# Patient Record
Sex: MALE | Race: WHITE | Employment: OTHER | URBAN - NONMETROPOLITAN AREA
[De-identification: names, ages, dates, MRNs, and addresses within clinical notes are randomized per-mention and may not be internally consistent; named-entity substitution may affect disease eponyms.]

---

## 2017-01-03 ENCOUNTER — OFFICE VISIT (OUTPATIENT)
Dept: SURGERY | Age: 62
End: 2017-01-03

## 2017-01-03 VITALS
SYSTOLIC BLOOD PRESSURE: 130 MMHG | HEART RATE: 72 BPM | RESPIRATION RATE: 14 BRPM | HEIGHT: 72 IN | DIASTOLIC BLOOD PRESSURE: 78 MMHG | BODY MASS INDEX: 30.23 KG/M2 | WEIGHT: 223.2 LBS

## 2017-01-03 DIAGNOSIS — K63.5 COLON POLYPS: Primary | ICD-10-CM

## 2017-01-03 PROCEDURE — 99213 OFFICE O/P EST LOW 20 MIN: CPT | Performed by: SURGERY

## 2017-01-03 RX ORDER — CHLORAL HYDRATE 500 MG
1200 CAPSULE ORAL 2 TIMES DAILY
COMMUNITY

## 2017-01-19 ENCOUNTER — OFFICE VISIT (OUTPATIENT)
Dept: PRIMARY CARE CLINIC | Age: 62
End: 2017-01-19

## 2017-01-19 VITALS
SYSTOLIC BLOOD PRESSURE: 132 MMHG | WEIGHT: 227.8 LBS | HEART RATE: 74 BPM | BODY MASS INDEX: 30.85 KG/M2 | HEIGHT: 72 IN | DIASTOLIC BLOOD PRESSURE: 80 MMHG | TEMPERATURE: 98.2 F | OXYGEN SATURATION: 97 %

## 2017-01-19 DIAGNOSIS — R05.9 COUGH: ICD-10-CM

## 2017-01-19 DIAGNOSIS — J01.40 ACUTE NON-RECURRENT PANSINUSITIS: Primary | ICD-10-CM

## 2017-01-19 DIAGNOSIS — J20.9 ACUTE BRONCHITIS, UNSPECIFIED ORGANISM: ICD-10-CM

## 2017-01-19 PROCEDURE — 99213 OFFICE O/P EST LOW 20 MIN: CPT | Performed by: PHYSICIAN ASSISTANT

## 2017-01-19 RX ORDER — BENZONATATE 200 MG/1
200 CAPSULE ORAL 3 TIMES DAILY PRN
Qty: 30 CAPSULE | Refills: 2 | Status: SHIPPED | OUTPATIENT
Start: 2017-01-19 | End: 2017-01-26

## 2017-01-19 RX ORDER — AZITHROMYCIN 250 MG/1
TABLET, FILM COATED ORAL
Qty: 1 PACKET | Refills: 0 | Status: SHIPPED | OUTPATIENT
Start: 2017-01-19 | End: 2017-01-29

## 2017-01-19 ASSESSMENT — ENCOUNTER SYMPTOMS
DIARRHEA: 0
COUGH: 1
NAUSEA: 0
VOMITING: 0
RHINORRHEA: 1
SHORTNESS OF BREATH: 0
WHEEZING: 1
SORE THROAT: 0
SINUS PRESSURE: 1
CHEST TIGHTNESS: 1

## 2017-01-26 ENCOUNTER — PROCEDURE VISIT (OUTPATIENT)
Dept: CARDIOLOGY | Age: 62
End: 2017-01-26

## 2017-01-26 DIAGNOSIS — I51.9 LV DYSFUNCTION: ICD-10-CM

## 2017-01-26 DIAGNOSIS — Z95.810 AICD (AUTOMATIC CARDIOVERTER/DEFIBRILLATOR) PRESENT: Primary | ICD-10-CM

## 2017-01-26 PROCEDURE — 93289 INTERROG DEVICE EVAL HEART: CPT | Performed by: INTERNAL MEDICINE

## 2017-02-07 ENCOUNTER — TELEPHONE (OUTPATIENT)
Dept: CARDIOLOGY | Age: 62
End: 2017-02-07

## 2017-03-20 ENCOUNTER — OFFICE VISIT (OUTPATIENT)
Dept: CARDIOLOGY | Age: 62
End: 2017-03-20
Payer: COMMERCIAL

## 2017-03-20 VITALS
BODY MASS INDEX: 31.69 KG/M2 | DIASTOLIC BLOOD PRESSURE: 84 MMHG | WEIGHT: 234 LBS | HEIGHT: 72 IN | SYSTOLIC BLOOD PRESSURE: 132 MMHG | HEART RATE: 64 BPM

## 2017-03-20 DIAGNOSIS — I10 ESSENTIAL HYPERTENSION: ICD-10-CM

## 2017-03-20 DIAGNOSIS — I25.5 CARDIOMYOPATHY, ISCHEMIC: ICD-10-CM

## 2017-03-20 DIAGNOSIS — I25.10 CORONARY ARTERY DISEASE INVOLVING NATIVE CORONARY ARTERY OF NATIVE HEART WITHOUT ANGINA PECTORIS: Primary | ICD-10-CM

## 2017-03-20 PROCEDURE — 93000 ELECTROCARDIOGRAM COMPLETE: CPT | Performed by: INTERNAL MEDICINE

## 2017-03-20 PROCEDURE — 99213 OFFICE O/P EST LOW 20 MIN: CPT | Performed by: INTERNAL MEDICINE

## 2017-03-24 RX ORDER — LORATADINE 10 MG/1
TABLET ORAL
Qty: 30 TABLET | Refills: 1 | Status: SHIPPED | OUTPATIENT
Start: 2017-03-24 | End: 2019-09-04

## 2017-04-13 ENCOUNTER — OFFICE VISIT (OUTPATIENT)
Dept: FAMILY MEDICINE CLINIC | Age: 62
End: 2017-04-13
Payer: COMMERCIAL

## 2017-04-13 VITALS
WEIGHT: 234 LBS | SYSTOLIC BLOOD PRESSURE: 138 MMHG | HEART RATE: 72 BPM | DIASTOLIC BLOOD PRESSURE: 70 MMHG | HEIGHT: 72 IN | BODY MASS INDEX: 31.69 KG/M2

## 2017-04-13 DIAGNOSIS — I25.10 CORONARY ARTERY DISEASE INVOLVING NATIVE CORONARY ARTERY OF NATIVE HEART WITHOUT ANGINA PECTORIS: ICD-10-CM

## 2017-04-13 DIAGNOSIS — E03.9 HYPOTHYROIDISM, UNSPECIFIED TYPE: ICD-10-CM

## 2017-04-13 DIAGNOSIS — K63.5 COLON POLYPS: ICD-10-CM

## 2017-04-13 DIAGNOSIS — F32.5 MAJOR DEPRESSIVE DISORDER WITH SINGLE EPISODE, IN FULL REMISSION (HCC): ICD-10-CM

## 2017-04-13 DIAGNOSIS — F17.210 NICOTINE DEPENDENCE, CIGARETTES, UNCOMPLICATED: Primary | ICD-10-CM

## 2017-04-13 DIAGNOSIS — I10 ESSENTIAL HYPERTENSION: ICD-10-CM

## 2017-04-13 DIAGNOSIS — Z72.0 TOBACCO ABUSE: ICD-10-CM

## 2017-04-13 DIAGNOSIS — E78.5 HYPERLIPIDEMIA WITH TARGET LDL LESS THAN 70: ICD-10-CM

## 2017-04-13 DIAGNOSIS — N52.9 ERECTILE DYSFUNCTION, UNSPECIFIED ERECTILE DYSFUNCTION TYPE: ICD-10-CM

## 2017-04-13 DIAGNOSIS — Z12.5 SCREENING PSA (PROSTATE SPECIFIC ANTIGEN): ICD-10-CM

## 2017-04-13 PROCEDURE — G0296 VISIT TO DETERM LDCT ELIG: HCPCS | Performed by: FAMILY MEDICINE

## 2017-04-13 PROCEDURE — 99214 OFFICE O/P EST MOD 30 MIN: CPT | Performed by: FAMILY MEDICINE

## 2017-04-13 ASSESSMENT — ENCOUNTER SYMPTOMS
COUGH: 0
SHORTNESS OF BREATH: 0
ALLERGIC/IMMUNOLOGIC NEGATIVE: 1
RESPIRATORY NEGATIVE: 1
GASTROINTESTINAL NEGATIVE: 1
EYES NEGATIVE: 1

## 2017-07-18 ENCOUNTER — OFFICE VISIT (OUTPATIENT)
Dept: FAMILY MEDICINE CLINIC | Age: 62
End: 2017-07-18
Payer: COMMERCIAL

## 2017-07-18 VITALS
HEIGHT: 72 IN | OXYGEN SATURATION: 97 % | HEART RATE: 86 BPM | DIASTOLIC BLOOD PRESSURE: 76 MMHG | SYSTOLIC BLOOD PRESSURE: 122 MMHG | WEIGHT: 229.2 LBS | BODY MASS INDEX: 31.04 KG/M2 | TEMPERATURE: 97.8 F

## 2017-07-18 DIAGNOSIS — D22.9 ATYPICAL NEVI: Primary | ICD-10-CM

## 2017-07-18 PROCEDURE — 99213 OFFICE O/P EST LOW 20 MIN: CPT | Performed by: NURSE PRACTITIONER

## 2017-07-18 ASSESSMENT — ENCOUNTER SYMPTOMS
SHORTNESS OF BREATH: 0
WHEEZING: 0
COUGH: 0

## 2017-07-27 ENCOUNTER — PROCEDURE VISIT (OUTPATIENT)
Dept: CARDIOLOGY | Age: 62
End: 2017-07-27
Payer: COMMERCIAL

## 2017-07-27 DIAGNOSIS — Z95.810 AICD (AUTOMATIC CARDIOVERTER/DEFIBRILLATOR) PRESENT: Primary | ICD-10-CM

## 2017-07-27 DIAGNOSIS — I51.9 LV DYSFUNCTION: ICD-10-CM

## 2017-07-27 DIAGNOSIS — I25.5 CARDIOMYOPATHY, ISCHEMIC: ICD-10-CM

## 2017-07-27 DIAGNOSIS — I48.0 PAROXYSMAL ATRIAL FIBRILLATION (HCC): ICD-10-CM

## 2017-07-27 PROCEDURE — 93289 INTERROG DEVICE EVAL HEART: CPT | Performed by: INTERNAL MEDICINE

## 2017-08-08 ENCOUNTER — TELEPHONE (OUTPATIENT)
Dept: FAMILY MEDICINE CLINIC | Age: 62
End: 2017-08-08

## 2017-10-02 ENCOUNTER — OFFICE VISIT (OUTPATIENT)
Dept: CARDIOLOGY | Age: 62
End: 2017-10-02
Payer: COMMERCIAL

## 2017-10-02 ENCOUNTER — HOSPITAL ENCOUNTER (OUTPATIENT)
Dept: LAB | Age: 62
Setting detail: SPECIMEN
Discharge: HOME | End: 2017-10-02
Payer: COMMERCIAL

## 2017-10-02 VITALS
RESPIRATION RATE: 16 BRPM | BODY MASS INDEX: 30.07 KG/M2 | DIASTOLIC BLOOD PRESSURE: 78 MMHG | WEIGHT: 222 LBS | HEART RATE: 69 BPM | HEIGHT: 72 IN | SYSTOLIC BLOOD PRESSURE: 126 MMHG

## 2017-10-02 DIAGNOSIS — I10 ESSENTIAL HYPERTENSION: ICD-10-CM

## 2017-10-02 DIAGNOSIS — Z71.6 TOBACCO ABUSE COUNSELING: ICD-10-CM

## 2017-10-02 DIAGNOSIS — E78.5 HYPERLIPIDEMIA WITH TARGET LDL LESS THAN 70: ICD-10-CM

## 2017-10-02 DIAGNOSIS — I25.10 CORONARY ARTERY DISEASE INVOLVING NATIVE CORONARY ARTERY OF NATIVE HEART WITHOUT ANGINA PECTORIS: Primary | ICD-10-CM

## 2017-10-02 DIAGNOSIS — E03.9 HYPOTHYROIDISM, UNSPECIFIED TYPE: ICD-10-CM

## 2017-10-02 DIAGNOSIS — I48.0 PAROXYSMAL ATRIAL FIBRILLATION (HCC): ICD-10-CM

## 2017-10-02 DIAGNOSIS — E78.5 HYPERLIPIDEMIA, UNSPECIFIED HYPERLIPIDEMIA TYPE: ICD-10-CM

## 2017-10-02 DIAGNOSIS — I25.5 ISCHEMIC CARDIOMYOPATHY: ICD-10-CM

## 2017-10-02 DIAGNOSIS — Z72.0 TOBACCO ABUSE: ICD-10-CM

## 2017-10-02 DIAGNOSIS — F41.9 ANXIETY: ICD-10-CM

## 2017-10-02 DIAGNOSIS — Z12.5 SCREENING PSA (PROSTATE SPECIFIC ANTIGEN): ICD-10-CM

## 2017-10-02 LAB
ABSOLUTE EOS #: 0.1 K/UL (ref 0–0.4)
ABSOLUTE LYMPH #: 3.4 K/UL (ref 1–4.8)
ABSOLUTE MONO #: 0.5 K/UL (ref 0.1–1.2)
ANION GAP SERPL CALCULATED.3IONS-SCNC: 12 MMOL/L (ref 9–17)
BASOPHILS # BLD: 1 % (ref 0–2)
BASOPHILS ABSOLUTE: 0.1 K/UL (ref 0–0.2)
BUN BLDV-MCNC: 15 MG/DL (ref 8–23)
BUN/CREAT BLD: 18 (ref 9–20)
CALCIUM SERPL-MCNC: 9.5 MG/DL (ref 8.6–10.4)
CHLORIDE BLD-SCNC: 102 MMOL/L (ref 98–107)
CHOLESTEROL/HDL RATIO: 3.8
CHOLESTEROL: 142 MG/DL
CO2: 26 MMOL/L (ref 20–31)
CREAT SERPL-MCNC: 0.82 MG/DL (ref 0.7–1.2)
DIFFERENTIAL TYPE: ABNORMAL
EOSINOPHILS RELATIVE PERCENT: 2 % (ref 1–8)
GFR AFRICAN AMERICAN: >60 ML/MIN
GFR NON-AFRICAN AMERICAN: >60 ML/MIN
GFR SERPL CREATININE-BSD FRML MDRD: NORMAL ML/MIN/{1.73_M2}
GFR SERPL CREATININE-BSD FRML MDRD: NORMAL ML/MIN/{1.73_M2}
GLUCOSE BLD-MCNC: 84 MG/DL (ref 70–99)
HCT VFR BLD CALC: 41.9 % (ref 41–53)
HDLC SERPL-MCNC: 37 MG/DL
HEMOGLOBIN: 14.7 G/DL (ref 13.5–17.5)
LDL CHOLESTEROL: 64 MG/DL (ref 0–130)
LYMPHOCYTES # BLD: 44 % (ref 15–43)
MCH RBC QN AUTO: 34.1 PG (ref 26–34)
MCHC RBC AUTO-ENTMCNC: 35.2 G/DL (ref 31–37)
MCV RBC AUTO: 96.9 FL (ref 80–100)
MONOCYTES # BLD: 7 % (ref 6–14)
PDW BLD-RTO: 13.1 % (ref 11–14.5)
PLATELET # BLD: 172 K/UL (ref 140–450)
PLATELET ESTIMATE: ABNORMAL
PMV BLD AUTO: 9.9 FL (ref 6–12)
POTASSIUM SERPL-SCNC: 4 MMOL/L (ref 3.7–5.3)
PROSTATE SPECIFIC ANTIGEN: 1.86 UG/L
RBC # BLD: 4.33 M/UL (ref 4.5–5.9)
RBC # BLD: ABNORMAL 10*6/UL
SEG NEUTROPHILS: 46 % (ref 44–74)
SEGMENTED NEUTROPHILS ABSOLUTE COUNT: 3.5 K/UL (ref 1.8–7.7)
SODIUM BLD-SCNC: 140 MMOL/L (ref 135–144)
THYROXINE, FREE: 1.22 NG/DL (ref 0.93–1.7)
TRIGL SERPL-MCNC: 203 MG/DL
TSH SERPL DL<=0.05 MIU/L-ACNC: 1.44 MIU/L (ref 0.3–5)
VLDLC SERPL CALC-MCNC: ABNORMAL MG/DL (ref 1–30)
WBC # BLD: 7.6 K/UL (ref 3.5–11)
WBC # BLD: ABNORMAL 10*3/UL

## 2017-10-02 PROCEDURE — 36415 COLL VENOUS BLD VENIPUNCTURE: CPT

## 2017-10-02 PROCEDURE — 85025 COMPLETE CBC W/AUTO DIFF WBC: CPT

## 2017-10-02 PROCEDURE — 99213 OFFICE O/P EST LOW 20 MIN: CPT | Performed by: INTERNAL MEDICINE

## 2017-10-02 PROCEDURE — 80048 BASIC METABOLIC PNL TOTAL CA: CPT

## 2017-10-02 PROCEDURE — 93000 ELECTROCARDIOGRAM COMPLETE: CPT | Performed by: INTERNAL MEDICINE

## 2017-10-02 PROCEDURE — 80061 LIPID PANEL: CPT

## 2017-10-02 PROCEDURE — 84443 ASSAY THYROID STIM HORMONE: CPT

## 2017-10-02 PROCEDURE — G0103 PSA SCREENING: HCPCS

## 2017-10-02 PROCEDURE — 84439 ASSAY OF FREE THYROXINE: CPT

## 2017-10-02 RX ORDER — VARENICLINE TARTRATE 1 MG/1
1 TABLET, FILM COATED ORAL 2 TIMES DAILY
Qty: 60 TABLET | Refills: 5 | Status: SHIPPED | OUTPATIENT
Start: 2017-10-02 | End: 2018-01-09 | Stop reason: SDUPTHER

## 2017-10-02 RX ORDER — VARENICLINE TARTRATE 25 MG
KIT ORAL
Qty: 42 TABLET | Refills: 0 | Status: SHIPPED | OUTPATIENT
Start: 2017-10-02 | End: 2018-01-09

## 2017-10-02 NOTE — PROGRESS NOTES
Cardiology Consultation  [facility]    Patient is here for follow up:    HPI and Chief Complaint:  Renetta Aguilar  is doing well from a cardiac standpoint. Good functional capacity with no significant change in functional capacity. No chest pain, no dyspnea, no PND, no syncope or pre-syncope, no orthopnea. No symptoms of CHF or angina/chest pain. Here for follow up regarding the above chronic stable cardiovascular conditions. REVIEW OF SYSTEMS:    · Constitutional: there has been no unanticipated weight loss. There's been No change in energy level, No change in activity level. · Eyes: No visual changes or diplopia. No scleral icterus. · ENT: No Headaches, hearing loss or vertigo. No mouth sores or sore throat. · Cardiovascular: No chest pain, no dyspnea, no chf like symptoms  · Respiratory: No previous pulmonary problems  · Gastrointestinal: No abdominal pain, appetite loss, blood in stools. No change in bowel or bladder habits. · Genitourinary: No dysuria, trouble voiding, or hematuria. · Musculoskeletal:  No gait disturbance, No weakness or joint complaints. · Integumentary: No rash or pruritis. · Neurological: No headache, diplopia, change in muscle strength, numbness or tingling. No change in gait, balance, coordination, mood, affect, memory, mentation, behavior. · Psychiatric: No new anxiety or depression. · Endocrine: No temperature intolerance. No excessive thirst, fluid intake, or urination. No tremor. · Hematologic/Lymphatic: No abnormal bruising or bleeding, blood clots or swollen lymph nodes. · Allergic/Immunologic: No nasal congestion or hives. Physical Exam:   Vitals: /78  Pulse 69  Resp 16  Ht 6' (1.829 m)  Wt 222 lb (100.7 kg)  BMI 30.11 kg/m2  General appearance: alert and cooperative with exam  HEENT: Head: Normocephalic, no lesions, without obvious abnormality.   Neck: no carotid bruit, no JVD  Lungs: clear to auscultation bilaterally  Heart: regular

## 2017-10-02 NOTE — MR AVS SNAPSHOT
Current Some Day Smoker           Additional Information about your Body Mass Index (BMI)           Your BMI as listed above is considered obese (30 or more). BMI is an estimate of body fat, calculated from your height and weight. The higher your BMI, the greater your risk of heart disease, high blood pressure, type 2 diabetes, stroke, gallstones, arthritis, sleep apnea, and certain cancers. BMI is not perfect. It may overestimate body fat in athletes and people who are more muscular. Even a small weight loss (between 5 and 10 percent of your current weight) by decreasing your calorie intake and becoming more physically active will help lower your risk of developing or worsening diseases associated with obesity. Learn more at: [email or web address]             Today's Medication Changes          These changes are accurate as of: 10/2/17  2:17 PM.  If you have any questions, ask your nurse or doctor. START taking these medications           * varenicline 0.5 MG X 11 & 1 MG X 42 tablet   Commonly known as:  CHANTIX STARTING MONTH LETY   Instructions: Take by mouth. Quantity:  42 tablet   Refills:  0   Started by: DO Kaye Chris varenicline 1 MG tablet   Commonly known as:  CHANTIX CONTINUING MONTH LETY   Instructions: Take 1 tablet by mouth 2 times daily   Quantity:  60 tablet   Refills:  5   Started by: Ghanshyam Benavidez DO       * Notice: This list has 2 medication(s) that are the same as other medications prescribed for you. Read the directions carefully, and ask your doctor or other care provider to review them with you.          Where to Get Your Medications      These medications were sent to [address] [place] [address] Charly Alfonso [phone] [address] [facility] [address]     Phone:  [phone]     varenicline 0.5 MG X 11 & 1 MG X 42 tablet    varenicline 1 MG tablet Pneumococcal Vaccine - Pneumovax for adults aged 19-64 years with: chronic heart disease, chronic lung disease, diabetes mellitus, alcoholism, chronic liver disease, or cigarette smoking. (1 of 1 - PPSV23) 1/4/1973    Yearly Flu Vaccine (1) 9/1/2017    Colonoscopy 12/21/2021    Cholesterol Screening 3/31/2022            MyChart Signup           Our records indicate that you have declined MyChart signup.

## 2017-10-09 RX ORDER — ATORVASTATIN CALCIUM 80 MG/1
TABLET, FILM COATED ORAL
Qty: 30 TABLET | Refills: 2 | Status: SHIPPED | OUTPATIENT
Start: 2017-10-09 | End: 2018-04-27 | Stop reason: SDUPTHER

## 2017-10-16 DIAGNOSIS — I48.0 PAROXYSMAL ATRIAL FIBRILLATION (HCC): Primary | ICD-10-CM

## 2017-10-16 RX ORDER — CLOPIDOGREL BISULFATE 75 MG/1
TABLET ORAL
Qty: 30 TABLET | Refills: 2 | Status: SHIPPED | OUTPATIENT
Start: 2017-10-16 | End: 2018-04-27 | Stop reason: SDUPTHER

## 2017-10-30 DIAGNOSIS — I10 ESSENTIAL HYPERTENSION: ICD-10-CM

## 2017-10-31 RX ORDER — RAMIPRIL 5 MG/1
CAPSULE ORAL
Qty: 30 CAPSULE | Refills: 2 | Status: SHIPPED | OUTPATIENT
Start: 2017-10-31 | End: 2018-04-27 | Stop reason: SDUPTHER

## 2018-01-09 ENCOUNTER — OFFICE VISIT (OUTPATIENT)
Dept: CARDIOLOGY | Age: 63
End: 2018-01-09
Payer: COMMERCIAL

## 2018-01-09 VITALS
HEIGHT: 72 IN | HEART RATE: 71 BPM | DIASTOLIC BLOOD PRESSURE: 80 MMHG | SYSTOLIC BLOOD PRESSURE: 128 MMHG | BODY MASS INDEX: 30.07 KG/M2 | WEIGHT: 222 LBS

## 2018-01-09 DIAGNOSIS — I25.10 CORONARY ARTERY DISEASE INVOLVING NATIVE CORONARY ARTERY OF NATIVE HEART WITHOUT ANGINA PECTORIS: Primary | ICD-10-CM

## 2018-01-09 DIAGNOSIS — E78.5 HYPERLIPIDEMIA, UNSPECIFIED HYPERLIPIDEMIA TYPE: ICD-10-CM

## 2018-01-09 DIAGNOSIS — R06.09 DOE (DYSPNEA ON EXERTION): ICD-10-CM

## 2018-01-09 DIAGNOSIS — I10 ESSENTIAL HYPERTENSION: ICD-10-CM

## 2018-01-09 DIAGNOSIS — I48.0 PAROXYSMAL ATRIAL FIBRILLATION (HCC): ICD-10-CM

## 2018-01-09 DIAGNOSIS — I25.5 ISCHEMIC CARDIOMYOPATHY: ICD-10-CM

## 2018-01-09 PROCEDURE — 99213 OFFICE O/P EST LOW 20 MIN: CPT | Performed by: INTERNAL MEDICINE

## 2018-01-09 PROCEDURE — 93000 ELECTROCARDIOGRAM COMPLETE: CPT | Performed by: INTERNAL MEDICINE

## 2018-01-09 RX ORDER — VARENICLINE TARTRATE 1 MG/1
1 TABLET, FILM COATED ORAL 2 TIMES DAILY
Qty: 60 TABLET | Refills: 5 | Status: SHIPPED | OUTPATIENT
Start: 2018-01-09 | End: 2018-04-27 | Stop reason: ALTCHOICE

## 2018-01-09 NOTE — PATIENT INSTRUCTIONS
Central Scheduling will call you to book your testing appointment. If you do not receive a call within 48 hours, please call their number at [phone] and push option [phone]. If you have any questions or concerns, call Cardiology at [phone]. The Cardiopulmonary Testing Facility is located in the basement of [facility]. Please check in for your testing appointment at the [facility] desk. Patient Education        Stopping Smoking: Care Instructions  Your Care Instructions    Cigarette smokers crave the nicotine in cigarettes. Giving it up is much harder than simply changing a habit. Your body has to stop craving the nicotine. It is hard to quit, but you can do it. There are many tools that people use to quit smoking. You may find that combining tools works best for you. There are several steps to quitting. First you get ready to quit. Then you get support to help you. After that, you learn new skills and behaviors to become a nonsmoker. For many people, a necessary step is getting and using medicine. Your doctor will help you set up the plan that best meets your needs. You may want to attend a smoking cessation program to help you quit smoking. When you choose a program, look for one that has proven success. Ask your doctor for ideas. You will greatly increase your chances of success if you take medicine as well as get counseling or join a cessation program.  Some of the changes you feel when you first quit tobacco are uncomfortable. Your body will miss the nicotine at first, and you may feel short-tempered and grumpy. You may have trouble sleeping or concentrating. Medicine can help you deal with these symptoms. You may struggle with changing your smoking habits and rituals. The last step is the tricky one: Be prepared for the smoking urge to continue for a time. This is a lot to deal with, but keep at it. You will feel better.   Follow-up care is a key part of your treatment and

## 2018-01-11 ENCOUNTER — OFFICE VISIT (OUTPATIENT)
Dept: FAMILY MEDICINE CLINIC | Age: 63
End: 2018-01-11
Payer: COMMERCIAL

## 2018-01-11 VITALS
WEIGHT: 240 LBS | DIASTOLIC BLOOD PRESSURE: 82 MMHG | HEIGHT: 72 IN | HEART RATE: 60 BPM | RESPIRATION RATE: 16 BRPM | BODY MASS INDEX: 32.51 KG/M2 | SYSTOLIC BLOOD PRESSURE: 130 MMHG

## 2018-01-11 DIAGNOSIS — I10 ESSENTIAL HYPERTENSION: ICD-10-CM

## 2018-01-11 DIAGNOSIS — Z72.0 TOBACCO ABUSE: ICD-10-CM

## 2018-01-11 DIAGNOSIS — E78.5 HYPERLIPIDEMIA WITH TARGET LDL LESS THAN 70: ICD-10-CM

## 2018-01-11 DIAGNOSIS — N52.9 ERECTILE DYSFUNCTION, UNSPECIFIED ERECTILE DYSFUNCTION TYPE: ICD-10-CM

## 2018-01-11 DIAGNOSIS — Z12.5 SCREENING PSA (PROSTATE SPECIFIC ANTIGEN): ICD-10-CM

## 2018-01-11 DIAGNOSIS — E03.9 HYPOTHYROIDISM, UNSPECIFIED TYPE: ICD-10-CM

## 2018-01-11 DIAGNOSIS — F32.5 MAJOR DEPRESSIVE DISORDER WITH SINGLE EPISODE, IN FULL REMISSION (HCC): ICD-10-CM

## 2018-01-11 DIAGNOSIS — I25.10 CORONARY ARTERY DISEASE INVOLVING NATIVE CORONARY ARTERY OF NATIVE HEART WITHOUT ANGINA PECTORIS: Primary | ICD-10-CM

## 2018-01-11 PROCEDURE — 99214 OFFICE O/P EST MOD 30 MIN: CPT | Performed by: FAMILY MEDICINE

## 2018-01-11 ASSESSMENT — PATIENT HEALTH QUESTIONNAIRE - PHQ9
SUM OF ALL RESPONSES TO PHQ9 QUESTIONS 1 & 2: 0
2. FEELING DOWN, DEPRESSED OR HOPELESS: 0
SUM OF ALL RESPONSES TO PHQ QUESTIONS 1-9: 0
1. LITTLE INTEREST OR PLEASURE IN DOING THINGS: 0

## 2018-01-11 ASSESSMENT — ENCOUNTER SYMPTOMS
RESPIRATORY NEGATIVE: 1
EYES NEGATIVE: 1
SHORTNESS OF BREATH: 0
GASTROINTESTINAL NEGATIVE: 1
COUGH: 0
ALLERGIC/IMMUNOLOGIC NEGATIVE: 1

## 2018-01-11 NOTE — PATIENT INSTRUCTIONS
average adult body mass. Additional eGFR calculator   available at:        [email or web address]        Performed at [facility] [address] [facility] [address] [place] [place] [phone]      GFR Staging 10/02/2017 NOT REPORTED   Final    WBC 10/02/2017 7.6  3.5 - 11.0 k/uL Final    RBC 10/02/2017 4.33* 4.5 - 5.9 m/uL Final    Hemoglobin 10/02/2017 14.7  13.5 - 17.5 g/dL Final    Hematocrit 10/02/2017 41.9  41 - 53 % Final    MCV 10/02/2017 96.9  80 - 100 fL Final    MCH 10/02/2017 34.1* 26 - 34 pg Final    MCHC 10/02/2017 35.2  31 - 37 g/dL Final    RDW 10/02/2017 13.1  11.0 - 14.5 % Final    Platelets 88/46/5086 172  140 - 450 k/uL Final    MPV 10/02/2017 9.9  6.0 - 12.0 fL Final    Differential Type 10/02/2017 NOT REPORTED   Final    WBC Morphology 10/02/2017 NOT REPORTED   Final    RBC Morphology 10/02/2017 NOT REPORTED   Final    Platelet Estimate 66/62/9944 NOT REPORTED   Final    Seg Neutrophils 10/02/2017 46  44 - 74 % Final    Lymphocytes 10/02/2017 44* 15 - 43 % Final    Monocytes 10/02/2017 7  6 - 14 % Final    Eosinophils % 10/02/2017 2  1 - 8 % Final    Basophils 10/02/2017 1  0 - 2 % Final    Segs Absolute 10/02/2017 3.50  1.8 - 7.7 k/uL Final    Absolute Lymph # 10/02/2017 3.40  1.0 - 4.8 k/uL Final    Absolute Mono # 10/02/2017 0.50  0.1 - 1.2 k/uL Final    Absolute Eos # 10/02/2017 0.10  0.0 - 0.4 k/uL Final    Basophils # 10/02/2017 0.10  0.0 - 0.2 k/uL Final    Comment: Performed at [facility] [address] [place] [address] [phone]      PSA 10/02/2017 1.86  <4.1 ug/L Final    Comment: The Roche \"ECLIA\" assay is used. Results obtained with different assay methods   cannot be used interchangeably. Performed at [facility] [address] [id number] [phone]      TSH 10/02/2017 1.44  0.30 - 5.00 mIU/L Final    Comment: Performed at [facility] [address] [address] [phone]      Thyroxine, Free 10/02/2017 1.22  0.93 - 1.70 ng/dL Final    Comment: Performed at [facility] [address] [facility] [id number] [phone]       Patient Education        Learning About High Blood Pressure  What is high blood pressure? Blood pressure is a measure of how hard the blood pushes against the walls of your arteries. It's normal for blood pressure to go up and down throughout the day, but if it stays up, you have high blood pressure. Another name for high blood pressure is hypertension. Two numbers tell you your blood pressure. The first number is the systolic pressure. It shows how hard the blood pushes when your heart is pumping. The second number is the diastolic pressure. It shows how hard the blood pushes between heartbeats, when your heart is relaxed and filling with blood. A blood pressure of less than 120/80 (say \"120 over 80\") is ideal for an adult. High blood pressure is 140/90 or higher. You have high blood pressure if your top number is 140 or higher or your bottom number is 90 or higher, or both. Many people fall into the category in between, called prehypertension. People with prehypertension need to make lifestyle changes to bring their blood pressure down and help prevent or delay high blood pressure. What happens when you have high blood pressure? · Blood flows through your arteries with too much force. Over time, this damages the walls of your arteries. But you can't feel it. High blood pressure usually doesn't cause symptoms. · Fat and calcium start to build up in your arteries. This buildup is called plaque. Plaque makes your arteries narrower and stiffer. Blood can't flow through them as easily. · This lack of good blood flow starts to damage some of the organs in your body.  This can lead to problems such as coronary artery disease and heart attack, heart failure, stroke, kidney failure, and eye damage. How can you prevent high blood pressure? · Stay at a healthy weight. · Try to limit how much sodium you eat to less than 2,300 milligrams (mg) a day. If you limit your sodium to 1,500 mg a day, you can lower your blood pressure even more. ¨ Buy foods that are labeled \"unsalted,\" \"sodium-free,\" or \"low-sodium. \" Foods labeled \"reduced-sodium\" and \"light sodium\" may still have too much sodium. ¨ Flavor your food with garlic, lemon juice, onion, vinegar, herbs, and spices instead of salt. Do not use soy sauce, steak sauce, onion salt, garlic salt, mustard, or ketchup on your food. ¨ Use less salt (or none) when recipes call for it. You can often use half the salt a recipe calls for without losing flavor. · Be physically active. Get at least 30 minutes of exercise on most days of the week. Walking is a good choice. You also may want to do other activities, such as running, swimming, cycling, or playing tennis or team sports. · Limit alcohol to 2 drinks a day for men and 1 drink a day for women. · Eat plenty of fruits, vegetables, and low-fat dairy products. Eat less saturated and total fats. How is high blood pressure treated? · Your doctor will suggest making lifestyle changes. For example, your doctor may ask you to eat healthy foods, quit smoking, lose extra weight, and be more active. · If lifestyle changes don't help enough or your blood pressure is very high, you will have to take medicine every day. Follow-up care is a key part of your treatment and safety. Be sure to make and go to all appointments, and call your doctor if you are having problems. It's also a good idea to know your test results and keep a list of the medicines you take. Where can you learn more? Go to https://ginger.healthOptisense. org and sign in to your [employer] account.  Enter P501 in the [address] Information box to learn more about \"Learning About High Blood Pressure. \"     If you do not have an account, please click on the \"Sign Up Now\" link. Current as of: September 21, 2016  Content Version: 11.5  © 7745-6476 [employer]. Care instructions adapted under license by [facility] ([facility]). If you have questions about a medical condition or this instruction, always ask your healthcare professional. [address] any warranty or liability for your use of this information. Patient Education        Learning About High Cholesterol  What is high cholesterol? Cholesterol is a type of fat in your blood. It is needed for many body functions, such as making new cells. Cholesterol is made by your body. It also comes from food you eat. If you have too much cholesterol, it starts to build up in your arteries. This is called hardening of the arteries, or atherosclerosis. High cholesterol raises your risk of a heart attack and stroke. There are different types of cholesterol. LDL is the \"bad\" cholesterol. High LDL can raise your risk for heart disease, heart attack, and stroke. HDL is the \"good\" cholesterol. High HDL is linked with a lower risk for heart disease, heart attack, and stroke. Your cholesterol levels help your doctor find out your risk for having a heart attack or stroke. How can you prevent high cholesterol? A heart-healthy lifestyle can help you prevent high cholesterol. This lifestyle helps lower your risk for a heart attack and stroke. · Eat heart-healthy foods. ¨ Eat fruits, vegetables, whole grains (like oatmeal), dried beans and peas, nuts and seeds, soy products (like tofu), and fat-free or low-fat dairy products. ¨ Replace butter, margarine, and hydrogenated or partially hydrogenated oils with olive and canola oils. (Canola oil margarine without trans fat is fine.)  ¨ Replace red meat with fish, poultry, and soy protein (like tofu).   ¨ Limit processed and

## 2018-01-11 NOTE — PROGRESS NOTES
Radha Velasco received counseling on the following healthy behaviors: medication adherence  Reviewed prior labs and health maintenance  Continue current medications, diet and exercise. Discussed use, benefit, and side effects of prescribed medications. Barriers to medication compliance addressed. Patient given educational materials - see patient instructions  Was a self-tracking handout given in paper form or via [employer]? Yes    Requested Prescriptions      No prescriptions requested or ordered in this encounter       All patient questions answered. Patient voiced understanding. Quality Measures    Body mass index is 32.55 kg/m². Normal. Weight control planned discussed Healthy diet and regular exercise. BP: 130/82 Blood pressure is normal. Treatment plan consists of No treatment change needed.     Lab Results   Component Value Date    LDLCHOLESTEROL 64 10/02/2017    LDLDIRECT 98 09/30/2012    (goal LDL reduction with dx if diabetes is 50% LDL reduction)      PHQ Scores 1/11/2018   PHQ2 Score 0   PHQ9 Score 0     Interpretation of Total Score Depression Severity: 1-4 = Minimal depression, 5-9 = Mild depression, 10-14 = Moderate depression, 15-19 = Moderately severe depression, 20-27 = Severe depression

## 2018-01-11 NOTE — PROGRESS NOTES
Subjective:      Patient ID: Nilesh Martines is a 58 y.o. male. Coronary Artery Disease   Pertinent negatives include no shortness of breath. Risk factors include hyperlipidemia. Hypertension   Pertinent negatives include no neck pain or shortness of breath. Hyperlipidemia   Pertinent negatives include no myalgias or shortness of breath. Erectile Dysfunction       Routine follow up on chronic medical conditions, refills, and review of updated labs. I have reviewed the patient's medical history in detail and updated the computerized patient record. Feeling well over the interval.  No chest pain or palpitations. bp under good control. He has quit smoking over the interval !! [birth year]. No back pain at present. Feeling well.           Past Medical History:   Diagnosis Date    Anxiety     Atrial fibrillation ([facility] [address]     CAD (coronary artery disease) 2012    stent to LAD after MI    Erectile dysfunction     Headache(784.0)     High cholesterol     Hx of adenomatous colonic polyps     4 tubular adenomas, 2 hyperplastic polyps 11/11/15    Hypertension     Ischemic cardiomyopathy     AICD 1/13 for EF 20-25%    Low back pain     history of     Osteoarthritis     knees    Plantar fasciitis     Seborrheic keratosis     history of      Current Outpatient Prescriptions   Medication Sig Dispense Refill    varenicline (CHANTIX CONTINUING MONTH LETY) 1 MG tablet Take 1 tablet by mouth 2 times daily 60 tablet 5    ramipril (ALTACE) 5 MG capsule TAKE ONE CAPSULE BY MOUTH DAILY 30 capsule 2    clopidogrel (PLAVIX) 75 MG tablet TAKE ONE TABLET BY MOUTH DAILY 30 tablet 2    atorvastatin (LIPITOR) 80 MG tablet TAKE ONE TABLET BY MOUTH ONCE NIGHTLY 30 tablet 2    loratadine (CLARITIN) 10 MG tablet TAKE ONE TABLET BY MOUTH DAILY 30 tablet 1    metoprolol tartrate (LOPRESSOR) 25 MG tablet Take 1 tablet by mouth 2 times daily 180 tablet 3    aspirin 81 MG tablet Take 1 tablet by mouth daily 30

## 2018-01-25 ENCOUNTER — PROCEDURE VISIT (OUTPATIENT)
Dept: CARDIOLOGY | Age: 63
End: 2018-01-25
Payer: COMMERCIAL

## 2018-01-25 DIAGNOSIS — I25.5 ISCHEMIC CARDIOMYOPATHY: ICD-10-CM

## 2018-01-25 DIAGNOSIS — Z95.810 AICD (AUTOMATIC CARDIOVERTER/DEFIBRILLATOR) PRESENT: Primary | ICD-10-CM

## 2018-01-25 PROCEDURE — 93289 INTERROG DEVICE EVAL HEART: CPT | Performed by: INTERNAL MEDICINE

## 2018-02-09 ENCOUNTER — HOSPITAL ENCOUNTER (OUTPATIENT)
Dept: NON INVASIVE DIAGNOSTICS | Age: 63
Discharge: HOME | End: 2018-02-09
Payer: COMMERCIAL

## 2018-02-09 DIAGNOSIS — I25.5 ISCHEMIC CARDIOMYOPATHY: ICD-10-CM

## 2018-02-09 DIAGNOSIS — I25.10 CORONARY ARTERY DISEASE INVOLVING NATIVE CORONARY ARTERY OF NATIVE HEART WITHOUT ANGINA PECTORIS: ICD-10-CM

## 2018-02-09 DIAGNOSIS — I48.0 PAROXYSMAL ATRIAL FIBRILLATION (HCC): ICD-10-CM

## 2018-02-09 DIAGNOSIS — E78.5 HYPERLIPIDEMIA, UNSPECIFIED HYPERLIPIDEMIA TYPE: ICD-10-CM

## 2018-02-09 DIAGNOSIS — R06.09 DOE (DYSPNEA ON EXERTION): ICD-10-CM

## 2018-02-09 DIAGNOSIS — I10 ESSENTIAL HYPERTENSION: ICD-10-CM

## 2018-02-09 PROCEDURE — 93306 TTE W/DOPPLER COMPLETE: CPT

## 2018-02-12 NOTE — PROCEDURES
[address]                   [address] [place] [id number]                                  ECHOCARDIOGRAM    PATIENT NAME: Laura Cheng                     :        1955  MED REC NO:   [id number]                             ROOM:  ACCOUNT NO:   [de-identified]                           ADMIT DATE: 2018  PROVIDER:     Hayde Gamble    TRANSTHORACIC ECHOCARDIOGRAM    DATE OF PROCEDURE:  2018    ORDERING PROVIDER:  Juan Antonio Wallace MD    PRIMARY CARE PROVIDER:  Jamisno Rodriguez MD    CLINICAL DIAGNOSIS:  CAD, history of MI, hypertension. M-MODE/SECTOR MEASUREMENTS:   (*Measurement made in subxiphoid view)    1. LVEDD (3.7-5.6cm<3.2cm/m2)     6.3  2. LVESD (2.2-4.0cm)    4.7  3. MAS (24-42%)    26%  4. MESS (<9cm)  5. LVIVS thickness (.6-1.2cm)     1.0  6. LVPW thickness (.5-1.0cm) 1.0  7. Estimated mitral valve are by Planimetry  8. Blood pressure  9. LA (1.9-4.0cm<2.2cm/m2)   4.6  10.  RVIDD (.7-2.6cm<1.4cm/m2)  11.  RVW (.3-.9cm)  12.  Pulmonary artery  13. LVOT diameter  3.2  14. Ao Root (2.0-3.7<2.2cm/m2)  15. Heart rate  16. LV Mass - Diastolic (231FI)  17. LV Mass - Systolic (435VL)  18. Estimated Ejection Fraction (Pelaez's) 45%  19. IVC Collapse   YES  20. Diameter (0.9-1.5 cm)  21. LA Volume Index  22. LA Volume  23. RVSP 30    FINDINGS:  1. Left ventricular dimensions are normal.  Contractility is mildly  reduced. There are multiple segmental wall motion abnormality mainly  involving the anteroseptal and apical areas. There is grade 1 diastolic  dysfunction of the left ventricle. 2.  Right ventricle appears to be normal in size and function. 3.  Mild left atrial enlargement. 4.  Aortic root diameter is normal at 3.2 cm. 5.  Aortic valve is unremarkable. 6.  Mitral valve shows mild regurgitation. No stenosis. 7.  There is mild tricuspid regurgitation.   8.  Right ventricular systolic pressure was estimated at

## 2018-02-22 DIAGNOSIS — I10 ESSENTIAL HYPERTENSION: ICD-10-CM

## 2018-02-22 DIAGNOSIS — I48.0 PAROXYSMAL ATRIAL FIBRILLATION (HCC): ICD-10-CM

## 2018-04-27 ENCOUNTER — OFFICE VISIT (OUTPATIENT)
Dept: FAMILY MEDICINE CLINIC | Age: 63
End: 2018-04-27
Payer: COMMERCIAL

## 2018-04-27 VITALS
BODY MASS INDEX: 32.52 KG/M2 | HEART RATE: 68 BPM | DIASTOLIC BLOOD PRESSURE: 72 MMHG | SYSTOLIC BLOOD PRESSURE: 128 MMHG | HEIGHT: 72 IN | WEIGHT: 240.08 LBS

## 2018-04-27 DIAGNOSIS — G47.19 EXCESSIVE DAYTIME SLEEPINESS: ICD-10-CM

## 2018-04-27 DIAGNOSIS — R06.81 WITNESSED EPISODE OF APNEA: Primary | ICD-10-CM

## 2018-04-27 PROCEDURE — 99213 OFFICE O/P EST LOW 20 MIN: CPT | Performed by: FAMILY MEDICINE

## 2018-04-27 RX ORDER — ATORVASTATIN CALCIUM 80 MG/1
TABLET, FILM COATED ORAL
Qty: 90 TABLET | Refills: 3 | Status: SHIPPED | OUTPATIENT
Start: 2018-04-27 | End: 2018-11-08 | Stop reason: SDUPTHER

## 2018-04-27 RX ORDER — CLOPIDOGREL BISULFATE 75 MG/1
TABLET ORAL
Qty: 90 TABLET | Refills: 3 | Status: SHIPPED | OUTPATIENT
Start: 2018-04-27 | End: 2018-08-04 | Stop reason: SDUPTHER

## 2018-04-27 RX ORDER — FLUTICASONE PROPIONATE 50 MCG
2 SPRAY, SUSPENSION (ML) NASAL DAILY
Qty: 1 BOTTLE | Refills: 11 | Status: SHIPPED | OUTPATIENT
Start: 2018-04-27 | End: 2019-03-04 | Stop reason: SDUPTHER

## 2018-04-27 RX ORDER — RAMIPRIL 5 MG/1
CAPSULE ORAL
Qty: 90 CAPSULE | Refills: 3 | Status: SHIPPED | OUTPATIENT
Start: 2018-04-27 | End: 2019-03-04 | Stop reason: SDUPTHER

## 2018-04-27 ASSESSMENT — ENCOUNTER SYMPTOMS
EYES NEGATIVE: 1
RESPIRATORY NEGATIVE: 1
GASTROINTESTINAL NEGATIVE: 1
ALLERGIC/IMMUNOLOGIC NEGATIVE: 1

## 2018-06-05 ENCOUNTER — HOSPITAL ENCOUNTER (OUTPATIENT)
Dept: SLEEP CENTER | Age: 63
Discharge: HOME | End: 2018-06-07
Payer: COMMERCIAL

## 2018-06-05 DIAGNOSIS — G47.19 EXCESSIVE DAYTIME SLEEPINESS: ICD-10-CM

## 2018-06-05 DIAGNOSIS — R06.81 WITNESSED EPISODE OF APNEA: ICD-10-CM

## 2018-06-05 PROCEDURE — 95810 POLYSOM 6/> YRS 4/> PARAM: CPT

## 2018-06-20 ENCOUNTER — TELEPHONE (OUTPATIENT)
Dept: FAMILY MEDICINE CLINIC | Age: 63
End: 2018-06-20

## 2018-06-20 DIAGNOSIS — R06.81 WITNESSED EPISODE OF APNEA: Primary | ICD-10-CM

## 2018-06-20 DIAGNOSIS — G47.19 EXCESSIVE DAYTIME SLEEPINESS: ICD-10-CM

## 2018-07-10 ENCOUNTER — OFFICE VISIT (OUTPATIENT)
Dept: CARDIOLOGY | Age: 63
End: 2018-07-10
Payer: COMMERCIAL

## 2018-07-10 VITALS
WEIGHT: 233.8 LBS | HEART RATE: 75 BPM | DIASTOLIC BLOOD PRESSURE: 90 MMHG | HEIGHT: 72 IN | BODY MASS INDEX: 31.67 KG/M2 | SYSTOLIC BLOOD PRESSURE: 140 MMHG

## 2018-07-10 DIAGNOSIS — I25.5 ISCHEMIC CARDIOMYOPATHY: ICD-10-CM

## 2018-07-10 DIAGNOSIS — I10 ESSENTIAL HYPERTENSION: ICD-10-CM

## 2018-07-10 DIAGNOSIS — E78.5 HYPERLIPIDEMIA, UNSPECIFIED HYPERLIPIDEMIA TYPE: ICD-10-CM

## 2018-07-10 DIAGNOSIS — I25.10 CORONARY ARTERY DISEASE INVOLVING NATIVE CORONARY ARTERY OF NATIVE HEART WITHOUT ANGINA PECTORIS: Primary | ICD-10-CM

## 2018-07-10 PROCEDURE — 99213 OFFICE O/P EST LOW 20 MIN: CPT | Performed by: INTERNAL MEDICINE

## 2018-07-10 PROCEDURE — 93000 ELECTROCARDIOGRAM COMPLETE: CPT | Performed by: INTERNAL MEDICINE

## 2018-07-26 ENCOUNTER — PROCEDURE VISIT (OUTPATIENT)
Dept: CARDIOLOGY | Age: 63
End: 2018-07-26
Payer: COMMERCIAL

## 2018-07-26 DIAGNOSIS — I51.9 LV DYSFUNCTION: ICD-10-CM

## 2018-07-26 DIAGNOSIS — Z95.810 AICD (AUTOMATIC CARDIOVERTER/DEFIBRILLATOR) PRESENT: Primary | ICD-10-CM

## 2018-07-26 PROCEDURE — 93289 INTERROG DEVICE EVAL HEART: CPT | Performed by: INTERNAL MEDICINE

## 2018-08-04 ENCOUNTER — OFFICE VISIT (OUTPATIENT)
Dept: PRIMARY CARE CLINIC | Age: 63
End: 2018-08-04
Payer: COMMERCIAL

## 2018-08-04 VITALS
HEART RATE: 80 BPM | OXYGEN SATURATION: 93 % | WEIGHT: 237 LBS | BODY MASS INDEX: 32.1 KG/M2 | SYSTOLIC BLOOD PRESSURE: 116 MMHG | HEIGHT: 72 IN | TEMPERATURE: 97.8 F | DIASTOLIC BLOOD PRESSURE: 80 MMHG

## 2018-08-04 DIAGNOSIS — J01.40 ACUTE NON-RECURRENT PANSINUSITIS: Primary | ICD-10-CM

## 2018-08-04 PROCEDURE — 99214 OFFICE O/P EST MOD 30 MIN: CPT | Performed by: FAMILY MEDICINE

## 2018-08-04 RX ORDER — METOPROLOL SUCCINATE 25 MG/1
25 TABLET, EXTENDED RELEASE ORAL DAILY
COMMUNITY
Start: 2018-07-11 | End: 2019-01-14 | Stop reason: SDUPTHER

## 2018-08-04 RX ORDER — CEFUROXIME AXETIL 500 MG/1
500 TABLET ORAL 2 TIMES DAILY
Qty: 20 TABLET | Refills: 0 | Status: SHIPPED | OUTPATIENT
Start: 2018-08-04 | End: 2019-01-14

## 2018-08-04 RX ORDER — VARENICLINE TARTRATE 1 MG/1
TABLET, FILM COATED ORAL
COMMUNITY
Start: 2018-07-26 | End: 2019-02-18 | Stop reason: SDUPTHER

## 2018-08-04 RX ORDER — TRIAMCINOLONE ACETONIDE 40 MG/ML
40 INJECTION, SUSPENSION INTRA-ARTICULAR; INTRAMUSCULAR ONCE
Status: COMPLETED | OUTPATIENT
Start: 2018-08-04 | End: 2018-08-04

## 2018-08-04 RX ADMIN — TRIAMCINOLONE ACETONIDE 40 MG: 40 INJECTION, SUSPENSION INTRA-ARTICULAR; INTRAMUSCULAR at 15:53

## 2018-08-04 ASSESSMENT — ENCOUNTER SYMPTOMS
RHINORRHEA: 1
SORE THROAT: 1
EYE DISCHARGE: 0
TROUBLE SWALLOWING: 0
NAUSEA: 0
SINUS COMPLAINT: 1
VOMITING: 0
SINUS PRESSURE: 1
WHEEZING: 0
COUGH: 1
SINUS PAIN: 1
ABDOMINAL PAIN: 0
SHORTNESS OF BREATH: 0
CONSTIPATION: 0
EYE REDNESS: 0
DIARRHEA: 0

## 2018-08-04 NOTE — PROGRESS NOTES
Subjective:      Patient ID: Diandra Mcnamara is a 58 y.o. male. Sinus Problem   This is a new problem. The current episode started in the past 7 days (3-4 days ago). The problem has been gradually worsening since onset. There has been no fever. Associated symptoms include congestion, coughing (some mild sputum production), sinus pressure and a sore throat. Pertinent negatives include no chills, ear pain (feel plugged), headaches, neck pain or shortness of breath. Past treatments include nothing. Did review patient's med list, allergies, social history,pmhx and pshx today as noted in the record. Review of Systems   Constitutional: Negative for chills, fatigue and fever. HENT: Positive for congestion, postnasal drip, rhinorrhea, sinus pain, sinus pressure and sore throat. Negative for ear pain (feel plugged) and trouble swallowing. Eyes: Negative for discharge and redness. Respiratory: Positive for cough (some mild sputum production). Negative for shortness of breath and wheezing. Cardiovascular: Negative for chest pain. Gastrointestinal: Negative for abdominal pain, constipation, diarrhea, nausea and vomiting. Musculoskeletal: Negative for arthralgias, myalgias and neck pain. Skin: Negative for rash and wound. Allergic/Immunologic: Negative for environmental allergies. Neurological: Negative for dizziness, weakness, light-headedness and headaches. Hematological: Negative for adenopathy. Psychiatric/Behavioral: Negative. Objective:   Physical Exam   Constitutional: He is oriented to person, place, and time. He appears well-developed and well-nourished. No distress. HENT:   Head: Normocephalic and atraumatic. Right Ear: External ear normal.   Left Ear: External ear normal.   Thick sinus drainage. Maxillary and frontal sinus tenderness with palpation bilaterally. TMs dull with fluid behind the TM.  +PND     Eyes: Conjunctivae and EOM are normal. Pupils are equal, round, and reactive to light. Right eye exhibits no discharge. Left eye exhibits no discharge. No scleral icterus. Neck: Normal range of motion. Neck supple. No thyromegaly present. Cardiovascular: Normal rate, regular rhythm and normal heart sounds. Pulmonary/Chest: Effort normal and breath sounds normal. No respiratory distress. He has no wheezes. Musculoskeletal: He exhibits no edema. Lymphadenopathy:     He has cervical adenopathy. Neurological: He is alert and oriented to person, place, and time. Skin: Skin is warm and dry. No rash noted. He is not diaphoretic. Psychiatric: He has a normal mood and affect. His behavior is normal. Judgment and thought content normal.   Nursing note and vitals reviewed. Assessment:      Encounter Diagnosis   Name Primary?  Acute non-recurrent pansinusitis Yes           Plan:      Orders Placed This Encounter   Medications    triamcinolone acetonide (KENALOG-40) injection 40 mg    cefUROXime (CEFTIN) 500 MG tablet     Sig: Take 1 tablet by mouth 2 times daily     Dispense:  20 tablet     Refill:  0     Tylenol/Motrin prn    Increase fluids and rest    Return  if no improvement in symptoms or if any further symptoms arise.

## 2018-08-06 ENCOUNTER — HOSPITAL ENCOUNTER (OUTPATIENT)
Dept: SLEEP CENTER | Age: 63
Discharge: HOME | End: 2018-08-08
Payer: COMMERCIAL

## 2018-08-06 DIAGNOSIS — R06.81 WITNESSED EPISODE OF APNEA: ICD-10-CM

## 2018-08-06 DIAGNOSIS — G47.19 EXCESSIVE DAYTIME SLEEPINESS: ICD-10-CM

## 2018-08-06 PROCEDURE — 95811 POLYSOM 6/>YRS CPAP 4/> PARM: CPT

## 2018-08-06 RX ORDER — CLOPIDOGREL BISULFATE 75 MG/1
TABLET ORAL
Qty: 30 TABLET | Refills: 1 | Status: SHIPPED | OUTPATIENT
Start: 2018-08-06 | End: 2019-04-09 | Stop reason: SDUPTHER

## 2018-08-13 NOTE — PROGRESS NOTES
[address]                   [address] [place] [id number]                                    SLEEP STUDY  PATIENT NAME: Leif Sharpe                     :        1955  MED REC NO:   [id number]                             ROOM:  ACCOUNT NO:   [de-identified]                           ADMIT DATE: 2018  PROVIDER:     Yolanda Weaver    [facility]  INTERPRETATION OF CLINICAL POLYSOMNOGRAPHY    DATE OF STUDY:  2018    REFERRING PROVIDER:  Dr. Lilly Caldwell. CLINICAL IMPRESSION:  1. Obstructive sleep apnea syndrome. 2.  Coronary artery disease. 3.  Atrial fibrillation. PROCEDURE STANDARD:  It was a 1-night CPAP titration. PROCEDURE:  The sleep/wake evaluation consisted of an intensive intake  interview, one night of clinical polysomnography, a nocturnal respiratory  battery, left and right leg anterior tibialis surface electromyography. The standard montage for clinical polysomnography included the  electroencephalogram, the electro-oculogram, mentalis surface  electromyography, and modified Lead II cardiography. The respiratory  battery consisted of measurements of oral/nasal airflow by heat thermistor,  nasal pressure transducer, thoracic and abdominal effort by Respiratory  Inductance Plethysmography. Nocturnal oxyhemoglobin saturations were  obtained by finger oximetry. Polysomnography revealed that while on a CPAP, the patient spent 463  minutes in bed with a total sleep time of 357 minutes. Sleep efficiency  was reduced to 75%. Latency to sleep onset normal at 11 minutes. There  were 72 sleep stage changes. There were 24 awakenings during the night. Sleep architecture was normal with 6% stage I, 72% stage II, 0 delta, and  23% REM. Latency of various sleep stages were normal other than prolonged latency of  REM, which was 170 minutes.     Polysomnography did not reveal any other abnormality. Electrocardiogram did not reveal any arrhythmia. Respiratory evaluation revealed while on the CPAP at a final pressure of 15  cm of water, the patient had no respiratory events. The lowest oxygen  saturation at this setting being 92%. The movement disorder evaluation revealed moderate degree of periodic leg  movements. The PLM index accompanied by arousal being 22 per hour. IMPRESSION:  1. Obstructive sleep apnea syndrome. 2.  Periodic leg movements. 3.  Coronary artery disease. RECOMMENDATIONS:  The patient is known to have a significant degree of  sleep apnea that responded well to the use of CPAP at a final pressure of  15 cm of water. It is recommended that the patient should continue the use  of CPAP at the present setting. CPAP by relieving the apnea should improve  his daytime symptoms and also protect the patient from the morbidity  associated with the apnea. The patient should be encouraged to lose weight. The patient should be advised not to consume any alcohol or hypnotics at  bedtime, which could worsen the apnea. The patient advised to use a heated humidifier along with the CPAP machine  to prevent upper airway dryness. Treatment of apnea will improve the outcome of coronary artery disease. The patient does have a significant degree of periodic leg movements. However, the treatment of periodic leg movements may be withheld at this  time. If in spite of the treatment of apnea the patient continues to have  daytime symptoms, then treatment of periodic leg movements should be given  consideration. The patient should be instructed on proper sleep hygiene techniques.           Roberth Alonzo    D: 08/10/2018 13:54:59       T: 08/11/2018 1:45:24     [email or web address]  Job#: [id number]     Doc#: [id number]    CC:  Thony Brennan

## 2018-08-27 RX ORDER — METOPROLOL SUCCINATE 25 MG/1
TABLET, EXTENDED RELEASE ORAL
Qty: 180 TABLET | Refills: 3 | Status: SHIPPED | OUTPATIENT
Start: 2018-08-27 | End: 2019-01-14 | Stop reason: SDUPTHER

## 2018-11-08 RX ORDER — ATORVASTATIN CALCIUM 80 MG/1
TABLET, FILM COATED ORAL
Qty: 90 TABLET | Refills: 3 | Status: SHIPPED | OUTPATIENT
Start: 2018-11-08 | End: 2019-09-04 | Stop reason: SDUPTHER

## 2018-12-13 ENCOUNTER — TELEPHONE (OUTPATIENT)
Dept: FAMILY MEDICINE CLINIC | Age: 63
End: 2018-12-13

## 2018-12-13 DIAGNOSIS — G47.33 SLEEP APNEA, OBSTRUCTIVE: Primary | ICD-10-CM

## 2019-01-09 DIAGNOSIS — G47.33 SLEEP APNEA, OBSTRUCTIVE: ICD-10-CM

## 2019-01-14 ENCOUNTER — OFFICE VISIT (OUTPATIENT)
Dept: FAMILY MEDICINE CLINIC | Age: 64
End: 2019-01-14
Payer: MEDICARE

## 2019-01-14 VITALS
WEIGHT: 237 LBS | BODY MASS INDEX: 32.1 KG/M2 | SYSTOLIC BLOOD PRESSURE: 130 MMHG | HEART RATE: 74 BPM | RESPIRATION RATE: 16 BRPM | DIASTOLIC BLOOD PRESSURE: 82 MMHG | HEIGHT: 72 IN | OXYGEN SATURATION: 97 % | TEMPERATURE: 97.3 F

## 2019-01-14 DIAGNOSIS — B96.89 ACUTE BACTERIAL SINUSITIS: ICD-10-CM

## 2019-01-14 DIAGNOSIS — J01.90 ACUTE BACTERIAL SINUSITIS: ICD-10-CM

## 2019-01-14 DIAGNOSIS — G47.33 SLEEP APNEA, OBSTRUCTIVE: Primary | ICD-10-CM

## 2019-01-14 PROCEDURE — 4040F PNEUMOC VAC/ADMIN/RCVD: CPT | Performed by: NURSE PRACTITIONER

## 2019-01-14 PROCEDURE — G8484 FLU IMMUNIZE NO ADMIN: HCPCS | Performed by: NURSE PRACTITIONER

## 2019-01-14 PROCEDURE — G8427 DOCREV CUR MEDS BY ELIG CLIN: HCPCS | Performed by: NURSE PRACTITIONER

## 2019-01-14 PROCEDURE — 4004F PT TOBACCO SCREEN RCVD TLK: CPT | Performed by: NURSE PRACTITIONER

## 2019-01-14 PROCEDURE — 1123F ACP DISCUSS/DSCN MKR DOCD: CPT | Performed by: NURSE PRACTITIONER

## 2019-01-14 PROCEDURE — 1101F PT FALLS ASSESS-DOCD LE1/YR: CPT | Performed by: NURSE PRACTITIONER

## 2019-01-14 PROCEDURE — G8417 CALC BMI ABV UP PARAM F/U: HCPCS | Performed by: NURSE PRACTITIONER

## 2019-01-14 PROCEDURE — 99214 OFFICE O/P EST MOD 30 MIN: CPT | Performed by: NURSE PRACTITIONER

## 2019-01-14 PROCEDURE — 3017F COLORECTAL CA SCREEN DOC REV: CPT | Performed by: NURSE PRACTITIONER

## 2019-01-14 PROCEDURE — G8598 ASA/ANTIPLAT THER USED: HCPCS | Performed by: NURSE PRACTITIONER

## 2019-01-14 RX ORDER — BENZONATATE 100 MG/1
100 CAPSULE ORAL 3 TIMES DAILY PRN
Qty: 21 CAPSULE | Refills: 0 | Status: SHIPPED | OUTPATIENT
Start: 2019-01-14 | End: 2019-01-21

## 2019-01-14 RX ORDER — AZITHROMYCIN 250 MG/1
TABLET, FILM COATED ORAL
Qty: 1 PACKET | Refills: 0 | Status: SHIPPED | OUTPATIENT
Start: 2019-01-14 | End: 2019-01-22 | Stop reason: ALTCHOICE

## 2019-01-14 ASSESSMENT — ENCOUNTER SYMPTOMS
SHORTNESS OF BREATH: 0
GASTROINTESTINAL NEGATIVE: 1
COUGH: 1
ABDOMINAL PAIN: 0
RHINORRHEA: 1
COLOR CHANGE: 0
EYES NEGATIVE: 1
SINUS PRESSURE: 1

## 2019-01-14 ASSESSMENT — PATIENT HEALTH QUESTIONNAIRE - PHQ9
2. FEELING DOWN, DEPRESSED OR HOPELESS: 0
1. LITTLE INTEREST OR PLEASURE IN DOING THINGS: 0
SUM OF ALL RESPONSES TO PHQ9 QUESTIONS 1 & 2: 0
SUM OF ALL RESPONSES TO PHQ QUESTIONS 1-9: 0
SUM OF ALL RESPONSES TO PHQ QUESTIONS 1-9: 0

## 2019-01-22 ENCOUNTER — OFFICE VISIT (OUTPATIENT)
Dept: CARDIOLOGY | Age: 64
End: 2019-01-22
Payer: MEDICARE

## 2019-01-22 VITALS
HEIGHT: 72 IN | WEIGHT: 235 LBS | DIASTOLIC BLOOD PRESSURE: 86 MMHG | SYSTOLIC BLOOD PRESSURE: 144 MMHG | BODY MASS INDEX: 31.83 KG/M2 | HEART RATE: 65 BPM

## 2019-01-22 DIAGNOSIS — I25.5 ISCHEMIC CARDIOMYOPATHY: ICD-10-CM

## 2019-01-22 DIAGNOSIS — E78.5 HYPERLIPIDEMIA, UNSPECIFIED HYPERLIPIDEMIA TYPE: ICD-10-CM

## 2019-01-22 DIAGNOSIS — I48.0 PAROXYSMAL ATRIAL FIBRILLATION (HCC): ICD-10-CM

## 2019-01-22 DIAGNOSIS — I25.10 CORONARY ARTERY DISEASE INVOLVING NATIVE CORONARY ARTERY OF NATIVE HEART WITHOUT ANGINA PECTORIS: Primary | ICD-10-CM

## 2019-01-22 DIAGNOSIS — I10 ESSENTIAL HYPERTENSION: ICD-10-CM

## 2019-01-22 DIAGNOSIS — I50.22 CHRONIC SYSTOLIC CONGESTIVE HEART FAILURE (HCC): ICD-10-CM

## 2019-01-22 PROCEDURE — 1101F PT FALLS ASSESS-DOCD LE1/YR: CPT | Performed by: INTERNAL MEDICINE

## 2019-01-22 PROCEDURE — 3017F COLORECTAL CA SCREEN DOC REV: CPT | Performed by: INTERNAL MEDICINE

## 2019-01-22 PROCEDURE — 4040F PNEUMOC VAC/ADMIN/RCVD: CPT | Performed by: INTERNAL MEDICINE

## 2019-01-22 PROCEDURE — G8417 CALC BMI ABV UP PARAM F/U: HCPCS | Performed by: INTERNAL MEDICINE

## 2019-01-22 PROCEDURE — G8598 ASA/ANTIPLAT THER USED: HCPCS | Performed by: INTERNAL MEDICINE

## 2019-01-22 PROCEDURE — G8427 DOCREV CUR MEDS BY ELIG CLIN: HCPCS | Performed by: INTERNAL MEDICINE

## 2019-01-22 PROCEDURE — 1123F ACP DISCUSS/DSCN MKR DOCD: CPT | Performed by: INTERNAL MEDICINE

## 2019-01-22 PROCEDURE — G8484 FLU IMMUNIZE NO ADMIN: HCPCS | Performed by: INTERNAL MEDICINE

## 2019-01-22 PROCEDURE — 93000 ELECTROCARDIOGRAM COMPLETE: CPT | Performed by: INTERNAL MEDICINE

## 2019-01-22 PROCEDURE — 4004F PT TOBACCO SCREEN RCVD TLK: CPT | Performed by: INTERNAL MEDICINE

## 2019-01-22 PROCEDURE — 99213 OFFICE O/P EST LOW 20 MIN: CPT | Performed by: INTERNAL MEDICINE

## 2019-01-24 ENCOUNTER — PROCEDURE VISIT (OUTPATIENT)
Dept: CARDIOLOGY | Age: 64
End: 2019-01-24
Payer: MEDICARE

## 2019-01-24 DIAGNOSIS — I48.91 ATRIAL FIBRILLATION, UNSPECIFIED TYPE (HCC): ICD-10-CM

## 2019-01-24 PROCEDURE — 93289 INTERROG DEVICE EVAL HEART: CPT | Performed by: INTERNAL MEDICINE

## 2019-02-18 RX ORDER — VARENICLINE TARTRATE 1 MG/1
1 TABLET, FILM COATED ORAL 2 TIMES DAILY
Qty: 60 TABLET | Refills: 3 | Status: SHIPPED | OUTPATIENT
Start: 2019-02-18 | End: 2019-03-04

## 2019-03-04 ENCOUNTER — OFFICE VISIT (OUTPATIENT)
Dept: FAMILY MEDICINE CLINIC | Age: 64
End: 2019-03-04
Payer: MEDICARE

## 2019-03-04 VITALS
BODY MASS INDEX: 32.78 KG/M2 | HEIGHT: 72 IN | SYSTOLIC BLOOD PRESSURE: 120 MMHG | DIASTOLIC BLOOD PRESSURE: 70 MMHG | HEART RATE: 64 BPM | WEIGHT: 242 LBS

## 2019-03-04 DIAGNOSIS — F32.5 MAJOR DEPRESSIVE DISORDER, SINGLE EPISODE, IN FULL REMISSION (HCC): ICD-10-CM

## 2019-03-04 DIAGNOSIS — Z12.5 SCREENING PSA (PROSTATE SPECIFIC ANTIGEN): ICD-10-CM

## 2019-03-04 DIAGNOSIS — E03.9 HYPOTHYROIDISM, UNSPECIFIED TYPE: ICD-10-CM

## 2019-03-04 DIAGNOSIS — Z72.0 TOBACCO ABUSE: ICD-10-CM

## 2019-03-04 DIAGNOSIS — I10 ESSENTIAL HYPERTENSION: ICD-10-CM

## 2019-03-04 DIAGNOSIS — Z13.6 ENCOUNTER FOR ABDOMINAL AORTIC ANEURYSM (AAA) SCREENING: Primary | ICD-10-CM

## 2019-03-04 DIAGNOSIS — I25.10 CORONARY ARTERY DISEASE INVOLVING NATIVE CORONARY ARTERY OF NATIVE HEART WITHOUT ANGINA PECTORIS: ICD-10-CM

## 2019-03-04 DIAGNOSIS — N52.9 ERECTILE DYSFUNCTION, UNSPECIFIED ERECTILE DYSFUNCTION TYPE: ICD-10-CM

## 2019-03-04 DIAGNOSIS — E78.5 HYPERLIPIDEMIA WITH TARGET LDL LESS THAN 70: ICD-10-CM

## 2019-03-04 DIAGNOSIS — F32.5 MAJOR DEPRESSIVE DISORDER WITH SINGLE EPISODE, IN FULL REMISSION (HCC): ICD-10-CM

## 2019-03-04 PROCEDURE — 1123F ACP DISCUSS/DSCN MKR DOCD: CPT | Performed by: FAMILY MEDICINE

## 2019-03-04 PROCEDURE — G8598 ASA/ANTIPLAT THER USED: HCPCS | Performed by: FAMILY MEDICINE

## 2019-03-04 PROCEDURE — 1101F PT FALLS ASSESS-DOCD LE1/YR: CPT | Performed by: FAMILY MEDICINE

## 2019-03-04 PROCEDURE — 4004F PT TOBACCO SCREEN RCVD TLK: CPT | Performed by: FAMILY MEDICINE

## 2019-03-04 PROCEDURE — G8417 CALC BMI ABV UP PARAM F/U: HCPCS | Performed by: FAMILY MEDICINE

## 2019-03-04 PROCEDURE — G8483 FLU IMM NO ADMIN DOC REA: HCPCS | Performed by: FAMILY MEDICINE

## 2019-03-04 PROCEDURE — 3017F COLORECTAL CA SCREEN DOC REV: CPT | Performed by: FAMILY MEDICINE

## 2019-03-04 PROCEDURE — 99214 OFFICE O/P EST MOD 30 MIN: CPT | Performed by: FAMILY MEDICINE

## 2019-03-04 PROCEDURE — G8427 DOCREV CUR MEDS BY ELIG CLIN: HCPCS | Performed by: FAMILY MEDICINE

## 2019-03-04 PROCEDURE — 4040F PNEUMOC VAC/ADMIN/RCVD: CPT | Performed by: FAMILY MEDICINE

## 2019-03-04 RX ORDER — SILDENAFIL 100 MG/1
TABLET, FILM COATED ORAL
Qty: 10 TABLET | Refills: 2 | Status: SHIPPED | OUTPATIENT
Start: 2019-03-04

## 2019-03-04 RX ORDER — RAMIPRIL 5 MG/1
CAPSULE ORAL
Qty: 90 CAPSULE | Refills: 3 | Status: SHIPPED | OUTPATIENT
Start: 2019-03-04 | End: 2020-04-15

## 2019-03-04 RX ORDER — VARENICLINE TARTRATE 1 MG/1
1 TABLET, FILM COATED ORAL 2 TIMES DAILY
Qty: 180 TABLET | Refills: 1 | Status: SHIPPED | OUTPATIENT
Start: 2019-03-04 | End: 2019-09-04 | Stop reason: ALTCHOICE

## 2019-03-04 RX ORDER — FLUTICASONE PROPIONATE 50 MCG
2 SPRAY, SUSPENSION (ML) NASAL DAILY
Qty: 1 BOTTLE | Refills: 11 | Status: SHIPPED | OUTPATIENT
Start: 2019-03-04 | End: 2022-05-16 | Stop reason: SDUPTHER

## 2019-03-04 ASSESSMENT — ENCOUNTER SYMPTOMS
GASTROINTESTINAL NEGATIVE: 1
ALLERGIC/IMMUNOLOGIC NEGATIVE: 1
SHORTNESS OF BREATH: 0
RESPIRATORY NEGATIVE: 1
EYES NEGATIVE: 1
COUGH: 0

## 2019-03-04 ASSESSMENT — PATIENT HEALTH QUESTIONNAIRE - PHQ9
2. FEELING DOWN, DEPRESSED OR HOPELESS: 0
SUM OF ALL RESPONSES TO PHQ QUESTIONS 1-9: 0
SUM OF ALL RESPONSES TO PHQ9 QUESTIONS 1 & 2: 0
SUM OF ALL RESPONSES TO PHQ QUESTIONS 1-9: 0
1. LITTLE INTEREST OR PLEASURE IN DOING THINGS: 0

## 2019-03-05 ENCOUNTER — HOSPITAL ENCOUNTER (OUTPATIENT)
Dept: LAB | Age: 64
Discharge: HOME | End: 2019-03-05
Payer: MEDICARE

## 2019-03-05 ENCOUNTER — HOSPITAL ENCOUNTER (OUTPATIENT)
Dept: ULTRASOUND IMAGING | Age: 64
Discharge: HOME | End: 2019-03-07
Payer: MEDICARE

## 2019-03-05 DIAGNOSIS — Z13.6 ENCOUNTER FOR ABDOMINAL AORTIC ANEURYSM (AAA) SCREENING: ICD-10-CM

## 2019-03-05 DIAGNOSIS — I25.10 CORONARY ARTERY DISEASE INVOLVING NATIVE CORONARY ARTERY OF NATIVE HEART WITHOUT ANGINA PECTORIS: ICD-10-CM

## 2019-03-05 DIAGNOSIS — I10 ESSENTIAL HYPERTENSION: Primary | ICD-10-CM

## 2019-03-05 DIAGNOSIS — I10 ESSENTIAL HYPERTENSION: ICD-10-CM

## 2019-03-05 LAB
ALT SERPL-CCNC: 28 U/L (ref 5–41)
ANION GAP SERPL CALCULATED.3IONS-SCNC: 14 MMOL/L (ref 9–17)
BUN BLDV-MCNC: 15 MG/DL (ref 8–23)
BUN/CREAT BLD: 17 (ref 9–20)
CALCIUM SERPL-MCNC: 9.5 MG/DL (ref 8.6–10.4)
CHLORIDE BLD-SCNC: 101 MMOL/L (ref 98–107)
CHOLESTEROL, FASTING: 128 MG/DL
CHOLESTEROL/HDL RATIO: 3.5
CO2: 27 MMOL/L (ref 20–31)
CREAT SERPL-MCNC: 0.88 MG/DL (ref 0.7–1.2)
GFR AFRICAN AMERICAN: >60 ML/MIN
GFR NON-AFRICAN AMERICAN: >60 ML/MIN
GFR SERPL CREATININE-BSD FRML MDRD: NORMAL ML/MIN/{1.73_M2}
GFR SERPL CREATININE-BSD FRML MDRD: NORMAL ML/MIN/{1.73_M2}
GLUCOSE BLD-MCNC: 87 MG/DL (ref 70–99)
HDLC SERPL-MCNC: 37 MG/DL
LDL CHOLESTEROL: 58 MG/DL (ref 0–130)
POTASSIUM SERPL-SCNC: 4.3 MMOL/L (ref 3.7–5.3)
SODIUM BLD-SCNC: 142 MMOL/L (ref 135–144)
TRIGLYCERIDE, FASTING: 165 MG/DL
VLDLC SERPL CALC-MCNC: ABNORMAL MG/DL (ref 1–30)

## 2019-03-05 PROCEDURE — 84460 ALANINE AMINO (ALT) (SGPT): CPT

## 2019-03-05 PROCEDURE — 76706 US ABDL AORTA SCREEN AAA: CPT

## 2019-03-05 PROCEDURE — 80048 BASIC METABOLIC PNL TOTAL CA: CPT

## 2019-03-05 PROCEDURE — 36415 COLL VENOUS BLD VENIPUNCTURE: CPT

## 2019-03-05 PROCEDURE — 80061 LIPID PANEL: CPT

## 2019-03-11 RX ORDER — METOPROLOL SUCCINATE 25 MG/1
TABLET, EXTENDED RELEASE ORAL
Qty: 180 TABLET | Refills: 3 | Status: SHIPPED
Start: 2019-03-11 | End: 2020-08-27 | Stop reason: ALTCHOICE

## 2019-03-18 ENCOUNTER — TELEPHONE (OUTPATIENT)
Dept: FAMILY MEDICINE CLINIC | Age: 64
End: 2019-03-18

## 2019-03-18 RX ORDER — OSELTAMIVIR PHOSPHATE 75 MG/1
75 CAPSULE ORAL DAILY
Qty: 10 CAPSULE | Refills: 0 | Status: SHIPPED | OUTPATIENT
Start: 2019-03-18 | End: 2019-03-28

## 2019-04-09 RX ORDER — CLOPIDOGREL BISULFATE 75 MG/1
TABLET ORAL
Qty: 30 TABLET | Refills: 5 | Status: SHIPPED | OUTPATIENT
Start: 2019-04-09 | End: 2019-09-04 | Stop reason: SDUPTHER

## 2019-07-25 ENCOUNTER — OFFICE VISIT (OUTPATIENT)
Dept: CARDIOLOGY | Age: 64
End: 2019-07-25
Payer: MEDICARE

## 2019-07-25 ENCOUNTER — PROCEDURE VISIT (OUTPATIENT)
Dept: CARDIOLOGY | Age: 64
End: 2019-07-25
Payer: MEDICARE

## 2019-07-25 VITALS
HEIGHT: 72 IN | WEIGHT: 227 LBS | DIASTOLIC BLOOD PRESSURE: 80 MMHG | HEART RATE: 61 BPM | SYSTOLIC BLOOD PRESSURE: 130 MMHG | BODY MASS INDEX: 30.75 KG/M2

## 2019-07-25 DIAGNOSIS — Z95.810 AICD (AUTOMATIC CARDIOVERTER/DEFIBRILLATOR) PRESENT: ICD-10-CM

## 2019-07-25 DIAGNOSIS — I25.5 ISCHEMIC CARDIOMYOPATHY: ICD-10-CM

## 2019-07-25 DIAGNOSIS — I48.91 ATRIAL FIBRILLATION, UNSPECIFIED TYPE (HCC): Primary | ICD-10-CM

## 2019-07-25 PROCEDURE — 4040F PNEUMOC VAC/ADMIN/RCVD: CPT | Performed by: INTERNAL MEDICINE

## 2019-07-25 PROCEDURE — G8598 ASA/ANTIPLAT THER USED: HCPCS | Performed by: INTERNAL MEDICINE

## 2019-07-25 PROCEDURE — 1123F ACP DISCUSS/DSCN MKR DOCD: CPT | Performed by: INTERNAL MEDICINE

## 2019-07-25 PROCEDURE — 93289 INTERROG DEVICE EVAL HEART: CPT | Performed by: INTERNAL MEDICINE

## 2019-07-25 PROCEDURE — 93000 ELECTROCARDIOGRAM COMPLETE: CPT | Performed by: INTERNAL MEDICINE

## 2019-07-25 PROCEDURE — G8427 DOCREV CUR MEDS BY ELIG CLIN: HCPCS | Performed by: INTERNAL MEDICINE

## 2019-07-25 PROCEDURE — 4004F PT TOBACCO SCREEN RCVD TLK: CPT | Performed by: INTERNAL MEDICINE

## 2019-07-25 PROCEDURE — G8417 CALC BMI ABV UP PARAM F/U: HCPCS | Performed by: INTERNAL MEDICINE

## 2019-07-25 PROCEDURE — 3017F COLORECTAL CA SCREEN DOC REV: CPT | Performed by: INTERNAL MEDICINE

## 2019-07-25 PROCEDURE — 99214 OFFICE O/P EST MOD 30 MIN: CPT | Performed by: INTERNAL MEDICINE

## 2019-07-25 NOTE — PROGRESS NOTES
tablet by mouth daily 3/20/17   Lisa Chase MD   Omega-3 Fatty Acids (FISH OIL) 1000 MG CAPS Take 1,200 mg by mouth 2 times daily     Historical Provider, MD   Multiple Vitamins-Minerals (THERAPEUTIC MULTIVITAMIN-MINERALS) tablet Take 1 tablet by mouth daily. Historical Provider, MD       Allergies:  Patient has no known allergies. Social History:   reports that he has been smoking cigarettes. He started smoking about 49 years ago. He has smoked for the past 45.00 years. He has never used smokeless tobacco. He reports that he drinks alcohol. He reports that he does not use drugs. REVIEW OF SYSTEMS:  CONSTITUTIONAL:NEGATIVE  HEENT:NEG  Cardiovascular: No chest pain, Yes dyspnea on exertion, No palpitations. Lower extremity edema: No  RESPIRATORY: GALLARDO  GASTROINTESTINAL:  negative  GENITOURINARY:  negative  INTEGUMENT:  negative  MUSCULOSKELETAL:  positive for  pain  NEUROLOGICAL:  negative    PHYSICAL EXAM:      /80   Pulse 61   Ht 6' (1.829 m)   Wt 227 lb (103 kg)   BMI 30.79 kg/m²    HEENT: PERRL, no cervical lymphadenopathy. No masses palpable. Cardiovascular:  · The apical impulse is not displaced  · Heart  Sounds: RRR, NO S3/RUB  · Jugular venous pulsation Normal  · The carotid upstroke is NL  · Peripheral pulses are symmetrical and full  Respiratory: Good respiratory effort. On auscultation: clear to auscultation bilaterally  Abdomen:  · No masses or tenderness  · Bowel sounds present  Extremities:  ·  No Cyanosis or Clubbing  ·  Lower extremity edema: No  Skin: Warm and dry    Cardiac data:    EKG: Sinus  Rhythm   -Right bundle branch block.    -Old inferior infarct  -Old anterior infarct. ABNORMAL       Labs:     CBC: No results for input(s): WBC, HGB, HCT, PLT in the last 72 hours. BMP: No results for input(s): NA, K, CO2, BUN, CREATININE, LABGLOM, GLUCOSE in the last 72 hours. PT/INR: No results for input(s): PROTIME, INR in the last 72 hours.   FASTING LIPID PANEL:  Lab Results   Component Value Date    HDL 37 03/05/2019    LDLDIRECT 98 09/30/2012    TRIG 203 10/02/2017     LIVER PROFILE:No results for input(s): AST, ALT, LABALBU in the last 72 hours. IMPRESSION:    CAD-NOAH to LAD 2012, stable  Chronic HFrEF, ICM   s/p AICD. Has interrogation today WITH NL FX  HTN  Obesity  Hyperlipidemia  Chronic smoking  Patient Active Problem List   Diagnosis    CAD (coronary artery disease)    HTN (hypertension)    Anxiety    Hyperlipemia    Memory loss    Hypothyroidism    Depression    Hyperlipidemia with target LDL less than 79    Ischemic cardiomyopathy    Tobacco abuse    Tobacco abuse    Tobacco abuse counseling    Hx of adenomatous colonic polyps    Colon polyps       RECOMMENDATIONS:  D/W PT IN DETAIL NEED AND OPTIONS TO QUIT SMOKING  Discussed medication use and possible side effects. Recommend increase physical activity as tolerated. Discussed salt and diet. Answered all questions and concerns. Refill medications as needed until next visit. RTC appointment is scheduled.         Cornelius Arellano MD  [facility] Cardiology Consult           [phone]

## 2019-09-04 ENCOUNTER — OFFICE VISIT (OUTPATIENT)
Dept: FAMILY MEDICINE CLINIC | Age: 64
End: 2019-09-04
Payer: MEDICARE

## 2019-09-04 VITALS
HEART RATE: 68 BPM | SYSTOLIC BLOOD PRESSURE: 134 MMHG | WEIGHT: 231 LBS | DIASTOLIC BLOOD PRESSURE: 72 MMHG | HEIGHT: 72 IN | BODY MASS INDEX: 31.29 KG/M2

## 2019-09-04 DIAGNOSIS — Z12.5 SCREENING PSA (PROSTATE SPECIFIC ANTIGEN): ICD-10-CM

## 2019-09-04 DIAGNOSIS — I25.10 CORONARY ARTERY DISEASE INVOLVING NATIVE CORONARY ARTERY OF NATIVE HEART WITHOUT ANGINA PECTORIS: Primary | ICD-10-CM

## 2019-09-04 DIAGNOSIS — F32.5 MAJOR DEPRESSIVE DISORDER, SINGLE EPISODE, IN FULL REMISSION (HCC): ICD-10-CM

## 2019-09-04 DIAGNOSIS — Z72.0 TOBACCO ABUSE: ICD-10-CM

## 2019-09-04 DIAGNOSIS — I10 ESSENTIAL HYPERTENSION: ICD-10-CM

## 2019-09-04 DIAGNOSIS — E03.9 HYPOTHYROIDISM, UNSPECIFIED TYPE: ICD-10-CM

## 2019-09-04 DIAGNOSIS — E78.5 HYPERLIPIDEMIA WITH TARGET LDL LESS THAN 70: ICD-10-CM

## 2019-09-04 DIAGNOSIS — N52.9 ERECTILE DYSFUNCTION, UNSPECIFIED ERECTILE DYSFUNCTION TYPE: ICD-10-CM

## 2019-09-04 PROCEDURE — 3017F COLORECTAL CA SCREEN DOC REV: CPT | Performed by: FAMILY MEDICINE

## 2019-09-04 PROCEDURE — 4040F PNEUMOC VAC/ADMIN/RCVD: CPT | Performed by: FAMILY MEDICINE

## 2019-09-04 PROCEDURE — G8427 DOCREV CUR MEDS BY ELIG CLIN: HCPCS | Performed by: FAMILY MEDICINE

## 2019-09-04 PROCEDURE — 4004F PT TOBACCO SCREEN RCVD TLK: CPT | Performed by: FAMILY MEDICINE

## 2019-09-04 PROCEDURE — G8417 CALC BMI ABV UP PARAM F/U: HCPCS | Performed by: FAMILY MEDICINE

## 2019-09-04 PROCEDURE — 99214 OFFICE O/P EST MOD 30 MIN: CPT | Performed by: FAMILY MEDICINE

## 2019-09-04 PROCEDURE — G8598 ASA/ANTIPLAT THER USED: HCPCS | Performed by: FAMILY MEDICINE

## 2019-09-04 PROCEDURE — 1123F ACP DISCUSS/DSCN MKR DOCD: CPT | Performed by: FAMILY MEDICINE

## 2019-09-04 RX ORDER — CLOPIDOGREL BISULFATE 75 MG/1
TABLET ORAL
Qty: 90 TABLET | Refills: 3 | Status: SHIPPED | OUTPATIENT
Start: 2019-09-04 | End: 2020-11-25

## 2019-09-04 RX ORDER — ATORVASTATIN CALCIUM 80 MG/1
TABLET, FILM COATED ORAL
Qty: 90 TABLET | Refills: 3 | Status: SHIPPED | OUTPATIENT
Start: 2019-09-04 | End: 2020-09-10

## 2019-09-04 ASSESSMENT — ENCOUNTER SYMPTOMS
ALLERGIC/IMMUNOLOGIC NEGATIVE: 1
SHORTNESS OF BREATH: 0
RESPIRATORY NEGATIVE: 1
EYES NEGATIVE: 1
GASTROINTESTINAL NEGATIVE: 1
COUGH: 0

## 2019-09-04 ASSESSMENT — PATIENT HEALTH QUESTIONNAIRE - PHQ9
1. LITTLE INTEREST OR PLEASURE IN DOING THINGS: 0
SUM OF ALL RESPONSES TO PHQ QUESTIONS 1-9: 0
SUM OF ALL RESPONSES TO PHQ QUESTIONS 1-9: 0
SUM OF ALL RESPONSES TO PHQ9 QUESTIONS 1 & 2: 0
2. FEELING DOWN, DEPRESSED OR HOPELESS: 0

## 2019-09-04 NOTE — PROGRESS NOTES
in place due to ischemic cardiomyopathy. Interrogation through cardiology, compliant. Htn: fair control at present. Cont current meds. Depression/anxiety: doing ok off wellbutrin. Had some excessive perspiration he blamed on the drug. Seems better off the drug. No concerns for depression/anxiety atresent. ED: no nitrate use. viagra not seeming to work as well at present. Hyperlipidemia: good control historically. Cont. lipitor 80mg. Triglycerides improved, 203 cont. fish oil 2 with each meal.  Updated labs pending. Hypothyroid hx. No active treatment at present. Remains well balanced at last check. Tobacco abuse. He just quit smoking on his birthday. Unfortunately  restarted. chantix helped. Rec. Restarting . psa due with current labs. No significant sinus/allergy concerns at present. Has prn flonase.

## 2020-02-27 ENCOUNTER — OFFICE VISIT (OUTPATIENT)
Dept: CARDIOLOGY | Age: 65
End: 2020-02-27
Payer: MEDICARE

## 2020-02-27 ENCOUNTER — PROCEDURE VISIT (OUTPATIENT)
Dept: CARDIOLOGY | Age: 65
End: 2020-02-27
Payer: MEDICARE

## 2020-02-27 VITALS
DIASTOLIC BLOOD PRESSURE: 78 MMHG | HEART RATE: 61 BPM | BODY MASS INDEX: 31.59 KG/M2 | WEIGHT: 233.2 LBS | SYSTOLIC BLOOD PRESSURE: 132 MMHG | HEIGHT: 72 IN

## 2020-02-27 PROCEDURE — 93289 INTERROG DEVICE EVAL HEART: CPT | Performed by: INTERNAL MEDICINE

## 2020-02-27 PROCEDURE — 93005 ELECTROCARDIOGRAM TRACING: CPT | Performed by: INTERNAL MEDICINE

## 2020-02-27 PROCEDURE — 93010 ELECTROCARDIOGRAM REPORT: CPT | Performed by: INTERNAL MEDICINE

## 2020-02-27 PROCEDURE — 1123F ACP DISCUSS/DSCN MKR DOCD: CPT | Performed by: INTERNAL MEDICINE

## 2020-02-27 PROCEDURE — 4004F PT TOBACCO SCREEN RCVD TLK: CPT | Performed by: INTERNAL MEDICINE

## 2020-02-27 PROCEDURE — 4040F PNEUMOC VAC/ADMIN/RCVD: CPT | Performed by: INTERNAL MEDICINE

## 2020-02-27 PROCEDURE — G8417 CALC BMI ABV UP PARAM F/U: HCPCS | Performed by: INTERNAL MEDICINE

## 2020-02-27 PROCEDURE — G8427 DOCREV CUR MEDS BY ELIG CLIN: HCPCS | Performed by: INTERNAL MEDICINE

## 2020-02-27 PROCEDURE — 3017F COLORECTAL CA SCREEN DOC REV: CPT | Performed by: INTERNAL MEDICINE

## 2020-02-27 PROCEDURE — G8483 FLU IMM NO ADMIN DOC REA: HCPCS | Performed by: INTERNAL MEDICINE

## 2020-02-27 PROCEDURE — 99214 OFFICE O/P EST MOD 30 MIN: CPT | Performed by: INTERNAL MEDICINE

## 2020-02-27 PROCEDURE — 99213 OFFICE O/P EST LOW 20 MIN: CPT

## 2020-02-27 NOTE — PROGRESS NOTES
Historical Provider, MD   Multiple Vitamins-Minerals (THERAPEUTIC MULTIVITAMIN-MINERALS) tablet Take 1 tablet by mouth daily. Historical Provider, MD       Allergies:  Patient has no known allergies. Social History:   reports that he has been smoking cigarettes. He started smoking about 50 years ago. He has smoked for the past 45.00 years. He has never used smokeless tobacco. He reports current alcohol use. He reports that he does not use drugs. Family History:    Family History   Problem Relation Age of Onset    Diabetes Mother     Heart Failure Mother         congestive heart failure    Kidney Disease Mother         renal failure    Other Father         benign prostatic hypertrophy    Heart Disease Father     Cancer Father     Other Maternal Grandmother         hepatitis    Heart Disease Maternal Grandmother     Colon Cancer Maternal Grandfather     Heart Disease Maternal Grandfather     Kidney Disease Maternal Grandfather         renal disease    Heart Disease Brother     Kidney Disease Sister     Kidney Disease Sister     Colon Cancer Maternal Uncle        REVIEW OF SYSTEMS:  CONSTITUTIONAL:NEGATIVE  HEENT:NEG  Cardiovascular: No chest pain, No dyspnea on exertion, No palpitations. Lower extremity edema: No  RESPIRATORY: neg  GASTROINTESTINAL:  negative  GENITOURINARY:  negative  INTEGUMENT:  negative  MUSCULOSKELETAL:  positive for  pain  NEUROLOGICAL:  negative    PHYSICAL EXAM:      /78   Pulse 61   Ht 6' 0.01\" (1.829 m)   Wt 233 lb 3.2 oz (105.8 kg)   BMI 31.62 kg/m²    HEENT: PERRL, no cervical lymphadenopathy. No masses palpable. Cardiovascular:  · The apical impulse is not displaced  · Heart  Sounds:RRR, NO S3/RUB  · Jugular venous pulsation Normal  · The carotid upstroke is normal  · Peripheral pulses are symmetrical and full  Respiratory: Good respiratory effort.  On auscultation: clear to auscultation bilaterally  Abdomen:  · No masses or tenderness  · Bowel sounds present  Extremities:  ·  No Cyanosis or Clubbing  ·  Lower extremity edema: No  Skin: Warm and dry    Cardiac data:    EKG: Sinus  Rhythm   -Right bundle branch block.    -Old anterior infarct.    -  Nonspecific T-abnormality. ABNORMAL      Labs:     CBC: No results for input(s): WBC, HGB, HCT, PLT in the last 72 hours. BMP: No results for input(s): NA, K, CO2, BUN, CREATININE, LABGLOM, GLUCOSE in the last 72 hours. PT/INR: No results for input(s): PROTIME, INR in the last 72 hours. FASTING LIPID PANEL:  Lab Results   Component Value Date    HDL 37 03/05/2019    LDLDIRECT 98 09/30/2012    TRIG 203 10/02/2017     LIVER PROFILE:No results for input(s): AST, ALT, LABALBU in the last 72 hours. Assessment:    CAD-NOAH to LAD 2012, stable  Chronic HFrEF, ICM   s/p AICD. Has interrogation today WITH NL FX  HTN  Obesity  Hyperlipidemia  Chronic smoking  Patient Active Problem List   Diagnosis    CAD (coronary artery disease)    HTN (hypertension)    Anxiety    Hyperlipemia    Memory loss    Hypothyroidism    Depression    Hyperlipidemia with target LDL less than 70    Ischemic cardiomyopathy    Tobacco abuse    Tobacco abuse    Tobacco abuse counseling    Hx of adenomatous colonic polyps    Colon polyps       Recommendations:  Smoking cessation, pharmacological and nonpharmacological discussed extensively with patient.   REGULAR EXERCISE  CALORIE RESTRICTION  WEIGHT LOSS  CONTINUE CURRENT TREATMENT    [facility] [address] Cardiology Consult           [phone]

## 2020-03-05 ENCOUNTER — HOSPITAL ENCOUNTER (OUTPATIENT)
Dept: LAB | Age: 65
Discharge: HOME | End: 2020-03-05
Payer: MEDICARE

## 2020-03-05 LAB
ABSOLUTE EOS #: 0.08 K/UL (ref 0–0.44)
ABSOLUTE IMMATURE GRANULOCYTE: 0.03 K/UL (ref 0–0.3)
ABSOLUTE LYMPH #: 2.57 K/UL (ref 1.1–3.7)
ABSOLUTE MONO #: 0.66 K/UL (ref 0.1–1.2)
ALBUMIN SERPL-MCNC: 4.6 G/DL (ref 3.5–5.2)
ALBUMIN/GLOBULIN RATIO: 1.6 (ref 1–2.5)
ALP BLD-CCNC: 68 U/L (ref 40–129)
ALT SERPL-CCNC: 21 U/L (ref 5–41)
ANION GAP SERPL CALCULATED.3IONS-SCNC: 12 MMOL/L (ref 9–17)
AST SERPL-CCNC: 20 U/L
BASOPHILS # BLD: 1 % (ref 0–2)
BASOPHILS ABSOLUTE: 0.07 K/UL (ref 0–0.2)
BILIRUB SERPL-MCNC: 1.03 MG/DL (ref 0.3–1.2)
BUN BLDV-MCNC: 10 MG/DL (ref 8–23)
BUN/CREAT BLD: 10 (ref 9–20)
CALCIUM SERPL-MCNC: 9.9 MG/DL (ref 8.6–10.4)
CHLORIDE BLD-SCNC: 100 MMOL/L (ref 98–107)
CHOLESTEROL/HDL RATIO: 4
CHOLESTEROL: 137 MG/DL
CO2: 26 MMOL/L (ref 20–31)
CREAT SERPL-MCNC: 1.02 MG/DL (ref 0.7–1.2)
DIFFERENTIAL TYPE: ABNORMAL
EOSINOPHILS RELATIVE PERCENT: 1 % (ref 1–4)
GFR AFRICAN AMERICAN: >60 ML/MIN
GFR NON-AFRICAN AMERICAN: >60 ML/MIN
GFR SERPL CREATININE-BSD FRML MDRD: NORMAL ML/MIN/{1.73_M2}
GFR SERPL CREATININE-BSD FRML MDRD: NORMAL ML/MIN/{1.73_M2}
GLUCOSE BLD-MCNC: 98 MG/DL (ref 70–99)
HCT VFR BLD CALC: 44.5 % (ref 40.7–50.3)
HDLC SERPL-MCNC: 34 MG/DL
HEMOGLOBIN: 15.9 G/DL (ref 13–17)
IMMATURE GRANULOCYTES: 0 %
LDL CHOLESTEROL: 58 MG/DL (ref 0–130)
LYMPHOCYTES # BLD: 32 % (ref 24–43)
MCH RBC QN AUTO: 33.8 PG (ref 25.2–33.5)
MCHC RBC AUTO-ENTMCNC: 35.7 G/DL (ref 25.2–33.5)
MCV RBC AUTO: 94.7 FL (ref 82.6–102.9)
MONOCYTES # BLD: 8 % (ref 3–12)
NRBC AUTOMATED: 0 PER 100 WBC
PDW BLD-RTO: 12.2 % (ref 11.8–14.4)
PLATELET # BLD: 159 K/UL (ref 138–453)
PLATELET ESTIMATE: ABNORMAL
PMV BLD AUTO: 12.2 FL (ref 8.1–13.5)
POTASSIUM SERPL-SCNC: 4 MMOL/L (ref 3.7–5.3)
RBC # BLD: 4.7 M/UL (ref 4.21–5.77)
RBC # BLD: ABNORMAL 10*6/UL
SEG NEUTROPHILS: 57 % (ref 36–65)
SEGMENTED NEUTROPHILS ABSOLUTE COUNT: 4.6 K/UL (ref 1.5–8.1)
SODIUM BLD-SCNC: 138 MMOL/L (ref 135–144)
THYROXINE, FREE: 1.37 NG/DL (ref 0.93–1.7)
TOTAL PROTEIN: 7.5 G/DL (ref 6.4–8.3)
TRIGL SERPL-MCNC: 223 MG/DL
TSH SERPL DL<=0.05 MIU/L-ACNC: 2.43 MIU/L (ref 0.3–5)
VLDLC SERPL CALC-MCNC: ABNORMAL MG/DL (ref 1–30)
WBC # BLD: 8 K/UL (ref 3.5–11.3)
WBC # BLD: ABNORMAL 10*3/UL

## 2020-03-05 PROCEDURE — 80053 COMPREHEN METABOLIC PANEL: CPT

## 2020-03-05 PROCEDURE — 80061 LIPID PANEL: CPT

## 2020-03-05 PROCEDURE — 85025 COMPLETE CBC W/AUTO DIFF WBC: CPT

## 2020-03-05 PROCEDURE — 84439 ASSAY OF FREE THYROXINE: CPT

## 2020-03-05 PROCEDURE — 84443 ASSAY THYROID STIM HORMONE: CPT

## 2020-03-05 PROCEDURE — 36415 COLL VENOUS BLD VENIPUNCTURE: CPT

## 2020-03-10 ENCOUNTER — OFFICE VISIT (OUTPATIENT)
Dept: FAMILY MEDICINE CLINIC | Age: 65
End: 2020-03-10
Payer: MEDICARE

## 2020-03-10 VITALS
HEIGHT: 72 IN | BODY MASS INDEX: 31.72 KG/M2 | HEART RATE: 52 BPM | DIASTOLIC BLOOD PRESSURE: 72 MMHG | WEIGHT: 234.2 LBS | OXYGEN SATURATION: 96 % | SYSTOLIC BLOOD PRESSURE: 120 MMHG

## 2020-03-10 PROCEDURE — 99213 OFFICE O/P EST LOW 20 MIN: CPT

## 2020-03-10 PROCEDURE — 4040F PNEUMOC VAC/ADMIN/RCVD: CPT | Performed by: FAMILY MEDICINE

## 2020-03-10 PROCEDURE — 3017F COLORECTAL CA SCREEN DOC REV: CPT | Performed by: FAMILY MEDICINE

## 2020-03-10 PROCEDURE — 1123F ACP DISCUSS/DSCN MKR DOCD: CPT | Performed by: FAMILY MEDICINE

## 2020-03-10 PROCEDURE — 4004F PT TOBACCO SCREEN RCVD TLK: CPT | Performed by: FAMILY MEDICINE

## 2020-03-10 PROCEDURE — G8427 DOCREV CUR MEDS BY ELIG CLIN: HCPCS | Performed by: FAMILY MEDICINE

## 2020-03-10 PROCEDURE — G8417 CALC BMI ABV UP PARAM F/U: HCPCS | Performed by: FAMILY MEDICINE

## 2020-03-10 PROCEDURE — 99214 OFFICE O/P EST MOD 30 MIN: CPT | Performed by: FAMILY MEDICINE

## 2020-03-10 PROCEDURE — G8483 FLU IMM NO ADMIN DOC REA: HCPCS | Performed by: FAMILY MEDICINE

## 2020-03-10 ASSESSMENT — ENCOUNTER SYMPTOMS
SHORTNESS OF BREATH: 0
RESPIRATORY NEGATIVE: 1
ALLERGIC/IMMUNOLOGIC NEGATIVE: 1
EYES NEGATIVE: 1
GASTROINTESTINAL NEGATIVE: 1
COUGH: 0

## 2020-03-10 NOTE — PATIENT INSTRUCTIONS
Hospital Outpatient Visit on 03/05/2020   Component Date Value Ref Range Status    Thyroxine, Free 03/05/2020 1.37  0.93 - 1.70 ng/dL Final    TSH 03/05/2020 2.43  0.30 - 5.00 mIU/L Final    Cholesterol 03/05/2020 137  <200 mg/dL Final    Comment:    Cholesterol Guidelines:      <200  Desirable   200-240  Borderline      >240  Undesirable         HDL 03/05/2020 34* >40 mg/dL Final    Comment:    HDL Guidelines:    <40     Undesirable   40-59    Borderline    >59     Desirable         LDL Cholesterol 03/05/2020 58  0 - 130 mg/dL Final    Comment:    LDL Guidelines:     <100    Desirable   100-129   Near to/above Desirable   130-159   Borderline      >159   Undesirable     Direct (measured) LDL and calculated LDL are not interchangeable tests.  Chol/HDL Ratio 03/05/2020 4.0  <5 Final            Triglycerides 03/05/2020 223* <150 mg/dL Final    Comment:    Triglyceride Guidelines:     <150   Desirable   150-199  Borderline   200-499  High     >499   Very high   Based on AHA Guidelines for fasting triglyceride, October 2012.          VLDL 03/05/2020 NOT REPORTED* 1 - 30 mg/dL Final    WBC 03/05/2020 8.0  3.5 - 11.3 k/uL Final    RBC 03/05/2020 4.70  4.21 - 5.77 m/uL Final    Hemoglobin 03/05/2020 15.9  13.0 - 17.0 g/dL Final    Hematocrit 03/05/2020 44.5  40.7 - 50.3 % Final    MCV 03/05/2020 94.7  82.6 - 102.9 fL Final    MCH 03/05/2020 33.8* 25.2 - 33.5 pg Final    MCHC 03/05/2020 35.7* 25.2 - 33.5 g/dL Final    RDW 03/05/2020 12.2  11.8 - 14.4 % Final    Platelets 49/43/2315 159  138 - 453 k/uL Final    MPV 03/05/2020 12.2  8.1 - 13.5 fL Final    NRBC Automated 03/05/2020 0.0  0.0 per 100 WBC Final    Differential Type 03/05/2020 NOT REPORTED   Final    WBC Morphology 03/05/2020 NOT REPORTED   Final    RBC Morphology 03/05/2020 NOT REPORTED   Final    Platelet Estimate 05/30/5212 NOT REPORTED   Final    Seg Neutrophils 03/05/2020 57  36 - 65 % Final    Lymphocytes 03/05/2020 32  24 - 43 %

## 2020-03-10 NOTE — PROGRESS NOTES
Subjective:      Patient ID: Hilaria Chisholm is a 76 y.o. male. Hypertension   Pertinent negatives include no neck pain or shortness of breath. Hyperlipidemia   Pertinent negatives include no myalgias or shortness of breath. Coronary Artery Disease   Pertinent negatives include no shortness of breath. Risk factors include hyperlipidemia. Erectile Dysfunction        Routine follow up on chronic medical conditions, refills, and review of updated labs. I have reviewed the patient's medical history in detail and updated the computerized patient record. Feeling well over the interval.  No chest pain or palpitations. bp under good control. He has quit smoking On his [birth year]. Restarted again. chantix had helped. Still smoking . Started cpap late January. Compliant with cpap nightly. He has perceived benefit at present. Some gas /distention ongoing. Making him sick at times. No back pain at present. Feeling well. No chest pain or arrhythmia concerns. Compliant with medications.        Past Medical History:   Diagnosis Date    Anxiety     Atrial fibrillation ([facility] [address]     CAD (coronary artery disease) 2012    stent to LAD after MI    Erectile dysfunction     Headache(784.0)     High cholesterol     Hx of adenomatous colonic polyps     4 tubular adenomas, 2 hyperplastic polyps 11/11/15    Hypertension     Ischemic cardiomyopathy     AICD 1/13 for EF 20-25%    Low back pain     history of     Osteoarthritis     knees    Plantar fasciitis     Seborrheic keratosis     history of      Current Outpatient Medications   Medication Sig Dispense Refill    Respiratory Therapy Supplies TONY 1 Device by Does not apply route daily Auto made 4-20 cmH2O 1 Device 0    clopidogrel (PLAVIX) 75 MG tablet TAKE ONE TABLET BY MOUTH DAILY 90 tablet 3    atorvastatin (LIPITOR) 80 MG tablet TAKE ONE TABLET BY MOUTH ONCE NIGHTLY 90 tablet 3    metoprolol succinate (TOPROL XL) 25 MG extended release tablet TAKE ONE TABLET BY MOUTH TWICE A  tablet 3    ramipril (ALTACE) 5 MG capsule TAKE ONE CAPSULE BY MOUTH DAILY 90 capsule 3    sildenafil (VIAGRA) 100 MG tablet TAKE 1 TABLET BY MOUTH AS NEEDED FOR ERECTILE DYSFUNCTION 10 tablet 2    fluticasone (FLONASE) 50 MCG/ACT nasal spray 2 sprays by Nasal route daily 1 Bottle 11    aspirin 81 MG tablet Take 1 tablet by mouth daily 30 tablet 6    Omega-3 Fatty Acids (FISH OIL) 1000 MG CAPS Take 1,200 mg by mouth 2 times daily       Multiple Vitamins-Minerals (THERAPEUTIC MULTIVITAMIN-MINERALS) tablet Take 1 tablet by mouth daily. No current facility-administered medications for this visit. No Known Allergies      Review of Systems   Constitutional: Negative. HENT: Negative. Eyes: Negative. Respiratory: Negative. Negative for cough and shortness of breath. Cardiovascular: Negative. Gastrointestinal: Negative. Endocrine: Negative. Genitourinary: Negative. Musculoskeletal: Negative. Negative for myalgias, neck pain and neck stiffness. Skin: Negative. Negative for rash. Allergic/Immunologic: Negative. Neurological: Negative. Hematological: Negative. Does not bruise/bleed easily. Psychiatric/Behavioral: Negative. Objective:   Physical Exam  Constitutional:       General: He is not in acute distress. Appearance: He is well-developed. HENT:      Head: Normocephalic and atraumatic. Right Ear: External ear normal.      Left Ear: External ear normal.      Mouth/Throat:      Pharynx: No oropharyngeal exudate. Eyes:      General: No scleral icterus. Conjunctiva/sclera: Conjunctivae normal.   Neck:      Musculoskeletal: Neck supple. Thyroid: No thyromegaly. Cardiovascular:      Rate and Rhythm: Normal rate and regular rhythm. Heart sounds: Normal heart sounds. No murmur. Pulmonary:      Effort: Pulmonary effort is normal. No respiratory distress.       Breath sounds: Normal mildly  reduced.  There are multiple segmental wall motion abnormality mainly  involving the anteroseptal and apical areas.  There is grade 1 diastolic  dysfunction of the left ventricle. 2.  Right ventricle appears to be normal in size and function. 3.  Mild left atrial enlargement. 4.  Aortic root diameter is normal at 3.2 cm. 5.  Aortic valve is unremarkable. 6.  Mitral valve shows mild regurgitation. No stenosis. 7.  There is mild tricuspid regurgitation. 8.  Right ventricular systolic pressure was estimated at 30 mmHg. 9.  Pulmonic valve is unremarkable. 10.  There is no pericardial effusion.         Assessment:       Encounter Diagnoses   Name Primary?  Coronary artery disease involving native coronary artery of native heart without angina pectoris Yes    Essential hypertension     Major depressive disorder, single episode, in full remission ([facility] [address]     Erectile dysfunction, unspecified erectile dysfunction type     Hyperlipidemia with target LDL less than 70     Hypothyroidism, unspecified type     Tobacco abuse     Screening PSA (prostate specific antigen)     DAVID (obstructive sleep apnea)            Plan:      CAD; clinically stable. S/p NOAH to lad after MI 2012. Ischemic cmo s/p aicd. No functional limitations. Denies cp. Denies changes in exercise tolerance. Consider updated echo at follow up. Last EF 20-25% s/p MI 2013. AICD in place due to ischemic cardiomyopathy. Interrogation through cardiology, compliant. Htn: fair control at present. Cont current meds. Depression/anxiety: doing ok off wellbutrin. Had some excessive perspiration he blamed on the drug. Seems better off the drug. No concerns for depression/anxiety atresent. ED: no nitrate use. viagra not seeming to work as well at present. Hyperlipidemia: good control historically. Cont. lipitor 80mg. Triglycerides improved, 203 cont. fish oil 2 with each meal.  Updated labs pending. Hypothyroid hx.  No active treatment at present. Remains well balanced at last check. Tobacco abuse. He just quit smoking on his birthday. Unfortunately  restarted. chantix helped. Rec. Restarting . Law: having trouble with gi gas/distention/pain. rx changed to autopap machine 2/20/20. He is waiting on new machine. Encouraged ongoing compliance. psa due with current labs. No significant sinus/allergy concerns at present. Has prn flonase.

## 2020-03-19 ENCOUNTER — TELEPHONE (OUTPATIENT)
Dept: FAMILY MEDICINE CLINIC | Age: 65
End: 2020-03-19

## 2020-04-15 RX ORDER — RAMIPRIL 5 MG/1
CAPSULE ORAL
Qty: 90 CAPSULE | Refills: 1 | Status: SHIPPED | OUTPATIENT
Start: 2020-04-15 | End: 2020-09-10 | Stop reason: SDUPTHER

## 2020-08-27 ENCOUNTER — PROCEDURE VISIT (OUTPATIENT)
Dept: CARDIOLOGY | Age: 65
End: 2020-08-27
Payer: MEDICARE

## 2020-08-27 ENCOUNTER — OFFICE VISIT (OUTPATIENT)
Dept: CARDIOLOGY | Age: 65
End: 2020-08-27
Payer: MEDICARE

## 2020-08-27 VITALS
SYSTOLIC BLOOD PRESSURE: 130 MMHG | HEART RATE: 60 BPM | BODY MASS INDEX: 31.69 KG/M2 | DIASTOLIC BLOOD PRESSURE: 70 MMHG | HEIGHT: 72 IN | WEIGHT: 234 LBS

## 2020-08-27 PROBLEM — I50.22 CHRONIC SYSTOLIC CONGESTIVE HEART FAILURE (HCC): Status: ACTIVE | Noted: 2020-08-27

## 2020-08-27 PROCEDURE — 93005 ELECTROCARDIOGRAM TRACING: CPT | Performed by: INTERNAL MEDICINE

## 2020-08-27 PROCEDURE — 93289 INTERROG DEVICE EVAL HEART: CPT | Performed by: INTERNAL MEDICINE

## 2020-08-27 PROCEDURE — G8427 DOCREV CUR MEDS BY ELIG CLIN: HCPCS | Performed by: INTERNAL MEDICINE

## 2020-08-27 PROCEDURE — 4040F PNEUMOC VAC/ADMIN/RCVD: CPT | Performed by: INTERNAL MEDICINE

## 2020-08-27 PROCEDURE — 1123F ACP DISCUSS/DSCN MKR DOCD: CPT | Performed by: INTERNAL MEDICINE

## 2020-08-27 PROCEDURE — 93010 ELECTROCARDIOGRAM REPORT: CPT | Performed by: INTERNAL MEDICINE

## 2020-08-27 PROCEDURE — 4004F PT TOBACCO SCREEN RCVD TLK: CPT | Performed by: INTERNAL MEDICINE

## 2020-08-27 PROCEDURE — 3017F COLORECTAL CA SCREEN DOC REV: CPT | Performed by: INTERNAL MEDICINE

## 2020-08-27 PROCEDURE — G8417 CALC BMI ABV UP PARAM F/U: HCPCS | Performed by: INTERNAL MEDICINE

## 2020-08-27 PROCEDURE — 99214 OFFICE O/P EST MOD 30 MIN: CPT | Performed by: INTERNAL MEDICINE

## 2020-08-27 RX ORDER — AMIODARONE HYDROCHLORIDE 400 MG/1
400 TABLET ORAL 2 TIMES DAILY
Qty: 30 TABLET | Refills: 3 | Status: SHIPPED | OUTPATIENT
Start: 2020-08-27 | End: 2020-09-04 | Stop reason: SDUPTHER

## 2020-08-27 NOTE — PATIENT INSTRUCTIONS
Amiodarone 400mg BID for 1 week,  Then 200mg BID for 1 week,  Then 200mg daily thereafter      Your Procedure Will Be Scheduled at:      [facility]    [address] [place], [address] [facility]   (located across from [address] [place] [address])    Located on the main floor of the [facility]. Report to our reception desk by the [employer]. Parking is free. Handicap parking is available by the main entrance. You may also park in [place] on Level 2 and enter building on the bridge to [facility]. Take elevator to the main floor. · Our  will call you to schedule your procedure within a week. If you do not receive a phone call, please call the  directly at [phone] and leave a message, or call [place] office at [phone]. Date:______________________________    Arrival Time:________________________    Procedure Time:_____________________    Instructions:_____________________________      · Bring Photo I.D. and insurance cards. · Bring all Medications in the bottles. · Pack an overnight bag in case you are required to spend the night. · You will need someone to drive you home. · The  will instruct you on holding food and drink the night before or morning of your procedure. · You are to take your Medications, along with your Cardiac and/or Blood Pressure Medications, with small sips of water on the morning of your Procedure, unless instructed otherwise by your Physician. · If you need additional directions please call [phone]. · If you have any questions please feel free to call the [place] office at [phone]. Patient Education        Left Heart Catheterization: About This Test  What is it? Left heart catheterization is a test to check the left side of your heart.  Your doctor might look at the shape of your heart, the motion of your heart, or the blood pressure inside the chambers. Why is this test done? This test gives information about how your heart is working. It can:  · Check blood flow and blood pressure in the chambers of the heart. · Check the pumping action of the heart. · Find out if a heart defect is present and how severe it is. · Find out how well the heart valves work. How is the test done? · You will get medicine to help you relax. · A thin tube called a catheter is put into a blood vessel in the groin or the arm. The doctor moves the catheter through the blood vessel into your heart. · You will get a shot to numb the skin where the catheter goes in. · Dye may be injected into your heart. Your doctor can watch on special monitors as the dye moves in your heart. The dye helps your doctor see blood flow in your heart. · If a heart defect is found, cardiac catheterization sometimes is used to correct it during the test.  · You will stay in a room for at least a few hours to make sure the catheter site starts to heal. You may have a bandage or a compression device on your groin or arm to prevent bleeding. · If the catheter was placed in your groin, you may lie in bed for a few hours. If the catheter was put in your arm, you will need to keep your arm still for at least 1 hour. How long does it take? The test will take about 30 minutes. If a problem is found and the doctor treats it, the test can take a few hours longer. What happens after the test?  · You may or may not need to stay in the hospital overnight. You will get more instructions for what to do at home. · Drink plenty of fluids for several hours after the test.  Follow-up care is a key part of your treatment and safety. Be sure to make and go to all appointments, and call your doctor if you are having problems. It's also a good idea to know your test results and keep a list of the medicines you take. Where can you learn more? Go to [email or web address] org and sign in

## 2020-08-27 NOTE — PROGRESS NOTES
Today's Date: 8/27/2020  Patient Name: Tate Bell  Patient's age: 76 y.o., 1955          CC: the patient is a 76 y.o.  male is in the office for routine follow up and ICD interrogation. 2 days ago while he was [place], he suddenly passed out. He woke up on the ground. There was no one accompanying him. ICD interrogation today shows ventricular tachycardia, polymorphic, rate greater than 330 which was terminated by one 35 J shock. There were few episodes of few nonsustained ventricular tachycardia on the device interrogation as well. Patient otherwise denies any chest pain/chest pressure. He denies any decline in his functional capacity or shortness of breath on exertion. He has been very compliant with his medications. His last coronary evaluation was in 2012. Past Medical History:   has a past medical history of Anxiety, Atrial fibrillation ([address] CAD (coronary artery disease), Erectile dysfunction, Headache(784.0), High cholesterol, Hx of adenomatous colonic polyps, Hypertension, Ischemic cardiomyopathy, Low back pain, Osteoarthritis, Plantar fasciitis, and Seborrheic keratosis. Past Surgical History:   has a past surgical history that includes Rotator cuff repair (1997); Vasectomy (1980); Cardiac catheterization (2012); Cardiac defibrillator placement (01/07/2013); cyst removal; Hand tendon surgery (Left); Colonoscopy (11/11/2015); and Colonoscopy (12/21/2016). Home Medications:    Prior to Admission medications    Medication Sig Start Date End Date Taking?  Authorizing Provider   metoprolol tartrate (LOPRESSOR) 25 MG tablet TAKE ONE TABLET BY MOUTH TWICE A DAY 5/15/20  Yes Mary Jefferson MD   ramipril (ALTACE) 5 MG capsule TAKE ONE CAPSULE BY MOUTH DAILY 4/15/20  Yes Mary Jefferson MD   Respiratory Therapy Supplies TONY 1 Device by Does not apply route daily Auto made 4-20 cmH2O 2/20/20  Yes Mary Jefferson MD clopidogrel (PLAVIX) 75 MG tablet TAKE ONE TABLET BY MOUTH DAILY 9/4/19  Yes Linsey Shin MD   atorvastatin (LIPITOR) 80 MG tablet TAKE ONE TABLET BY MOUTH ONCE NIGHTLY 9/4/19  Yes Linsey Shin MD   sildenafil (VIAGRA) 100 MG tablet TAKE 1 TABLET BY MOUTH AS NEEDED FOR ERECTILE DYSFUNCTION 3/4/19  Yes Linsey Shin MD   fluticasone [facility]) 50 MCG/ACT nasal spray 2 sprays by Nasal route daily 3/4/19  Yes Linsey Shin MD   aspirin 81 MG tablet Take 1 tablet by mouth daily 3/20/17  Yes Karen Polo MD   Omega-3 Fatty Acids (FISH OIL) 1000 MG CAPS Take 1,200 mg by mouth 2 times daily    Yes Historical Provider, MD   Multiple Vitamins-Minerals (THERAPEUTIC MULTIVITAMIN-MINERALS) tablet Take 1 tablet by mouth daily. Yes Historical Provider, MD       Allergies:  Patient has no known allergies. Social History:   reports that he has been smoking cigarettes. He started smoking about 50 years ago. He has smoked for the past 45.00 years. He has never used smokeless tobacco. He reports current alcohol use. He reports that he does not use drugs. Family History:    Family History   Problem Relation Age of Onset    Diabetes Mother     Heart Failure Mother         congestive heart failure    Kidney Disease Mother         renal failure    Other Father         benign prostatic hypertrophy    Heart Disease Father     Cancer Father     Other Maternal Grandmother         hepatitis    Heart Disease Maternal Grandmother     Colon Cancer Maternal Grandfather     Heart Disease Maternal Grandfather     Kidney Disease Maternal Grandfather         renal disease    Heart Disease Brother     Kidney Disease Sister     Kidney Disease Sister     Colon Cancer Maternal Uncle        REVIEW OF SYSTEMS:  CONSTITUTIONAL:NEGATIVE  HEENT:NEG  Cardiovascular: No chest pain, No dyspnea on exertion, No palpitations.  Lower extremity edema: No  RESPIRATORY: neg  GASTROINTESTINAL:  negative  GENITOURINARY:

## 2020-09-04 ENCOUNTER — TELEPHONE (OUTPATIENT)
Dept: CARDIOLOGY | Age: 65
End: 2020-09-04

## 2020-09-04 ENCOUNTER — HOSPITAL ENCOUNTER (OUTPATIENT)
Dept: CARDIAC CATH/INVASIVE PROCEDURES | Age: 65
Discharge: HOME | End: 2020-09-04
Payer: MEDICARE

## 2020-09-04 VITALS
HEART RATE: 51 BPM | HEIGHT: 72 IN | TEMPERATURE: 97.9 F | SYSTOLIC BLOOD PRESSURE: 105 MMHG | BODY MASS INDEX: 30.88 KG/M2 | OXYGEN SATURATION: 98 % | WEIGHT: 228 LBS | RESPIRATION RATE: 15 BRPM | DIASTOLIC BLOOD PRESSURE: 59 MMHG

## 2020-09-04 LAB
GFR NON-AFRICAN AMERICAN: >60 ML/MIN
GFR SERPL CREATININE-BSD FRML MDRD: >60 ML/MIN
GFR SERPL CREATININE-BSD FRML MDRD: NORMAL ML/MIN/{1.73_M2}
GLUCOSE BLD-MCNC: 84 MG/DL (ref 74–100)
PLATELET # BLD: 155 K/UL (ref 138–453)
POC CHLORIDE: 104 MMOL/L (ref 98–107)
POC CREATININE: 1.09 MG/DL (ref 0.51–1.19)
POC HEMATOCRIT: 44 % (ref 41–53)
POC HEMOGLOBIN: 14.8 G/DL (ref 13.5–17.5)
POC POTASSIUM: 4.1 MMOL/L (ref 3.5–4.5)
POC SODIUM: 142 MMOL/L (ref 138–146)

## 2020-09-04 PROCEDURE — C1725 CATH, TRANSLUMIN NON-LASER: HCPCS

## 2020-09-04 PROCEDURE — 84132 ASSAY OF SERUM POTASSIUM: CPT

## 2020-09-04 PROCEDURE — 82947 ASSAY GLUCOSE BLOOD QUANT: CPT

## 2020-09-04 PROCEDURE — 82565 ASSAY OF CREATININE: CPT

## 2020-09-04 PROCEDURE — 2500000003 HC RX 250 WO HCPCS

## 2020-09-04 PROCEDURE — 2709999900 HC NON-CHARGEABLE SUPPLY

## 2020-09-04 PROCEDURE — 93454 CORONARY ARTERY ANGIO S&I: CPT | Performed by: INTERNAL MEDICINE

## 2020-09-04 PROCEDURE — 6360000002 HC RX W HCPCS

## 2020-09-04 PROCEDURE — 7100000000 HC PACU RECOVERY - FIRST 15 MIN

## 2020-09-04 PROCEDURE — 82435 ASSAY OF BLOOD CHLORIDE: CPT

## 2020-09-04 PROCEDURE — 85049 AUTOMATED PLATELET COUNT: CPT

## 2020-09-04 PROCEDURE — 36415 COLL VENOUS BLD VENIPUNCTURE: CPT

## 2020-09-04 PROCEDURE — 6360000004 HC RX CONTRAST MEDICATION

## 2020-09-04 PROCEDURE — 85014 HEMATOCRIT: CPT

## 2020-09-04 PROCEDURE — C1894 INTRO/SHEATH, NON-LASER: HCPCS

## 2020-09-04 PROCEDURE — C1769 GUIDE WIRE: HCPCS

## 2020-09-04 PROCEDURE — 84295 ASSAY OF SERUM SODIUM: CPT

## 2020-09-04 PROCEDURE — 7100000001 HC PACU RECOVERY - ADDTL 15 MIN

## 2020-09-04 RX ORDER — HYDRALAZINE HYDROCHLORIDE 20 MG/ML
10 INJECTION INTRAMUSCULAR; INTRAVENOUS EVERY 10 MIN PRN
Status: DISCONTINUED | OUTPATIENT
Start: 2020-09-04 | End: 2020-09-05 | Stop reason: HOSPADM

## 2020-09-04 RX ORDER — SODIUM CHLORIDE 0.9 % (FLUSH) 0.9 %
10 SYRINGE (ML) INJECTION EVERY 12 HOURS SCHEDULED
Status: DISCONTINUED | OUTPATIENT
Start: 2020-09-04 | End: 2020-09-05 | Stop reason: HOSPADM

## 2020-09-04 RX ORDER — ACETAMINOPHEN 325 MG/1
650 TABLET ORAL EVERY 4 HOURS PRN
Status: DISCONTINUED | OUTPATIENT
Start: 2020-09-04 | End: 2020-09-05 | Stop reason: HOSPADM

## 2020-09-04 RX ORDER — AMIODARONE HYDROCHLORIDE 200 MG/1
200 TABLET ORAL DAILY
Qty: 30 TABLET | Refills: 3 | Status: SHIPPED | OUTPATIENT
Start: 2020-09-04 | End: 2021-02-12 | Stop reason: SDUPTHER

## 2020-09-04 RX ORDER — AMIODARONE HYDROCHLORIDE 200 MG/1
200 TABLET ORAL 2 TIMES DAILY
Qty: 30 TABLET | Refills: 3 | Status: SHIPPED | OUTPATIENT
Start: 2020-09-04 | End: 2020-09-04 | Stop reason: SDUPTHER

## 2020-09-04 RX ORDER — LABETALOL HYDROCHLORIDE 5 MG/ML
10 INJECTION, SOLUTION INTRAVENOUS EVERY 30 MIN PRN
Status: DISCONTINUED | OUTPATIENT
Start: 2020-09-04 | End: 2020-09-05 | Stop reason: HOSPADM

## 2020-09-04 RX ORDER — SODIUM CHLORIDE 9 MG/ML
INJECTION, SOLUTION INTRAVENOUS CONTINUOUS
Status: DISCONTINUED | OUTPATIENT
Start: 2020-09-04 | End: 2020-09-05 | Stop reason: HOSPADM

## 2020-09-04 RX ORDER — SODIUM CHLORIDE 0.9 % (FLUSH) 0.9 %
10 SYRINGE (ML) INJECTION PRN
Status: DISCONTINUED | OUTPATIENT
Start: 2020-09-04 | End: 2020-09-05 | Stop reason: HOSPADM

## 2020-09-04 RX ADMIN — SODIUM CHLORIDE: 9 INJECTION, SOLUTION INTRAVENOUS at 13:48

## 2020-09-04 NOTE — OP NOTE
[facility]    CARDIAC CATHETERIZATION    Date:   9/4/2020  Patient name:  Cathryn Oliver  Date of admission:  No admission date for patient encounter. MRN:   [id number]  YOB: 1955    Operators:  Primary:   Dr Teodoro Briceño      Procedure performed:     [x] Left Heart Catheterization. [] Graft Angiography. [x] Left Ventriculography. [] Right Heart Catheterization. [x] Coronary Angiography. [] Aortic Valve Studies. [] PCI:      [] Other:       Pre Procedure Conscious Sedation Data:  ASA Class:    [] I [x] II [] III [] IV    Mallampati Class:  [] I [x] II [] III [] IV      Indication:  [] STEMI      [] + Stress test  [] ACS      [] + EKG Changes  [] Non Q MI       [] Significant Risk Factors  [] Recurrent Angina             [] Diabetes Mellitus    [] New LBBB      [x] Other. Syncopal episode secondary to ventricular tachycardia terminated by ICD shock  [] CHF / Low EF changes     [] Abnormal CTA / Ca Score      Procedure:  Access:  [] Femoral  [] Radial  artery       [x] Right  [] Left    Procedure: After informed consent was obtained with explanation of the risks and benefits, patient was brought to the cath lab. The access area was prepped and draped in sterile fashion. 1% lidocaine was used for local block. The artery was cannulated with 6  Fr sheath with brisk arterial blood return. The side port was frequently flushed and aspirated with normal saline. Estimated Blood Loss:  [x] Minimal < 25 cc [] Moderate 25-50 cc  [] >50 cc    Findings:    LMCA: Mild irregularities 10-20%. LAD: Patent stents in proximal and mid segments with mild 10-20% in stent  restenosis   D1 and D2 are medium sized, patent, mild disease     LCx: Large and dominant, mid 50% stenosis (similar to last cath in 2012)   LPDA/LPL are patent with mild disease     RCA: Small, non dominant, known occluded with left to right collaterals and   was not engaged   The LV gram was performed in the [address] 30 position.

## 2020-09-04 NOTE — PLAN OF CARE
Problem: Falls - Risk of:  Goal: Will remain free from falls  Description: Will remain free from falls  9/4/2020 1355 by Orin Boudreaux RN  Outcome: Ongoing  9/4/2020 1354 by Orin Boudreaux RN  Outcome: Ongoing  Goal: Absence of physical injury  Description: Absence of physical injury  9/4/2020 1355 by Orin Boudreaux RN  Outcome: Ongoing  9/4/2020 1354 by Orin Boudreaux RN  Outcome: Ongoing     Problem: Anxiety:  Goal: Level of anxiety will decrease  Description: Level of anxiety will decrease  Outcome: Met This Shift   PRE-PROCEDURAL EDUCATION COMPLETED

## 2020-09-04 NOTE — PROGRESS NOTES
Patient admitted, consent signed and questions answered. Patient ready for procedure. Call light to reach with side rails up 2 of 2. Right wrist hair and bilat groin hairs clipped. Wife at bedside with patient. History and physical needing updat.

## 2020-09-04 NOTE — PROGRESS NOTES
Patient received post cath to [facility] room 12. Assessment obtained. Post cath pathway initiated. Right radial site with Vasc band inflated with 12 mL of air intact. No hematoma noted. Patient without complaints.

## 2020-09-04 NOTE — PROGRESS NOTES
All discharge instructions reviewed, questions answered, paper signed and given copy. Patient discharged per ambulatory with wife and belongings.

## 2020-09-04 NOTE — TELEPHONE ENCOUNTER
Received call from Deepak Finch regarding Amio rx. They received 2 rx's today and I confirmed that the first was discontinued and then reordered as current dose of amio 200mg one daily. They confirm.

## 2020-09-04 NOTE — H&P
[facility]  Procedure History and Physical Update          Patient Name: Victor Hugo Chun  MRN:    [id number]  YOB: 1955  Date of evaluation:  9/4/2020    Procedure:    Cardiac cath +/- PCI    Indication for procedure:  Ventricular tachycardia     Please refer to the office note completed by Dr. Belinda Beasley on 8/27/2020 in the medical record and note that:    [x] I have examined the patient and reviewed the H&P/Consult and there are no changes to be made to the assessment or plan. [] I have examined the patient and reviewed the H&P/Consult and have noted the following changes:    · Syncopal episode secondary to ventricular tachycardia terminated by ICD shock  · CAD with hx of NOAH to LAD in 2012  · HFrEF secondary to Ischemic CMP  · AICD in situ  · HTN, well controlled   · Obesity  · Hyperlipidemia (last lipid panel in 03/2020, LDL 58, Chol 137)  · Chronic smoking    Past Medical History:   Diagnosis Date    Anxiety     Atrial fibrillation ([address]     CAD (coronary artery disease) 2012    stent to LAD after MI    Erectile dysfunction     Headache(784.0)     High cholesterol     Hx of adenomatous colonic polyps     4 tubular adenomas, 2 hyperplastic polyps 11/11/15    Hypertension     Ischemic cardiomyopathy     AICD 1/13 for EF 20-25%    Low back pain     history of     Osteoarthritis     knees    Plantar fasciitis     Seborrheic keratosis     history of        Past Surgical History:   Procedure Laterality Date    CARDIAC CATHETERIZATION  2012    3 stents    Joaquin Brown  01/07/2013    COLONOSCOPY  11/11/2015    polyps X 5 Pascale CARBAJAL    COLONOSCOPY  12/21/2016    INT.  Hemmorrhoids, Transverse Colon Polyp adenoma, Rectal Polyp hyperplastic.  5 yr follow up    CYST REMOVAL      HAND TENDON SURGERY Left     thumb    ROTATOR CUFF REPAIR  1997    VASECTOMY  1980       Family History   Problem Relation Age of Onset    Diabetes Mother     Heart Failure

## 2020-09-04 NOTE — PROGRESS NOTES
2 cc air successfully removed from [facility] 83. Meal and fluids taken. Wife at bedside. Call light to reach.

## 2020-09-04 NOTE — PLAN OF CARE
Problem: Falls - Risk of:  Goal: Will remain free from falls  Description: Will remain free from falls  9/4/2020 1658 by Obed Robins RN  Outcome: Completed  9/4/2020 1355 by Obed Robins RN  Outcome: Ongoing  9/4/2020 1354 by Obed Robins RN  Outcome: Ongoing  Goal: Absence of physical injury  Description: Absence of physical injury  9/4/2020 1658 by Obed Robins RN  Outcome: Completed  9/4/2020 1355 by Obed Robins RN  Outcome: Ongoing  9/4/2020 1354 by Obed Robins RN  Outcome: Ongoing     Problem: Anxiety:  Goal: Level of anxiety will decrease  Description: Level of anxiety will decrease  9/4/2020 1658 by Obed Robins RN  Outcome: Completed  9/4/2020 1355 by Obed Robins RN  Outcome: Met This Shift     Problem: KNOWLEDGE DEFICIT  Goal: Patient/S.O. demonstrates understanding of disease process, treatment plan, medications, and discharge instructions.   Outcome: Completed

## 2020-09-08 ENCOUNTER — TELEPHONE (OUTPATIENT)
Dept: CARDIOLOGY | Age: 65
End: 2020-09-08

## 2020-09-08 NOTE — TELEPHONE ENCOUNTER
Received notification from Dr Catherine Covington asking us to schedule pt for appt with Dr Shan Landa.

## 2020-09-10 ENCOUNTER — OFFICE VISIT (OUTPATIENT)
Dept: FAMILY MEDICINE CLINIC | Age: 65
End: 2020-09-10
Payer: MEDICARE

## 2020-09-10 VITALS
SYSTOLIC BLOOD PRESSURE: 132 MMHG | BODY MASS INDEX: 29.26 KG/M2 | TEMPERATURE: 96 F | HEART RATE: 68 BPM | HEIGHT: 72 IN | DIASTOLIC BLOOD PRESSURE: 72 MMHG | WEIGHT: 216 LBS

## 2020-09-10 PROCEDURE — 4004F PT TOBACCO SCREEN RCVD TLK: CPT | Performed by: FAMILY MEDICINE

## 2020-09-10 PROCEDURE — G8427 DOCREV CUR MEDS BY ELIG CLIN: HCPCS | Performed by: FAMILY MEDICINE

## 2020-09-10 PROCEDURE — 4040F PNEUMOC VAC/ADMIN/RCVD: CPT | Performed by: FAMILY MEDICINE

## 2020-09-10 PROCEDURE — 99213 OFFICE O/P EST LOW 20 MIN: CPT

## 2020-09-10 PROCEDURE — 99214 OFFICE O/P EST MOD 30 MIN: CPT | Performed by: FAMILY MEDICINE

## 2020-09-10 PROCEDURE — 1123F ACP DISCUSS/DSCN MKR DOCD: CPT | Performed by: FAMILY MEDICINE

## 2020-09-10 PROCEDURE — G0296 VISIT TO DETERM LDCT ELIG: HCPCS | Performed by: FAMILY MEDICINE

## 2020-09-10 PROCEDURE — 3017F COLORECTAL CA SCREEN DOC REV: CPT | Performed by: FAMILY MEDICINE

## 2020-09-10 PROCEDURE — G8417 CALC BMI ABV UP PARAM F/U: HCPCS | Performed by: FAMILY MEDICINE

## 2020-09-10 RX ORDER — RAMIPRIL 5 MG/1
CAPSULE ORAL
Qty: 90 CAPSULE | Refills: 1 | Status: SHIPPED | OUTPATIENT
Start: 2020-09-10 | End: 2021-02-25 | Stop reason: ALTCHOICE

## 2020-09-10 RX ORDER — ATORVASTATIN CALCIUM 80 MG/1
TABLET, FILM COATED ORAL
Qty: 90 TABLET | Refills: 2 | Status: SHIPPED | OUTPATIENT
Start: 2020-09-10 | End: 2021-03-17 | Stop reason: SDUPTHER

## 2020-09-10 ASSESSMENT — ENCOUNTER SYMPTOMS
RESPIRATORY NEGATIVE: 1
SHORTNESS OF BREATH: 0
EYES NEGATIVE: 1
ALLERGIC/IMMUNOLOGIC NEGATIVE: 1
COUGH: 0
GASTROINTESTINAL NEGATIVE: 1

## 2020-09-10 ASSESSMENT — PATIENT HEALTH QUESTIONNAIRE - PHQ9
SUM OF ALL RESPONSES TO PHQ9 QUESTIONS 1 & 2: 0
SUM OF ALL RESPONSES TO PHQ QUESTIONS 1-9: 0
2. FEELING DOWN, DEPRESSED OR HOPELESS: 0
1. LITTLE INTEREST OR PLEASURE IN DOING THINGS: 0
SUM OF ALL RESPONSES TO PHQ QUESTIONS 1-9: 0

## 2020-09-10 NOTE — PATIENT INSTRUCTIONS
Hospital Outpatient Visit on 09/04/2020   Component Date Value Ref Range Status    Platelets 51/26/2958 155  138 - 453 k/uL Final    POC Hemoglobin 09/04/2020 14.8  13.5 - 17.5 g/dL Final    POC Hematocrit 09/04/2020 44  41 - 53 % Final    POC Creatinine 09/04/2020 1.09  0.51 - 1.19 mg/dL Final    GFR Comment 09/04/2020 >60  >60 mL/min Final    GFR Non- 09/04/2020 >60  >60 mL/min Final    GFR Comment 09/04/2020        Final    Comment: Average GFR for 61-76 years old:   80 mL/min/1.73sq m  Chronic Kidney Disease:   <60 mL/min/1.73sq m  Kidney failure:   <15 mL/min/1.73sq m              eGFR calculated using average adult body mass. Additional eGFR calculator available at:        [email or web address]            POC Sodium 09/04/2020 142  138 - 146 mmol/L Final    POC Potassium 09/04/2020 4.1  3.5 - 4.5 mmol/L Final    POC Chloride 09/04/2020 104  98 - 107 mmol/L Final    POC Glucose 09/04/2020 84  74 - 100 mg/dL Final       What is lung cancer screening? Lung cancer screening is a way in which doctors check the lungs for early signs of cancer in people who have no symptoms of lung cancer. A low-dose CT scan uses much less radiation than a normal CT scan and shows a more detailed image of the lungs than a standard X-ray. The goal of lung cancer screening is to find cancer early, before it has a chance to grow, spread, or cause problems. One large study found that smokers who were screened with low-dose CT scans were less likely to die of lung cancer than those who were screened with standard X-ray. Below is a summary of the things you need to know regarding screening for lung cancer with low-dose computed tomography (LDCT). This is a screening program that involves routine annual screening with LDCT studies of the lung. The LDCTs are done using low-dose radiation that is not thought to increase your cancer risk.   If you have other serious medical

## 2020-09-10 NOTE — PROGRESS NOTES
Medical History:   Diagnosis Date    Anxiety     [address] [address]     CAD (coronary artery disease) 2012    stent to LAD after MI    Erectile dysfunction     Headache(784.0)     High cholesterol     Hx of adenomatous colonic polyps     4 tubular adenomas, 2 hyperplastic polyps 11/11/15    Hypertension     ICD (implantable cardioverter-defibrillator) discharge 08/2020    Ischemic cardiomyopathy     AICD 1/13 for EF 20-25%    Low back pain     history of     MI, old 2012    Osteoarthritis     knees    Plantar fasciitis     Seborrheic keratosis     history of     Smoker     Syncopal episodes 08/2020    STATES PASSED OUT AND THAT ICD HAD FIRED WHEN DEVICE WAS INTERROGATED     Current Outpatient Medications   Medication Sig Dispense Refill    atorvastatin (LIPITOR) 80 MG tablet TAKE ONE TABLET BY MOUTH EVERY NIGHT 90 tablet 2    amiodarone (CORDARONE) 200 MG tablet Take 1 tablet by mouth daily 30 tablet 3    metoprolol tartrate (LOPRESSOR) 25 MG tablet TAKE ONE TABLET BY MOUTH TWICE A  tablet 1    ramipril (ALTACE) 5 MG capsule TAKE ONE CAPSULE BY MOUTH DAILY 90 capsule 1    clopidogrel (PLAVIX) 75 MG tablet TAKE ONE TABLET BY MOUTH DAILY 90 tablet 3    sildenafil (VIAGRA) 100 MG tablet TAKE 1 TABLET BY MOUTH AS NEEDED FOR ERECTILE DYSFUNCTION 10 tablet 2    fluticasone (FLONASE) 50 MCG/ACT nasal spray 2 sprays by Nasal route daily (Patient taking differently: 2 sprays by Nasal route daily as needed ) 1 Bottle 11    aspirin 81 MG tablet Take 1 tablet by mouth daily 30 tablet 6    Omega-3 Fatty Acids (FISH OIL) 1000 MG CAPS Take 1,200 mg by mouth 2 times daily       Multiple Vitamins-Minerals (THERAPEUTIC MULTIVITAMIN-MINERALS) tablet Take 1 tablet by mouth daily. No current facility-administered medications for this visit. No Known Allergies      Review of Systems   Constitutional: Negative. HENT: Negative. Eyes: Negative. Respiratory: Negative.   Negative for cough and shortness of breath. Cardiovascular: Positive for syncope. Gastrointestinal: Negative. Endocrine: Negative. Genitourinary: Negative. Musculoskeletal: Negative. Negative for myalgias, neck pain and neck stiffness. Skin: Negative. Negative for rash. Allergic/Immunologic: Negative. Hematological: Negative. Does not bruise/bleed easily. Psychiatric/Behavioral: Negative. Objective:   Physical Exam  Constitutional:       General: He is not in acute distress. Appearance: He is well-developed. HENT:      Head: Normocephalic and atraumatic. Right Ear: External ear normal.      Left Ear: External ear normal.      Mouth/Throat:      Pharynx: No oropharyngeal exudate. Eyes:      General: No scleral icterus. Conjunctiva/sclera: Conjunctivae normal.   Neck:      Musculoskeletal: Neck supple. Thyroid: No thyromegaly. Cardiovascular:      Rate and Rhythm: Normal rate and regular rhythm. Heart sounds: Normal heart sounds. No murmur. Pulmonary:      Effort: Pulmonary effort is normal. No respiratory distress. Breath sounds: Normal breath sounds. No wheezing. Abdominal:      General: Bowel sounds are normal. There is no distension. Palpations: Abdomen is soft. Tenderness: There is no abdominal tenderness. There is no rebound. Musculoskeletal: Normal range of motion. General: No tenderness (trace edema, improved at present. ). Skin:     General: Skin is warm and dry. Findings: No erythema or rash. Neurological:      Mental Status: He is alert and oriented to person, place, and time. Psychiatric:         Behavior: Behavior normal.         Thought Content:  Thought content normal.         Judgment: Judgment normal.       /72 (Site: Left Upper Arm, Position: Sitting, Cuff Size: Large Adult)   Pulse 68   Temp 96 °F (35.6 °C) (Temporal)   Ht 6' (1.829 m)   Wt 216 lb (98 kg)   BMI 29.29 kg/m²             Hospital Outpatient Visit on 09/04/2020   Component Date Value Ref Range Status    Platelets 10/62/3015 155  138 - 453 k/uL Final    POC Hemoglobin 09/04/2020 14.8  13.5 - 17.5 g/dL Final    POC Hematocrit 09/04/2020 44  41 - 53 % Final    POC Creatinine 09/04/2020 1.09  0.51 - 1.19 mg/dL Final    GFR Comment 09/04/2020 >60  >60 mL/min Final    GFR Non- 09/04/2020 >60  >60 mL/min Final    GFR Comment 09/04/2020        Final    POC Sodium 09/04/2020 142  138 - 146 mmol/L Final    POC Potassium 09/04/2020 4.1  3.5 - 4.5 mmol/L Final    POC Chloride 09/04/2020 104  98 - 107 mmol/L Final    POC Glucose 09/04/2020 84  74 - 100 mg/dL Final       TTE 02/09/18     1.  Left ventricular dimensions are normal.  Contractility is mildly  reduced.  There are multiple segmental wall motion abnormality mainly  involving the anteroseptal and apical areas.  There is grade 1 diastolic  dysfunction of the left ventricle. 2.  Right ventricle appears to be normal in size and function. 3.  Mild left atrial enlargement. 4.  Aortic root diameter is normal at 3.2 cm. 5.  Aortic valve is unremarkable. 6.  Mitral valve shows mild regurgitation. No stenosis. 7.  There is mild tricuspid regurgitation. 8.  Right ventricular systolic pressure was estimated at 30 mmHg. 9.  Pulmonic valve is unremarkable. 10.  There is no pericardial effusion.         Assessment:       Encounter Diagnoses   Name Primary?     Coronary artery disease involving native coronary artery of native heart without angina pectoris Yes    Ventricular tachycardia (HCC)     Essential hypertension     Major depressive disorder, single episode, in full remission ([address]     Erectile dysfunction, unspecified erectile dysfunction type     Hyperlipidemia with target LDL less than 70     Hypothyroidism, unspecified type     Tobacco abuse     Screening PSA (prostate specific antigen)     DAVID (obstructive sleep apnea)            Plan:      CAD; False-positive LDCT results often occur. 95% of all positive results do not lead to a diagnosis of cancer. Usually further imaging can resolve most false-positive results; however, some patients may require invasive procedures. Over diagnosis risk - 10% to 12% of screen-detected lung cancer cases are over diagnosed--that is, the cancer would not have been detected in the patient's lifetime without the screening. Need for follow up screens annually to continue lung cancer screening effectiveness     Risks associated with radiation from annual LDCT- Radiation exposure is about the same as for a mammogram, which is about 1/3 of the annual background radiation exposure from everyday life. Starting screening at age 54 is not likely to increase cancer risk from radiation exposure. Patients with comorbidities resulting in life expectancy of < 10 years, or that would preclude treatment of an abnormality identified on CT, should not be screened due to lack of benefit.     To obtain maximal benefit from this screening, smoking cessation and long-term abstinence from smoking is critical

## 2020-09-21 ENCOUNTER — TELEPHONE (OUTPATIENT)
Dept: ONCOLOGY | Age: 65
End: 2020-09-21

## 2020-09-24 ENCOUNTER — HOSPITAL ENCOUNTER (OUTPATIENT)
Dept: CT IMAGING | Age: 65
Discharge: HOME | End: 2020-09-26
Payer: MEDICARE

## 2020-09-24 PROCEDURE — G0297 LDCT FOR LUNG CA SCREEN: HCPCS

## 2020-10-15 ENCOUNTER — OFFICE VISIT (OUTPATIENT)
Dept: CARDIOLOGY | Age: 65
End: 2020-10-15
Payer: MEDICARE

## 2020-10-15 VITALS
DIASTOLIC BLOOD PRESSURE: 74 MMHG | HEART RATE: 100 BPM | OXYGEN SATURATION: 96 % | BODY MASS INDEX: 30.34 KG/M2 | SYSTOLIC BLOOD PRESSURE: 128 MMHG | HEIGHT: 72 IN | WEIGHT: 224 LBS

## 2020-10-15 PROCEDURE — 99213 OFFICE O/P EST LOW 20 MIN: CPT

## 2020-10-15 PROCEDURE — G8417 CALC BMI ABV UP PARAM F/U: HCPCS | Performed by: INTERNAL MEDICINE

## 2020-10-15 PROCEDURE — 99214 OFFICE O/P EST MOD 30 MIN: CPT

## 2020-10-15 PROCEDURE — 3017F COLORECTAL CA SCREEN DOC REV: CPT | Performed by: INTERNAL MEDICINE

## 2020-10-15 PROCEDURE — 4004F PT TOBACCO SCREEN RCVD TLK: CPT | Performed by: INTERNAL MEDICINE

## 2020-10-15 PROCEDURE — G8427 DOCREV CUR MEDS BY ELIG CLIN: HCPCS | Performed by: INTERNAL MEDICINE

## 2020-10-15 PROCEDURE — 99214 OFFICE O/P EST MOD 30 MIN: CPT | Performed by: INTERNAL MEDICINE

## 2020-10-15 PROCEDURE — 4040F PNEUMOC VAC/ADMIN/RCVD: CPT | Performed by: INTERNAL MEDICINE

## 2020-10-15 PROCEDURE — G8483 FLU IMM NO ADMIN DOC REA: HCPCS | Performed by: INTERNAL MEDICINE

## 2020-10-15 PROCEDURE — 1123F ACP DISCUSS/DSCN MKR DOCD: CPT | Performed by: INTERNAL MEDICINE

## 2020-10-15 NOTE — PROGRESS NOTES
Today's Date: 10/15/2020  Patient Name: Zhang Jarvis  Patient's age: 76 y.o., 1955          CC: the patient is a 76 y.o.  male is in the office for follow up post repeat coronary angiogram for an episode fo V fib/PM Vtach which wa shock terminated. Stents patent with moderate non-obs disease otherwise  Reports feeling good, no chest pain/pressure, dyspnea on exertion  Seen by EP post cath  Started on po amiodarone since last visit       Past Medical History:   has a past medical history of Anxiety, Atrial fibrillation ([facility] [address] CAD (coronary artery disease), Erectile dysfunction, Headache(784.0), High cholesterol, Hx of adenomatous colonic polyps, Hypertension, ICD (implantable cardioverter-defibrillator) discharge, Ischemic cardiomyopathy, Low back pain, MI, old, Osteoarthritis, Plantar fasciitis, Seborrheic keratosis, Smoker, and Syncopal episodes. Past Surgical History:   has a past surgical history that includes Rotator cuff repair (Bilateral, 1997); Vasectomy (1980); Cardiac defibrillator placement (01/07/2013); cyst removal; Hand tendon surgery (Left); Colonoscopy (11/11/2015); Colonoscopy (12/21/2016); Coronary angioplasty with stent (09/2012); and Cardiac catheterization (09/04/2020). Home Medications:    Prior to Admission medications    Medication Sig Start Date End Date Taking?  Authorizing Provider   atorvastatin (LIPITOR) 80 MG tablet TAKE ONE TABLET BY MOUTH EVERY NIGHT 9/10/20   Rosetta Hammer MD   ramipril (ALTACE) 5 MG capsule 1 po daily 9/10/20   Rosetta Hammer MD   metoprolol tartrate (LOPRESSOR) 25 MG tablet 1 po BID 9/10/20   Rosetta Hammer MD   amiodarone (CORDARONE) 200 MG tablet Take 1 tablet by mouth daily 9/4/20   Kavon Roberto MD   clopidogrel (PLAVIX) 75 MG tablet TAKE ONE TABLET BY MOUTH DAILY 9/4/19   Rosetta Hammer MD   sildenafil (VIAGRA) 100 MG tablet TAKE 1 TABLET BY MOUTH AS NEEDED FOR ERECTILE DYSFUNCTION 3/4/19   Rosetta Hammer MD not displaced  · Heart  Sounds:RRR, NO S3/RUB  · Jugular venous pulsation Normal  · The carotid upstroke is normal  · Peripheral pulses are symmetrical and full  Respiratory: Good respiratory effort. On auscultation: clear to auscultation bilaterally no wheezing or crackles  Abdomen:  · No masses or tenderness  · Bowel sounds present  Extremities:  ·  No Cyanosis or Clubbing  ·  Lower extremity edema: No  Skin: Warm and dry    Cardiac data:      EKG 8/27/2020  SR, RBBB, Old anteroseptal and inferior infarct (same as previous ecgs)    Echocardiogram 2/9/2018  1. Left ventricular dimensions are normal. Contractility is mildly  reduced. There are multiple segmental wall motion abnormality mainly  involving the anteroseptal and apical areas. There is grade 1 diastolic  dysfunction of the left ventricle. 2.  Right ventricle appears to be normal in size and function. 3.  Mild left atrial enlargement. 4.  Aortic root diameter is normal at 3.2 cm. 5.  Aortic valve is unremarkable. 6.  Mitral valve shows mild regurgitation. No stenosis. 7.  There is mild tricuspid regurgitation. 8.  Right ventricular systolic pressure was estimated at 30 mmHg. 9.  Pulmonic valve is unremarkable. 10.  There is no pericardial effusion.     [facility] 09/04/2020   1. Patent LAD stents   2. Intermediate 50% stenosis in LCx same as on last cath in 2012   3.  Known occluded () RCA with Collaterals    Labs:     FASTING LIPID PANEL:  Lab Results   Component Value Date    HDL 34 03/05/2020    LDLDIRECT 98 09/30/2012    TRIG 223 03/05/2020       Assessment:    · CAD with hx of NOAH to LAD in 2012 (patent stents on last cath 09/2020)  · Syncopal episode secondary to ventricular tachycardia terminated by ICD shock in August 2020, started on po amio post repeat cath   · HFrEF secondary to Ischemic CMP  · AICD in situ  · HTN, well controlled   · Obesity  · Hyperlipidemia (last lipid panel in 03/2020, LDL 58, Chol 137)  · Chronic smoking      Patient Active Problem List   Diagnosis    CAD (coronary artery disease)    HTN (hypertension)    Anxiety    Hyperlipemia    Memory loss    Hypothyroidism    Depression    Hyperlipidemia with target LDL less than 70    Atrial fibrillation (HCC)    Ischemic cardiomyopathy    Tobacco abuse    Tobacco abuse counseling    Hx of adenomatous colonic polyps    Colon polyps    Chronic systolic congestive heart failure (HCC)       Recommendations:  Cont asa, plavix, high intensity statin (tolerating prolonged DAPT with no significant side effects)   PO amiodarone 200 mg qd   Continue follow-up with EP/Dr Junior Jensen  Will need generator change out in next 3 to 4 months  Maintained on BB and ACEI    Routine follow-up in 6 months or earlier if needed.       Calli Cisneros [id number] [facility]           [phone]

## 2020-11-25 RX ORDER — CLOPIDOGREL BISULFATE 75 MG/1
TABLET ORAL
Qty: 90 TABLET | Refills: 2 | Status: SHIPPED | OUTPATIENT
Start: 2020-11-25 | End: 2021-09-02

## 2021-02-03 ENCOUNTER — OFFICE VISIT (OUTPATIENT)
Dept: OPTOMETRY | Age: 66
End: 2021-02-03
Payer: MEDICARE

## 2021-02-03 DIAGNOSIS — H52.4 HYPEROPIA OF BOTH EYES WITH ASTIGMATISM AND PRESBYOPIA: ICD-10-CM

## 2021-02-03 DIAGNOSIS — H53.8 BLURRED VISION, BILATERAL: Primary | ICD-10-CM

## 2021-02-03 DIAGNOSIS — H52.03 HYPEROPIA OF BOTH EYES WITH ASTIGMATISM AND PRESBYOPIA: ICD-10-CM

## 2021-02-03 DIAGNOSIS — H52.203 HYPEROPIA OF BOTH EYES WITH ASTIGMATISM AND PRESBYOPIA: ICD-10-CM

## 2021-02-03 PROCEDURE — 99203 OFFICE O/P NEW LOW 30 MIN: CPT | Performed by: OPTOMETRIST

## 2021-02-03 PROCEDURE — 99212 OFFICE O/P EST SF 10 MIN: CPT

## 2021-02-03 ASSESSMENT — KERATOMETRY
OD_K2POWER_DIOPTERS: 43.50
OS_AXISANGLE_DEGREES: 007
OS_K1POWER_DIOPTERS: 42.50
OS_AXISANGLE2_DEGREES: 097
OD_AXISANGLE2_DEGREES: 101
OS_K2POWER_DIOPTERS: 43.00
OD_K1POWER_DIOPTERS: 42.75
METHOD_AUTO_MANUAL: AUTOMATED
OD_AXISANGLE_DEGREES: 011

## 2021-02-03 ASSESSMENT — REFRACTION_WEARINGRX
OD_CYLINDER: -2.00
OD_ADD: +2.25
OD_AXIS: 089
OS_ADD: +2.25
OS_AXIS: 105
OD_SPHERE: +1.50
OS_SPHERE: +0.25
OS_CYLINDER: -1.00
SPECS_TYPE: PAL

## 2021-02-03 ASSESSMENT — REFRACTION_MANIFEST
OS_SPHERE: +0.50
OD_CYLINDER: -1.75
OS_CYLINDER: -1.50
OD_SPHERE: +1.50
OS_AXIS: 095
OD_AXIS: 096
OD_ADD: +2.50
OS_ADD: +2.50

## 2021-02-03 ASSESSMENT — VISUAL ACUITY
METHOD: SNELLEN - LINEAR
OS_CC+: -2
OD_CC+: -2
CORRECTION_TYPE: GLASSES
OS_CC: 20/25
OD_CC: 20/25-

## 2021-02-03 ASSESSMENT — TONOMETRY
OS_IOP_MMHG: 12
IOP_METHOD: NON-CONTACT AIR PUFF
OD_IOP_MMHG: 11

## 2021-02-03 ASSESSMENT — ENCOUNTER SYMPTOMS
ALLERGIC/IMMUNOLOGIC NEGATIVE: 0
RESPIRATORY NEGATIVE: 0
EYES NEGATIVE: 0
GASTROINTESTINAL NEGATIVE: 0

## 2021-02-03 ASSESSMENT — SLIT LAMP EXAM - LIDS
COMMENTS: NORMAL
COMMENTS: NORMAL

## 2021-02-03 NOTE — PROGRESS NOTES
Harvey Apodaca presents today for   Chief Complaint   Patient presents with    Blurred Vision    Vision Exam   .    HPI     Blurred Vision     In both eyes. Vision is difficult to focus and blurred. Context:  distance vision and near vision. Comments     Last Vision Exam: 10years +  Last Ophthalmology Exam: No  Last Filled Glasses Rx: 10years +  Insurance: Medicare   Update: Update glasses, vision has changed in the distance and up close. Coating on lenses is peeling. Current Outpatient Medications   Medication Sig Dispense Refill    clopidogrel (PLAVIX) 75 MG tablet TAKE ONE TABLET BY MOUTH DAILY 90 tablet 2    atorvastatin (LIPITOR) 80 MG tablet TAKE ONE TABLET BY MOUTH EVERY NIGHT 90 tablet 2    ramipril (ALTACE) 5 MG capsule 1 po daily 90 capsule 1    metoprolol tartrate (LOPRESSOR) 25 MG tablet 1 po BID (Patient taking differently: 25 mg Take one tablet in am. And take 1/2 tablet in pm.) 180 tablet 1    amiodarone (CORDARONE) 200 MG tablet Take 1 tablet by mouth daily 30 tablet 3    sildenafil (VIAGRA) 100 MG tablet TAKE 1 TABLET BY MOUTH AS NEEDED FOR ERECTILE DYSFUNCTION 10 tablet 2    fluticasone (FLONASE) 50 MCG/ACT nasal spray 2 sprays by Nasal route daily (Patient taking differently: 2 sprays by Nasal route daily as needed ) 1 Bottle 11    aspirin 81 MG tablet Take 1 tablet by mouth daily 30 tablet 6    Omega-3 Fatty Acids (FISH OIL) 1000 MG CAPS Take 1,200 mg by mouth 2 times daily       Multiple Vitamins-Minerals (THERAPEUTIC MULTIVITAMIN-MINERALS) tablet Take 1 tablet by mouth daily. No current facility-administered medications for this visit.           Family History   Problem Relation Age of Onset    Diabetes Mother     Heart Failure Mother         congestive heart failure    Kidney Disease Mother         renal failure    Other Father         benign prostatic hypertrophy    Heart Disease Father     Cancer Father     Other Maternal Grandmother hepatitis    Heart Disease Maternal Grandmother     Colon Cancer Maternal Grandfather     Heart Disease Maternal Grandfather     Kidney Disease Maternal Grandfather         renal disease    Heart Disease Brother     Kidney Disease Sister     Kidney Disease Sister     Colon Cancer Maternal Uncle     Glaucoma Neg Hx      Social History     Socioeconomic History    Marital status:      Spouse name: None    Number of children: None    Years of education: None    Highest education level: None   Occupational History    None   Social Needs    Financial resource strain: None    Food insecurity     Worry: None     Inability: None    Transportation needs     Medical: None     Non-medical: None   Tobacco Use    Smoking status: Current Every Day Smoker     Packs/day: 0.75     Years: 45.00     Pack years: 33.75     Types: Cigarettes     Start date: 1/1/1970     Last attempt to quit: 1/4/2018     Years since quitting: 3.0    Smokeless tobacco: Never Used    Tobacco comment: 8 cigarettes daily   Substance and Sexual Activity    Alcohol use:  Yes     Alcohol/week: 0.0 standard drinks     Comment: occasional    Drug use: No    Sexual activity: Yes     Partners: Female   Lifestyle    Physical activity     Days per week: None     Minutes per session: None    Stress: None   Relationships    Social connections     Talks on phone: None     Gets together: None     Attends [employer] service: None     Active member of club or organization: None     Attends meetings of clubs or organizations: None     Relationship status: None    Intimate partner violence     Fear of current or ex partner: None     Emotionally abused: None     Physically abused: None     Forced sexual activity: None   Other Topics Concern    None   Social History Narrative    None     Past Medical History:   Diagnosis Date    Anxiety     Atrial fibrillation ([facility] [address]     CAD (coronary artery disease) 2012    stent to LAD after MI    42.75 101 43.50 011    Left 42.50 097 43.00 007                  Ophthalmology Exam     Wearing Rx       Sphere Cylinder Axis Add    Right +1.50 -2.00 089 +2.25    Left +0.25 -1.00 105 +2.25    Age: 10yrs+    Type: PAL              Manifest Refraction     Manifest Refraction       Sphere Cylinder [place] [address] [place] Add [place]    Right +1.50 -1.75 096 20/20- +2.50     Left +0.50 -1.50 095 20/20- +2.50 20/20ou          Manifest Refraction #2 (Auto)       Sphere Cylinder [address] [facility] Add [place]    Right +1.50 -1.75 096       Left +0.75 -1.50 094                  Final Rx       Sphere Cylinder Axis Add    Right +1.50 -1.75 096 +2.50    Left +0.50 -1.50 095 +2.50    Type: PAL    Expiration Date: 2/4/2023            1. Blurred vision, bilateral    2.  Hyperopia of both eyes with astigmatism and presbyopia           Patient Instructions   New glasses recommended      Return in about 2 years (around 2/3/2023) for complete eye exam.

## 2021-02-12 DIAGNOSIS — I47.20 VENTRICULAR TACHYCARDIA: ICD-10-CM

## 2021-02-12 RX ORDER — AMIODARONE HYDROCHLORIDE 200 MG/1
200 TABLET ORAL DAILY
Qty: 30 TABLET | Refills: 3 | Status: SHIPPED | OUTPATIENT
Start: 2021-02-12 | End: 2021-02-25 | Stop reason: SDUPTHER

## 2021-02-25 ENCOUNTER — PROCEDURE VISIT (OUTPATIENT)
Dept: CARDIOLOGY | Age: 66
End: 2021-02-25
Payer: MEDICARE

## 2021-02-25 ENCOUNTER — OFFICE VISIT (OUTPATIENT)
Dept: CARDIOLOGY | Age: 66
End: 2021-02-25
Payer: MEDICARE

## 2021-02-25 VITALS
HEIGHT: 72 IN | DIASTOLIC BLOOD PRESSURE: 86 MMHG | SYSTOLIC BLOOD PRESSURE: 143 MMHG | HEART RATE: 87 BPM | WEIGHT: 235 LBS | BODY MASS INDEX: 31.83 KG/M2

## 2021-02-25 DIAGNOSIS — I47.20 VENTRICULAR TACHYCARDIA: ICD-10-CM

## 2021-02-25 DIAGNOSIS — Z95.810 AICD (AUTOMATIC CARDIOVERTER/DEFIBRILLATOR) PRESENT: ICD-10-CM

## 2021-02-25 DIAGNOSIS — I50.22 CHRONIC SYSTOLIC CONGESTIVE HEART FAILURE (HCC): Primary | ICD-10-CM

## 2021-02-25 DIAGNOSIS — I25.5 ISCHEMIC CARDIOMYOPATHY: ICD-10-CM

## 2021-02-25 PROCEDURE — 99214 OFFICE O/P EST MOD 30 MIN: CPT

## 2021-02-25 PROCEDURE — 93289 INTERROG DEVICE EVAL HEART: CPT | Performed by: INTERNAL MEDICINE

## 2021-02-25 PROCEDURE — 99214 OFFICE O/P EST MOD 30 MIN: CPT | Performed by: INTERNAL MEDICINE

## 2021-02-25 PROCEDURE — 93010 ELECTROCARDIOGRAM REPORT: CPT | Performed by: INTERNAL MEDICINE

## 2021-02-25 PROCEDURE — 93005 ELECTROCARDIOGRAM TRACING: CPT | Performed by: INTERNAL MEDICINE

## 2021-02-25 RX ORDER — SACUBITRIL AND VALSARTAN 24; 26 MG/1; MG/1
1 TABLET, FILM COATED ORAL 2 TIMES DAILY
Qty: 180 TABLET | Refills: 1 | Status: SHIPPED | OUTPATIENT
Start: 2021-02-28 | End: 2021-09-02

## 2021-02-25 RX ORDER — AMIODARONE HYDROCHLORIDE 200 MG/1
200 TABLET ORAL DAILY
Qty: 90 TABLET | Refills: 1 | Status: SHIPPED | OUTPATIENT
Start: 2021-02-25 | End: 2022-01-24

## 2021-02-25 NOTE — PROGRESS NOTES
Today's Date: 2/25/2021  Patient Name: Josefina Valerio  Patient's age: 78 y. o., 1955    CC: follow up for CAD, VT, ICD    HPI: the patient is a 78 y.o.  male is in the office for follow up. No chest pains, no sob, no palpitations, no le edema, no orthopnea, no PND. No lightheadedness. No ICD shocks. Has been out of amio for 1 month. Past Medical History:   has a past medical history of Anxiety, Atrial fibrillation ([address] CAD (coronary artery disease), Erectile dysfunction, Headache(784.0), High cholesterol, Hx of adenomatous colonic polyps, Hypertension, ICD (implantable cardioverter-defibrillator) discharge, Ischemic cardiomyopathy, Low back pain, MI, old, Osteoarthritis, Plantar fasciitis, Seborrheic keratosis, Smoker, and Syncopal episodes. Past Surgical History:   has a past surgical history that includes Rotator cuff repair (Bilateral, 1997); Vasectomy (1980); Cardiac defibrillator placement (01/07/2013); cyst removal; Hand tendon surgery (Left); Colonoscopy (11/11/2015); Colonoscopy (12/21/2016); Coronary angioplasty with stent (09/2012); and Cardiac catheterization (09/04/2020). Home Medications:    Prior to Admission medications    Medication Sig Start Date End Date Taking?  Authorizing Provider   amiodarone (CORDARONE) 200 MG tablet Take 1 tablet by mouth daily 2/25/21  Yes Marciano Patton DO   sacubitril-valsartan (ENTRESTO) 24-26 MG per tablet Take 1 tablet by mouth 2 times daily 2/28/21  Yes Marciano Patton DO   clopidogrel (PLAVIX) 75 MG tablet TAKE ONE TABLET BY MOUTH DAILY 11/25/20  Yes Sylvia Morgan MD   atorvastatin (LIPITOR) 80 MG tablet TAKE ONE TABLET BY MOUTH EVERY NIGHT 9/10/20  Yes Sylvia Morgan MD   metoprolol tartrate (LOPRESSOR) 25 MG tablet 1 po BID  Patient taking differently: 25 mg Take one tablet in am. And take 1/2 tablet in pm. 9/10/20  Yes Sylvia Morgan MD   sildenafil (VIAGRA) 100 MG tablet TAKE 1 TABLET BY MOUTH AS NEEDED FOR ERECTILE DYSFUNCTION 3/4/19  Yes Sommer Hernandez MD   fluticasone [facility]) 50 MCG/ACT nasal spray 2 sprays by Nasal route daily  Patient taking differently: 2 sprays by Nasal route daily as needed  3/4/19  Yes Sommer Hernandez MD   aspirin 81 MG tablet Take 1 tablet by mouth daily 3/20/17  Yes Tami Rowan MD   Omega-3 Fatty Acids (FISH OIL) 1000 MG CAPS Take 1,200 mg by mouth 2 times daily    Yes Historical Provider, MD   Multiple Vitamins-Minerals (THERAPEUTIC MULTIVITAMIN-MINERALS) tablet Take 1 tablet by mouth daily. Yes Historical Provider, MD       Allergies:  Patient has no known allergies. Social History:   reports that he has been smoking cigarettes. He started smoking about 51 years ago. He has a 33.75 pack-year smoking history. He has never used smokeless tobacco. He reports current alcohol use. He reports that he does not use drugs. Family History:    Family History   Problem Relation Age of Onset    Diabetes Mother     Heart Failure Mother         congestive heart failure    Kidney Disease Mother         renal failure    Other Father         benign prostatic hypertrophy    Heart Disease Father     Cancer Father     Other Maternal Grandmother         hepatitis    Heart Disease Maternal Grandmother     Colon Cancer Maternal Grandfather     Heart Disease Maternal Grandfather     Kidney Disease Maternal Grandfather         renal disease    Heart Disease Brother     Kidney Disease Sister     Kidney Disease Sister     Colon Cancer Maternal Uncle     Glaucoma Neg Hx        REVIEW OF SYSTEMS:  · Constitutional: there has been no unanticipated weight loss. There's been No change in energy level, No change in activity level. · Eyes: No visual changes or diplopia. No scleral icterus. · ENT: No Headaches, hearing loss or vertigo. No mouth sores or sore throat. · Cardiovascular: AS HPI  · Respiratory: AS HPI  · Gastrointestinal: No abdominal pain, appetite loss, blood in stools.  No change in bowel or bladder habits. · Genitourinary: No dysuria, trouble voiding, or hematuria. · Musculoskeletal:  No gait disturbance, No weakness or joint complaints. · Integumentary: No rash or pruritis. · Neurological: No headache, diplopia, change in muscle strength, numbness or tingling. No change in gait, balance, coordination, mood, affect, memory, mentation, behavior. · Psychiatric: No new anxiety or depression. · Endocrine: No temperature intolerance. No excessive thirst, fluid intake, or urination. No tremor. · Hematologic/Lymphatic: No abnormal bruising or bleeding, blood clots or swollen lymph nodes. · Allergic/Immunologic: No nasal congestion or hives. PHYSICAL EXAM:      BP (!) 151/87 (Site: Right Upper Arm, Position: Sitting, Cuff Size: Large Adult)   Pulse 87   Ht 6' (1.829 m)   Wt 235 lb (106.6 kg)   BMI 31.87 kg/m²    HEENT: PERRL, no cervical lymphadenopathy. No masses palpable. Cardiovascular:  · The apical impulse is not displaced  · Heart  Sounds:RRR, NO S3/RUB  · Jugular venous pulsation Normal  · The carotid upstroke is normal  · Peripheral pulses are symmetrical and full  Respiratory: Good respiratory effort.  On auscultation: clear to auscultation bilaterally no wheezing or crackles  Abdomen:  · No masses or tenderness  · Bowel sounds present  Extremities:  ·  No Cyanosis or Clubbing  ·  Lower extremity edema: No  Skin: Warm and dry    Labs:   Lab Results   Component Value Date    CHOL 137 03/05/2020    TRIG 223 (H) 03/05/2020    HDL 34 (L) 03/05/2020    LDLCHOLESTEROL 58 03/05/2020    VLDL NOT REPORTED (H) 03/05/2020    CHOLHDLRATIO 4.0 03/05/2020       Lab Results   Component Value Date     03/05/2020    K 4.0 03/05/2020     03/05/2020    CO2 26 03/05/2020    BUN 10 03/05/2020    CREATININE 1.09 09/04/2020    GLUCOSE 98 03/05/2020    CALCIUM 9.9 03/05/2020    PROT 7.5 03/05/2020    LABALBU 4.6 03/05/2020    BILITOT 1.03 03/05/2020    ALKPHOS 68 03/05/2020    AST 20 03/05/2020    ALT 21 03/05/2020    LABGLOM >60 09/04/2020    GFRAA >60 03/05/2020     Cardiac data:      EKG   Sinus  Rhythm  -First degree A-V block   [place] = 224  -Right bundle branch block. - (age undetermined)  Inferior -lateral and  Old  Inferior infarct. Echocardiogram 2/9/2018  1. Left ventricular dimensions are normal. Contractility is mildly  reduced. There are multiple segmental wall motion abnormality mainly  involving the anteroseptal and apical areas. There is grade 1 diastolic  dysfunction of the left ventricle. 2.  Right ventricle appears to be normal in size and function. 3.  Mild left atrial enlargement. 4.  Aortic root diameter is normal at 3.2 cm. 5.  Aortic valve is unremarkable. 6.  Mitral valve shows mild regurgitation. No stenosis. 7.  There is mild tricuspid regurgitation. 8.  Right ventricular systolic pressure was estimated at 30 mmHg. 9.  Pulmonic valve is unremarkable. 10.  There is no pericardial effusion.     [facility] 09/04/2020   1. Patent LAD stents   2. Intermediate 50% stenosis in LCx same as on last cath in 2012   3. Known occluded () RCA with Collaterals    Assessment:    · CAD with hx of NOAH to LAD in 2012.  of RCA stable 9/20 (patent stents on last cath 09/2020)  · Syncopal episode secondary to ventricular tachycardia  · Ventricular tachycardia terminated by ICD shock in August 2020, started on po amio post repeat cath   · HFrEF secondary to Ischemic CMP  · AICD in situ- checked in office today, needs recheck in 3 months, then likely set up for gen change.    · HTN- not well controlled   · Obesity  · Hyperlipidemia (last lipid panel in 03/2020, LDL 58, Chol 137)  · Chronic smoking    Recommendations:  · Stop ramipril   · Start entresto 24/26 bid starting Sunday 2/28/21 (studies have shown reduced VT with entresto vs ACE/ARB on AICD interrogations)   · Add farxiga after next office visit  · Update 2d Echo in 6 months  · Cont asa, plavix, high intensity statin, BB   · PO amiodarone 200 mg qd   · Continue follow-up with EP/Dr Debbie Barnard  · Repeat AICD check in 3 months, then likely will need generator change    The patient is to continue heart healthy diet, weight loss and exercise as tolerated. Patient's medications and side effects were discussed. Medication refills were provided if needed. Follow up appointment timing was discussed. All questions and concerns were addressed to patient's satisfaction. The patient is to follow up in 6 months or sooner if necessary. Thank you for allowing me to participate in the care of this patient, please do not hesitate to call if you have any questions. Jessie Swartz DO, [facility] [place], [address], [place] [address] [facility]  [email or web address]  [phone]

## 2021-03-05 ENCOUNTER — IMMUNIZATION (OUTPATIENT)
Dept: LAB | Age: 66
End: 2021-03-05
Payer: MEDICARE

## 2021-03-05 PROCEDURE — 91301 COVID-19, MODERNA VACCINE 100MCG/0.5ML DOSE: CPT

## 2021-03-17 ENCOUNTER — OFFICE VISIT (OUTPATIENT)
Dept: FAMILY MEDICINE CLINIC | Age: 66
End: 2021-03-17
Payer: MEDICARE

## 2021-03-17 ENCOUNTER — HOSPITAL ENCOUNTER (OUTPATIENT)
Dept: LAB | Age: 66
Discharge: HOME | End: 2021-03-17
Payer: MEDICARE

## 2021-03-17 VITALS
HEART RATE: 72 BPM | HEIGHT: 72 IN | SYSTOLIC BLOOD PRESSURE: 134 MMHG | DIASTOLIC BLOOD PRESSURE: 72 MMHG | WEIGHT: 238 LBS | BODY MASS INDEX: 32.23 KG/M2

## 2021-03-17 DIAGNOSIS — Z12.5 SCREENING PSA (PROSTATE SPECIFIC ANTIGEN): ICD-10-CM

## 2021-03-17 DIAGNOSIS — E03.9 HYPOTHYROIDISM, UNSPECIFIED TYPE: ICD-10-CM

## 2021-03-17 DIAGNOSIS — E78.5 HYPERLIPIDEMIA WITH TARGET LDL LESS THAN 70: ICD-10-CM

## 2021-03-17 DIAGNOSIS — I47.20 VENTRICULAR TACHYCARDIA: ICD-10-CM

## 2021-03-17 DIAGNOSIS — Z72.0 TOBACCO ABUSE: ICD-10-CM

## 2021-03-17 DIAGNOSIS — F32.5 MAJOR DEPRESSIVE DISORDER, SINGLE EPISODE, IN FULL REMISSION (HCC): ICD-10-CM

## 2021-03-17 DIAGNOSIS — I25.10 CORONARY ARTERY DISEASE INVOLVING NATIVE CORONARY ARTERY OF NATIVE HEART WITHOUT ANGINA PECTORIS: Primary | ICD-10-CM

## 2021-03-17 DIAGNOSIS — I10 ESSENTIAL HYPERTENSION: ICD-10-CM

## 2021-03-17 LAB
ABSOLUTE EOS #: 0.11 K/UL (ref 0–0.44)
ABSOLUTE IMMATURE GRANULOCYTE: <0.03 K/UL (ref 0–0.3)
ABSOLUTE LYMPH #: 3.13 K/UL (ref 1.1–3.7)
ABSOLUTE MONO #: 0.53 K/UL (ref 0.1–1.2)
ALBUMIN SERPL-MCNC: 4.5 G/DL (ref 3.5–5.2)
ALBUMIN/GLOBULIN RATIO: 1.6 (ref 1–2.5)
ALP BLD-CCNC: 63 U/L (ref 40–129)
ALT SERPL-CCNC: 27 U/L (ref 5–41)
ANION GAP SERPL CALCULATED.3IONS-SCNC: 11 MMOL/L (ref 9–17)
AST SERPL-CCNC: 20 U/L
BASOPHILS # BLD: 1 % (ref 0–2)
BASOPHILS ABSOLUTE: 0.09 K/UL (ref 0–0.2)
BILIRUB SERPL-MCNC: 0.66 MG/DL (ref 0.3–1.2)
BUN BLDV-MCNC: 11 MG/DL (ref 8–23)
BUN/CREAT BLD: 11 (ref 9–20)
CALCIUM SERPL-MCNC: 9.7 MG/DL (ref 8.6–10.4)
CHLORIDE BLD-SCNC: 104 MMOL/L (ref 98–107)
CO2: 25 MMOL/L (ref 20–31)
CREAT SERPL-MCNC: 1 MG/DL (ref 0.7–1.2)
DIFFERENTIAL TYPE: ABNORMAL
EOSINOPHILS RELATIVE PERCENT: 1 % (ref 1–4)
GFR AFRICAN AMERICAN: >60 ML/MIN
GFR NON-AFRICAN AMERICAN: >60 ML/MIN
GFR SERPL CREATININE-BSD FRML MDRD: NORMAL ML/MIN/{1.73_M2}
GFR SERPL CREATININE-BSD FRML MDRD: NORMAL ML/MIN/{1.73_M2}
GLUCOSE BLD-MCNC: 87 MG/DL (ref 70–99)
HCT VFR BLD CALC: 45 % (ref 40.7–50.3)
HEMOGLOBIN: 15.8 G/DL (ref 13–17)
IMMATURE GRANULOCYTES: 0 %
LYMPHOCYTES # BLD: 37 % (ref 24–43)
MCH RBC QN AUTO: 34.1 PG (ref 25.2–33.5)
MCHC RBC AUTO-ENTMCNC: 35.1 G/DL (ref 25.2–33.5)
MCV RBC AUTO: 97.2 FL (ref 82.6–102.9)
MONOCYTES # BLD: 6 % (ref 3–12)
NRBC AUTOMATED: 0 PER 100 WBC
PDW BLD-RTO: 12.1 % (ref 11.8–14.4)
PLATELET # BLD: 147 K/UL (ref 138–453)
PLATELET ESTIMATE: ABNORMAL
PMV BLD AUTO: 12.1 FL (ref 8.1–13.5)
POTASSIUM SERPL-SCNC: 4 MMOL/L (ref 3.7–5.3)
RBC # BLD: 4.63 M/UL (ref 4.21–5.77)
RBC # BLD: ABNORMAL 10*6/UL
SEG NEUTROPHILS: 55 % (ref 36–65)
SEGMENTED NEUTROPHILS ABSOLUTE COUNT: 4.66 K/UL (ref 1.5–8.1)
SODIUM BLD-SCNC: 140 MMOL/L (ref 135–144)
TOTAL PROTEIN: 7.4 G/DL (ref 6.4–8.3)
TSH SERPL DL<=0.05 MIU/L-ACNC: 2.94 MIU/L (ref 0.3–5)
WBC # BLD: 8.5 K/UL (ref 3.5–11.3)
WBC # BLD: ABNORMAL 10*3/UL

## 2021-03-17 PROCEDURE — 84439 ASSAY OF FREE THYROXINE: CPT

## 2021-03-17 PROCEDURE — 99214 OFFICE O/P EST MOD 30 MIN: CPT | Performed by: FAMILY MEDICINE

## 2021-03-17 PROCEDURE — 99213 OFFICE O/P EST LOW 20 MIN: CPT

## 2021-03-17 PROCEDURE — 84443 ASSAY THYROID STIM HORMONE: CPT

## 2021-03-17 PROCEDURE — G0103 PSA SCREENING: HCPCS

## 2021-03-17 PROCEDURE — 80061 LIPID PANEL: CPT

## 2021-03-17 PROCEDURE — 36415 COLL VENOUS BLD VENIPUNCTURE: CPT

## 2021-03-17 PROCEDURE — 80053 COMPREHEN METABOLIC PANEL: CPT

## 2021-03-17 PROCEDURE — 85025 COMPLETE CBC W/AUTO DIFF WBC: CPT

## 2021-03-17 RX ORDER — ATORVASTATIN CALCIUM 80 MG/1
TABLET, FILM COATED ORAL
Qty: 90 TABLET | Refills: 2 | Status: SHIPPED | OUTPATIENT
Start: 2021-03-17 | End: 2022-05-16 | Stop reason: SDUPTHER

## 2021-03-17 ASSESSMENT — PATIENT HEALTH QUESTIONNAIRE - PHQ9
1. LITTLE INTEREST OR PLEASURE IN DOING THINGS: 0
SUM OF ALL RESPONSES TO PHQ QUESTIONS 1-9: 0
SUM OF ALL RESPONSES TO PHQ9 QUESTIONS 1 & 2: 0

## 2021-03-17 ASSESSMENT — ENCOUNTER SYMPTOMS
EYES NEGATIVE: 1
ALLERGIC/IMMUNOLOGIC NEGATIVE: 1
RESPIRATORY NEGATIVE: 1
SHORTNESS OF BREATH: 0
GASTROINTESTINAL NEGATIVE: 1
COUGH: 0

## 2021-03-17 NOTE — PROGRESS NOTES
Subjective:      Patient ID: Olive Cuevas is a 78 y.o. male. Coronary Artery Disease  Pertinent negatives include no shortness of breath. Risk factors include hyperlipidemia. Loss of Consciousness  His past medical history is significant for CAD. Hyperlipidemia  Pertinent negatives include no myalgias or shortness of breath. Hypertension  Pertinent negatives include no neck pain or shortness of breath. Erectile Dysfunction       Routine follow up on chronic medical conditions, refills, and review of updated labs. I have reviewed the patient's medical history in detail and updated the computerized patient record. Feeling well over the interval.  No chest pain or palpitations. bp under good control. He has quit smoking [birth year] his [birth year]. Restarted again. chantix had helped. Still smoking again at present. Started cpap late January. Compliant with cpap nightly. He has perceived benefit at present. Some gas /distention ongoing. Making him sick at times. Feeling a little sob, trouble getting used to the mask, mostly due to recurrent nasal congestion. No back pain at present. Feeling well. No chest pain      Compliant with medications. Syncope event about 2 weeks ago. Sudden loss of conciousness while outside working on his golf cart. A little hot, but taking breaks and drinking gatorade. No warning or prodromal symptoms. He fell from standing into the grass. No injuries. He woke up and got up on his own. He had no sense of defibrillator firing, but apparently it picked up the event on his cardiology follow up. He then had updated Cardiac evan. 9/4/20. 1. Patent LAD stents   2. Intermediate 50% stenosis in LCx same as on last cath in 2012   3. Known occluded () RCA with Collaterals       ICD interrogation today shows ventricular tachycardia, polymorphic, rate greater than 330 which was terminated by one 35 J shock.   There were few episodes of few tablet by mouth daily. No current facility-administered medications for this visit. No Known Allergies      Review of Systems   Constitutional: Negative. HENT: Negative. Eyes: Negative. Respiratory: Negative. Negative for cough and shortness of breath. Cardiovascular: Positive for syncope. Gastrointestinal: Negative. Endocrine: Negative. Genitourinary: Negative. Musculoskeletal: Negative. Negative for myalgias, neck pain and neck stiffness. Skin: Negative. Negative for rash. Allergic/Immunologic: Negative. Hematological: Negative. Does not bruise/bleed easily. Psychiatric/Behavioral: Negative. Objective:   Physical Exam  Constitutional:       General: He is not in acute distress. Appearance: He is well-developed. HENT:      Head: Normocephalic and atraumatic. Right Ear: External ear normal.      Left Ear: External ear normal.      Mouth/Throat:      Pharynx: No oropharyngeal exudate. Eyes:      General: No scleral icterus. Conjunctiva/sclera: Conjunctivae normal.   Neck:      Musculoskeletal: Neck supple. Thyroid: No thyromegaly. Cardiovascular:      Rate and Rhythm: Normal rate and regular rhythm. Heart sounds: Normal heart sounds. No murmur. Pulmonary:      Effort: Pulmonary effort is normal. No respiratory distress. Breath sounds: Normal breath sounds. No wheezing. Abdominal:      General: Bowel sounds are normal. There is no distension. Palpations: Abdomen is soft. Tenderness: There is no abdominal tenderness. There is no rebound. Musculoskeletal: Normal range of motion. General: No tenderness (trace edema, improved at present. ). Skin:     General: Skin is warm and dry. Findings: No erythema or rash. Neurological:      Mental Status: He is alert and oriented to person, place, and time. Psychiatric:         Behavior: Behavior normal.         Thought Content:  Thought content normal. Judgment: Judgment normal.       /72 (Site: Right Upper Arm, Position: Sitting, Cuff Size: Large Adult)   Pulse 72   Ht 6' (1.829 m)   Wt 238 lb (108 kg)   BMI 32.28 kg/m²     No visits with results within 1 Month(s) from this visit. Latest known visit with results is:   Hospital Outpatient Visit on 09/04/2020   Component Date Value Ref Range Status    Platelets 37/65/7005 155  138 - 453 k/uL Final    POC Hemoglobin 09/04/2020 14.8  13.5 - 17.5 g/dL Final    POC Hematocrit 09/04/2020 44  41 - 53 % Final    POC Creatinine 09/04/2020 1.09  0.51 - 1.19 mg/dL Final    GFR Comment 09/04/2020 >60  >60 mL/min Final    GFR Non- 09/04/2020 >60  >60 mL/min Final    GFR Comment 09/04/2020        Final    POC Sodium 09/04/2020 142  138 - 146 mmol/L Final    POC Potassium 09/04/2020 4.1  3.5 - 4.5 mmol/L Final    POC Chloride 09/04/2020 104  98 - 107 mmol/L Final    POC Glucose 09/04/2020 84  74 - 100 mg/dL Final       TTE 02/09/18     1.  Left ventricular dimensions are normal.  Contractility is mildly  reduced.  There are multiple segmental wall motion abnormality mainly  involving the anteroseptal and apical areas.  There is grade 1 diastolic  dysfunction of the left ventricle. 2.  Right ventricle appears to be normal in size and function. 3.  Mild left atrial enlargement. 4.  Aortic root diameter is normal at 3.2 cm. 5.  Aortic valve is unremarkable. 6.  Mitral valve shows mild regurgitation. No stenosis. 7.  There is mild tricuspid regurgitation. 8.  Right ventricular systolic pressure was estimated at 30 mmHg. 9.  Pulmonic valve is unremarkable. 10.  There is no pericardial effusion.     Labs pending draw yet     Assessment:       Encounter Diagnoses   Name Primary?     Coronary artery disease involving native coronary artery of native heart without angina pectoris Yes    Ventricular tachycardia (HCC)     Essential hypertension     Major depressive disorder, single episode, in full remission ([facility] [address]     Hyperlipidemia with target LDL less than 70     Hypothyroidism, unspecified type     Tobacco abuse     Screening PSA (prostate specific antigen)              Plan:      CAD; clinically stable. S/p NOAH to lad after MI 2012. Ischemic cmo s/p aicd. No functional limitations. Denies cp. Denies changes in exercise tolerance. Consider updated echo at follow up. Last EF 20-25% s/p MI 2013. AICD in place due to ischemic cardiomyopathy. Interrogation through cardiology, compliant. Ventricular tachycardia: episode ~8/22/20 where he passed out while standing outside. No prodromal warning/symptoms. Felt fine afterwards. icd interrogation showing shock delivered and terminating the arrhythmia. Now on amiodarone. Plans for follow up with electrophysiology cardiologist.      Htn: fair control at present. Cont current meds. Depression/anxiety: doing ok off wellbutrin. Had some excessive perspiration he blamed on the drug. Seems better off the drug. No concerns for depression/anxiety atresent. ED: no nitrate use. viagra not seeming to work as well at present. Hyperlipidemia: good control historically. Cont. lipitor 80mg. Updated labs pending. Hypothyroid hx. No active treatment at present. Remains well balanced at last check. Now on amiodarone, will cont. Serial checks. Tobacco abuse. He just quit smoking on his birthday. Unfortunately  restarted. chantix helped. Rec. Restarting . Discussed CT screening program.  He is interested. Completed. Law: having trouble with gi gas/distention/pain. rx changed to autopap machine 2/20/20. New machine. He still struggles a little with a sense of not breathing. psa due with current labs.

## 2021-03-18 LAB
CHOLESTEROL/HDL RATIO: 3.9
CHOLESTEROL: 134 MG/DL
HDLC SERPL-MCNC: 34 MG/DL
LDL CHOLESTEROL: 59 MG/DL (ref 0–130)
PROSTATE SPECIFIC ANTIGEN: 3.63 UG/L
THYROXINE, FREE: 1.54 NG/DL (ref 0.93–1.7)
TRIGL SERPL-MCNC: 205 MG/DL
VLDLC SERPL CALC-MCNC: ABNORMAL MG/DL (ref 1–30)

## 2021-04-02 ENCOUNTER — IMMUNIZATION (OUTPATIENT)
Dept: LAB | Age: 66
End: 2021-04-02
Payer: MEDICARE

## 2021-04-02 PROCEDURE — 91301 COVID-19, MODERNA VACCINE 100MCG/0.5ML DOSE: CPT

## 2021-04-15 ENCOUNTER — OFFICE VISIT (OUTPATIENT)
Dept: CARDIOLOGY | Age: 66
End: 2021-04-15
Payer: MEDICARE

## 2021-04-15 VITALS
WEIGHT: 241 LBS | HEART RATE: 58 BPM | DIASTOLIC BLOOD PRESSURE: 87 MMHG | SYSTOLIC BLOOD PRESSURE: 150 MMHG | BODY MASS INDEX: 32.64 KG/M2 | HEIGHT: 72 IN

## 2021-04-15 DIAGNOSIS — I25.5 ISCHEMIC CARDIOMYOPATHY: Primary | ICD-10-CM

## 2021-04-15 DIAGNOSIS — I50.22 CHRONIC SYSTOLIC CONGESTIVE HEART FAILURE (HCC): ICD-10-CM

## 2021-04-15 DIAGNOSIS — Z95.810 AICD (AUTOMATIC CARDIOVERTER/DEFIBRILLATOR) PRESENT: ICD-10-CM

## 2021-04-15 DIAGNOSIS — I47.20 VENTRICULAR TACHYCARDIA: ICD-10-CM

## 2021-04-15 DIAGNOSIS — I25.10 CORONARY ARTERY DISEASE INVOLVING NATIVE CORONARY ARTERY OF NATIVE HEART WITHOUT ANGINA PECTORIS: ICD-10-CM

## 2021-04-15 PROCEDURE — 93010 ELECTROCARDIOGRAM REPORT: CPT | Performed by: INTERNAL MEDICINE

## 2021-04-15 PROCEDURE — 93005 ELECTROCARDIOGRAM TRACING: CPT | Performed by: INTERNAL MEDICINE

## 2021-04-15 PROCEDURE — 99213 OFFICE O/P EST LOW 20 MIN: CPT | Performed by: INTERNAL MEDICINE

## 2021-04-15 PROCEDURE — 99214 OFFICE O/P EST MOD 30 MIN: CPT | Performed by: INTERNAL MEDICINE

## 2021-04-15 SDOH — HEALTH STABILITY: MENTAL HEALTH: HOW OFTEN DO YOU HAVE A DRINK CONTAINING ALCOHOL?: NOT ASKED

## 2021-04-15 NOTE — PROGRESS NOTES
Today's Date: 4/15/2021  Patient Name: Phoebe Navarrete  Patient's age: 78 y. o., 1955    CC: follow up for CAD, VT, ICD    HPI: the patient is a 78 y.o.  male is in the office for follow up. Doing well from cardiac standpoint and denies any new symptoms. No chest pain, no sob with routine activities, no palpitations, no le edema, no orthopnea, no PND. No ICD shocks or alarms. Past Medical History:   has a past medical history of Anxiety, Atrial fibrillation ([address] CAD (coronary artery disease), Erectile dysfunction, Headache(784.0), High cholesterol, Hx of adenomatous colonic polyps, Hypertension, ICD (implantable cardioverter-defibrillator) discharge, Ischemic cardiomyopathy, Low back pain, MI, old, Osteoarthritis, Plantar fasciitis, Seborrheic keratosis, Smoker, and Syncopal episodes. Past Surgical History:   has a past surgical history that includes Rotator cuff repair (Bilateral, 1997); Vasectomy (1980); Cardiac defibrillator placement (01/07/2013); cyst removal; Hand tendon surgery (Left); Colonoscopy (11/11/2015); Colonoscopy (12/21/2016); Coronary angioplasty with stent (09/2012); and Cardiac catheterization (09/04/2020). Home Medications:    Prior to Admission medications    Medication Sig Start Date End Date Taking?  Authorizing Provider   ZINC PO Take 1 tablet by mouth daily   Yes Historical Provider, MD   atorvastatin (LIPITOR) 80 MG tablet TAKE ONE TABLET BY MOUTH EVERY NIGHT 3/17/21  Yes Roxy Diaz MD   metoprolol tartrate (LOPRESSOR) 25 MG tablet Take 1 tablet by mouth 2 times daily Take one tablet in am. And take 1/2 tablet in pm.  Patient taking differently: Take 25 mg by mouth Take one tablet in am. And take 1/2 tablet in pm. 3/17/21  Yes Roxy Diaz MD   amiodarone (CORDARONE) 200 MG tablet Take 1 tablet by mouth daily 2/25/21  Yes Marciano Patton DO   sacubitril-valsartan (ENTRESTO) 24-26 MG per tablet Take 1 tablet by mouth 2 times daily 2/28/21  Yes Marciano Patton DO   clopidogrel (PLAVIX) 75 MG tablet TAKE ONE TABLET BY MOUTH DAILY 11/25/20  Yes Jamison Ramsey MD   fluticasone [facility]) 50 MCG/ACT nasal spray 2 sprays by Nasal route daily  Patient taking differently: 2 sprays by Nasal route daily as needed  3/4/19  Yes Jamison Ramsey MD   aspirin 81 MG tablet Take 1 tablet by mouth daily 3/20/17  Yes Manjit Jordan MD   Omega-3 Fatty Acids (FISH OIL) 1000 MG CAPS Take 1,200 mg by mouth 2 times daily    Yes Historical Provider, MD   Multiple Vitamins-Minerals (THERAPEUTIC MULTIVITAMIN-MINERALS) tablet Take 1 tablet by mouth daily. Yes Historical Provider, MD   sildenafil (VIAGRA) 100 MG tablet TAKE 1 TABLET BY MOUTH AS NEEDED FOR ERECTILE DYSFUNCTION 3/4/19   Jamison Ramsey MD       Allergies:  Patient has no known allergies. Social History:   reports that he has been smoking cigarettes. He started smoking about 51 years ago. He has a 45.00 pack-year smoking history. He has never used smokeless tobacco. He reports previous alcohol use. He reports that he does not use drugs. Family History:    Family History   Problem Relation Age of Onset    Diabetes Mother     Heart Failure Mother         congestive heart failure    Kidney Disease Mother         renal failure    Other Father         benign prostatic hypertrophy    Heart Disease Father     Cancer Father     Other Maternal Grandmother         hepatitis    Heart Disease Maternal Grandmother     Colon Cancer Maternal Grandfather     Heart Disease Maternal Grandfather     Kidney Disease Maternal Grandfather         renal disease    Heart Disease Brother     Kidney Disease Sister     Kidney Disease Sister     Colon Cancer Maternal Uncle     Glaucoma Neg Hx        REVIEW OF SYSTEMS:  · Constitutional: there has been no unanticipated weight loss. There's been No change in energy level, No change in activity level. · Eyes: No visual changes or diplopia.  No scleral icterus. · ENT: No Headaches, hearing loss or vertigo. No mouth sores or sore throat. · Cardiovascular: AS HPI  · Respiratory: AS HPI  · Gastrointestinal: No abdominal pain, appetite loss, blood in stools. No change in bowel or bladder habits. · Genitourinary: No dysuria, trouble voiding, or hematuria. · Musculoskeletal:  No gait disturbance, No weakness or joint complaints. · Integumentary: No rash or pruritis. · Neurological: No headache, diplopia, change in muscle strength, numbness or tingling. No change in gait, balance, coordination, mood, affect, memory, mentation, behavior. · Psychiatric: No new anxiety or depression. · Endocrine: No temperature intolerance. No excessive thirst, fluid intake, or urination. No tremor. · Hematologic/Lymphatic: No abnormal bruising or bleeding, blood clots or swollen lymph nodes. · Allergic/Immunologic: No nasal congestion or hives. PHYSICAL EXAM:      BP (!) 147/77 (Site: Right Upper Arm, Position: Sitting, Cuff Size: Medium Adult)   Pulse 58   Ht 6' (1.829 m)   Wt 241 lb (109.3 kg)   BMI 32.69 kg/m²    HEENT: PERRL, no cervical lymphadenopathy. No masses palpable. Cardiovascular:  · The apical impulse is not displaced  · Heart  Sounds:RRR, NO S3/RUB  · Jugular venous pulsation Normal  · The carotid upstroke is normal  · Peripheral pulses are symmetrical and full  Respiratory: Good respiratory effort.  On auscultation: clear to auscultation bilaterally no wheezing or crackles  Abdomen:  · No masses or tenderness  · Bowel sounds present  Extremities:  ·  No Cyanosis or Clubbing  ·  Lower extremity edema: No  Skin: Warm and dry    Labs:   Lab Results   Component Value Date    CHOL 134 03/17/2021    TRIG 205 (H) 03/17/2021    HDL 34 (L) 03/17/2021    LDLCHOLESTEROL 59 03/17/2021    VLDL NOT REPORTED (H) 03/17/2021    CHOLHDLRATIO 3.9 03/17/2021       Lab Results   Component Value Date     03/17/2021    K 4.0 03/17/2021     03/17/2021    CO2 25 shock terminated VT  · Continue follow-up with EP/Dr Valencia Banda  · Repeat AICD check in 3 months, then likely will need generator change      The patient is to continue heart healthy diet, weight loss and exercise as tolerated. Patient's medications and side effects were discussed. Medication refills were provided if needed. Follow up appointment timing was discussed. All questions and concerns were addressed to patient's satisfaction. The patient is to follow up in 6 months or sooner if necessary. Thank you for allowing me to participate in the care of this patient, please do not hesitate to call if you have any questions. Joshua Jenkins MD   [facility]  [facility]  [phone]

## 2021-05-27 ENCOUNTER — PROCEDURE VISIT (OUTPATIENT)
Dept: CARDIOLOGY | Age: 66
End: 2021-05-27
Payer: MEDICARE

## 2021-05-27 DIAGNOSIS — I25.5 ISCHEMIC CARDIOMYOPATHY: ICD-10-CM

## 2021-05-27 DIAGNOSIS — Z95.810 AICD (AUTOMATIC CARDIOVERTER/DEFIBRILLATOR) PRESENT: ICD-10-CM

## 2021-05-27 DIAGNOSIS — I25.5 ISCHEMIC CARDIOMYOPATHY: Primary | ICD-10-CM

## 2021-05-27 PROCEDURE — 93289 INTERROG DEVICE EVAL HEART: CPT | Performed by: INTERNAL MEDICINE

## 2021-06-08 ENCOUNTER — HOSPITAL ENCOUNTER (OUTPATIENT)
Dept: CARDIAC CATH/INVASIVE PROCEDURES | Age: 66
Discharge: HOME | End: 2021-06-08
Payer: MEDICARE

## 2021-06-08 LAB
GFR NON-AFRICAN AMERICAN: >60 ML/MIN
GFR SERPL CREATININE-BSD FRML MDRD: >60 ML/MIN
GFR SERPL CREATININE-BSD FRML MDRD: NORMAL ML/MIN/{1.73_M2}
GLUCOSE BLD-MCNC: 101 MG/DL (ref 74–100)
POC BUN: 13 MG/DL (ref 8–26)
POC CHLORIDE: 108 MMOL/L (ref 98–107)
POC CREATININE: 1.04 MG/DL (ref 0.51–1.19)
POC HEMATOCRIT: 44 % (ref 41–53)
POC HEMOGLOBIN: 14.9 G/DL (ref 13.5–17.5)
POC POTASSIUM: 4.3 MMOL/L (ref 3.5–4.5)
POC SODIUM: 141 MMOL/L (ref 138–146)

## 2021-06-08 PROCEDURE — 82947 ASSAY GLUCOSE BLOOD QUANT: CPT

## 2021-06-08 PROCEDURE — 82435 ASSAY OF BLOOD CHLORIDE: CPT

## 2021-06-08 PROCEDURE — 82565 ASSAY OF CREATININE: CPT

## 2021-06-08 PROCEDURE — 84295 ASSAY OF SERUM SODIUM: CPT

## 2021-06-08 PROCEDURE — 84520 ASSAY OF UREA NITROGEN: CPT

## 2021-06-08 PROCEDURE — 2780000010 HC IMPLANT OTHER

## 2021-06-08 PROCEDURE — 84132 ASSAY OF SERUM POTASSIUM: CPT

## 2021-06-08 PROCEDURE — 85014 HEMATOCRIT: CPT

## 2021-06-08 PROCEDURE — 2709999900 HC NON-CHARGEABLE SUPPLY

## 2021-06-08 PROCEDURE — C1722 AICD, SINGLE CHAMBER: HCPCS

## 2021-06-08 RX ORDER — SODIUM CHLORIDE 0.9 % (FLUSH) 0.9 %
5-40 SYRINGE (ML) INJECTION PRN
Status: DISCONTINUED | OUTPATIENT
Start: 2021-06-08 | End: 2021-06-09 | Stop reason: HOSPADM

## 2021-06-08 RX ORDER — SODIUM CHLORIDE 0.9 % (FLUSH) 0.9 %
5-40 SYRINGE (ML) INJECTION EVERY 12 HOURS SCHEDULED
Status: DISCONTINUED | OUTPATIENT
Start: 2021-06-08 | End: 2021-06-09 | Stop reason: HOSPADM

## 2021-06-08 RX ORDER — CALCIUM CARBONATE 500(1250)
500 TABLET ORAL DAILY
COMMUNITY

## 2021-06-08 RX ORDER — SODIUM CHLORIDE 9 MG/ML
INJECTION, SOLUTION INTRAVENOUS CONTINUOUS
Status: DISCONTINUED | OUTPATIENT
Start: 2021-06-08 | End: 2021-06-09 | Stop reason: HOSPADM

## 2021-06-08 RX ORDER — CEFAZOLIN SODIUM 1 G/50ML
1000 INJECTION, SOLUTION INTRAVENOUS ONCE
Status: DISCONTINUED | OUTPATIENT
Start: 2021-06-08 | End: 2021-06-09 | Stop reason: HOSPADM

## 2021-06-08 RX ORDER — SODIUM CHLORIDE 9 MG/ML
25 INJECTION, SOLUTION INTRAVENOUS PRN
Status: DISCONTINUED | OUTPATIENT
Start: 2021-06-08 | End: 2021-06-09 | Stop reason: HOSPADM

## 2021-06-08 RX ADMIN — SODIUM CHLORIDE 25 ML: 9 INJECTION, SOLUTION INTRAVENOUS at 14:51

## 2021-06-08 NOTE — PROGRESS NOTES
Patient admitted, consent signed and questions answered. Patient ready for procedure. Call light to reach with side rails up 2 of 2. Chest hair clipped clipped. updated at bedside with patient. History and physical updated.  Chest cleansed with CHG wipes

## 2021-06-22 ENCOUNTER — HOSPITAL ENCOUNTER (OUTPATIENT)
Dept: CARDIAC CATH/INVASIVE PROCEDURES | Age: 66
Discharge: HOME | End: 2021-06-22
Payer: MEDICARE

## 2021-06-22 VITALS
SYSTOLIC BLOOD PRESSURE: 126 MMHG | WEIGHT: 236 LBS | HEIGHT: 72 IN | DIASTOLIC BLOOD PRESSURE: 71 MMHG | TEMPERATURE: 98.2 F | BODY MASS INDEX: 31.97 KG/M2 | RESPIRATION RATE: 16 BRPM | HEART RATE: 55 BPM | OXYGEN SATURATION: 96 %

## 2021-06-22 PROCEDURE — 33262 RMVL& REPLC PULSE GEN 1 LEAD: CPT | Performed by: INTERNAL MEDICINE

## 2021-06-22 PROCEDURE — 7100000000 HC PACU RECOVERY - FIRST 15 MIN

## 2021-06-22 PROCEDURE — 2580000003 HC RX 258: Performed by: INTERNAL MEDICINE

## 2021-06-22 PROCEDURE — 6360000002 HC RX W HCPCS

## 2021-06-22 PROCEDURE — 2720000010 HC SURG SUPPLY STERILE

## 2021-06-22 PROCEDURE — 2580000003 HC RX 258

## 2021-06-22 PROCEDURE — 2709999900 HC NON-CHARGEABLE SUPPLY

## 2021-06-22 PROCEDURE — 2500000003 HC RX 250 WO HCPCS

## 2021-06-22 PROCEDURE — C1722 AICD, SINGLE CHAMBER: HCPCS

## 2021-06-22 PROCEDURE — 7100000001 HC PACU RECOVERY - ADDTL 15 MIN

## 2021-06-22 RX ORDER — SODIUM CHLORIDE 9 MG/ML
25 INJECTION, SOLUTION INTRAVENOUS PRN
Status: DISCONTINUED | OUTPATIENT
Start: 2021-06-22 | End: 2021-06-23 | Stop reason: HOSPADM

## 2021-06-22 RX ORDER — SODIUM CHLORIDE 0.9 % (FLUSH) 0.9 %
5-40 SYRINGE (ML) INJECTION EVERY 12 HOURS SCHEDULED
Status: DISCONTINUED | OUTPATIENT
Start: 2021-06-22 | End: 2021-06-23 | Stop reason: HOSPADM

## 2021-06-22 RX ORDER — SODIUM CHLORIDE 9 MG/ML
INJECTION, SOLUTION INTRAVENOUS CONTINUOUS
Status: DISCONTINUED | OUTPATIENT
Start: 2021-06-22 | End: 2021-06-23 | Stop reason: HOSPADM

## 2021-06-22 RX ORDER — CEFAZOLIN SODIUM 1 G/50ML
1000 INJECTION, SOLUTION INTRAVENOUS ONCE
Status: DISCONTINUED | OUTPATIENT
Start: 2021-06-22 | End: 2021-06-23 | Stop reason: HOSPADM

## 2021-06-22 RX ORDER — SODIUM CHLORIDE 0.9 % (FLUSH) 0.9 %
5-40 SYRINGE (ML) INJECTION PRN
Status: DISCONTINUED | OUTPATIENT
Start: 2021-06-22 | End: 2021-06-23 | Stop reason: HOSPADM

## 2021-06-22 RX ADMIN — SODIUM CHLORIDE: 9 INJECTION, SOLUTION INTRAVENOUS at 12:34

## 2021-06-22 NOTE — OP NOTE
[facility]     Electrophysiology Device Implant   ICD                 Juanjose Overton (9 y.o., male)  1955 6/22/2021      Procedure: ICD Change Out    Pre Procedure Conscious Sedation Data:    ASA Class:    [] I [x] II [] III [] IV    Mallampati Class:  [] I [x] II [] III [] IV    Indication: Generator batter end of life    AICD / CRT Indication: Pre procedure review    Documentation to support the medical necessity f procedure. Reason for placement:     [] Initial [] Recall/Malfunction  [] Upgrade  [] Beyond useful life limit    EF measured by:   [x] Echo [] Stress Test  [] Muga  [] Angiography    For Secondary prevention - Complete this section. A.   [] Documented episodes of cardiac arrest due to V.Fib, not due to transient reversible causes, with no irreversible brain damage   (NCD Covered indication I)  [] Documented sustained VT, either spontaneous or induced by EP study, not associated with an acute MI and notdue to a transient or reversible cause; and patient does not have irreversible brain damage from pre existing cerebral disease. (NCD Covered Insication 2). Docment as  []  Ischemic. [] Non Ischemic. For Primary prevention Patients - Complete Section B & C. Section B.  [] Documented familial or inherited conditions with a high risk of life threatening VT (Long QT, HOC). (Cpovered indication 3)  [] Coronary artery disease with documented prior MI annd LVEF < 35%, and inducivble sustained VT or VF at EP study. (NCD Covered Indication 4). [] Documented prior MI and LVEF < 30%. (NCD Covered Indication 5)  [x] Ischemic dilated CM (IDCM), documented prior MI, NYHA class II/III heart failure, and EF < 35%. (NCD Covered Indication 6). [] NIDCM > 3 months, NYHA class II/III heart failure, and LVEF < 35%   (NCD Covered Indication 7 & 9). [] Documented prior MI and NYHA class IV heart failure and meets all current medicare coverage for CRT device.     Section C. (if one or more box is checked, then patient does not meet NCD coverage requirements for primary prevention)  [] NYHA class IV (Covered Indications 4 and 8)  [] Cardiogenic shock or symptomatic hypotension while in a stable baseline rhythm. [] CABG or PTCA within past 3 months  [] Acute MI within past 40 days. [] Patient is unable to give informed consent. [] Symptoms / Findings making them a candidate for coronary revascularization. [] Irreversible brain damage from pre existing cerebral disease.  [] Any disease, other than cardiac disease, with likely survival less than 1 year. Device / Data:     Model # Serial #   Pacer/ICD/Bi-V [id number]     All lead test and program parameters documented in chart    · Sedation monitoring  · Intra - OP Lead Testing  · Pocket Revision  · Generators Implant  · Fluoro time: 0 min    Procedure  After the usual preparation of the left neck and chest, the patient was draped in the usual sterile manner. Local anesthesia was infused below the left clavicle from the midline laterally. An incision was made inferior and parallel to the clavicle. The incision was carried down to the fascia, dissecting the leads and generator in the previously made pocket. Generator Change out:    The old generator was removed, and the previously implanted leads were attached to the new generator using the setscrews. The pocket was irrigated with antibiotic solution. The pulse generator and leads were coiled and placed in the pocket. Fluoroscopy was used to verify the final placement of the pacemaker and leads. The pocket was closed using multiple layers of suture and a dry sterile dressing was applied. There were no complications, patient tolerated the procedure well. The patient left the EP lab in stable condition.       Estimated Blood Loss:  [x] Minimal < 25 cc [] Moderate 25-50 cc  [] >50 cc    Impression / Device:  Successful Generator Change Out      Plan:  Telemetry monitoring  Interigate pacemaker before discharge  CXR if needed  Wound check at [facility] in 7days  Discharge if patient remains stable        Electronically signed by Emely Sanders MD on 6/22/2021 at 2:18 [facility]  [phone]

## 2021-06-22 NOTE — PROGRESS NOTES
All discharge instructions reviewed, questions answered, paper signed and given copy. Patient discharged per ambulation with family and belongings.

## 2021-06-22 NOTE — PROGRESS NOTES
Received post procedure to [facility] to room 9. Assessment obtained. Restrictions reviewed with patient. Post procedure pathway initiated. Right shoulder site soft , dressing dry and intact. No hematoma noted. Family at side. Patient without complaints.  H

## 2021-06-22 NOTE — PROGRESS NOTES
Patient admitted, consent signed and questions answered. Patient ready for procedure. Call light to reach with side rails up 2 of 2. Chest hairs clipped. Family at bedside with patient. History and physical needed.   2% Chlorhexidine cloths used to prep site / anterior chest

## 2021-06-22 NOTE — H&P
[facility]   History & Physical               Today's Date: 6/22/2021  Patient Name: Marizol Jett  Date of admission: 6/22/2021 11:29 AM  Patient's age: 78 y. o., 1955  Admission Dx: ICD (implantable cardioverter-defibrillator) battery depletion [Z45.02]    Reason for Admission:  Battery change for AICd    CHIEF COMPLAINT:  Battery depletion  No chief complaint on file. History Obtained From:  patient, electronic medical record    HISTORY OF PRESENT ILLNESS:      · The patient is a 78 y.o.  male who is admitted to the hospital for battery change. He has history of CAD with hx of NOAH to LAD in 2012.  of RCA stable 9/20 (patent stents on last cath 09/2020. Syncopal episode secondary to ventricular tachycardia . Ventricular tachycardia terminated by ICD shock in August 2020, started on po amio post repeat cath HFrEF secondary to Ischemic CMP (last LVEF 45%) . He is here because of battery depletion and he is scheduled for battery change. Past Medical History:   has a past medical history of Anxiety, Atrial fibrillation ([address] CAD (coronary artery disease), Erectile dysfunction, Headache(784.0), High cholesterol, Hx of adenomatous colonic polyps, Hypertension, ICD (implantable cardioverter-defibrillator) discharge, Ischemic cardiomyopathy, Low back pain, MI, old, Osteoarthritis, Plantar fasciitis, Seborrheic keratosis, Smoker, and Syncopal episodes. Past Surgical History:   has a past surgical history that includes Rotator cuff repair (Bilateral, 1997); Vasectomy (1980); Cardiac defibrillator placement (01/07/2013); cyst removal; Hand tendon surgery (Left); Colonoscopy (11/11/2015); Colonoscopy (12/21/2016); Coronary angioplasty with stent (09/2012); Cardiac catheterization (09/04/2020); and Cardiac defibrillator placement (06/22/2021). Home Medications:    Prior to Admission medications    Medication Sig Start Date End Date Taking?  Authorizing Provider   calcium carbonate (OSCAL) 500 MG TABS tablet Take 500 mg by mouth daily   Yes Historical Provider, MD   ZINC PO Take 1 tablet by mouth daily   Yes Historical Provider, MD   metoprolol tartrate (LOPRESSOR) 25 MG tablet Take one tablet in am and take 1/2 tablet in pm. 4/15/21  Yes Marcia Jones MD   atorvastatin (LIPITOR) 80 MG tablet TAKE ONE TABLET BY MOUTH EVERY NIGHT 3/17/21  Yes Puma Sommer MD   amiodarone (CORDARONE) 200 MG tablet Take 1 tablet by mouth daily 2/25/21  Yes Marciano Patton DO   sacubitril-valsartan (ENTRESTO) 24-26 MG per tablet Take 1 tablet by mouth 2 times daily 2/28/21  Yes Marciano Patton DO   clopidogrel (PLAVIX) 75 MG tablet TAKE ONE TABLET BY MOUTH DAILY 11/25/20  Yes Puma Sommer MD   aspirin 81 MG tablet Take 1 tablet by mouth daily 3/20/17  Yes Rebecca Lowe MD   Omega-3 Fatty Acids (FISH OIL) 1000 MG CAPS Take 1,200 mg by mouth 2 times daily    Yes Historical Provider, MD   Multiple Vitamins-Minerals (THERAPEUTIC MULTIVITAMIN-MINERALS) tablet Take 1 tablet by mouth daily. Yes Historical Provider, MD   sildenafil (VIAGRA) 100 MG tablet TAKE 1 TABLET BY MOUTH AS NEEDED FOR ERECTILE DYSFUNCTION 3/4/19   Puma Sommer MD   fluticasone [facility]) 50 MCG/ACT nasal spray 2 sprays by Nasal route daily  Patient taking differently: 2 sprays by Nasal route daily as needed  3/4/19   Puma Sommer MD        Current Facility-Administered Medications: vancomycin (VANCOCIN) 1,000 mg in sodium chloride 0.9 % 1,000 mL irrigation, 1,000 mg, Irrigation, Once  0.9 % sodium chloride infusion, , Intravenous, Continuous  ceFAZolin (ANCEF) 1000 mg in dextrose 5 % 50 mL IVPB (premix), 1,000 mg, Intravenous, Once    Allergies:  Patient has no known allergies. Social History:   reports that he has been smoking cigarettes. He started smoking about 51 years ago. He has a 45.00 pack-year smoking history. He has never used smokeless tobacco. He reports previous alcohol use. He reports that he does not use drugs. Family History: family history includes Cancer in his father; [place] Mary in his maternal grandfather and maternal uncle; Diabetes in his mother; Heart Disease in his brother, father, maternal grandfather, and maternal grandmother; Heart Failure in his mother; Kidney Disease in his maternal grandfather, mother, sister, and sister; Other in his father and maternal grandmother. REVIEW OF SYSTEMS:      · Constitutional: there has been no unanticipated weight loss. · Eyes: No visual changes or diplopia. · ENT: No Headaches  · Cardiovascular:  Remaining as above  · Respiratory: No cough  · Gastrointestinal: No abdominal pain. No change in bowel or bladder habits. · Genitourinary: No dysuria, trouble voiding, or hematuria. · Neurological: No headache. PHYSICAL EXAM:      BP (!) 145/83   Pulse 56   Temp 98.2 °F (36.8 °C) (Oral)   Resp 16   Ht 6' (1.829 m)   Wt 236 lb (107 kg)   SpO2 98%   BMI 32.01 kg/m²    No intake or output data in the 24 hours ending 06/22/21 1337        Constitutional and General Appearance:   alert, cooperative, no distress and appears stated age  HEENT:  · PERRL, EOMI  Respiratory:  · Normal excursion and expansion without use of accessory muscles  · Resp Auscultation:  Good respiratory effort. No for increased work of breathing. On auscultation: clear to auscultation bilaterally  Cardiovascular:  · Regular rate and rhythm  · S1/S2  · No murmurs  · The apical impulse is not displaced  Abdomen:  · Soft  · Bowel sounds present  · Non-tender to palpation  Extremities:  · No cyanosis or clubbing  · Lower extremity edema: No  Skin:  · Warm and dry  Neurological:  · Alert and oriented.   · Moves all extremities well       EKG 4/15/21: Sinus Rhythm, RBB, old anteroseptal and inferior infarct      Echocardiogram 2/9/2018  1.  Left ventricular dimensions are normal. Contractility is mildly  reduced.  There are multiple segmental wall motion abnormality mainly  involving the anteroseptal and apical areas.  There is grade 1 diastolic  dysfunction of the left ventricle. 2.  Right ventricle appears to be normal in size and function. 3.  Mild left atrial enlargement. 4.  Aortic root diameter is normal at 3.2 cm. 5.  Aortic valve is unremarkable. 6.  Mitral valve shows mild regurgitation.  No stenosis. 7.  There is mild tricuspid regurgitation. 8.  Right ventricular systolic pressure was estimated at 30 mmHg. 9.  Pulmonic valve is unremarkable. 10.  There is no pericardial effusion.     [facility] 09/04/2020   1. Patent LAD stents   2. Intermediate 50% stenosis in LCx same as on last cath in 2012   3. Known occluded () RCA with Collaterals        Patient's Active Problem List  Active Problems:    * No active hospital problems. *  Resolved Problems:    * No resolved hospital problems. *        IMPRESSION:    · CAD with hx of NOAH to LAD in 2012.  of RCA stable 9/20 (patent stents on last cath 09/2020)  · Syncopal episode secondary to ventricular tachycardia  · Ventricular tachycardia terminated by ICD shock in August 2020, started on po amio post repeat cath   · HFrEF secondary to Ischemic CMP (last LVEF 45%)  · AICD. Battery depletion   · HTN- not well controlled   · Obesity  · Hyperlipidemia (last lipid panel in 03/2020, LDL 58, Chol 137)  · Chronic smoking    RECOMMENDATIONS:    1. He is is here for battery change  2. Risks, benefits, alternatives, and details discussed extensively. Accepts and consents. Discussed with patient and Nurse. Luis Anotnio Hahn MD, M.D. Fellow, [address] [place] [address]      Please note that part of this chart were generated using voice recognition  dictation software. Although every effort was made to ensure the accuracy of this automated transcription, some errors in transcription may have occurred.         All documents reviewed and plan of treatment agreed on  End of life battery  Change out  Corrections made to patient note    Emely Sanders M.D.

## 2021-06-22 NOTE — PROGRESS NOTES
Ambulates in halls without distress. Left subclavian incisional site remains clean and dry without increase in soft swelling. Wound care instructions reviewed with patient and family present, all attentive.

## 2021-06-22 NOTE — PROGRESS NOTES
Taking light meal without distress. Slight soft swelling to left subclavian site. No complaints. Temporary card and booklet given to wife.

## 2021-07-02 ENCOUNTER — NURSE ONLY (OUTPATIENT)
Dept: CARDIOLOGY | Age: 66
End: 2021-07-02
Payer: MEDICARE

## 2021-07-02 DIAGNOSIS — Z95.810 AICD (AUTOMATIC CARDIOVERTER/DEFIBRILLATOR) PRESENT: ICD-10-CM

## 2021-07-02 DIAGNOSIS — I25.5 ISCHEMIC CARDIOMYOPATHY: ICD-10-CM

## 2021-07-02 PROCEDURE — 99211 OFF/OP EST MAY X REQ PHY/QHP: CPT

## 2021-07-02 NOTE — PATIENT INSTRUCTIONS
1. Can take shower at 1 week post.  Start with back to water. Let water flow over shoulder down chest.  Don't scrub. 2. Let steri-strips fall off on their own. 3. Do not drive until your follow up appointment with the doctor. 4. Do not lift more than 5 pounds for one month at least.  Then may increase weight slowly. 5. Do not raise arm above head for 4-6 weeks. 6. No swimming or golfing for 4-6 weeks. 7. Use cell phone on opposite side. 8. Do you work in [employer] or with heavy equipment?

## 2021-07-02 NOTE — PROGRESS NOTES
Pt present for wound check post cardiac device implant. Incision is clean, dry, no drainage or redness, mild edema of pocket noted. Traced [place] of pocket with marker and pt will monitor and alert us if any concern. Staples removed with no complication. F/u appts booked and all questions answered.

## 2021-07-23 ENCOUNTER — OFFICE VISIT (OUTPATIENT)
Dept: CARDIOLOGY | Age: 66
End: 2021-07-23
Payer: MEDICARE

## 2021-07-23 ENCOUNTER — PROCEDURE VISIT (OUTPATIENT)
Dept: CARDIOLOGY | Age: 66
End: 2021-07-23
Payer: MEDICARE

## 2021-07-23 VITALS
DIASTOLIC BLOOD PRESSURE: 82 MMHG | HEART RATE: 50 BPM | HEIGHT: 72 IN | SYSTOLIC BLOOD PRESSURE: 150 MMHG | WEIGHT: 236 LBS | BODY MASS INDEX: 31.97 KG/M2

## 2021-07-23 DIAGNOSIS — I25.5 ISCHEMIC CARDIOMYOPATHY: Primary | ICD-10-CM

## 2021-07-23 DIAGNOSIS — Z95.810 AICD (AUTOMATIC CARDIOVERTER/DEFIBRILLATOR) PRESENT: ICD-10-CM

## 2021-07-23 DIAGNOSIS — E78.5 DYSLIPIDEMIA: ICD-10-CM

## 2021-07-23 DIAGNOSIS — I25.10 CORONARY ARTERY DISEASE INVOLVING NATIVE CORONARY ARTERY OF NATIVE HEART WITHOUT ANGINA PECTORIS: ICD-10-CM

## 2021-07-23 DIAGNOSIS — I10 ESSENTIAL HYPERTENSION: ICD-10-CM

## 2021-07-23 PROCEDURE — 93005 ELECTROCARDIOGRAM TRACING: CPT | Performed by: INTERNAL MEDICINE

## 2021-07-23 PROCEDURE — 99214 OFFICE O/P EST MOD 30 MIN: CPT | Performed by: INTERNAL MEDICINE

## 2021-07-23 PROCEDURE — 93010 ELECTROCARDIOGRAM REPORT: CPT | Performed by: INTERNAL MEDICINE

## 2021-07-23 PROCEDURE — 93289 INTERROG DEVICE EVAL HEART: CPT | Performed by: INTERNAL MEDICINE

## 2021-07-23 ASSESSMENT — ENCOUNTER SYMPTOMS
SHORTNESS OF BREATH: 0
ABDOMINAL DISTENTION: 0
NAUSEA: 0
CHEST TIGHTNESS: 0
VOMITING: 0
EYE ITCHING: 0
EYE DISCHARGE: 0

## 2021-07-23 NOTE — PROGRESS NOTES
Today's Date: 7/23/2021  Patient's Name: Shawna West  Patient's age: 78 y. o., 1955    Subjective: The patient is a 78y.o. year old, , male is in the office for CAD. Denies exertional chest pain or SOB, no dizziness or syncope and no palpitations. Past Medical History:   has a past medical history of Anxiety, Atrial fibrillation ([address] CAD (coronary artery disease), Erectile dysfunction, Headache(784.0), High cholesterol, Hx of adenomatous colonic polyps, Hypertension, ICD (implantable cardioverter-defibrillator) discharge, Ischemic cardiomyopathy, Low back pain, MI, old, Osteoarthritis, Plantar fasciitis, Seborrheic keratosis, Smoker, and Syncopal episodes. Past Surgical History:   has a past surgical history that includes Rotator cuff repair (Bilateral, 1997); Vasectomy (1980); Cardiac defibrillator placement (01/07/2013); cyst removal; Hand tendon surgery (Left); Colonoscopy (11/11/2015); Colonoscopy (12/21/2016); Coronary angioplasty with stent (09/2012); Cardiac catheterization (09/04/2020); and Cardiac defibrillator placement (06/22/2021). Home Medications:  Prior to Admission medications    Medication Sig Start Date End Date Taking?  Authorizing Provider   calcium carbonate (OSCAL) 500 MG TABS tablet Take 500 mg by mouth daily   Yes Historical Provider, MD   ZINC PO Take 1 tablet by mouth daily   Yes Historical Provider, MD   metoprolol tartrate (LOPRESSOR) 25 MG tablet Take one tablet in am and take 1/2 tablet in pm. 4/15/21  Yes Kaylah Alcala MD   atorvastatin (LIPITOR) 80 MG tablet TAKE ONE TABLET BY MOUTH EVERY NIGHT 3/17/21  Yes Linda Chow MD   amiodarone (CORDARONE) 200 MG tablet Take 1 tablet by mouth daily 2/25/21  Yes Marciano Patton DO   sacubitril-valsartan (ENTRESTO) 24-26 MG per tablet Take 1 tablet by mouth 2 times daily 2/28/21  Yes Marciano Patton DO   clopidogrel (PLAVIX) 75 MG tablet TAKE ONE TABLET BY MOUTH DAILY 11/25/20  Yes Linda Chow MD sildenafil (VIAGRA) 100 MG tablet TAKE 1 TABLET BY MOUTH AS NEEDED FOR ERECTILE DYSFUNCTION 3/4/19  Yes Torrie Hubbard MD   fluticasone [facility]) 50 MCG/ACT nasal spray 2 sprays by Nasal route daily  Patient taking differently: 2 sprays by Nasal route daily as needed  3/4/19  Yes Torrie Hubbard MD   aspirin 81 MG tablet Take 1 tablet by mouth daily 3/20/17  Yes Salena Tomlinson MD   Omega-3 Fatty Acids (FISH OIL) 1000 MG CAPS Take 1,200 mg by mouth 2 times daily    Yes Historical Provider, MD   Multiple Vitamins-Minerals (THERAPEUTIC MULTIVITAMIN-MINERALS) tablet Take 1 tablet by mouth daily. Yes Historical Provider, MD       Allergies:  Patient has no known allergies. Social History:   reports that he has been smoking cigarettes. He started smoking about 51 years ago. He has a 45.00 pack-year smoking history. He has never used smokeless tobacco. He reports previous alcohol use. He reports that he does not use drugs. Review of Systems:  Review of Systems   Constitutional: Negative for chills, fatigue and fever. HENT: Negative for congestion and dental problem. Eyes: Negative for discharge and itching. Respiratory: Negative for chest tightness and shortness of breath. Cardiovascular: Negative for chest pain and palpitations. Gastrointestinal: Negative for abdominal distention, nausea and vomiting. Endocrine: Negative for cold intolerance and heat intolerance. Musculoskeletal: Negative for arthralgias and myalgias. Neurological: Negative for dizziness and syncope. Psychiatric/Behavioral: Negative for agitation and behavioral problems. Physical Exam:  BP (!) 160/91 (Cuff Size: Large Adult)   Pulse 50   Ht 6' (1.829 m)   Wt 236 lb (107 kg)   BMI 32.01 kg/m²    Physical Exam  HENT:      Head: Normocephalic and atraumatic. Mouth/Throat:      Mouth: Mucous membranes are moist.   Cardiovascular:      Rate and Rhythm: Normal rate and regular rhythm. Pulses: Normal pulses. Heart sounds: No murmur heard. Pulmonary:      Effort: Pulmonary effort is normal.      Breath sounds: Normal breath sounds. Abdominal:      General: Abdomen is flat. There is no distension. Palpations: There is no mass. Musculoskeletal:         General: No swelling or tenderness. Skin:     Coloration: Skin is not jaundiced or pale. Neurological:      General: No focal deficit present. Mental Status: He is alert and oriented to person, place, and time. Psychiatric:         Mood and Affect: Mood normal.         Cardiac Data:      Labs:     CBC: No results for input(s): WBC, HGB, HCT, PLT in the last 72 hours. BMP:No results for input(s): NA, K, CO2, BUN, CREATININE, LABGLOM, GLUCOSE in the last 72 hours. PT/INR: No results for input(s): PROTIME, INR in the last 72 hours. FASTING LIPID PANEL:  Lab Results   Component Value Date    HDL 34 03/17/2021    LDLDIRECT 98 09/30/2012    TRIG 205 03/17/2021     LIVER PROFILE:No results for input(s): AST, ALT, LABALBU in the last 72 hours. IMPRESSION:    Patient Active Problem List   Diagnosis    CAD (coronary artery disease)    HTN (hypertension)    Anxiety    Hyperlipemia    Memory loss    Hypothyroidism    Depression    Hyperlipidemia with target LDL less than 70    Atrial fibrillation (HCC)    Ischemic cardiomyopathy    Tobacco abuse    Tobacco abuse counseling    Hx of adenomatous colonic polyps    Colon polyps    Chronic systolic congestive heart failure (HCC)       Assessment/Plan:  · CAD with hx of NOAH to LAD in 2012.  of RCA stable 9/20 (patent stents on last cath 09/2020, 50% LCX stenosis). Stable, continue current medications. · Syncopal episode secondary to ventricular tachycardia  · Ventricular tachycardia terminated by ICD shock in August 2020, started on po amio post repeat cath. Has been doing well. Device check on 7/2021 showed normal function. · HFrEF secondary to Ischemic CMP (last LVEF 45%).  Stable with no

## 2021-08-25 ENCOUNTER — TELEPHONE (OUTPATIENT)
Dept: CARDIOLOGY | Age: 66
End: 2021-08-25

## 2021-08-25 ENCOUNTER — HOSPITAL ENCOUNTER (OUTPATIENT)
Dept: NON INVASIVE DIAGNOSTICS | Age: 66
Discharge: HOME | End: 2021-08-25
Payer: MEDICARE

## 2021-08-25 DIAGNOSIS — I50.22 CHRONIC SYSTOLIC CONGESTIVE HEART FAILURE (HCC): ICD-10-CM

## 2021-08-25 DIAGNOSIS — I47.20 VENTRICULAR TACHYCARDIA: ICD-10-CM

## 2021-08-25 LAB
LV EF: 35 %
LVEF MODALITY: NORMAL

## 2021-08-25 PROCEDURE — 93306 TTE W/DOPPLER COMPLETE: CPT

## 2021-08-27 NOTE — TELEPHONE ENCOUNTER
Patient notified per Dr Jovany Romo to continue current medications. Pt verbalized understanding and had no further questions at the time.

## 2021-09-01 ENCOUNTER — TELEPHONE (OUTPATIENT)
Dept: ONCOLOGY | Age: 66
End: 2021-09-01

## 2021-09-01 DIAGNOSIS — Z87.891 PERSONAL HISTORY OF NICOTINE DEPENDENCE: Primary | ICD-10-CM

## 2021-09-01 NOTE — TELEPHONE ENCOUNTER
Our records indicate that your patient is due for their annual lung cancer screening follow up testing. For your convenience, we have pended the order for the scan for you. If you do not agree with the need for the test, please cancel the order and let us know. Sincerely,    [address] [place] [address] Screening Program    Auto printed reminder letter sent to patient.

## 2021-09-02 RX ORDER — SACUBITRIL AND VALSARTAN 24; 26 MG/1; MG/1
TABLET, FILM COATED ORAL
Qty: 180 TABLET | Refills: 3 | Status: SHIPPED | OUTPATIENT
Start: 2021-09-02 | End: 2022-09-20

## 2021-09-02 RX ORDER — CLOPIDOGREL BISULFATE 75 MG/1
TABLET ORAL
Qty: 90 TABLET | Refills: 2 | Status: SHIPPED | OUTPATIENT
Start: 2021-09-02 | End: 2022-05-16 | Stop reason: SDUPTHER

## 2021-11-16 ENCOUNTER — OFFICE VISIT (OUTPATIENT)
Dept: FAMILY MEDICINE CLINIC | Age: 66
End: 2021-11-16
Payer: MEDICARE

## 2021-11-16 VITALS
HEIGHT: 72 IN | HEART RATE: 68 BPM | DIASTOLIC BLOOD PRESSURE: 70 MMHG | WEIGHT: 245 LBS | BODY MASS INDEX: 33.18 KG/M2 | SYSTOLIC BLOOD PRESSURE: 130 MMHG

## 2021-11-16 DIAGNOSIS — Z12.5 SCREENING PSA (PROSTATE SPECIFIC ANTIGEN): ICD-10-CM

## 2021-11-16 DIAGNOSIS — I10 ESSENTIAL HYPERTENSION: ICD-10-CM

## 2021-11-16 DIAGNOSIS — G47.33 OSA (OBSTRUCTIVE SLEEP APNEA): ICD-10-CM

## 2021-11-16 DIAGNOSIS — F32.5 MAJOR DEPRESSIVE DISORDER, SINGLE EPISODE, IN FULL REMISSION (HCC): ICD-10-CM

## 2021-11-16 DIAGNOSIS — E03.9 HYPOTHYROIDISM, UNSPECIFIED TYPE: ICD-10-CM

## 2021-11-16 DIAGNOSIS — E78.5 HYPERLIPIDEMIA WITH TARGET LDL LESS THAN 70: ICD-10-CM

## 2021-11-16 DIAGNOSIS — I25.10 CORONARY ARTERY DISEASE INVOLVING NATIVE CORONARY ARTERY OF NATIVE HEART WITHOUT ANGINA PECTORIS: Primary | ICD-10-CM

## 2021-11-16 DIAGNOSIS — I47.20 VENTRICULAR TACHYCARDIA: ICD-10-CM

## 2021-11-16 DIAGNOSIS — Z72.0 TOBACCO ABUSE: ICD-10-CM

## 2021-11-16 PROCEDURE — 99213 OFFICE O/P EST LOW 20 MIN: CPT

## 2021-11-16 PROCEDURE — 99214 OFFICE O/P EST MOD 30 MIN: CPT | Performed by: FAMILY MEDICINE

## 2021-11-16 ASSESSMENT — ENCOUNTER SYMPTOMS
EYES NEGATIVE: 1
GASTROINTESTINAL NEGATIVE: 1
COUGH: 0
RESPIRATORY NEGATIVE: 1
ALLERGIC/IMMUNOLOGIC NEGATIVE: 1
SHORTNESS OF BREATH: 0

## 2021-11-16 ASSESSMENT — PATIENT HEALTH QUESTIONNAIRE - PHQ9
2. FEELING DOWN, DEPRESSED OR HOPELESS: 0
SUM OF ALL RESPONSES TO PHQ QUESTIONS 1-9: 0
SUM OF ALL RESPONSES TO PHQ QUESTIONS 1-9: 0
1. LITTLE INTEREST OR PLEASURE IN DOING THINGS: 0
SUM OF ALL RESPONSES TO PHQ9 QUESTIONS 1 & 2: 0
SUM OF ALL RESPONSES TO PHQ QUESTIONS 1-9: 0

## 2021-11-16 NOTE — PROGRESS NOTES
Subjective:      Patient ID: Joyce Tinajero is a 78 y.o. male. Coronary Artery Disease  Pertinent negatives include no shortness of breath. Risk factors include hyperlipidemia. Loss of Consciousness  His past medical history is significant for CAD. Hyperlipidemia  Pertinent negatives include no myalgias or shortness of breath. Hypertension  Pertinent negatives include no neck pain or shortness of breath. Erectile Dysfunction       Routine follow up on chronic medical conditions, refills, and review of updated labs. I have reviewed the patient's medical history in detail and updated the computerized patient record. Feeling well over the interval.  No chest pain or palpitations. bp under good control. He has quit smoking [birth year] his [birth year]. Restarted again. chantix had helped. Still smoking again at present. Started cpap late January. Compliant with cpap nightly. He has perceived benefit at present. S Feeling a little sob, trouble getting used to the mask, mostly due to recurrent nasal congestion. No back pain at present. Feeling well. Activity related exacerbations with lifting things. No chest pain      Knees starting to bother him more. Takes a lot of effort to get off the knees. Compliant with medications. Syncope in the past.  Sudden loss of conciousness while outside working on his golf cart. A little hot, but taking breaks and drinking gatorade. No warning or prodromal symptoms. He fell from standing into the grass. No injuries. He woke up and got up on his own. He had no sense of defibrillator firing, but apparently it picked up the event on his cardiology follow up. He then had updated Cardiac evan. 9/4/20. 1. Patent LAD stents   2. Intermediate 50% stenosis in LCx same as on last cath in 2012   3.  Known occluded () RCA with Collaterals       ICD interrogation today shows ventricular tachycardia, polymorphic, rate greater than 330 which was terminated by one 35 J shock. There were few episodes of few nonsustained ventricular tachycardia on the device interrogation as well   Defibrillator replaced 6/22/21. Started amiodarone at the time of his initial defibrillator. No interval shocks. No chest pain.       Past Medical History:   Diagnosis Date    Anxiety     Atrial fibrillation ([address]     CAD (coronary artery disease) 2012    stent to LAD after MI    Erectile dysfunction     Headache(784.0)     High cholesterol     Hx of adenomatous colonic polyps     4 tubular adenomas, 2 hyperplastic polyps 11/11/15    Hypertension     ICD (implantable cardioverter-defibrillator) discharge 08/2020    Ischemic cardiomyopathy     AICD 1/13 for EF 20-25%    Low back pain     history of     MI, old 2012    Osteoarthritis     knees    Plantar fasciitis     Seborrheic keratosis     history of     Smoker     Syncopal episodes 08/2020    STATES PASSED OUT AND THAT ICD HAD FIRED WHEN DEVICE WAS INTERROGATED     Current Outpatient Medications   Medication Sig Dispense Refill    clopidogrel (PLAVIX) 75 MG tablet TAKE ONE TABLET BY MOUTH DAILY 90 tablet 2    ENTRESTO 24-26 MG per tablet TAKE ONE TABLET BY MOUTH TWICE A  tablet 3    calcium carbonate (OSCAL) 500 MG TABS tablet Take 500 mg by mouth daily      ZINC PO Take 1 tablet by mouth daily      metoprolol tartrate (LOPRESSOR) 25 MG tablet Take one tablet in am and take 1/2 tablet in pm. 135 tablet 3    atorvastatin (LIPITOR) 80 MG tablet TAKE ONE TABLET BY MOUTH EVERY NIGHT 90 tablet 2    amiodarone (CORDARONE) 200 MG tablet Take 1 tablet by mouth daily 90 tablet 1    sildenafil (VIAGRA) 100 MG tablet TAKE 1 TABLET BY MOUTH AS NEEDED FOR ERECTILE DYSFUNCTION 10 tablet 2    fluticasone (FLONASE) 50 MCG/ACT nasal spray 2 sprays by Nasal route daily (Patient taking differently: 2 sprays by Nasal route daily as needed ) 1 Bottle 11    aspirin 81 MG tablet Take 1 tablet by mouth daily 30 tablet 6    Omega-3 Fatty Acids (FISH OIL) 1000 MG CAPS Take 1,200 mg by mouth 2 times daily       Multiple Vitamins-Minerals (THERAPEUTIC MULTIVITAMIN-MINERALS) tablet Take 1 tablet by mouth daily. No current facility-administered medications for this visit. No Known Allergies      Review of Systems   Constitutional: Negative. HENT: Negative. Eyes: Negative. Respiratory: Negative. Negative for cough and shortness of breath. Cardiovascular: Positive for syncope. Gastrointestinal: Negative. Endocrine: Negative. Genitourinary: Negative. Musculoskeletal: Negative. Negative for myalgias, neck pain and neck stiffness. Skin: Negative. Negative for rash. Allergic/Immunologic: Negative. Hematological: Negative. Does not bruise/bleed easily. Psychiatric/Behavioral: Negative. Objective:   Physical Exam  Constitutional:       General: He is not in acute distress. Appearance: He is well-developed. HENT:      Head: Normocephalic and atraumatic. Right Ear: External ear normal.      Left Ear: External ear normal.      Mouth/Throat:      Pharynx: No oropharyngeal exudate. Eyes:      General: No scleral icterus. Conjunctiva/sclera: Conjunctivae normal.   Neck:      Thyroid: No thyromegaly. Cardiovascular:      Rate and Rhythm: Normal rate and regular rhythm. Heart sounds: Normal heart sounds. No murmur heard. Pulmonary:      Effort: Pulmonary effort is normal. No respiratory distress. Breath sounds: Normal breath sounds. No wheezing. Abdominal:      General: Bowel sounds are normal. There is no distension. Palpations: Abdomen is soft. Tenderness: There is no abdominal tenderness. There is no rebound. Musculoskeletal:         General: No tenderness (trace edema, improved at present. ). Normal range of motion. Cervical back: Neck supple. Skin:     General: Skin is warm and dry. Findings: No erythema or rash. Neurological:      Mental Status: He is alert and oriented to person, place, and time. Psychiatric:         Behavior: Behavior normal.         Thought Content: Thought content normal.         Judgment: Judgment normal.       /70 (Site: Right Upper Arm, Position: Sitting, Cuff Size: Large Adult)   Pulse 68   Ht 6' (1.829 m)   Wt 245 lb (111.1 kg)   BMI 33.23 kg/m²     No visits with results within 1 Month(s) from this visit. Latest known visit with results is:   Hospital Outpatient Visit on 08/25/2021   Component Date Value Ref Range Status    Left Ventricular Ejection Fraction 08/25/2021 35   Final-Edited    LVEF MODALITY 08/25/2021 ECHO   Final-Edited       TTE 02/09/18     1.  Left ventricular dimensions are normal.  Contractility is mildly  reduced.  There are multiple segmental wall motion abnormality mainly  involving the anteroseptal and apical areas.  There is grade 1 diastolic  dysfunction of the left ventricle. 2.  Right ventricle appears to be normal in size and function. 3.  Mild left atrial enlargement. 4.  Aortic root diameter is normal at 3.2 cm. 5.  Aortic valve is unremarkable. 6.  Mitral valve shows mild regurgitation. No stenosis. 7.  There is mild tricuspid regurgitation. 8.  Right ventricular systolic pressure was estimated at 30 mmHg. 9.  Pulmonic valve is unremarkable. 10.  There is no pericardial effusion.     Labs pending draw yet     Assessment:       Encounter Diagnoses   Name Primary?  Coronary artery disease involving native coronary artery of native heart without angina pectoris Yes    Ventricular tachycardia (HCC)     Essential hypertension     Major depressive disorder, single episode, in full remission ([facility] [address]     Hyperlipidemia with target LDL less than 70     Hypothyroidism, unspecified type     Tobacco abuse     Screening PSA (prostate specific antigen)     DAVID (obstructive sleep apnea)          Plan:      CAD; clinically stable.  S/p NOAH to lad after MI 2012. Ischemic cmo s/p aicd. No functional limitations. Denies cp. Denies changes in exercise tolerance. Consider updated echo at follow up. Last EF 20-25% s/p MI 2013. AICD in place due to ischemic cardiomyopathy. Interrogation through cardiology, compliant. Ventricular tachycardia: episode ~8/22/20 where he passed out while standing outside. No prodromal warning/symptoms. Felt fine afterwards. icd interrogation showing shock delivered and terminating the arrhythmia. Now on amiodarone. Plans for follow up with electrophysiology cardiologist.      Htn: fair control at present. Cont current meds. Depression/anxiety: doing ok off wellbutrin. Had some excessive perspiration he blamed on the drug. Seems better off the drug. No concerns for depression/anxiety atresent. ED: no nitrate use. viagra not seeming to work as well at present. Hyperlipidemia: good control historically. Cont. lipitor 80mg. Updated labs pending. Hypothyroid hx. No active treatment at present. Remains well balanced at last check. Now on amiodarone, will cont. Serial checks. Tobacco abuse. He just quit smoking on his birthday. Unfortunately  restarted. chantix helped. Rec. Restarting . Discussed CT screening program.  He is interested. Completed. Law: having trouble with gi gas/distention/pain. rx changed to autopap machine 2/20/20. New machine. He still struggles a little with a sense of not breathing. psa due with current labs.

## 2021-12-08 ENCOUNTER — HOSPITAL ENCOUNTER (EMERGENCY)
Age: 66
Discharge: HOME | End: 2021-12-08
Attending: EMERGENCY MEDICINE
Payer: MEDICARE

## 2021-12-08 ENCOUNTER — APPOINTMENT (OUTPATIENT)
Dept: GENERAL RADIOLOGY | Age: 66
End: 2021-12-08
Payer: MEDICARE

## 2021-12-08 VITALS
RESPIRATION RATE: 25 BRPM | DIASTOLIC BLOOD PRESSURE: 68 MMHG | HEART RATE: 92 BPM | BODY MASS INDEX: 31.83 KG/M2 | HEIGHT: 72 IN | WEIGHT: 235 LBS | SYSTOLIC BLOOD PRESSURE: 137 MMHG | TEMPERATURE: 99.1 F | OXYGEN SATURATION: 94 %

## 2021-12-08 DIAGNOSIS — J11.1 INFLUENZA WITH RESPIRATORY MANIFESTATION OTHER THAN PNEUMONIA: Primary | ICD-10-CM

## 2021-12-08 LAB
ABSOLUTE EOS #: <0.03 K/UL (ref 0–0.44)
ABSOLUTE IMMATURE GRANULOCYTE: 0.05 K/UL (ref 0–0.3)
ABSOLUTE LYMPH #: 0.93 K/UL (ref 1.1–3.7)
ABSOLUTE MONO #: 0.76 K/UL (ref 0.1–1.2)
ALBUMIN SERPL-MCNC: 4.3 G/DL (ref 3.5–5.2)
ALBUMIN/GLOBULIN RATIO: 1.5 (ref 1–2.5)
ALP BLD-CCNC: 65 U/L (ref 40–129)
ALT SERPL-CCNC: 20 U/L (ref 5–41)
ANION GAP SERPL CALCULATED.3IONS-SCNC: 12 MMOL/L (ref 9–17)
AST SERPL-CCNC: 21 U/L
BASOPHILS # BLD: 0 % (ref 0–2)
BASOPHILS ABSOLUTE: 0.04 K/UL (ref 0–0.2)
BILIRUB SERPL-MCNC: 0.55 MG/DL (ref 0.3–1.2)
BNP INTERPRETATION: ABNORMAL
BUN BLDV-MCNC: 14 MG/DL (ref 8–23)
BUN/CREAT BLD: 11 (ref 9–20)
CALCIUM SERPL-MCNC: 9.1 MG/DL (ref 8.6–10.4)
CHLORIDE BLD-SCNC: 100 MMOL/L (ref 98–107)
CO2: 25 MMOL/L (ref 20–31)
CREAT SERPL-MCNC: 1.26 MG/DL (ref 0.7–1.2)
D-DIMER QUANTITATIVE: 0.48 MG/L FEU (ref 0–0.59)
DIFFERENTIAL TYPE: ABNORMAL
DIRECT EXAM: ABNORMAL
DIRECT EXAM: ABNORMAL
EOSINOPHILS RELATIVE PERCENT: 0 % (ref 1–4)
GFR AFRICAN AMERICAN: >60 ML/MIN
GFR NON-AFRICAN AMERICAN: 57 ML/MIN
GFR SERPL CREATININE-BSD FRML MDRD: ABNORMAL ML/MIN/{1.73_M2}
GFR SERPL CREATININE-BSD FRML MDRD: ABNORMAL ML/MIN/{1.73_M2}
GLUCOSE BLD-MCNC: 90 MG/DL (ref 70–99)
HCT VFR BLD CALC: 40.2 % (ref 40.7–50.3)
HEMOGLOBIN: 14.4 G/DL (ref 13–17)
IMMATURE GRANULOCYTES: 1 %
LACTIC ACID, SEPSIS WHOLE BLOOD: NORMAL MMOL/L (ref 0.5–1.9)
LACTIC ACID, SEPSIS: 1.1 MMOL/L (ref 0.5–1.9)
LYMPHOCYTES # BLD: 10 % (ref 24–43)
Lab: ABNORMAL
MCH RBC QN AUTO: 34.5 PG (ref 25.2–33.5)
MCHC RBC AUTO-ENTMCNC: 35.8 G/DL (ref 25.2–33.5)
MCV RBC AUTO: 96.4 FL (ref 82.6–102.9)
MONOCYTES # BLD: 8 % (ref 3–12)
NRBC AUTOMATED: 0 PER 100 WBC
PDW BLD-RTO: 12.6 % (ref 11.8–14.4)
PLATELET # BLD: 138 K/UL (ref 138–453)
PLATELET ESTIMATE: ABNORMAL
PMV BLD AUTO: 12.3 FL (ref 8.1–13.5)
POTASSIUM SERPL-SCNC: 4.1 MMOL/L (ref 3.7–5.3)
PRO-BNP: 666 PG/ML
RBC # BLD: 4.17 M/UL (ref 4.21–5.77)
RBC # BLD: ABNORMAL 10*6/UL
SARS-COV-2, RAPID: NOT DETECTED
SEG NEUTROPHILS: 81 % (ref 36–65)
SEGMENTED NEUTROPHILS ABSOLUTE COUNT: 7.4 K/UL (ref 1.5–8.1)
SODIUM BLD-SCNC: 137 MMOL/L (ref 135–144)
SPECIMEN DESCRIPTION: ABNORMAL
SPECIMEN DESCRIPTION: NORMAL
TOTAL PROTEIN: 7.2 G/DL (ref 6.4–8.3)
TROPONIN INTERP: NORMAL
TROPONIN T: NORMAL NG/ML
TROPONIN, HIGH SENSITIVITY: 12 NG/L (ref 0–22)
WBC # BLD: 9.2 K/UL (ref 3.5–11.3)
WBC # BLD: ABNORMAL 10*3/UL

## 2021-12-08 PROCEDURE — 83605 ASSAY OF LACTIC ACID: CPT

## 2021-12-08 PROCEDURE — 94640 AIRWAY INHALATION TREATMENT: CPT

## 2021-12-08 PROCEDURE — 85025 COMPLETE CBC W/AUTO DIFF WBC: CPT

## 2021-12-08 PROCEDURE — 36415 COLL VENOUS BLD VENIPUNCTURE: CPT

## 2021-12-08 PROCEDURE — 96374 THER/PROPH/DIAG INJ IV PUSH: CPT

## 2021-12-08 PROCEDURE — 6370000000 HC RX 637 (ALT 250 FOR IP): Performed by: EMERGENCY MEDICINE

## 2021-12-08 PROCEDURE — 83880 ASSAY OF NATRIURETIC PEPTIDE: CPT

## 2021-12-08 PROCEDURE — 85379 FIBRIN DEGRADATION QUANT: CPT

## 2021-12-08 PROCEDURE — 87635 SARS-COV-2 COVID-19 AMP PRB: CPT

## 2021-12-08 PROCEDURE — 87804 INFLUENZA ASSAY W/OPTIC: CPT

## 2021-12-08 PROCEDURE — 87040 BLOOD CULTURE FOR BACTERIA: CPT

## 2021-12-08 PROCEDURE — 80053 COMPREHEN METABOLIC PANEL: CPT

## 2021-12-08 PROCEDURE — 84484 ASSAY OF TROPONIN QUANT: CPT

## 2021-12-08 PROCEDURE — 71045 X-RAY EXAM CHEST 1 VIEW: CPT

## 2021-12-08 PROCEDURE — 6360000002 HC RX W HCPCS: Performed by: EMERGENCY MEDICINE

## 2021-12-08 PROCEDURE — 93005 ELECTROCARDIOGRAM TRACING: CPT | Performed by: EMERGENCY MEDICINE

## 2021-12-08 PROCEDURE — 99285 EMERGENCY DEPT VISIT HI MDM: CPT

## 2021-12-08 RX ORDER — ALBUTEROL SULFATE 2.5 MG/3ML
2.5 SOLUTION RESPIRATORY (INHALATION) ONCE
Status: DISCONTINUED | OUTPATIENT
Start: 2021-12-08 | End: 2021-12-08 | Stop reason: HOSPADM

## 2021-12-08 RX ORDER — ALBUTEROL SULFATE 90 UG/1
2 AEROSOL, METERED RESPIRATORY (INHALATION) 4 TIMES DAILY
Status: DISCONTINUED | OUTPATIENT
Start: 2021-12-08 | End: 2021-12-08 | Stop reason: HOSPADM

## 2021-12-08 RX ORDER — ALBUTEROL SULFATE 90 UG/1
2 AEROSOL, METERED RESPIRATORY (INHALATION) EVERY 4 HOURS PRN
Qty: 18 G | Refills: 0 | Status: SHIPPED | OUTPATIENT
Start: 2021-12-08 | End: 2022-05-16 | Stop reason: SDUPTHER

## 2021-12-08 RX ORDER — PREDNISONE 10 MG/1
TABLET ORAL
Qty: 20 TABLET | Refills: 0 | Status: SHIPPED | OUTPATIENT
Start: 2021-12-08 | End: 2021-12-18

## 2021-12-08 RX ORDER — OSELTAMIVIR PHOSPHATE 75 MG/1
75 CAPSULE ORAL 2 TIMES DAILY
Qty: 10 CAPSULE | Refills: 0 | Status: SHIPPED | OUTPATIENT
Start: 2021-12-08 | End: 2021-12-13

## 2021-12-08 RX ORDER — IPRATROPIUM BROMIDE AND ALBUTEROL SULFATE 2.5; .5 MG/3ML; MG/3ML
1 SOLUTION RESPIRATORY (INHALATION)
Status: DISCONTINUED | OUTPATIENT
Start: 2021-12-08 | End: 2021-12-08 | Stop reason: HOSPADM

## 2021-12-08 RX ORDER — METHYLPREDNISOLONE SODIUM SUCCINATE 125 MG/2ML
125 INJECTION, POWDER, LYOPHILIZED, FOR SOLUTION INTRAMUSCULAR; INTRAVENOUS ONCE
Status: COMPLETED | OUTPATIENT
Start: 2021-12-08 | End: 2021-12-08

## 2021-12-08 RX ADMIN — IPRATROPIUM BROMIDE AND ALBUTEROL SULFATE 1 AMPULE: .5; 3 SOLUTION RESPIRATORY (INHALATION) at 15:45

## 2021-12-08 RX ADMIN — METHYLPREDNISOLONE SODIUM SUCCINATE 125 MG: 125 INJECTION, POWDER, FOR SOLUTION INTRAMUSCULAR; INTRAVENOUS at 17:40

## 2021-12-08 RX ADMIN — IPRATROPIUM BROMIDE AND ALBUTEROL SULFATE 1 AMPULE: .5; 3 SOLUTION RESPIRATORY (INHALATION) at 17:49

## 2021-12-08 RX ADMIN — ALBUTEROL SULFATE 2 PUFF: 90 AEROSOL, METERED RESPIRATORY (INHALATION) at 18:47

## 2021-12-08 ASSESSMENT — ENCOUNTER SYMPTOMS
WHEEZING: 0
TROUBLE SWALLOWING: 0
SHORTNESS OF BREATH: 1
NAUSEA: 0
DIARRHEA: 0
VOMITING: 0
SORE THROAT: 0
BACK PAIN: 0
COUGH: 1
ABDOMINAL PAIN: 0
BLOOD IN STOOL: 0
RHINORRHEA: 1
CONSTIPATION: 0

## 2021-12-08 NOTE — ED PROVIDER NOTES
[facility]  eMERGENCY dEPARTMENT eNCOUnter      Pt Name: Joyce Tinajero  MRN: [id number]  [place] 1955  Date of evaluation: 12/8/2021      CHIEF COMPLAINT       Chief Complaint   Patient presents with    Shortness of Breath         HISTORY OF PRESENT ILLNESS    Joyce Tinajero is a 78 y.o. male who presents with chief complaint of shortness of breath patient says yesterday it started with a runny nose then a cough and shortness of breath there is no chest pain no leg swelling he does have some myalgias associated with this he has had the Covid vaccine his wife also has a cough at home  Patient has a history of atrial fibrillation and coronary artery disease has a AICD in place as well  He is on aspirin and Plavix  REVIEW OF SYSTEMS         Review of Systems   Constitutional: Positive for appetite change and chills. Negative for fever. HENT: Positive for congestion, postnasal drip and rhinorrhea. Negative for dental problem, sore throat and trouble swallowing. Eyes: Negative for visual disturbance. Respiratory: Positive for cough and shortness of breath. Negative for wheezing. Cardiovascular: Negative for chest pain, palpitations and leg swelling. Gastrointestinal: Negative for abdominal pain, blood in stool, constipation, diarrhea, nausea and vomiting. Genitourinary: Negative for difficulty urinating and dysuria. Musculoskeletal: Negative for back pain, joint swelling and neck pain. Skin: Negative for rash. Neurological: Negative for dizziness, syncope, weakness and headaches. Hematological: Negative for adenopathy. Does not bruise/bleed easily. Psychiatric/Behavioral: Negative for confusion and suicidal ideas.          PAST MEDICAL HISTORY    has a past medical history of Anxiety, Atrial fibrillation ([address] CAD (coronary artery disease), Erectile dysfunction, Headache(784.0), High cholesterol, Hx of adenomatous colonic polyps, Hypertension, ICD (implantable cardioverter-defibrillator) discharge, Ischemic cardiomyopathy, Low back pain, MI, old, Osteoarthritis, Plantar fasciitis, Seborrheic keratosis, Smoker, and Syncopal episodes. SURGICAL HISTORY      has a past surgical history that includes Rotator cuff repair (Bilateral, ); Vasectomy (); Cardiac defibrillator placement (2013); cyst removal; Hand tendon surgery (Left); Colonoscopy (2015); Colonoscopy (2016); Coronary angioplasty with stent (2012); Cardiac catheterization (2020); and Cardiac defibrillator placement (2021). CURRENT MEDICATIONS       Previous Medications    AMIODARONE (CORDARONE) 200 MG TABLET    Take 1 tablet by mouth daily    ASPIRIN 81 MG TABLET    Take 1 tablet by mouth daily    ATORVASTATIN (LIPITOR) 80 MG TABLET    TAKE ONE TABLET BY MOUTH EVERY NIGHT    CALCIUM CARBONATE (OSCAL) 500 MG TABS TABLET    Take 500 mg by mouth daily    CLOPIDOGREL (PLAVIX) 75 MG TABLET    TAKE ONE TABLET BY MOUTH DAILY    ENTRESTO 24-26 MG PER TABLET    TAKE ONE TABLET BY MOUTH TWICE A DAY    FLUTICASONE (FLONASE) 50 MCG/ACT NASAL SPRAY    2 sprays by Nasal route daily    METOPROLOL TARTRATE (LOPRESSOR) 25 MG TABLET    Take one tablet in am and take 1/2 tablet in pm.    MULTIPLE VITAMINS-MINERALS (THERAPEUTIC MULTIVITAMIN-MINERALS) TABLET    Take 1 tablet by mouth daily. OMEGA-3 FATTY ACIDS (FISH OIL) 1000 MG CAPS    Take 1,200 mg by mouth 2 times daily     SILDENAFIL (VIAGRA) 100 MG TABLET    TAKE 1 TABLET BY MOUTH AS NEEDED FOR ERECTILE DYSFUNCTION    ZINC PO    Take 1 tablet by mouth daily       ALLERGIES     has No Known Allergies. FAMILY HISTORY     He indicated that his mother is . He indicated that his father is alive. He indicated that only one of his three sisters is alive. He indicated that all of his three brothers are alive. He indicated that his maternal grandmother is . He indicated that his maternal grandfather is .  He indicated that his paternal grandmother is . He indicated that his paternal grandfather is . He indicated that the status of his maternal uncle is unknown. He indicated that the status of his neg hx is unknown.     family history includes Cancer in his father; [place] in his maternal grandfather and maternal uncle; Diabetes in his mother; Heart Disease in his brother, father, maternal grandfather, and maternal grandmother; Heart Failure in his mother; Kidney Disease in his maternal grandfather, mother, sister, and sister; Other in his father and maternal grandmother. SOCIAL HISTORY      reports that he has been smoking cigarettes. He started smoking about 51 years ago. He has a 45.00 pack-year smoking history. He has never used smokeless tobacco. He reports previous alcohol use. He reports that he does not use drugs. PHYSICAL EXAM     INITIAL VITALS:  height is 6' (1.829 m) and weight is 235 lb (106.6 kg). His tympanic temperature is 99.1 °F (37.3 °C). His blood pressure is 137/74 and his pulse is 79. His respiration is 23 and oxygen saturation is 91%. Physical Exam  Constitutional:       Appearance: He is well-developed. He is obese. He is diaphoretic. He is not ill-appearing. HENT:      Head: Normocephalic and atraumatic. Right Ear: External ear normal.      Left Ear: External ear normal.   Eyes:      Pupils: Pupils are equal, round, and reactive to light. Cardiovascular:      Rate and Rhythm: Normal rate and regular rhythm. Pulmonary:      Effort: Pulmonary effort is normal.      Breath sounds: Decreased breath sounds, wheezing and rhonchi present. No rales. Abdominal:      General: Bowel sounds are normal.      Palpations: Abdomen is soft. Musculoskeletal:         General: Normal range of motion. Cervical back: Normal range of motion and neck supple. Skin:     General: Skin is warm.    Neurological:      Mental Status: He is alert and oriented to person, place, and time.   Psychiatric:         Behavior: Behavior normal.           DIFFERENTIAL DIAGNOSIS/ MDM:     URI symptoms with chills and cough we will work him up for Covid    DIAGNOSTIC RESULTS     EKG: All EKG's are interpreted by the Emergency Department Physician who either signs or Co-signs this chart in the absence of a cardiologist.  Normal sinus rhythm at 84 bpm TX interval 204 ms QRS durations 182 ms QT corrected 564ms axis is -17 patient has a right bundle branch block pattern with Q waves in the inferior and anteroseptal leads consistent with old infarct this was compared to previous EKG no significant changes seen      RADIOLOGY:   I directly visualized the following  images and reviewed the radiologist interpretations:       ONE XRAY VIEW OF THE CHEST       12/8/2021 1:44 pm       COMPARISON:   01/08/2013       HISTORY:   ORDERING SYSTEM PROVIDED HISTORY: shortness of breath   TECHNOLOGIST PROVIDED HISTORY:   shortness of breath   Reason for Exam: Cough, shortness of breath       FINDINGS:   Transvenous pacer in place.       The lungs are without acute focal process.  There is no effusion or   pneumothorax. The cardiomediastinal silhouette is stable.  The osseous   structures are stable.           Impression   No acute process           ED BEDSIDE ULTRASOUND:       LABS:  Labs Reviewed   RAPID INFLUENZA A/B ANTIGENS - Abnormal; Notable for the following components:       Result Value    Direct Exam POSITIVE for Influenza A Antigen (*)     All other components within normal limits   COMPREHENSIVE METABOLIC PANEL - Abnormal; Notable for the following components:    CREATININE 1.26 (*)     GFR Non- 57 (*)     All other components within normal limits   CBC WITH AUTO DIFFERENTIAL - Abnormal; Notable for the following components:    RBC 4.17 (*)     Hematocrit 40.2 (*)     MCH 34.5 (*)     MCHC 35.8 (*)     Seg Neutrophils 81 (*)     Lymphocytes 10 (*)     Eosinophils % 0 (*)     Immature Granulocytes 1 (*)     Absolute Lymph # 0.93 (*)     All other components within normal limits   BRAIN NATRIURETIC PEPTIDE - Abnormal; Notable for the following components:    Pro- (*)     All other components within normal limits   COVID-19, RAPID   CULTURE, BLOOD 1   CULTURE, BLOOD 1   TROPONIN   D-DIMER, QUANTITATIVE   LACTATE, SEPSIS           EMERGENCY DEPARTMENT COURSE:   Vitals:    Vitals:    12/08/21 1645 12/08/21 1700 12/08/21 1730 12/08/21 1737   BP: 132/69 130/68 137/74    Pulse: 82 83 80 79   Resp: 24 24 24 23   Temp:       TempSrc:       SpO2: 91% 91% 91% 91%   Weight:       Height:         -------------------------  BP: 137/74, Temp: 99.1 °F (37.3 °C), Pulse: 79, Resp: 23        Re-evaluation Notes    On repeat exam.  Patient feels much better would like to go home we will have him instructed on inhaler we will put him on Tamiflu as well as prednisone    CRITICAL CARE:   None        CONSULTS:      PROCEDURES:  None    FINAL IMPRESSION      1. Influenza with respiratory manifestation other than pneumonia          DISPOSITION/PLAN   DISPOSITION discharged    Condition on Disposition    Stable    PATIENT REFERRED TO:  Juaquin Pal MD  [address] [place] [address]  [phone]    In 3 days        DISCHARGE MEDICATIONS:  New Prescriptions    ALBUTEROL SULFATE HFA (PROVENTIL HFA) 108 (90 BASE) MCG/ACT INHALER    Inhale 2 puffs into the lungs every 4 hours as needed for Wheezing or Shortness of Breath (Space out to every 6 hours as symptoms improve) Space out to every 6 hours as symptoms improve.     OSELTAMIVIR (TAMIFLU) 75 MG CAPSULE    Take 1 capsule by mouth 2 times daily for 5 days    PREDNISONE (DELTASONE) 10 MG TABLET    Take 4 tablets by mouth once daily for 5 days       (Please note that portions of this note were completed with a voice recognition program.  Efforts were made to edit the dictations but occasionally words are mis-transcribed.)    Read MD Gretchen,, MD, ISABEL COOK A.E.M.   Attending Emergency Physician                          Brittany Dickens MD  12/17/21 0563

## 2021-12-08 NOTE — ED TRIAGE NOTES
Patient states he was feeling fatigued last night and woke up today feeling fatigued, having chills, cough, and shortness of breath.

## 2021-12-09 ENCOUNTER — CARE COORDINATION (OUTPATIENT)
Dept: CARE COORDINATION | Age: 66
End: 2021-12-09

## 2021-12-09 LAB
EKG ATRIAL RATE: 84 BPM
EKG P AXIS: 35 DEGREES
EKG P-R INTERVAL: 204 MS
EKG Q-T INTERVAL: 478 MS
EKG QRS DURATION: 182 MS
EKG QTC CALCULATION (BAZETT): 564 MS
EKG R AXIS: -17 DEGREES
EKG T AXIS: 39 DEGREES
EKG VENTRICULAR RATE: 84 BPM

## 2021-12-09 NOTE — CARE COORDINATION
Nicolas Moran was seen at ED- [facility] 12/8/2021.     12/9/2021- 10:45 am Left message requesting return call @ [phone] re: Initial ER F/U call. 2:40 pm Left message requesting return call @ [phone].   12/10/2021- 9:28 am Left message requesting return call @ [phone].

## 2021-12-13 LAB
CULTURE: NORMAL
CULTURE: NORMAL
Lab: NORMAL
Lab: NORMAL
SPECIMEN DESCRIPTION: NORMAL
SPECIMEN DESCRIPTION: NORMAL

## 2021-12-17 ENCOUNTER — TELEPHONE (OUTPATIENT)
Dept: FAMILY MEDICINE CLINIC | Age: 66
End: 2021-12-17

## 2021-12-17 DIAGNOSIS — R05.9 COUGH: Primary | ICD-10-CM

## 2021-12-17 DIAGNOSIS — R06.02 SOB (SHORTNESS OF BREATH): ICD-10-CM

## 2021-12-17 RX ORDER — IPRATROPIUM BROMIDE AND ALBUTEROL SULFATE 2.5; .5 MG/3ML; MG/3ML
1 SOLUTION RESPIRATORY (INHALATION) EVERY 4 HOURS
Qty: 360 ML | Refills: 0 | Status: SHIPPED | OUTPATIENT
Start: 2021-12-17 | End: 2022-05-16 | Stop reason: SDUPTHER

## 2021-12-17 RX ORDER — IPRATROPIUM BROMIDE AND ALBUTEROL SULFATE 2.5; .5 MG/3ML; MG/3ML
1 SOLUTION RESPIRATORY (INHALATION) EVERY 4 HOURS
Qty: 360 ML | Refills: 0 | Status: SHIPPED
Start: 2021-12-17 | End: 2021-12-17 | Stop reason: HOSPADM

## 2021-12-17 NOTE — TELEPHONE ENCOUNTER
Patient was in ER 12/8 and diagnosed with the flu. Patient is requested nebulizer treatments to loosen up congestion, has an inhaler is helping much. Please advise.

## 2021-12-17 NOTE — TELEPHONE ENCOUNTER
Patient made aware and did not have nebulizer machine so this was sent in with his script. Patient decided to switch pharmacies since he needed the nebulizer to [facility].

## 2022-01-04 ENCOUNTER — TELEPHONE (OUTPATIENT)
Dept: FAMILY MEDICINE CLINIC | Age: 67
End: 2022-01-04

## 2022-01-04 NOTE — TELEPHONE ENCOUNTER
Pt calling stating recently [facility] had called in a nebulizer solution for him to do breathing treatments but pt states they are making his tongue sore and questions if there is anything else he can use, pt uses [facility] for this, please advise at above number.

## 2022-01-06 RX ORDER — ALBUTEROL SULFATE 90 UG/1
2 AEROSOL, METERED RESPIRATORY (INHALATION) 4 TIMES DAILY PRN
Qty: 18 G | Refills: 3 | Status: SHIPPED | OUTPATIENT
Start: 2022-01-06 | End: 2022-05-16

## 2022-01-24 DIAGNOSIS — I47.20 VENTRICULAR TACHYCARDIA: ICD-10-CM

## 2022-01-24 RX ORDER — AMIODARONE HYDROCHLORIDE 200 MG/1
TABLET ORAL
Qty: 30 TABLET | Refills: 3 | Status: SHIPPED | OUTPATIENT
Start: 2022-01-24 | End: 2022-05-16 | Stop reason: SDUPTHER

## 2022-01-28 ENCOUNTER — PROCEDURE VISIT (OUTPATIENT)
Dept: CARDIOLOGY | Age: 67
End: 2022-01-28
Payer: MEDICARE

## 2022-01-28 ENCOUNTER — OFFICE VISIT (OUTPATIENT)
Dept: CARDIOLOGY | Age: 67
End: 2022-01-28
Payer: MEDICARE

## 2022-01-28 VITALS
WEIGHT: 258 LBS | SYSTOLIC BLOOD PRESSURE: 142 MMHG | BODY MASS INDEX: 34.95 KG/M2 | HEART RATE: 59 BPM | HEIGHT: 72 IN | DIASTOLIC BLOOD PRESSURE: 88 MMHG

## 2022-01-28 DIAGNOSIS — I25.5 ISCHEMIC CARDIOMYOPATHY: Primary | ICD-10-CM

## 2022-01-28 DIAGNOSIS — I50.22 CHRONIC SYSTOLIC CONGESTIVE HEART FAILURE (HCC): ICD-10-CM

## 2022-01-28 DIAGNOSIS — I10 ESSENTIAL HYPERTENSION: ICD-10-CM

## 2022-01-28 DIAGNOSIS — Z95.810 AICD (AUTOMATIC CARDIOVERTER/DEFIBRILLATOR) PRESENT: ICD-10-CM

## 2022-01-28 DIAGNOSIS — I25.10 CORONARY ARTERY DISEASE INVOLVING NATIVE CORONARY ARTERY OF NATIVE HEART WITHOUT ANGINA PECTORIS: ICD-10-CM

## 2022-01-28 DIAGNOSIS — I47.20 VENTRICULAR TACHYCARDIA: ICD-10-CM

## 2022-01-28 PROCEDURE — 93289 INTERROG DEVICE EVAL HEART: CPT | Performed by: INTERNAL MEDICINE

## 2022-01-28 PROCEDURE — 93010 ELECTROCARDIOGRAM REPORT: CPT | Performed by: INTERNAL MEDICINE

## 2022-01-28 PROCEDURE — 99214 OFFICE O/P EST MOD 30 MIN: CPT | Performed by: INTERNAL MEDICINE

## 2022-01-28 PROCEDURE — 93005 ELECTROCARDIOGRAM TRACING: CPT | Performed by: INTERNAL MEDICINE

## 2022-01-28 PROCEDURE — 99212 OFFICE O/P EST SF 10 MIN: CPT | Performed by: INTERNAL MEDICINE

## 2022-01-28 ASSESSMENT — ENCOUNTER SYMPTOMS
ABDOMINAL PAIN: 0
EYE DISCHARGE: 0
SHORTNESS OF BREATH: 0
CHEST TIGHTNESS: 0
ABDOMINAL DISTENTION: 0
EYE ITCHING: 0

## 2022-01-28 NOTE — PROGRESS NOTES
Today's Date: 1/28/2022  Patient's Name: Fatimah Metcalf  Patient's age: 75 y. o., 1955    Subjective: The patient is a 78y.o. year old, , male is in the office for CAD. Denies exertional chest pain or SOB, no dizziness or syncope and no palpitations. Past Medical History:   has a past medical history of Anxiety, Atrial fibrillation ([address] CAD (coronary artery disease), Erectile dysfunction, Headache(784.0), High cholesterol, Hx of adenomatous colonic polyps, Hypertension, ICD (implantable cardioverter-defibrillator) discharge, Ischemic cardiomyopathy, Low back pain, MI, old, Osteoarthritis, Plantar fasciitis, Seborrheic keratosis, Smoker, and Syncopal episodes. Past Surgical History:   has a past surgical history that includes Rotator cuff repair (Bilateral, 1997); Vasectomy (1980); Cardiac defibrillator placement (01/07/2013); cyst removal; Hand tendon surgery (Left); Colonoscopy (11/11/2015); Colonoscopy (12/21/2016); Coronary angioplasty with stent (09/2012); Cardiac catheterization (09/04/2020); and Cardiac defibrillator placement (06/22/2021). Home Medications:  Prior to Admission medications    Medication Sig Start Date End Date Taking?  Authorizing Provider   amiodarone (CORDARONE) 200 MG tablet TAKE ONE TABLET BY MOUTH DAILY 1/24/22  Yes Nidhi Hinton MD   albuterol sulfate HFA (VENTOLIN HFA) 108 (90 Base) MCG/ACT inhaler Inhale 2 puffs into the lungs 4 times daily as needed for Wheezing 1/6/22  Yes Nidhi Hinton MD   ipratropium-albuterol (DUONEB) 0.5-2.5 (3) MG/3ML SOLN nebulizer solution Inhale 3 mLs into the lungs every 4 hours 12/17/21  Yes Nidhi Hinton MD   Nebulizers (COMPRESSOR/NEBULIZER) MISC Nebulizer with tubing and supplies 12/17/21  Yes Nidhi Hinton MD   albuterol sulfate HFA (PROVENTIL HFA) 108 (90 Base) MCG/ACT inhaler Inhale 2 puffs into the lungs every 4 hours as needed for Wheezing or Shortness of Breath (Space out to every 6 hours as symptoms improve) Space out to every 6 hours as symptoms improve. 12/8/21  Yes Zhou Bright MD   clopidogrel (PLAVIX) 75 MG tablet TAKE ONE TABLET BY MOUTH DAILY 9/2/21  Yes Roxy Diaz MD   ENTRESTO 24-26 MG per tablet TAKE ONE TABLET BY MOUTH TWICE A DAY 9/2/21  Yes Shannan Singletary MD   calcium carbonate (OSCAL) 500 MG TABS tablet Take 500 mg by mouth daily   Yes Historical Provider, MD   ZINC PO Take 1 tablet by mouth daily   Yes Historical Provider, MD   metoprolol tartrate (LOPRESSOR) 25 MG tablet Take one tablet in am and take 1/2 tablet in pm. 4/15/21  Yes Shannan Singletary MD   atorvastatin (LIPITOR) 80 MG tablet TAKE ONE TABLET BY MOUTH EVERY NIGHT 3/17/21  Yes Roxy Diaz MD   sildenafil (VIAGRA) 100 MG tablet TAKE 1 TABLET BY MOUTH AS NEEDED FOR ERECTILE DYSFUNCTION 3/4/19  Yes Roxy Diaz MD   fluticasone (FLONASE) 50 MCG/ACT nasal spray 2 sprays by Nasal route daily  Patient taking differently: 2 sprays by Nasal route daily as needed  3/4/19  Yes Roxy Diaz MD   aspirin 81 MG tablet Take 1 tablet by mouth daily 3/20/17  Yes Heladio Greenwood MD   Omega-3 Fatty Acids (FISH OIL) 1000 MG CAPS Take 1,200 mg by mouth 2 times daily    Yes Historical Provider, MD   Multiple Vitamins-Minerals (THERAPEUTIC MULTIVITAMIN-MINERALS) tablet Take 1 tablet by mouth daily. Yes Historical Provider, MD       Allergies:  Patient has no known allergies. Social History:   reports that he has been smoking cigarettes. He started smoking about 52 years ago. He has a 45.00 pack-year smoking history. He has never used smokeless tobacco. He reports previous alcohol use. He reports that he does not use drugs. Review of Systems:  Review of Systems   Constitutional: Negative for chills, fatigue and fever. HENT: Negative for congestion and dental problem. Eyes: Negative for discharge and itching. Respiratory: Negative for chest tightness and shortness of breath.     Cardiovascular: Negative for chest  HTN (hypertension)    Anxiety    Hyperlipemia    Memory loss    Hypothyroidism    Depression    Hyperlipidemia with target LDL less than 70    Atrial fibrillation (HCC)    Ischemic cardiomyopathy    Tobacco abuse    Tobacco abuse counseling    Hx of adenomatous colonic polyps    Colon polyps    Chronic systolic congestive heart failure (HCC)       Assessment/Plan:  · CAD with hx of NOAH to LAD in 2012.  of RCA stable 9/20 (patent stents on last cath 09/2020, 50% LCX stenosis). Stable, continue current medications. · Syncopal episode secondary to ventricular tachycardia  · Ventricular tachycardia terminated by ICD shock in August 2020, started on po amio post repeat cath. Has been doing well. Device check on 2/2021  showed normal function. · HFrEF secondary to Ischemic. Echo 8/2021 EF 35%. Grade I DD. Mild MR . Stable with no signs of volume overload. · HTN-BP is high. Normal at home, will have him check BP at home and report to us. · Obesity  · Hyperlipidemia (last lipid panel in 3/2021, LDL 59). On Lipitor. · Chronic smoking. Counseled to stop smoking.            Rudi Heath MD, MD  [facility]           [phone]

## 2022-05-09 ENCOUNTER — HOSPITAL ENCOUNTER (OUTPATIENT)
Dept: LAB | Age: 67
Discharge: HOME | End: 2022-05-09
Payer: MEDICARE

## 2022-05-09 DIAGNOSIS — E03.9 HYPOTHYROIDISM, UNSPECIFIED TYPE: ICD-10-CM

## 2022-05-09 DIAGNOSIS — Z12.5 SCREENING PSA (PROSTATE SPECIFIC ANTIGEN): ICD-10-CM

## 2022-05-09 DIAGNOSIS — I10 ESSENTIAL HYPERTENSION: ICD-10-CM

## 2022-05-09 DIAGNOSIS — E78.5 HYPERLIPIDEMIA WITH TARGET LDL LESS THAN 70: ICD-10-CM

## 2022-05-09 LAB
ABSOLUTE EOS #: 0.35 K/UL (ref 0–0.44)
ABSOLUTE IMMATURE GRANULOCYTE: 0.04 K/UL (ref 0–0.3)
ABSOLUTE LYMPH #: 3.79 K/UL (ref 1.1–3.7)
ABSOLUTE MONO #: 0.82 K/UL (ref 0.1–1.2)
ALBUMIN SERPL-MCNC: 4.5 G/DL (ref 3.5–5.2)
ALBUMIN/GLOBULIN RATIO: 1.6 (ref 1–2.5)
ALP BLD-CCNC: 63 U/L (ref 40–129)
ALT SERPL-CCNC: 16 U/L (ref 5–41)
ANION GAP SERPL CALCULATED.3IONS-SCNC: 9 MMOL/L (ref 9–17)
AST SERPL-CCNC: 15 U/L
BASOPHILS # BLD: 1 % (ref 0–2)
BASOPHILS ABSOLUTE: 0.11 K/UL (ref 0–0.2)
BILIRUB SERPL-MCNC: 0.6 MG/DL (ref 0.3–1.2)
BUN BLDV-MCNC: 21 MG/DL (ref 8–23)
BUN/CREAT BLD: 19 (ref 9–20)
CALCIUM SERPL-MCNC: 9.5 MG/DL (ref 8.6–10.4)
CHLORIDE BLD-SCNC: 104 MMOL/L (ref 98–107)
CHOLESTEROL/HDL RATIO: 4.3
CHOLESTEROL: 149 MG/DL
CO2: 26 MMOL/L (ref 20–31)
CREAT SERPL-MCNC: 1.08 MG/DL (ref 0.7–1.2)
EOSINOPHILS RELATIVE PERCENT: 4 % (ref 1–4)
GFR AFRICAN AMERICAN: >60 ML/MIN
GFR NON-AFRICAN AMERICAN: >60 ML/MIN
GFR SERPL CREATININE-BSD FRML MDRD: NORMAL ML/MIN/{1.73_M2}
GLUCOSE BLD-MCNC: 93 MG/DL (ref 70–99)
HCT VFR BLD CALC: 42.5 % (ref 40.7–50.3)
HDLC SERPL-MCNC: 35 MG/DL
HEMOGLOBIN: 14.9 G/DL (ref 13–17)
IMMATURE GRANULOCYTES: 0 %
LDL CHOLESTEROL: 57 MG/DL (ref 0–130)
LYMPHOCYTES # BLD: 38 % (ref 24–43)
MCH RBC QN AUTO: 34.3 PG (ref 25.2–33.5)
MCHC RBC AUTO-ENTMCNC: 35.1 G/DL (ref 25.2–33.5)
MCV RBC AUTO: 97.7 FL (ref 82.6–102.9)
MONOCYTES # BLD: 8 % (ref 3–12)
NRBC AUTOMATED: 0 PER 100 WBC
PDW BLD-RTO: 12.2 % (ref 11.8–14.4)
PLATELET # BLD: 174 K/UL (ref 138–453)
PMV BLD AUTO: 11.9 FL (ref 8.1–13.5)
POTASSIUM SERPL-SCNC: 4.2 MMOL/L (ref 3.7–5.3)
PROSTATE SPECIFIC ANTIGEN: 1.77 NG/ML
RBC # BLD: 4.35 M/UL (ref 4.21–5.77)
SEG NEUTROPHILS: 49 % (ref 36–65)
SEGMENTED NEUTROPHILS ABSOLUTE COUNT: 4.96 K/UL (ref 1.5–8.1)
SODIUM BLD-SCNC: 139 MMOL/L (ref 135–144)
THYROXINE, FREE: 1.44 NG/DL (ref 0.93–1.7)
TOTAL PROTEIN: 7.4 G/DL (ref 6.4–8.3)
TRIGL SERPL-MCNC: 283 MG/DL
TSH SERPL DL<=0.05 MIU/L-ACNC: 3.64 UIU/ML (ref 0.3–5)
WBC # BLD: 10.1 K/UL (ref 3.5–11.3)

## 2022-05-09 PROCEDURE — 36415 COLL VENOUS BLD VENIPUNCTURE: CPT

## 2022-05-09 PROCEDURE — 80053 COMPREHEN METABOLIC PANEL: CPT

## 2022-05-09 PROCEDURE — 85025 COMPLETE CBC W/AUTO DIFF WBC: CPT

## 2022-05-09 PROCEDURE — 84443 ASSAY THYROID STIM HORMONE: CPT

## 2022-05-09 PROCEDURE — 80061 LIPID PANEL: CPT

## 2022-05-09 PROCEDURE — G0103 PSA SCREENING: HCPCS

## 2022-05-09 PROCEDURE — 84439 ASSAY OF FREE THYROXINE: CPT

## 2022-05-16 ENCOUNTER — OFFICE VISIT (OUTPATIENT)
Dept: FAMILY MEDICINE CLINIC | Age: 67
End: 2022-05-16
Payer: MEDICARE

## 2022-05-16 VITALS
WEIGHT: 256 LBS | DIASTOLIC BLOOD PRESSURE: 60 MMHG | HEART RATE: 64 BPM | BODY MASS INDEX: 34.67 KG/M2 | SYSTOLIC BLOOD PRESSURE: 110 MMHG | HEIGHT: 72 IN

## 2022-05-16 DIAGNOSIS — E03.9 HYPOTHYROIDISM, UNSPECIFIED TYPE: ICD-10-CM

## 2022-05-16 DIAGNOSIS — I10 ESSENTIAL HYPERTENSION: ICD-10-CM

## 2022-05-16 DIAGNOSIS — E78.5 HYPERLIPIDEMIA WITH TARGET LDL LESS THAN 70: ICD-10-CM

## 2022-05-16 DIAGNOSIS — Z12.5 SCREENING PSA (PROSTATE SPECIFIC ANTIGEN): ICD-10-CM

## 2022-05-16 DIAGNOSIS — I25.10 CORONARY ARTERY DISEASE INVOLVING NATIVE CORONARY ARTERY OF NATIVE HEART WITHOUT ANGINA PECTORIS: Primary | ICD-10-CM

## 2022-05-16 DIAGNOSIS — N52.9 ERECTILE DYSFUNCTION, UNSPECIFIED ERECTILE DYSFUNCTION TYPE: ICD-10-CM

## 2022-05-16 DIAGNOSIS — F32.5 MAJOR DEPRESSIVE DISORDER, SINGLE EPISODE, IN FULL REMISSION (HCC): ICD-10-CM

## 2022-05-16 DIAGNOSIS — G47.33 OSA (OBSTRUCTIVE SLEEP APNEA): ICD-10-CM

## 2022-05-16 DIAGNOSIS — I47.20 VENTRICULAR TACHYCARDIA: ICD-10-CM

## 2022-05-16 DIAGNOSIS — Z72.0 TOBACCO ABUSE: ICD-10-CM

## 2022-05-16 PROCEDURE — 99214 OFFICE O/P EST MOD 30 MIN: CPT | Performed by: FAMILY MEDICINE

## 2022-05-16 PROCEDURE — 99213 OFFICE O/P EST LOW 20 MIN: CPT | Performed by: FAMILY MEDICINE

## 2022-05-16 RX ORDER — IPRATROPIUM BROMIDE AND ALBUTEROL SULFATE 2.5; .5 MG/3ML; MG/3ML
1 SOLUTION RESPIRATORY (INHALATION) EVERY 4 HOURS
Qty: 360 ML | Refills: 0 | Status: SHIPPED | OUTPATIENT
Start: 2022-05-16

## 2022-05-16 RX ORDER — CLOPIDOGREL BISULFATE 75 MG/1
TABLET ORAL
Qty: 90 TABLET | Refills: 2 | Status: SHIPPED | OUTPATIENT
Start: 2022-05-16

## 2022-05-16 RX ORDER — AMIODARONE HYDROCHLORIDE 200 MG/1
TABLET ORAL
Qty: 90 TABLET | Refills: 1 | Status: SHIPPED | OUTPATIENT
Start: 2022-05-16

## 2022-05-16 RX ORDER — ALBUTEROL SULFATE 90 UG/1
2 AEROSOL, METERED RESPIRATORY (INHALATION) EVERY 4 HOURS PRN
Qty: 18 G | Refills: 0 | Status: SHIPPED | OUTPATIENT
Start: 2022-05-16

## 2022-05-16 RX ORDER — FLUTICASONE PROPIONATE 50 MCG
2 SPRAY, SUSPENSION (ML) NASAL DAILY
Qty: 3 EACH | Refills: 1 | Status: SHIPPED | OUTPATIENT
Start: 2022-05-16

## 2022-05-16 RX ORDER — ATORVASTATIN CALCIUM 80 MG/1
TABLET, FILM COATED ORAL
Qty: 90 TABLET | Refills: 2 | Status: SHIPPED | OUTPATIENT
Start: 2022-05-16 | End: 2022-06-21

## 2022-05-16 ASSESSMENT — PATIENT HEALTH QUESTIONNAIRE - PHQ9
9. THOUGHTS THAT YOU WOULD BE BETTER OFF DEAD, OR OF HURTING YOURSELF: 0
10. IF YOU CHECKED OFF ANY PROBLEMS, HOW DIFFICULT HAVE THESE PROBLEMS MADE IT FOR YOU TO DO YOUR WORK, TAKE CARE OF THINGS AT HOME, OR GET ALONG WITH OTHER PEOPLE: 0
7. TROUBLE CONCENTRATING ON THINGS, SUCH AS READING THE NEWSPAPER OR WATCHING TELEVISION: 0
4. FEELING TIRED OR HAVING LITTLE ENERGY: 0
6. FEELING BAD ABOUT YOURSELF - OR THAT YOU ARE A FAILURE OR HAVE LET YOURSELF OR YOUR FAMILY DOWN: 0
8. MOVING OR SPEAKING SO SLOWLY THAT OTHER PEOPLE COULD HAVE NOTICED. OR THE OPPOSITE, BEING SO FIGETY OR RESTLESS THAT YOU HAVE BEEN MOVING AROUND A LOT MORE THAN USUAL: 0
2. FEELING DOWN, DEPRESSED OR HOPELESS: 0
SUM OF ALL RESPONSES TO PHQ QUESTIONS 1-9: 0
SUM OF ALL RESPONSES TO PHQ QUESTIONS 1-9: 0
3. TROUBLE FALLING OR STAYING ASLEEP: 0
SUM OF ALL RESPONSES TO PHQ QUESTIONS 1-9: 0
SUM OF ALL RESPONSES TO PHQ QUESTIONS 1-9: 0
5. POOR APPETITE OR OVEREATING: 0

## 2022-05-16 ASSESSMENT — ENCOUNTER SYMPTOMS
GASTROINTESTINAL NEGATIVE: 1
RESPIRATORY NEGATIVE: 1
ALLERGIC/IMMUNOLOGIC NEGATIVE: 1
COUGH: 0
EYES NEGATIVE: 1
SHORTNESS OF BREATH: 0

## 2022-05-16 NOTE — PATIENT INSTRUCTIONS
Hospital Outpatient Visit on 05/09/2022   Component Date Value Ref Range Status    PSA 05/09/2022 1.77  <4.1 ng/mL Final    Comment: The Roche \"ECLIA\" assay is used. Results obtained with different assay methods cannot be   used interchangeably.       WBC 05/09/2022 10.1  3.5 - 11.3 k/uL Final    RBC 05/09/2022 4.35  4.21 - 5.77 m/uL Final    Hemoglobin 05/09/2022 14.9  13.0 - 17.0 g/dL Final    Hematocrit 05/09/2022 42.5  40.7 - 50.3 % Final    MCV 05/09/2022 97.7  82.6 - 102.9 fL Final    MCH 05/09/2022 34.3* 25.2 - 33.5 pg Final    MCHC 05/09/2022 35.1* 25.2 - 33.5 g/dL Final    RDW 05/09/2022 12.2  11.8 - 14.4 % Final    Platelets 67/88/6314 174  138 - 453 k/uL Final    MPV 05/09/2022 11.9  8.1 - 13.5 fL Final    NRBC Automated 05/09/2022 0.0  0.0 per 100 WBC Final    Seg Neutrophils 05/09/2022 49  36 - 65 % Final    Lymphocytes 05/09/2022 38  24 - 43 % Final    Monocytes 05/09/2022 8  3 - 12 % Final    Eosinophils % 05/09/2022 4  1 - 4 % Final    Basophils 05/09/2022 1  0 - 2 % Final    Immature Granulocytes 05/09/2022 0  0 % Final    Segs Absolute 05/09/2022 4.96  1.50 - 8.10 k/uL Final    Absolute Lymph # 05/09/2022 3.79* 1.10 - 3.70 k/uL Final    Absolute Mono # 05/09/2022 0.82  0.10 - 1.20 k/uL Final    Absolute Eos # 05/09/2022 0.35  0.00 - 0.44 k/uL Final    Basophils Absolute 05/09/2022 0.11  0.00 - 0.20 k/uL Final    Absolute Immature Granulocyte 05/09/2022 0.04  0.00 - 0.30 k/uL Final    Glucose 05/09/2022 93  70 - 99 mg/dL Final    BUN 05/09/2022 21  8 - 23 mg/dL Final    CREATININE 05/09/2022 1.08  0.70 - 1.20 mg/dL Final    Bun/Cre Ratio 05/09/2022 19  9 - 20 Final    Calcium 05/09/2022 9.5  8.6 - 10.4 mg/dL Final    Sodium 05/09/2022 139  135 - 144 mmol/L Final    Potassium 05/09/2022 4.2  3.7 - 5.3 mmol/L Final    Chloride 05/09/2022 104  98 - 107 mmol/L Final    CO2 05/09/2022 26  20 - 31 mmol/L Final    Anion Gap 05/09/2022 9  9 - 17 mmol/L Final    Alkaline Phosphatase 05/09/2022 63  40 - 129 U/L Final    ALT 05/09/2022 16  5 - 41 U/L Final    AST 05/09/2022 15  <40 U/L Final    Total Bilirubin 05/09/2022 0.60  0.3 - 1.2 mg/dL Final    Total Protein 05/09/2022 7.4  6.4 - 8.3 g/dL Final    Albumin 05/09/2022 4.5  3.5 - 5.2 g/dL Final    Albumin/Globulin Ratio 05/09/2022 1.6  1.0 - 2.5 Final    GFR Non- 05/09/2022 >60  >60 mL/min Final    GFR  05/09/2022 >60  >60 mL/min Final    GFR Comment 05/09/2022        Final    Comment: Average GFR for 61-76 years old:   80 mL/min/1.73sq m  Chronic Kidney Disease:   <60 mL/min/1.73sq m  Kidney failure:   <15 mL/min/1.73sq m              eGFR calculated using average adult body mass. Additional eGFR calculator available at:        [email or web address]            Cholesterol 05/09/2022 149  <200 mg/dL Final    Comment:    Cholesterol Guidelines:      <200  Desirable   200-240  Borderline      >240  Undesirable         HDL 05/09/2022 35* >40 mg/dL Final    Comment:    HDL Guidelines:    <40     Undesirable   40-59    Borderline    >59     Desirable         LDL Cholesterol 05/09/2022 57  0 - 130 mg/dL Final    Comment:    LDL Guidelines:     <100    Desirable   100-129   Near to/above Desirable   130-159   Borderline      >159   Undesirable     Direct (measured) LDL and calculated LDL are not interchangeable tests.  Chol/HDL Ratio 05/09/2022 4.3  <5 Final            Triglycerides 05/09/2022 283* <150 mg/dL Final    Comment:    Triglyceride Guidelines:     <150   Desirable   150-199  Borderline   200-499  High     >499   Very high   Based on AHA Guidelines for fasting triglyceride, October 2012.          Thyroxine, Free 05/09/2022 1.44  0.93 - 1.70 ng/dL Final    TSH 05/09/2022 3.64  0.30 - 5.00 uIU/mL Final

## 2022-05-16 NOTE — PROGRESS NOTES
Subjective:      Patient ID: Avni Osborn is a 75 y.o. male. Coronary Artery Disease  Pertinent negatives include no shortness of breath. Risk factors include hyperlipidemia. Loss of Consciousness  His past medical history is significant for CAD. Hyperlipidemia  Pertinent negatives include no myalgias or shortness of breath. Hypertension  Pertinent negatives include no neck pain or shortness of breath. Erectile Dysfunction       Routine follow up on chronic medical conditions, refills, and review of updated labs. I have reviewed the patient's medical history in detail and updated the computerized patient record. Feeling well over the interval.  No chest pain or palpitations. bp under good control. He has quit smoking On his birthday [birth year]. Restarted again. chantix had helped. Still smoking again at present. Started cpap late January. Compliant with cpap nightly. He has perceived benefit at present. S Feeling a little sob, trouble getting used to the mask, mostly due to recurrent nasal congestion. No back pain at present. Feeling well. Activity related exacerbations with lifting things. No chest pain      Knees starting to bother him more. Takes a lot of effort to get off the knees. Compliant with medications. Syncope in the past.  Sudden loss of conciousness while outside working on his golf cart. A little hot, but taking breaks and drinking gatorade. No warning or prodromal symptoms. He fell from standing into the grass. No injuries. He woke up and got up on his own. He had no sense of defibrillator firing, but apparently it picked up the event on his cardiology follow up. He then had updated Cardiac evan. 9/4/20. 1. Patent LAD stents   2. Intermediate 50% stenosis in LCx same as on last cath in 2012   3.  Known occluded () RCA with Collaterals       ICD interrogation today shows ventricular tachycardia, polymorphic, rate greater than 330 which was terminated by one 35 J shock. There were few episodes of few nonsustained ventricular tachycardia on the device interrogation as well   Defibrillator replaced 6/22/21. Started amiodarone at the time of his initial defibrillator. No interval shocks. No chest pain. Past Medical History:   Diagnosis Date    Anxiety     Atrial fibrillation ([address]     CAD (coronary artery disease) 2012    stent to LAD after MI    Erectile dysfunction     Headache(784.0)     High cholesterol     Hx of adenomatous colonic polyps     4 tubular adenomas, 2 hyperplastic polyps 11/11/15    Hypertension     ICD (implantable cardioverter-defibrillator) discharge 08/2020    Ischemic cardiomyopathy     AICD 1/13 for EF 20-25%    Low back pain     history of     MI, old 2012    Osteoarthritis     knees    Plantar fasciitis     Seborrheic keratosis     history of     Smoker     Syncopal episodes 08/2020    STATES PASSED OUT AND THAT ICD HAD FIRED WHEN DEVICE WAS INTERROGATED     Current Outpatient Medications   Medication Sig Dispense Refill    metoprolol tartrate (LOPRESSOR) 25 MG tablet Take one tablet in am and take 1/2 tablet in pm. 135 tablet 3    amiodarone (CORDARONE) 200 MG tablet TAKE ONE TABLET BY MOUTH DAILY 30 tablet 3    ipratropium-albuterol (DUONEB) 0.5-2.5 (3) MG/3ML SOLN nebulizer solution Inhale 3 mLs into the lungs every 4 hours 360 mL 0    albuterol sulfate HFA (PROVENTIL HFA) 108 (90 Base) MCG/ACT inhaler Inhale 2 puffs into the lungs every 4 hours as needed for Wheezing or Shortness of Breath (Space out to every 6 hours as symptoms improve) Space out to every 6 hours as symptoms improve.  18 g 0    clopidogrel (PLAVIX) 75 MG tablet TAKE ONE TABLET BY MOUTH DAILY 90 tablet 2    ENTRESTO 24-26 MG per tablet TAKE ONE TABLET BY MOUTH TWICE A  tablet 3    calcium carbonate (OSCAL) 500 MG TABS tablet Take 500 mg by mouth daily      ZINC PO Take 1 tablet by mouth daily      atorvastatin (LIPITOR) 80 MG tablet TAKE ONE TABLET BY MOUTH EVERY NIGHT 90 tablet 2    sildenafil (VIAGRA) 100 MG tablet TAKE 1 TABLET BY MOUTH AS NEEDED FOR ERECTILE DYSFUNCTION 10 tablet 2    fluticasone (FLONASE) 50 MCG/ACT nasal spray 2 sprays by Nasal route daily (Patient taking differently: 2 sprays by Nasal route daily as needed ) 1 Bottle 11    aspirin 81 MG tablet Take 1 tablet by mouth daily 30 tablet 6    Omega-3 Fatty Acids (FISH OIL) 1000 MG CAPS Take 1,200 mg by mouth 2 times daily       Multiple Vitamins-Minerals (THERAPEUTIC MULTIVITAMIN-MINERALS) tablet Take 1 tablet by mouth daily. No current facility-administered medications for this visit. No Known Allergies        Review of Systems   Constitutional: Negative. HENT: Negative. Eyes: Negative. Respiratory: Negative. Negative for cough and shortness of breath. Cardiovascular: Positive for syncope. Gastrointestinal: Negative. Endocrine: Negative. Genitourinary: Negative. Musculoskeletal: Negative. Negative for myalgias, neck pain and neck stiffness. Skin: Negative. Negative for rash. Allergic/Immunologic: Negative. Neurological: Negative for syncope. Hematological: Negative. Does not bruise/bleed easily. Psychiatric/Behavioral: Negative. Objective:   Physical Exam  Constitutional:       General: He is not in acute distress. Appearance: He is well-developed. HENT:      Head: Normocephalic and atraumatic. Right Ear: External ear normal.      Left Ear: External ear normal.      Mouth/Throat:      Pharynx: No oropharyngeal exudate. Eyes:      General: No scleral icterus. Conjunctiva/sclera: Conjunctivae normal.   Neck:      Thyroid: No thyromegaly. Cardiovascular:      Rate and Rhythm: Normal rate and regular rhythm. Heart sounds: Normal heart sounds. No murmur heard. Pulmonary:      Effort: Pulmonary effort is normal. No respiratory distress.       Breath sounds: Normal breath sounds. No wheezing. Abdominal:      General: Bowel sounds are normal. There is no distension. Palpations: Abdomen is soft. Tenderness: There is no abdominal tenderness. There is no rebound. Musculoskeletal:         General: No tenderness (trace edema, improved at present. ). Normal range of motion. Cervical back: Neck supple. Right lower leg: Edema (1-2 pitting bilaterally) present. Left lower leg: Edema present. Skin:     General: Skin is warm and dry. Findings: No erythema or rash. Neurological:      Mental Status: He is alert and oriented to person, place, and time. Psychiatric:         Behavior: Behavior normal.         Thought Content:  Thought content normal.         Judgment: Judgment normal.       /60 (Site: Right Upper Arm, Position: Sitting, Cuff Size: Large Adult)   Pulse 64   Ht 6' (1.829 m)   Wt 256 lb (116.1 kg)   BMI 34.72 kg/m²     Hospital Outpatient Visit on 05/09/2022   Component Date Value Ref Range Status    PSA 05/09/2022 1.77  <4.1 ng/mL Final    WBC 05/09/2022 10.1  3.5 - 11.3 k/uL Final    RBC 05/09/2022 4.35  4.21 - 5.77 m/uL Final    Hemoglobin 05/09/2022 14.9  13.0 - 17.0 g/dL Final    Hematocrit 05/09/2022 42.5  40.7 - 50.3 % Final    MCV 05/09/2022 97.7  82.6 - 102.9 fL Final    MCH 05/09/2022 34.3* 25.2 - 33.5 pg Final    MCHC 05/09/2022 35.1* 25.2 - 33.5 g/dL Final    RDW 05/09/2022 12.2  11.8 - 14.4 % Final    Platelets 45/09/0337 174  138 - 453 k/uL Final    MPV 05/09/2022 11.9  8.1 - 13.5 fL Final    NRBC Automated 05/09/2022 0.0  0.0 per 100 WBC Final    Seg Neutrophils 05/09/2022 49  36 - 65 % Final    Lymphocytes 05/09/2022 38  24 - 43 % Final    Monocytes 05/09/2022 8  3 - 12 % Final    Eosinophils % 05/09/2022 4  1 - 4 % Final    Basophils 05/09/2022 1  0 - 2 % Final    Immature Granulocytes 05/09/2022 0  0 % Final    Segs Absolute 05/09/2022 4.96  1.50 - 8.10 k/uL Final    Absolute Lymph # 05/09/2022 3.79* 1.10 - 3.70 k/uL Final    Absolute Mono # 05/09/2022 0.82  0.10 - 1.20 k/uL Final    Absolute Eos # 05/09/2022 0.35  0.00 - 0.44 k/uL Final    Basophils Absolute 05/09/2022 0.11  0.00 - 0.20 k/uL Final    Absolute Immature Granulocyte 05/09/2022 0.04  0.00 - 0.30 k/uL Final    Glucose 05/09/2022 93  70 - 99 mg/dL Final    BUN 05/09/2022 21  8 - 23 mg/dL Final    CREATININE 05/09/2022 1.08  0.70 - 1.20 mg/dL Final    Bun/Cre Ratio 05/09/2022 19  9 - 20 Final    Calcium 05/09/2022 9.5  8.6 - 10.4 mg/dL Final    Sodium 05/09/2022 139  135 - 144 mmol/L Final    Potassium 05/09/2022 4.2  3.7 - 5.3 mmol/L Final    Chloride 05/09/2022 104  98 - 107 mmol/L Final    CO2 05/09/2022 26  20 - 31 mmol/L Final    Anion Gap 05/09/2022 9  9 - 17 mmol/L Final    Alkaline Phosphatase 05/09/2022 63  40 - 129 U/L Final    ALT 05/09/2022 16  5 - 41 U/L Final    AST 05/09/2022 15  <40 U/L Final    Total Bilirubin 05/09/2022 0.60  0.3 - 1.2 mg/dL Final    Total Protein 05/09/2022 7.4  6.4 - 8.3 g/dL Final    Albumin 05/09/2022 4.5  3.5 - 5.2 g/dL Final    Albumin/Globulin Ratio 05/09/2022 1.6  1.0 - 2.5 Final    GFR Non- 05/09/2022 >60  >60 mL/min Final    GFR  05/09/2022 >60  >60 mL/min Final    GFR Comment 05/09/2022        Final    Cholesterol 05/09/2022 149  <200 mg/dL Final    HDL 05/09/2022 35* >40 mg/dL Final    LDL Cholesterol 05/09/2022 57  0 - 130 mg/dL Final    Chol/HDL Ratio 05/09/2022 4.3  <5 Final    Triglycerides 05/09/2022 283* <150 mg/dL Final    Thyroxine, Free 05/09/2022 1.44  0.93 - 1.70 ng/dL Final    TSH 05/09/2022 3.64  0.30 - 5.00 uIU/mL Final       TTE 02/09/18     1.  Left ventricular dimensions are normal.  Contractility is mildly  reduced.  There are multiple segmental wall motion abnormality mainly  involving the anteroseptal and apical areas.  There is grade 1 diastolic  dysfunction of the left ventricle.   2.  Right ventricle appears to be normal in size and function. 3.  Mild left atrial enlargement. 4.  Aortic root diameter is normal at 3.2 cm. 5.  Aortic valve is unremarkable. 6.  Mitral valve shows mild regurgitation. No stenosis. 7.  There is mild tricuspid regurgitation. 8.  Right ventricular systolic pressure was estimated at 30 mmHg. 9.  Pulmonic valve is unremarkable.   10.  There is no pericardial effusion.     Hospital Outpatient Visit on 05/09/2022   Component Date Value Ref Range Status    PSA 05/09/2022 1.77  <4.1 ng/mL Final    WBC 05/09/2022 10.1  3.5 - 11.3 k/uL Final    RBC 05/09/2022 4.35  4.21 - 5.77 m/uL Final    Hemoglobin 05/09/2022 14.9  13.0 - 17.0 g/dL Final    Hematocrit 05/09/2022 42.5  40.7 - 50.3 % Final    MCV 05/09/2022 97.7  82.6 - 102.9 fL Final    MCH 05/09/2022 34.3* 25.2 - 33.5 pg Final    MCHC 05/09/2022 35.1* 25.2 - 33.5 g/dL Final    RDW 05/09/2022 12.2  11.8 - 14.4 % Final    Platelets 88/93/2923 174  138 - 453 k/uL Final    MPV 05/09/2022 11.9  8.1 - 13.5 fL Final    NRBC Automated 05/09/2022 0.0  0.0 per 100 WBC Final    Seg Neutrophils 05/09/2022 49  36 - 65 % Final    Lymphocytes 05/09/2022 38  24 - 43 % Final    Monocytes 05/09/2022 8  3 - 12 % Final    Eosinophils % 05/09/2022 4  1 - 4 % Final    Basophils 05/09/2022 1  0 - 2 % Final    Immature Granulocytes 05/09/2022 0  0 % Final    Segs Absolute 05/09/2022 4.96  1.50 - 8.10 k/uL Final    Absolute Lymph # 05/09/2022 3.79* 1.10 - 3.70 k/uL Final    Absolute Mono # 05/09/2022 0.82  0.10 - 1.20 k/uL Final    Absolute Eos # 05/09/2022 0.35  0.00 - 0.44 k/uL Final    Basophils Absolute 05/09/2022 0.11  0.00 - 0.20 k/uL Final    Absolute Immature Granulocyte 05/09/2022 0.04  0.00 - 0.30 k/uL Final    Glucose 05/09/2022 93  70 - 99 mg/dL Final    BUN 05/09/2022 21  8 - 23 mg/dL Final    CREATININE 05/09/2022 1.08  0.70 - 1.20 mg/dL Final    Bun/Cre Ratio 05/09/2022 19  9 - 20 Final    Calcium 05/09/2022 9.5  8.6 - 10.4 mg/dL Final  Sodium 05/09/2022 139  135 - 144 mmol/L Final    Potassium 05/09/2022 4.2  3.7 - 5.3 mmol/L Final    Chloride 05/09/2022 104  98 - 107 mmol/L Final    CO2 05/09/2022 26  20 - 31 mmol/L Final    Anion Gap 05/09/2022 9  9 - 17 mmol/L Final    Alkaline Phosphatase 05/09/2022 63  40 - 129 U/L Final    ALT 05/09/2022 16  5 - 41 U/L Final    AST 05/09/2022 15  <40 U/L Final    Total Bilirubin 05/09/2022 0.60  0.3 - 1.2 mg/dL Final    Total Protein 05/09/2022 7.4  6.4 - 8.3 g/dL Final    Albumin 05/09/2022 4.5  3.5 - 5.2 g/dL Final    Albumin/Globulin Ratio 05/09/2022 1.6  1.0 - 2.5 Final    GFR Non- 05/09/2022 >60  >60 mL/min Final    GFR  05/09/2022 >60  >60 mL/min Final    GFR Comment 05/09/2022        Final    Cholesterol 05/09/2022 149  <200 mg/dL Final    HDL 05/09/2022 35* >40 mg/dL Final    LDL Cholesterol 05/09/2022 57  0 - 130 mg/dL Final    Chol/HDL Ratio 05/09/2022 4.3  <5 Final    Triglycerides 05/09/2022 283* <150 mg/dL Final    Thyroxine, Free 05/09/2022 1.44  0.93 - 1.70 ng/dL Final    TSH 05/09/2022 3.64  0.30 - 5.00 uIU/mL Final         Assessment:       Encounter Diagnoses   Name Primary?  Coronary artery disease involving native coronary artery of native heart without angina pectoris Yes    Ventricular tachycardia (HCC)     Essential hypertension     Major depressive disorder, single episode, in full remission ([facility] [address]     Erectile dysfunction, unspecified erectile dysfunction type     Hyperlipidemia with target LDL less than 70     Hypothyroidism, unspecified type     Tobacco abuse     DAVID (obstructive sleep apnea)            Plan:      CAD; clinically stable. S/p NOAH to lad after MI 2012. Ischemic cmo s/p aicd. No functional limitations. Denies cp. Denies changes in exercise tolerance. Consider updated echo at follow up. Last EF 20-25% s/p MI 2013. AICD in place due to ischemic cardiomyopathy.   Interrogation through cardiology, compliant. New defibrillator placed 6/22/21/      Ventricular tachycardia: episode ~8/22/20 where he passed out while standing outside. No prodromal warning/symptoms. Felt fine afterwards. icd interrogation showing shock delivered and terminating the arrhythmia. Now on amiodarone. Plans for follow up with electrophysiology cardiologist.      Htn: fair control at present. Cont current meds. Depression/anxiety: doing ok off wellbutrin. Had some excessive perspiration he blamed on the drug. Seems better off the drug. No concerns for depression/anxiety atresent. ED: no nitrate use. viagra not seeming to work as well at present. Hyperlipidemia: good control historically. Cont. lipitor 80mg. Updated labs pending. Hypothyroid hx. No active treatment at present. Remains well balanced at last check. Now on amiodarone, will cont. Serial checks. Tobacco abuse. He just quit smoking on his birthday. Unfortunately  restarted. chantix helped. Rec. Restarting . Discussed CT screening program.  He is interested. Completed. Still smoking. Law: having trouble with gi gas/distention/pain. rx changed to autopap machine 2/20/20. New machine. He still struggles a little with a sense of not breathing. psa normal.  Cont. Annual screening. Mi Levine

## 2022-06-06 ENCOUNTER — TELEPHONE (OUTPATIENT)
Dept: PHARMACY | Facility: CLINIC | Age: 67
End: 2022-06-06

## 2022-06-06 NOTE — TELEPHONE ENCOUNTER
[facility] CLINICAL PHARMACY: ADHERENCE REVIEW  Identified care gap per [employer]: fills at [facility] and [facility] Statin adherence    Last Visit: 5/16/22    Patient found in Outcomes MTM and is not currently eligible for CMR/TIP    ASSESSMENT  [id number] Records claims through 5/15/22 (Prior Year [place] = n.a%; YTD [place] = Filled only once; Potential Fail Date: 7/2/22): Atorvastatin 80 mg tablets last filled on 1/27/22 for 90 day supply. Next refill due: 4/27/22    Per [employer] Portal:  Atorvastatin last filled on 5/16/22 for 90 day supply. Per [facility]:   Atorvastatin 80 mg tablets last picked up on 5/16/22 for 90 day supply. 2 refills remaining. Billed through [facility]   Component Value Date    CHOL 149 05/09/2022    TRIG 283 (H) 05/09/2022    HDL 35 (L) 05/09/2022    LDLCHOLESTEROL 57 05/09/2022    LDLDIRECT 98 09/30/2012     ALT   Date Value Ref Range Status   05/09/2022 16 5 - 41 U/L Final     AST   Date Value Ref Range Status   05/09/2022 15 <40 U/L Final     The 10-year ASCVD risk score (Katheryn Webb, et al., 2013) is: 18.2%    Values used to calculate the score:      Age: 75 years      Sex: Male      Is Non- : No      Diabetic: No      Tobacco smoker: Yes      Systolic Blood Pressure: 440 mmHg      Is BP treated: Yes      HDL Cholesterol: 35 mg/dL      Total Cholesterol: 149 mg/dL     PLAN  No patient out reach planned at this time.     Future Appointments   Date Time Provider Camilo So   7/22/2022 11:15 AM SCHEDULE, [facility] CESAR CRONIN   7/22/2022 11:30 AM MD CESAR Crouch   11/22/2022  1:00 PM MD JOSE Guaman [facility] [[email or web address]]       Dayanara Sousa, PharmD, [facility], [address]  Department, toll free: [phone], option 1    For [facility] in place:  No   Gap Closed?: Yes    Time Spent (min): 5

## 2022-06-20 ENCOUNTER — TELEMEDICINE (OUTPATIENT)
Dept: FAMILY MEDICINE CLINIC | Age: 67
End: 2022-06-20
Payer: MEDICARE

## 2022-06-20 DIAGNOSIS — Z00.00 INITIAL MEDICARE ANNUAL WELLNESS VISIT: Primary | ICD-10-CM

## 2022-06-20 PROCEDURE — G0438 PPPS, INITIAL VISIT: HCPCS | Performed by: FAMILY MEDICINE

## 2022-06-20 PROCEDURE — 1123F ACP DISCUSS/DSCN MKR DOCD: CPT | Performed by: FAMILY MEDICINE

## 2022-06-20 RX ORDER — MULTIVIT WITH MINERALS/LUTEIN
250 TABLET ORAL DAILY
COMMUNITY

## 2022-06-20 SDOH — ECONOMIC STABILITY: FOOD INSECURITY: WITHIN THE PAST 12 MONTHS, YOU WORRIED THAT YOUR FOOD WOULD RUN OUT BEFORE YOU GOT MONEY TO BUY MORE.: PATIENT DECLINED

## 2022-06-20 SDOH — ECONOMIC STABILITY: FOOD INSECURITY: WITHIN THE PAST 12 MONTHS, THE FOOD YOU BOUGHT JUST DIDN'T LAST AND YOU DIDN'T HAVE MONEY TO GET MORE.: PATIENT DECLINED

## 2022-06-20 ASSESSMENT — PATIENT HEALTH QUESTIONNAIRE - PHQ9
5. POOR APPETITE OR OVEREATING: 0
10. IF YOU CHECKED OFF ANY PROBLEMS, HOW DIFFICULT HAVE THESE PROBLEMS MADE IT FOR YOU TO DO YOUR WORK, TAKE CARE OF THINGS AT HOME, OR GET ALONG WITH OTHER PEOPLE: 0
7. TROUBLE CONCENTRATING ON THINGS, SUCH AS READING THE NEWSPAPER OR WATCHING TELEVISION: 0
4. FEELING TIRED OR HAVING LITTLE ENERGY: 0
SUM OF ALL RESPONSES TO PHQ QUESTIONS 1-9: 0
3. TROUBLE FALLING OR STAYING ASLEEP: 0
SUM OF ALL RESPONSES TO PHQ QUESTIONS 1-9: 0
6. FEELING BAD ABOUT YOURSELF - OR THAT YOU ARE A FAILURE OR HAVE LET YOURSELF OR YOUR FAMILY DOWN: 0
1. LITTLE INTEREST OR PLEASURE IN DOING THINGS: 0
SUM OF ALL RESPONSES TO PHQ QUESTIONS 1-9: 0
SUM OF ALL RESPONSES TO PHQ9 QUESTIONS 1 & 2: 0
9. THOUGHTS THAT YOU WOULD BE BETTER OFF DEAD, OR OF HURTING YOURSELF: 0
8. MOVING OR SPEAKING SO SLOWLY THAT OTHER PEOPLE COULD HAVE NOTICED. OR THE OPPOSITE, BEING SO FIGETY OR RESTLESS THAT YOU HAVE BEEN MOVING AROUND A LOT MORE THAN USUAL: 0
SUM OF ALL RESPONSES TO PHQ QUESTIONS 1-9: 0
2. FEELING DOWN, DEPRESSED OR HOPELESS: 0

## 2022-06-20 ASSESSMENT — LIFESTYLE VARIABLES: HOW OFTEN DO YOU HAVE A DRINK CONTAINING ALCOHOL: NEVER

## 2022-06-20 ASSESSMENT — SOCIAL DETERMINANTS OF HEALTH (SDOH): HOW HARD IS IT FOR YOU TO PAY FOR THE VERY BASICS LIKE FOOD, HOUSING, MEDICAL CARE, AND HEATING?: PATIENT DECLINED

## 2022-06-20 NOTE — PATIENT INSTRUCTIONS
Personalized Preventive Plan for Allie Jiang - 6/20/2022  Medicare offers a range of preventive health benefits. Some of the tests and screenings are paid in full while other may be subject to a deductible, co-insurance, and/or copay. Some of these benefits include a comprehensive review of your medical history including lifestyle, illnesses that may run in your family, and various assessments and screenings as appropriate. After reviewing your medical record and screening and assessments performed today your provider may have ordered immunizations, labs, imaging, and/or referrals for you. A list of these orders (if applicable) as well as your Preventive Care list are included within your After Visit Summary for your review. Other Preventive Recommendations:    · A preventive eye exam performed by an eye specialist is recommended every 1-2 years to screen for glaucoma; cataracts, macular degeneration, and other eye disorders. · A preventive dental visit is recommended every 6 months. · Try to get at least 150 minutes of exercise per week or 10,000 steps per day on a pedometer . · Order or download the FREE \"Exercise & Physical Activity: Your Everyday Guide\" from [employer]. Call 6-516.875.7259 or search [employer] online. · You need 2571-1570 mg of calcium and 5365-5593 IU of vitamin D per day. It is possible to meet your calcium requirement with diet alone, but a vitamin D supplement is usually necessary to meet this goal.  · When exposed to the sun, use a sunscreen that protects against both UVA and UVB radiation with an SPF of 30 or greater. Reapply every 2 to 3 hours or after sweating, drying off with a towel, or swimming. · Always wear a seat belt when traveling in a car. Always wear a helmet when riding a bicycle or motorcycle.

## 2022-06-20 NOTE — PROGRESS NOTES
Patient declines colonoscopy. Patient would like more information on living will       Medicare Annual Wellness Visit    John Martinez is here for Medicare AWV    Assessment & Plan    Recommendations for Preventive Services Due: see orders and patient instructions/AVS.  Recommended screening schedule for the next 5-10 years is provided to the patient in written form: see Patient Instructions/AVS.     No follow-ups on file. Subjective   The following acute and/or chronic problems were also addressed today:      Patient's complete Health Risk Assessment and screening values have been reviewed and are found in Flowsheets. The following problems were reviewed today and where indicated follow up appointments were made and/or referrals ordered.     Positive Risk Factor Screenings with Interventions:       Tobacco Use:     Tobacco Use: High Risk    Smoking Tobacco Use: Current Every Day Smoker    Smokeless Tobacco Use: Never Used     E-Cigarettes/Vaping Use     Questions Responses    E-Cigarette/Vaping Use Never User    Start Date     Passive Exposure     Quit Date     Counseling Given     Comments         Substance Use - Tobacco Interventions:  patient is not ready to work toward tobacco cessation at this time         General Health and ACP:  General  In general, how would you say your health is?: Very Good  In the past 7 days, have you experienced any of the following: New or Increased Pain, New or Increased Fatigue, Loneliness, Social Isolation, Stress or Anger?: No  Do you get the social and emotional support that you need?: Yes  Do you have a Living Will?: (!) No    Advance Directives     Power of  Living Will ACP-Advance Directive ACP-Power of     Not on File Not on File Not on File Not on File      General Health Risk Interventions:  · No Living Will: Patient referred to [place] Habits/Nutrition:     Physical Activity: Inactive    Days of Exercise per Week: 0 days    Minutes of Exercise per Session: 0 min     Have you lost any weight without trying in the past 3 months?: No     Have you seen the dentist within the past year?: Yes    Health Habits/Nutrition Interventions:  · Inadequate physical activity:  patient is not ready to increase his/her physical activity level at this time             Objective      Patient-Reported Vitals  BP Observations: No, remote/electronic monitoring device was not used or able to be verified  Patient-Reported Weight: 245lbs  Patient-Reported Height: 6 0            No Known Allergies  Prior to Visit Medications    Medication Sig Taking? Authorizing Provider   Ascorbic Acid (VITAMIN C) 250 MG tablet Take 250 mg by mouth daily Yes Historical Provider, MD   metoprolol tartrate (LOPRESSOR) 25 MG tablet Take one tablet in am and take 1/2 tablet in pm. Yes Gianna Perez MD   amiodarone (CORDARONE) 200 MG tablet TAKE ONE TABLET BY MOUTH DAILY Yes Gianna Perez MD   ipratropium-albuterol (DUONEB) 0.5-2.5 (3) MG/3ML SOLN nebulizer solution Inhale 3 mLs into the lungs every 4 hours Yes Gianna Perez MD   albuterol sulfate HFA (PROVENTIL HFA) 108 (90 Base) MCG/ACT inhaler Inhale 2 puffs into the lungs every 4 hours as needed for Wheezing or Shortness of Breath (Space out to every 6 hours as symptoms improve) Space out to every 6 hours as symptoms improve.  Yes Gianna Perez MD   atorvastatin (LIPITOR) 80 MG tablet TAKE ONE TABLET BY MOUTH EVERY NIGHT Yes Gianna Perez MD   fluticasone (FLONASE) 50 MCG/ACT nasal spray 2 sprays by Nasal route daily Yes Gianna Perez MD   ENTRESTO 24-26 MG per tablet TAKE ONE TABLET BY MOUTH TWICE A DAY Yes Darylene Console, MD   calcium carbonate (OSCAL) 500 MG TABS tablet Take 500 mg by mouth daily Yes Historical Provider, MD   sildenafil (VIAGRA) 100 MG tablet TAKE 1 TABLET BY MOUTH AS NEEDED FOR ERECTILE DYSFUNCTION Yes Gianna Perez MD   aspirin 81 MG tablet Take 1 tablet by mouth daily Yes Luci Smoker, MD   Omega-3 Fatty Acids (FISH OIL) 1000 MG CAPS Take 1,200 mg by mouth 2 times daily  Yes Historical Provider, MD   Multiple Vitamins-Minerals (THERAPEUTIC MULTIVITAMIN-MINERALS) tablet Take 1 tablet by mouth daily. Yes Historical Provider, MD   clopidogrel (PLAVIX) 75 MG tablet 1 po daily  Patient not taking: Reported on 6/20/2022  Laura Guadalupe MD   ZINC PO Take 1 tablet by mouth daily  Patient not taking: Reported on 6/20/2022  Historical Provider, MD [place] (Including outside providers/suppliers regularly involved in providing care):   Patient Care Team:  Laura Guadalupe MD as PCP - Star Dense, MD as PCP - [facility] Empaneled Provider  Lewis Vazquez RN as Imaging Navigator     Reviewed and updated this visit:  Tobacco  Allergies  Meds  Med Hx  Surg Hx  Soc Hx  Fam Hx           [place], was evaluated through a synchronous (real-time) audio encounter. The patient (or guardian if applicable) is aware that this is a billable service, which includes applicable co-pays. This Virtual Visit was conducted with patient's (and/or legal guardian's) consent. The visit was conducted pursuant to the emergency declaration under the [facility], [facility] waiver authority and the [employer] General Act. Patient identification was verified, and a caregiver was present when appropriate. The patient was located at Home: [address] [facility]. Provider was located at [facility] ([address] [place] [address]): [address] [place], [place] [address]is Newman[place]anna [place]. Tresa Kawasaki, LPN, [id number]/6825, performed the documented evaluation under the direct supervision of the attending physician. This encounter was performed under myLaura MDs, direct supervision, 6/20/2022.

## 2022-06-21 RX ORDER — ATORVASTATIN CALCIUM 80 MG/1
TABLET, FILM COATED ORAL
Qty: 90 TABLET | Refills: 2 | Status: SHIPPED | OUTPATIENT
Start: 2022-06-21

## 2022-07-22 ENCOUNTER — OFFICE VISIT (OUTPATIENT)
Dept: CARDIOLOGY | Age: 67
End: 2022-07-22
Payer: MEDICARE

## 2022-07-22 ENCOUNTER — PROCEDURE VISIT (OUTPATIENT)
Dept: CARDIOLOGY | Age: 67
End: 2022-07-22
Payer: MEDICARE

## 2022-07-22 VITALS
SYSTOLIC BLOOD PRESSURE: 138 MMHG | HEART RATE: 60 BPM | RESPIRATION RATE: 16 BRPM | DIASTOLIC BLOOD PRESSURE: 82 MMHG | WEIGHT: 253 LBS | BODY MASS INDEX: 34.31 KG/M2 | OXYGEN SATURATION: 97 %

## 2022-07-22 DIAGNOSIS — E78.5 HYPERLIPIDEMIA, UNSPECIFIED HYPERLIPIDEMIA TYPE: ICD-10-CM

## 2022-07-22 DIAGNOSIS — I50.22 CHRONIC SYSTOLIC CONGESTIVE HEART FAILURE (HCC): ICD-10-CM

## 2022-07-22 DIAGNOSIS — Z95.0 PACEMAKER: ICD-10-CM

## 2022-07-22 DIAGNOSIS — I48.0 PAROXYSMAL ATRIAL FIBRILLATION (HCC): ICD-10-CM

## 2022-07-22 DIAGNOSIS — I25.5 ISCHEMIC CARDIOMYOPATHY: ICD-10-CM

## 2022-07-22 DIAGNOSIS — I34.0 CHRONIC PRIMARY MITRAL VALVE REGURGITATION: Primary | ICD-10-CM

## 2022-07-22 DIAGNOSIS — I10 PRIMARY HYPERTENSION: ICD-10-CM

## 2022-07-22 DIAGNOSIS — I25.10 CORONARY ARTERY DISEASE INVOLVING NATIVE CORONARY ARTERY OF NATIVE HEART WITHOUT ANGINA PECTORIS: Primary | ICD-10-CM

## 2022-07-22 PROCEDURE — 93005 ELECTROCARDIOGRAM TRACING: CPT | Performed by: INTERNAL MEDICINE

## 2022-07-22 PROCEDURE — 93288 INTERROG EVL PM/LDLS PM IP: CPT | Performed by: INTERNAL MEDICINE

## 2022-07-22 PROCEDURE — 1123F ACP DISCUSS/DSCN MKR DOCD: CPT | Performed by: INTERNAL MEDICINE

## 2022-07-22 PROCEDURE — 99214 OFFICE O/P EST MOD 30 MIN: CPT | Performed by: INTERNAL MEDICINE

## 2022-07-22 PROCEDURE — 93010 ELECTROCARDIOGRAM REPORT: CPT | Performed by: INTERNAL MEDICINE

## 2022-07-22 ASSESSMENT — ENCOUNTER SYMPTOMS
SHORTNESS OF BREATH: 0
NAUSEA: 0
ABDOMINAL PAIN: 0
CHEST TIGHTNESS: 0
EYE DISCHARGE: 0
EYE ITCHING: 0
VOMITING: 0

## 2022-07-22 ASSESSMENT — PATIENT HEALTH QUESTIONNAIRE - PHQ9
SUM OF ALL RESPONSES TO PHQ QUESTIONS 1-9: 0
SUM OF ALL RESPONSES TO PHQ QUESTIONS 1-9: 0
SUM OF ALL RESPONSES TO PHQ9 QUESTIONS 1 & 2: 0
SUM OF ALL RESPONSES TO PHQ QUESTIONS 1-9: 0
1. LITTLE INTEREST OR PLEASURE IN DOING THINGS: 0
SUM OF ALL RESPONSES TO PHQ QUESTIONS 1-9: 0
2. FEELING DOWN, DEPRESSED OR HOPELESS: 0

## 2022-07-22 NOTE — PROGRESS NOTES
Today's Date: 7/22/2022  Patient's Name: Vidya Sosa  Patient's age: 75 y. o., 1955    Subjective: The patient is a 75y.o. year old, , male is in the office for CAD. Denies exertional chest pain or SOB, no dizziness or syncope and no palpitations. Past Medical History:   has a past medical history of Anxiety, Atrial fibrillation ([address] CAD (coronary artery disease), Erectile dysfunction, Headache(784.0), High cholesterol, Hx of adenomatous colonic polyps, Hypertension, ICD (implantable cardioverter-defibrillator) discharge, Ischemic cardiomyopathy, Low back pain, MI, old, Osteoarthritis, Plantar fasciitis, Seborrheic keratosis, Smoker, and Syncopal episodes. Past Surgical History:   has a past surgical history that includes Rotator cuff repair (Bilateral, 1997); Vasectomy (1980); Cardiac defibrillator placement (01/07/2013); cyst removal; Hand tendon surgery (Left); Colonoscopy (11/11/2015); Colonoscopy (12/21/2016); Coronary angioplasty with stent (09/2012); Cardiac catheterization (09/04/2020); and Cardiac defibrillator placement (06/22/2021). Home Medications:  Prior to Admission medications    Medication Sig Start Date End Date Taking?  Authorizing Provider   atorvastatin (LIPITOR) 80 MG tablet TAKE ONE TABLET BY MOUTH ONCE NIGHTLY 6/21/22  Yes Talat Landry MD   Ascorbic Acid (VITAMIN C) 250 MG tablet Take 250 mg by mouth daily   Yes Historical Provider, MD   metoprolol tartrate (LOPRESSOR) 25 MG tablet Take one tablet in am and take 1/2 tablet in pm. 5/16/22  Yes Talat Landry MD   amiodarone (CORDARONE) 200 MG tablet TAKE ONE TABLET BY MOUTH DAILY 5/16/22  Yes Talat Landry MD   ipratropium-albuterol (DUONEB) 0.5-2.5 (3) MG/3ML SOLN nebulizer solution Inhale 3 mLs into the lungs every 4 hours 5/16/22  Yes Talat Landry MD   albuterol sulfate HFA (PROVENTIL HFA) 108 (90 Base) MCG/ACT inhaler Inhale 2 puffs into the lungs every 4 hours as needed for Wheezing or Shortness of Breath (Space out to every 6 hours as symptoms improve) Space out to every 6 hours as symptoms improve. 5/16/22  Yes Talat Landry MD   clopidogrel (PLAVIX) 75 MG tablet 1 po daily 5/16/22  Yes Talat Landry MD   fluticasone [facility]) 50 MCG/ACT nasal spray 2 sprays by Nasal route daily 5/16/22  Yes Talat Landry MD   ENTRESTO 24-26 MG per tablet TAKE ONE TABLET BY MOUTH TWICE A DAY 9/2/21  Yes Andrew Robles MD   calcium carbonate (OSCAL) 500 MG TABS tablet Take 500 mg by mouth daily   Yes Historical Provider, MD   sildenafil (VIAGRA) 100 MG tablet TAKE 1 TABLET BY MOUTH AS NEEDED FOR ERECTILE DYSFUNCTION 3/4/19  Yes Talat Landry MD   aspirin 81 MG tablet Take 1 tablet by mouth daily 3/20/17  Yes Jean Mcfadden MD   Omega-3 Fatty Acids (FISH OIL) 1000 MG CAPS Take 1,200 mg by mouth 2 times daily    Yes Historical Provider, MD   Multiple Vitamins-Minerals (THERAPEUTIC MULTIVITAMIN-MINERALS) tablet Take 1 tablet by mouth daily. Yes Historical Provider, MD       Allergies:  Patient has no known allergies. Social History:   reports that he has been smoking cigarettes. He started smoking about 52 years ago. He has a 45.00 pack-year smoking history. He has never used smokeless tobacco. He reports that he does not currently use alcohol. He reports that he does not use drugs. Review of Systems:  Review of Systems   Constitutional:  Negative for chills, fatigue and fever. HENT:  Negative for congestion and dental problem. Eyes:  Negative for discharge and itching. Respiratory:  Negative for chest tightness and shortness of breath. Cardiovascular:  Negative for chest pain and palpitations. Gastrointestinal:  Negative for abdominal pain, nausea and vomiting. Endocrine: Negative for cold intolerance and heat intolerance. Neurological:  Negative for dizziness and syncope. Psychiatric/Behavioral:  Negative for agitation and behavioral problems.       Physical Exam:  /82 (Site: Left Upper Arm, Position: Sitting, Cuff Size: Large Adult)   Pulse 60   Resp 16   Wt 253 lb (114.8 kg)   SpO2 97%   BMI 34.31 kg/m²    Physical Exam  Constitutional:       Appearance: Normal appearance. HENT:      Head: Normocephalic and atraumatic. Cardiovascular:      Rate and Rhythm: Normal rate and regular rhythm. Pulses: Normal pulses. Heart sounds: Normal heart sounds. No murmur heard. Pulmonary:      Effort: Pulmonary effort is normal. No respiratory distress. Breath sounds: Normal breath sounds. No stridor. Abdominal:      General: There is no distension. Palpations: There is no mass. Musculoskeletal:         General: No swelling or tenderness. Skin:     Capillary Refill: Capillary refill takes less than 2 seconds. Coloration: Skin is not jaundiced or pale. Neurological:      General: No focal deficit present. Mental Status: He is alert and oriented to person, place, and time. Cranial Nerves: No cranial nerve deficit. Psychiatric:         Mood and Affect: Mood normal.         Behavior: Behavior normal.       Cardiac Data:  EKG:     Labs:     CBC: No results for input(s): WBC, HGB, HCT, PLT in the last 72 hours. BMP:No results for input(s): NA, K, CO2, BUN, CREATININE, LABGLOM, GLUCOSE in the last 72 hours. PT/INR: No results for input(s): PROTIME, INR in the last 72 hours. FASTING LIPID PANEL:  Lab Results   Component Value Date/Time    HDL 35 05/09/2022 09:11 AM    LDLDIRECT 98 09/30/2012 12:01 PM    TRIG 283 05/09/2022 09:11 AM     LIVER PROFILE:No results for input(s): AST, ALT, LABALBU in the last 72 hours.     IMPRESSION:    Patient Active Problem List   Diagnosis    CAD (coronary artery disease)    HTN (hypertension)    Anxiety    Hyperlipemia    Memory loss    Hypothyroidism    Depression    Hyperlipidemia with target LDL less than 70    Atrial fibrillation ([facility] [address]    Ischemic cardiomyopathy    Tobacco abuse    Tobacco abuse counseling Hx of adenomatous colonic polyps    Colon polyps    Chronic systolic congestive heart failure (HCC)       Assessment/Plan:  CAD with hx of NOAH to LAD in 2012.  of RCA stable 9/20 (patent stents on last cath 09/2020, 50% LCX stenosis). Stable, continue current medications. Syncopal episode secondary to ventricular tachycardia  Ventricular tachycardia terminated by ICD shock in August 2020, started on po amio post repeat cath. Has been doing well. Device check on 7/2022  showed normal function. HFrEF secondary to Ischemic. Echo 8/2021 EF 35%. Grade I DD. Mild MR . Stable with no signs of volume overload. HTN-BP Well controlled. Obesity  Hyperlipidemia (last lipid panel in 5/2022, LDL 57). On Lipitor. Chronic smoking. Counseled to stop smoking.          Rubén Chambers MD, MD  [facility]           [phone]

## 2022-09-04 ENCOUNTER — OFFICE VISIT (OUTPATIENT)
Dept: PRIMARY CARE CLINIC | Age: 67
End: 2022-09-04
Payer: MEDICARE

## 2022-09-04 ENCOUNTER — HOSPITAL ENCOUNTER (EMERGENCY)
Age: 67
Discharge: HOME | End: 2022-09-04
Attending: EMERGENCY MEDICINE
Payer: MEDICARE

## 2022-09-04 ENCOUNTER — APPOINTMENT (OUTPATIENT)
Dept: GENERAL RADIOLOGY | Age: 67
End: 2022-09-04
Payer: MEDICARE

## 2022-09-04 VITALS
DIASTOLIC BLOOD PRESSURE: 80 MMHG | TEMPERATURE: 98 F | HEIGHT: 72 IN | OXYGEN SATURATION: 98 % | WEIGHT: 261 LBS | HEART RATE: 58 BPM | SYSTOLIC BLOOD PRESSURE: 130 MMHG | BODY MASS INDEX: 35.35 KG/M2

## 2022-09-04 VITALS
OXYGEN SATURATION: 96 % | SYSTOLIC BLOOD PRESSURE: 142 MMHG | HEART RATE: 55 BPM | BODY MASS INDEX: 35.35 KG/M2 | WEIGHT: 261 LBS | DIASTOLIC BLOOD PRESSURE: 83 MMHG | TEMPERATURE: 97.9 F | HEIGHT: 72 IN | RESPIRATION RATE: 18 BRPM

## 2022-09-04 DIAGNOSIS — R60.0 EDEMA OF BOTH LOWER LEGS: Primary | ICD-10-CM

## 2022-09-04 DIAGNOSIS — R60.0 BILATERAL LOWER EXTREMITY EDEMA: ICD-10-CM

## 2022-09-04 DIAGNOSIS — I50.9 CONGESTIVE HEART FAILURE, UNSPECIFIED HF CHRONICITY, UNSPECIFIED HEART FAILURE TYPE (HCC): Primary | ICD-10-CM

## 2022-09-04 LAB
ABSOLUTE EOS #: 0.3 K/UL (ref 0–0.44)
ABSOLUTE IMMATURE GRANULOCYTE: 0.04 K/UL (ref 0–0.3)
ABSOLUTE LYMPH #: 2.99 K/UL (ref 1.1–3.7)
ABSOLUTE MONO #: 0.6 K/UL (ref 0.1–1.2)
ALBUMIN SERPL-MCNC: 4.2 G/DL (ref 3.5–5.2)
ALBUMIN/GLOBULIN RATIO: 1.4 (ref 1–2.5)
ALP BLD-CCNC: 64 U/L (ref 40–129)
ALT SERPL-CCNC: 21 U/L (ref 5–41)
ANION GAP SERPL CALCULATED.3IONS-SCNC: 10 MMOL/L (ref 9–17)
AST SERPL-CCNC: 19 U/L
BASOPHILS # BLD: 1 % (ref 0–2)
BASOPHILS ABSOLUTE: 0.06 K/UL (ref 0–0.2)
BILIRUB SERPL-MCNC: 0.5 MG/DL (ref 0.3–1.2)
BUN BLDV-MCNC: 11 MG/DL (ref 8–23)
BUN/CREAT BLD: 12 (ref 9–20)
CALCIUM SERPL-MCNC: 9.4 MG/DL (ref 8.6–10.4)
CHLORIDE BLD-SCNC: 104 MMOL/L (ref 98–107)
CO2: 26 MMOL/L (ref 20–31)
CREAT SERPL-MCNC: 0.89 MG/DL (ref 0.7–1.2)
D-DIMER QUANTITATIVE: 0.34 MG/L FEU (ref 0–0.59)
EOSINOPHILS RELATIVE PERCENT: 4 % (ref 1–4)
GFR AFRICAN AMERICAN: >60 ML/MIN
GFR NON-AFRICAN AMERICAN: >60 ML/MIN
GFR SERPL CREATININE-BSD FRML MDRD: ABNORMAL ML/MIN/{1.73_M2}
GLUCOSE BLD-MCNC: 124 MG/DL (ref 70–99)
HCT VFR BLD CALC: 41.6 % (ref 40.7–50.3)
HEMOGLOBIN: 15.2 G/DL (ref 13–17)
IMMATURE GRANULOCYTES: 1 %
LACTIC ACID: 1.3 MMOL/L (ref 0.5–2.2)
LYMPHOCYTES # BLD: 36 % (ref 24–43)
MCH RBC QN AUTO: 35 PG (ref 25.2–33.5)
MCHC RBC AUTO-ENTMCNC: 36.5 G/DL (ref 25.2–33.5)
MCV RBC AUTO: 95.9 FL (ref 82.6–102.9)
MONOCYTES # BLD: 7 % (ref 3–12)
NRBC AUTOMATED: 0 PER 100 WBC
PDW BLD-RTO: 12.4 % (ref 11.8–14.4)
PLATELET # BLD: 143 K/UL (ref 138–453)
PMV BLD AUTO: 12.6 FL (ref 8.1–13.5)
POTASSIUM SERPL-SCNC: 4.2 MMOL/L (ref 3.7–5.3)
PRO-BNP: 344 PG/ML
RBC # BLD: 4.34 M/UL (ref 4.21–5.77)
SEG NEUTROPHILS: 51 % (ref 36–65)
SEGMENTED NEUTROPHILS ABSOLUTE COUNT: 4.36 K/UL (ref 1.5–8.1)
SODIUM BLD-SCNC: 140 MMOL/L (ref 135–144)
TOTAL PROTEIN: 7.1 G/DL (ref 6.4–8.3)
TROPONIN, HIGH SENSITIVITY: 8 NG/L (ref 0–22)
WBC # BLD: 8.4 K/UL (ref 3.5–11.3)

## 2022-09-04 PROCEDURE — 85379 FIBRIN DEGRADATION QUANT: CPT

## 2022-09-04 PROCEDURE — 6360000002 HC RX W HCPCS: Performed by: EMERGENCY MEDICINE

## 2022-09-04 PROCEDURE — 83880 ASSAY OF NATRIURETIC PEPTIDE: CPT

## 2022-09-04 PROCEDURE — 84484 ASSAY OF TROPONIN QUANT: CPT

## 2022-09-04 PROCEDURE — 99285 EMERGENCY DEPT VISIT HI MDM: CPT

## 2022-09-04 PROCEDURE — 85025 COMPLETE CBC W/AUTO DIFF WBC: CPT

## 2022-09-04 PROCEDURE — 83605 ASSAY OF LACTIC ACID: CPT

## 2022-09-04 PROCEDURE — 80053 COMPREHEN METABOLIC PANEL: CPT

## 2022-09-04 PROCEDURE — 71046 X-RAY EXAM CHEST 2 VIEWS: CPT

## 2022-09-04 PROCEDURE — 99212 OFFICE O/P EST SF 10 MIN: CPT | Performed by: NURSE PRACTITIONER

## 2022-09-04 PROCEDURE — 93005 ELECTROCARDIOGRAM TRACING: CPT | Performed by: EMERGENCY MEDICINE

## 2022-09-04 PROCEDURE — 99999 PR OFFICE/OUTPT VISIT,PROCEDURE ONLY: CPT | Performed by: NURSE PRACTITIONER

## 2022-09-04 PROCEDURE — 96374 THER/PROPH/DIAG INJ IV PUSH: CPT

## 2022-09-04 RX ORDER — FUROSEMIDE 20 MG/1
20 TABLET ORAL 2 TIMES DAILY
Qty: 10 TABLET | Refills: 0 | Status: SHIPPED | OUTPATIENT
Start: 2022-09-04 | End: 2022-09-09 | Stop reason: ALTCHOICE

## 2022-09-04 RX ORDER — FUROSEMIDE 10 MG/ML
40 INJECTION INTRAMUSCULAR; INTRAVENOUS ONCE
Status: COMPLETED | OUTPATIENT
Start: 2022-09-04 | End: 2022-09-04

## 2022-09-04 RX ADMIN — FUROSEMIDE 40 MG: 10 INJECTION, SOLUTION INTRAMUSCULAR; INTRAVENOUS at 16:17

## 2022-09-04 ASSESSMENT — ENCOUNTER SYMPTOMS
TROUBLE SWALLOWING: 0
VOMITING: 0
ABDOMINAL PAIN: 0
DIARRHEA: 0
ABDOMINAL PAIN: 0
BACK PAIN: 0
NAUSEA: 0
SORE THROAT: 0
SHORTNESS OF BREATH: 1
BLOOD IN STOOL: 0
SORE THROAT: 0
CONSTIPATION: 0
COUGH: 1
VOMITING: 0
WHEEZING: 1
NAUSEA: 0

## 2022-09-04 ASSESSMENT — PAIN - FUNCTIONAL ASSESSMENT: PAIN_FUNCTIONAL_ASSESSMENT: NONE - DENIES PAIN

## 2022-09-04 NOTE — PROGRESS NOTES
[facility]             [address], [address]                        Telephone [phone]             Fax [phone]     Yumiko Urena  1955  YNN:[id number]   Date of visit:  9/4/2022    Subjective:    Yumiko Urena is a 75 y.o.  male who presents to [facility] today (9/4/2022) for evaluation of:    Chief Complaint   Patient presents with    Leg Swelling     Bilateral x 2 weeks       Other  This is a new problem. The current episode started 1 to 4 weeks ago (X 10 days). The problem occurs constantly. The problem has been unchanged. Associated symptoms include coughing (chronic from smoking, is worse last 2 days). Pertinent negatives include no abdominal pain, chest pain, chills, congestion, fever, nausea, sore throat or vomiting. Associated symptoms comments: Swelling bilateral ankles and tops of feet. Swelling does not decrease with elevation at night. Denies pain in legs. Denies shortness of breath unless he is doing a lot of heavy work outside. . Nothing aggravates the symptoms. He has tried nothing for the symptoms. The treatment provided no relief.      He has the following problem list:  Patient Active Problem List   Diagnosis    CAD (coronary artery disease)    HTN (hypertension)    Anxiety    Hyperlipemia    Memory loss    Hypothyroidism    Depression    Hyperlipidemia with target LDL less than 70    Atrial fibrillation (HCC)    Ischemic cardiomyopathy    Tobacco abuse    Tobacco abuse counseling    Hx of adenomatous colonic polyps    Colon polyps    Chronic systolic congestive heart failure (HCC)        Current medications are:  Current Outpatient Medications   Medication Sig Dispense Refill    atorvastatin (LIPITOR) 80 MG tablet TAKE ONE TABLET BY MOUTH ONCE NIGHTLY 90 tablet 2    Ascorbic Acid (VITAMIN C) 250 MG tablet Take 250 mg by mouth daily      metoprolol tartrate (LOPRESSOR) 25 MG tablet Take one tablet in am and take 1/2 tablet in pm. 135 tablet 3    amiodarone (CORDARONE) 200 MG tablet TAKE ONE TABLET BY MOUTH DAILY 90 tablet 1    ipratropium-albuterol (DUONEB) 0.5-2.5 (3) MG/3ML SOLN nebulizer solution Inhale 3 mLs into the lungs every 4 hours 360 mL 0    albuterol sulfate HFA (PROVENTIL HFA) 108 (90 Base) MCG/ACT inhaler Inhale 2 puffs into the lungs every 4 hours as needed for Wheezing or Shortness of Breath (Space out to every 6 hours as symptoms improve) Space out to every 6 hours as symptoms improve. 18 g 0    clopidogrel (PLAVIX) 75 MG tablet 1 po daily 90 tablet 2    fluticasone (FLONASE) 50 MCG/ACT nasal spray 2 sprays by Nasal route daily 3 each 1    ENTRESTO 24-26 MG per tablet TAKE ONE TABLET BY MOUTH TWICE A  tablet 3    calcium carbonate (OSCAL) 500 MG TABS tablet Take 500 mg by mouth daily      sildenafil (VIAGRA) 100 MG tablet TAKE 1 TABLET BY MOUTH AS NEEDED FOR ERECTILE DYSFUNCTION 10 tablet 2    aspirin 81 MG tablet Take 1 tablet by mouth daily 30 tablet 6    Omega-3 Fatty Acids (FISH OIL) 1000 MG CAPS Take 1,200 mg by mouth 2 times daily       Multiple Vitamins-Minerals (THERAPEUTIC MULTIVITAMIN-MINERALS) tablet Take 1 tablet by mouth daily. No current facility-administered medications for this visit. He has No Known Allergies. Joselyn Lafleur He  reports that he has been smoking cigarettes. He started smoking about 52 years ago. He has a 45.00 pack-year smoking history. He has never used smokeless tobacco.      Objective:    Vitals:    09/04/22 1311   BP: 130/80   Site: Left Upper Arm   Position: Sitting   Cuff Size: Large Adult   Pulse: 58   Temp: 98 °F (36.7 °C)   SpO2: 98%   Weight: 261 lb (118.4 kg)   Height: 6' (1.829 m)     Body mass index is 35.4 kg/m². Review of Systems   Constitutional: Negative. Negative for chills and fever. HENT:  Negative for congestion and sore throat.     Respiratory:  Positive for cough (chronic from smoking, is worse last 2 days). Cardiovascular: Negative. Negative for chest pain. Gastrointestinal:  Negative for abdominal pain, nausea and vomiting. Musculoskeletal:         Swelling bilateral ankles and tops of feet. Physical Exam  Vitals and nursing note reviewed. Constitutional:       Appearance: Normal appearance. He is well-developed. HENT:      Head: Normocephalic. Jaw: There is normal jaw occlusion. Mouth/Throat:      Lips: Pink. Mouth: Mucous membranes are moist.      Pharynx: Oropharynx is clear. Uvula midline. Eyes:      Pupils: Pupils are equal, round, and reactive to light. Cardiovascular:      Rate and Rhythm: Regular rhythm. Bradycardia present. Pulses: Normal pulses. Heart sounds: Normal heart sounds. Pulmonary:      Effort: Pulmonary effort is normal.      Breath sounds: Normal air entry. Rales (bilateral scattered) present. Musculoskeletal:      Cervical back: Normal range of motion and neck supple. Right lower le+ Pitting Edema present. Left lower le+ Pitting Edema present. Skin:     General: Skin is warm and dry. Neurological:      General: No focal deficit present. Mental Status: He is alert and oriented to person, place, and time. Psychiatric:         Behavior: Behavior normal.         Thought Content: Thought content normal.       Assessment and Plan:    No results found for this visit on 22. Diagnosis Orders   1. Edema of both lower legs          I discussed with Liat Baxter that due to new bilateral lower leg pitting edema, bilateral scattered rales, I thought it would be best for him to be evaluated at the emergency room. Liat Baxter was in agreement, and he was transported by Urgent Care staff to [facility] ER. The use, risks, benefits, and side effects of prescribed or recommended medications were discussed. All questions were answered and the patient/caregiver voiced understanding.     No orders of the defined types were placed in this encounter.         Electronically signed by ANDREW Lizama CNP on 9/4/22 at 1:23 PM EDT

## 2022-09-04 NOTE — ED PROVIDER NOTES
[facility]  eMERGENCY dEPARTMENT eNCOUnter      Pt Name: Aren Warner  MRN: [id number]  [place] 1955  Date of evaluation: 9/4/2022      CHIEF COMPLAINT       Chief Complaint   Patient presents with    Leg Swelling         HISTORY OF PRESENT ILLNESS    Aren Warner is a 75 y.o. male who presents with chief complaint of leg swelling since been going on for couple weeks has been getting worse he is got a history of CHF patient says he is got a little bit of a cough and wheeze no fevers chills no nausea no vomiting no chest pain his wife wants him to go on vacation and she wants him to fix his legs before they go there is been no recent travel or immobilization no leg pain the leg swelling is equal      REVIEW OF SYSTEMS         Review of Systems   Constitutional:  Negative for chills and fever. HENT:  Negative for congestion, dental problem, sore throat and trouble swallowing. Eyes:  Negative for visual disturbance. Respiratory:  Positive for shortness of breath and wheezing. Cardiovascular:  Positive for leg swelling. Negative for chest pain and palpitations. Gastrointestinal:  Negative for abdominal pain, blood in stool, constipation, diarrhea, nausea and vomiting. Genitourinary:  Negative for difficulty urinating, dysuria and testicular pain. Musculoskeletal:  Negative for back pain, joint swelling and neck pain. Skin:  Negative for rash. Neurological:  Negative for dizziness, syncope, weakness and headaches. Hematological:  Negative for adenopathy. Does not bruise/bleed easily. Psychiatric/Behavioral:  Negative for confusion and suicidal ideas.         PAST MEDICAL HISTORY    has a past medical history of Anxiety, Atrial fibrillation ([address] CAD (coronary artery disease), Erectile dysfunction, Headache(784.0), High cholesterol, Hx of adenomatous colonic polyps, Hypertension, ICD (implantable cardioverter-defibrillator) discharge, Ischemic cardiomyopathy, Low back pain, MI, old, Osteoarthritis, Plantar fasciitis, Seborrheic keratosis, Smoker, and Syncopal episodes. SURGICAL HISTORY      has a past surgical history that includes Rotator cuff repair (Bilateral, 1997); Vasectomy (1980); Cardiac defibrillator placement (01/07/2013); cyst removal; Hand tendon surgery (Left); Colonoscopy (11/11/2015); Colonoscopy (12/21/2016); Coronary angioplasty with stent (09/2012); Cardiac catheterization (09/04/2020); and Cardiac defibrillator placement (06/22/2021). CURRENT MEDICATIONS       Previous Medications    ALBUTEROL SULFATE HFA (PROVENTIL HFA) 108 (90 BASE) MCG/ACT INHALER    Inhale 2 puffs into the lungs every 4 hours as needed for Wheezing or Shortness of Breath (Space out to every 6 hours as symptoms improve) Space out to every 6 hours as symptoms improve. AMIODARONE (CORDARONE) 200 MG TABLET    TAKE ONE TABLET BY MOUTH DAILY    ASCORBIC ACID (VITAMIN C) 250 MG TABLET    Take 250 mg by mouth daily    ASPIRIN 81 MG TABLET    Take 1 tablet by mouth daily    ATORVASTATIN (LIPITOR) 80 MG TABLET    TAKE ONE TABLET BY MOUTH ONCE NIGHTLY    CALCIUM CARBONATE (OSCAL) 500 MG TABS TABLET    Take 500 mg by mouth daily    CLOPIDOGREL (PLAVIX) 75 MG TABLET    1 po daily    ENTRESTO 24-26 MG PER TABLET    TAKE ONE TABLET BY MOUTH TWICE A DAY    FLUTICASONE (FLONASE) 50 MCG/ACT NASAL SPRAY    2 sprays by Nasal route daily    IPRATROPIUM-ALBUTEROL (DUONEB) 0.5-2.5 (3) MG/3ML SOLN NEBULIZER SOLUTION    Inhale 3 mLs into the lungs every 4 hours    METOPROLOL TARTRATE (LOPRESSOR) 25 MG TABLET    Take one tablet in am and take 1/2 tablet in pm.    MULTIPLE VITAMINS-MINERALS (THERAPEUTIC MULTIVITAMIN-MINERALS) TABLET    Take 1 tablet by mouth daily. OMEGA-3 FATTY ACIDS (FISH OIL) 1000 MG CAPS    Take 1,200 mg by mouth 2 times daily     SILDENAFIL (VIAGRA) 100 MG TABLET    TAKE 1 TABLET BY MOUTH AS NEEDED FOR ERECTILE DYSFUNCTION       ALLERGIES     has No Known Allergies.     FAMILY HISTORY He indicated that his mother is . He indicated that his father is alive. He indicated that only one of his three sisters is alive. He indicated that all of his three brothers are alive. He indicated that his maternal grandmother is . He indicated that his maternal grandfather is . He indicated that his paternal grandmother is . He indicated that his paternal grandfather is . He indicated that the status of his maternal uncle is unknown. He indicated that the status of his neg hx is unknown.     family history includes Cancer in his father; Vernia Dense in his maternal grandfather and maternal uncle; Diabetes in his mother; Heart Disease in his brother, father, maternal grandfather, and maternal grandmother; Heart Failure in his mother; Kidney Disease in his maternal grandfather, mother, sister, and sister; Other in his father and maternal grandmother. SOCIAL HISTORY      reports that he has been smoking cigarettes. He started smoking about 52 years ago. He has a 45.00 pack-year smoking history. He has never used smokeless tobacco. He reports that he does not currently use alcohol. He reports that he does not use drugs. PHYSICAL EXAM     INITIAL VITALS:  height is 6' (1.829 m) and weight is 261 lb (118.4 kg). His tympanic temperature is 97.9 °F (36.6 °C). His blood pressure is 183/96 (abnormal) and his pulse is 57. His respiration is 19 and oxygen saturation is 95%. Physical Exam  Constitutional:       Appearance: He is well-developed. He is not ill-appearing or diaphoretic. HENT:      Head: Normocephalic and atraumatic. Right Ear: External ear normal.      Left Ear: External ear normal.   Eyes:      Pupils: Pupils are equal, round, and reactive to light. Cardiovascular:      Rate and Rhythm: Normal rate and regular rhythm. Heart sounds: No murmur heard. Pulmonary:      Effort: Pulmonary effort is normal.      Breath sounds: Wheezing present. Abdominal:      General: Bowel sounds are normal.      Palpations: Abdomen is soft. Musculoskeletal:         General: Swelling present. Normal range of motion. Cervical back: Normal range of motion and neck supple. Right lower leg: Edema present. Left lower leg: Edema present. Comments: Pitting edema of both lower extremity there is no erythema calves are otherwise soft [place]' sign is negative bilaterally   Skin:     General: Skin is warm and dry. Neurological:      Mental Status: He is alert and oriented to person, place, and time. Psychiatric:         Behavior: Behavior normal.         DIFFERENTIAL DIAGNOSIS/ MDM:     Acute exacerbation of CHF most likely we will do a work-up    DIAGNOSTIC RESULTS     EKG: All EKG's are interpreted by the Emergency Department Physician who either signs or Co-signs this chart in the absence of a cardiologist.  EKG shows a sinus bradycardia with first-degree AV block rate of 53 bpm UT was 230 ms QRS durations 180 ms QT corrected is 502 ms axis is 22 he has a right bundle branch block pattern there is no acute ST or T wave changes seen      RADIOLOGY:   I directly visualized the following  images and reviewed the radiologist interpretations:       EXAMINATION:   TWO XRAY VIEWS OF THE CHEST       9/4/2022 3:38 pm       COMPARISON:   Chest radiograph performed 12/08/2021. HISTORY:   ORDERING SYSTEM PROVIDED HISTORY: shortness of breath   TECHNOLOGIST PROVIDED HISTORY:   shortness of breath   Reason for Exam: Difficulty breathing last 2-3 days       FINDINGS:   There is no acute consolidation or effusion. There is no pneumothorax. The   mediastinal structures are unremarkable. The upper abdomen is unremarkable. The extrathoracic soft tissues are unremarkable. There is no acute osseous   abnormality.            Impression   No acute cardiopulmonary process         ED BEDSIDE ULTRASOUND:       LABS:  Labs Reviewed   CBC WITH AUTO DIFFERENTIAL - Abnormal; Notable for the following components:       Result Value    MCH 35.0 (*)     MCHC 36.5 (*)     Immature Granulocytes 1 (*)     All other components within normal limits   COMPREHENSIVE METABOLIC PANEL W/ REFLEX TO MG FOR LOW K - Abnormal; Notable for the following components:    Glucose 124 (*)     All other components within normal limits   BRAIN NATRIURETIC PEPTIDE - Abnormal; Notable for the following components:    Pro- (*)     All other components within normal limits   COVID-19, RAPID   LACTIC ACID   TROPONIN   D-DIMER, QUANTITATIVE           EMERGENCY DEPARTMENT COURSE:   Vitals:    Vitals:    09/04/22 1351 09/04/22 1500 09/04/22 1551 09/04/22 1600   BP: (!) 165/87 131/73 (!) 141/85 (!) 183/96   Pulse: 59 51 53 57   Resp: 17 15 16 19   Temp: 97.9 °F (36.6 °C)      TempSrc: Tympanic      SpO2: 97% 93% 94% 95%   Weight: 261 lb (118.4 kg)      Height: 6' (1.829 m)        -------------------------  BP: (!) 183/96, Temp: 97.9 °F (36.6 °C), Heart Rate: 57, Resp: 19        Re-evaluation Notes    Is resting comfortably no acute distress    CRITICAL CARE:   None        CONSULTS: Case was discussed with cardiology Dr. Oksana Leach who recommended an IV dose of Lasix and then placing him on a short course of Lasix with follow-up in their office this week      PROCEDURES:  None    FINAL IMPRESSION      1. Congestive heart failure, unspecified HF chronicity, unspecified heart failure type ([facility] [address]    2.  Bilateral lower extremity edema          DISPOSITION/PLAN   DISPOSITION Decision To Discharge  Discharge    Condition on Disposition    Stable    PATIENT REFERRED TO:  David Bacon DO  [address]  [phone]    In 3 days      DISCHARGE MEDICATIONS:  New Prescriptions    FUROSEMIDE (LASIX) 20 MG TABLET    Take 1 tablet by mouth 2 times daily for 5 days       (Please note that portions of this note were completed with a voice recognition program.  Efforts were made to edit the dictations but occasionally words are mis-transcribed.)    Johanne Kerr MD,, MD, F.A.A.E.M.   Attending Emergency Physician                           Johanne Kerr MD  09/04/22 [id number]

## 2022-09-06 LAB
EKG ATRIAL RATE: 53 BPM
EKG P AXIS: 63 DEGREES
EKG P-R INTERVAL: 230 MS
EKG Q-T INTERVAL: 536 MS
EKG QRS DURATION: 180 MS
EKG QTC CALCULATION (BAZETT): 502 MS
EKG R AXIS: 22 DEGREES
EKG T AXIS: 48 DEGREES
EKG VENTRICULAR RATE: 53 BPM

## 2022-09-09 ENCOUNTER — OFFICE VISIT (OUTPATIENT)
Dept: CARDIOLOGY | Age: 67
End: 2022-09-09
Payer: MEDICARE

## 2022-09-09 VITALS
DIASTOLIC BLOOD PRESSURE: 70 MMHG | BODY MASS INDEX: 34.95 KG/M2 | HEIGHT: 72 IN | WEIGHT: 258 LBS | SYSTOLIC BLOOD PRESSURE: 132 MMHG

## 2022-09-09 DIAGNOSIS — Z95.0 PACEMAKER: ICD-10-CM

## 2022-09-09 DIAGNOSIS — I25.5 ISCHEMIC CARDIOMYOPATHY: ICD-10-CM

## 2022-09-09 DIAGNOSIS — I50.22 CHRONIC SYSTOLIC CONGESTIVE HEART FAILURE (HCC): ICD-10-CM

## 2022-09-09 DIAGNOSIS — I25.10 CORONARY ARTERY DISEASE INVOLVING NATIVE CORONARY ARTERY OF NATIVE HEART WITHOUT ANGINA PECTORIS: Primary | ICD-10-CM

## 2022-09-09 PROCEDURE — 1123F ACP DISCUSS/DSCN MKR DOCD: CPT | Performed by: INTERNAL MEDICINE

## 2022-09-09 PROCEDURE — 99214 OFFICE O/P EST MOD 30 MIN: CPT | Performed by: INTERNAL MEDICINE

## 2022-09-09 PROCEDURE — 99213 OFFICE O/P EST LOW 20 MIN: CPT | Performed by: INTERNAL MEDICINE

## 2022-09-09 RX ORDER — FUROSEMIDE 40 MG/1
40 TABLET ORAL DAILY
Qty: 30 TABLET | Refills: 3 | Status: SHIPPED | OUTPATIENT
Start: 2022-09-09

## 2022-09-09 ASSESSMENT — ENCOUNTER SYMPTOMS
VOMITING: 0
BACK PAIN: 0
SHORTNESS OF BREATH: 0
ABDOMINAL PAIN: 0
EYE DISCHARGE: 0
EYE ITCHING: 0
NAUSEA: 0
CHEST TIGHTNESS: 0

## 2022-09-09 NOTE — PROGRESS NOTES
Today's Date: 9/9/2022  Patient's Name: Katalina White  Patient's age: 75 y. o., 1955    Subjective: The patient is a 75y.o. year old, , male is in the office for CAD. Denies exertional chest pain or SOB, no dizziness or syncope and no palpitations. Past Medical History:   has a past medical history of Anxiety, Atrial fibrillation ([address] CAD (coronary artery disease), Erectile dysfunction, Headache(784.0), High cholesterol, Hx of adenomatous colonic polyps, Hypertension, ICD (implantable cardioverter-defibrillator) discharge, Ischemic cardiomyopathy, Low back pain, MI, old, Osteoarthritis, Plantar fasciitis, Seborrheic keratosis, Smoker, and Syncopal episodes. Past Surgical History:   has a past surgical history that includes Rotator cuff repair (Bilateral, 1997); Vasectomy (1980); Cardiac defibrillator placement (01/07/2013); cyst removal; Hand tendon surgery (Left); Colonoscopy (11/11/2015); Colonoscopy (12/21/2016); Coronary angioplasty with stent (09/2012); Cardiac catheterization (09/04/2020); and Cardiac defibrillator placement (06/22/2021). Home Medications:  Prior to Admission medications    Medication Sig Start Date End Date Taking?  Authorizing Provider   furosemide (LASIX) 20 MG tablet Take 1 tablet by mouth 2 times daily for 5 days 9/4/22 9/9/22 Yes Elaien Emery MD   atorvastatin (LIPITOR) 80 MG tablet TAKE ONE TABLET BY MOUTH ONCE NIGHTLY 6/21/22  Yes Kalie Puente MD   Ascorbic Acid (VITAMIN C) 250 MG tablet Take 250 mg by mouth daily   Yes Historical Provider, MD   metoprolol tartrate (LOPRESSOR) 25 MG tablet Take one tablet in am and take 1/2 tablet in pm. 5/16/22  Yes Kalie Puente MD   amiodarone (CORDARONE) 200 MG tablet TAKE ONE TABLET BY MOUTH DAILY 5/16/22  Yes Kalie Puente MD   ipratropium-albuterol (DUONEB) 0.5-2.5 (3) MG/3ML SOLN nebulizer solution Inhale 3 mLs into the lungs every 4 hours 5/16/22  Yes Kalie Puente MD   albuterol sulfate HFA and speech difficulty. Psychiatric/Behavioral:  Negative for agitation and behavioral problems. Physical Exam:  /70 (Site: Right Upper Arm, Position: Sitting)   Ht 6' (1.829 m)   Wt 258 lb (117 kg)   BMI 34.99 kg/m²    Physical Exam  Constitutional:       Appearance: Normal appearance. HENT:      Head: Normocephalic and atraumatic. Nose: Nose normal.   Cardiovascular:      Rate and Rhythm: Normal rate and regular rhythm. Pulses: Normal pulses. Heart sounds: Normal heart sounds. No murmur heard. Pulmonary:      Effort: Pulmonary effort is normal. No respiratory distress. Breath sounds: Normal breath sounds. No stridor. Abdominal:      General: There is no distension. Palpations: There is no mass. Musculoskeletal:         General: No swelling or tenderness. Skin:     Capillary Refill: Capillary refill takes less than 2 seconds. Coloration: Skin is not jaundiced or pale. Neurological:      General: No focal deficit present. Mental Status: He is alert and oriented to person, place, and time. Cranial Nerves: No cranial nerve deficit. Psychiatric:         Mood and Affect: Mood normal.         Behavior: Behavior normal.       Cardiac Data:      Labs:     CBC: No results for input(s): WBC, HGB, HCT, PLT in the last 72 hours. BMP:No results for input(s): NA, K, CO2, BUN, CREATININE, LABGLOM, GLUCOSE in the last 72 hours. PT/INR: No results for input(s): PROTIME, INR in the last 72 hours. FASTING LIPID PANEL:  Lab Results   Component Value Date/Time    HDL 35 05/09/2022 09:11 AM    LDLDIRECT 98 09/30/2012 12:01 PM    TRIG 283 05/09/2022 09:11 AM     LIVER PROFILE:No results for input(s): AST, ALT, LABALBU in the last 72 hours.     IMPRESSION:    Patient Active Problem List   Diagnosis    CAD (coronary artery disease)    HTN (hypertension)    Anxiety    Hyperlipemia    Memory loss    Hypothyroidism    Depression    Hyperlipidemia with target LDL less than 70    Atrial fibrillation ([facility] [address]    Ischemic cardiomyopathy    Tobacco abuse    Tobacco abuse counseling    Hx of adenomatous colonic polyps    Colon polyps    Chronic systolic congestive heart failure (HCC)       Assessment/Plan:  CAD with hx of NOAH to LAD in 2012.  of RCA. (patent stents on last cath 09/2020, 50% LCX stenosis, RCA ). Stable, continue current medications. Syncope due to Ventricular tachycardia terminated by ICD shock in August 2020, started on po amio post repeat cath. Has been doing well. Device check on 7/2022  showed normal function. HFrEF secondary to Ischemic. Echo 8/2021 EF 35%. Grade I DD. Mild MR . Stable with no signs of volume overload. Continue Lasix. Check BMP. HTN-BP Well controlled. Obesity  Hyperlipidemia (last lipid panel in 5/2022, LDL 57). On Lipitor. Chronic smoking. Counseled to stop smoking.             Claudell Cable, MD, MD [facility]           [phone]

## 2022-09-19 ENCOUNTER — HOSPITAL ENCOUNTER (OUTPATIENT)
Dept: LAB | Age: 67
Discharge: HOME | End: 2022-09-19
Payer: MEDICARE

## 2022-09-19 DIAGNOSIS — I25.10 CORONARY ARTERY DISEASE INVOLVING NATIVE CORONARY ARTERY OF NATIVE HEART WITHOUT ANGINA PECTORIS: ICD-10-CM

## 2022-09-19 LAB
ANION GAP SERPL CALCULATED.3IONS-SCNC: 10 MMOL/L (ref 9–17)
BUN BLDV-MCNC: 11 MG/DL (ref 8–23)
BUN/CREAT BLD: 11 (ref 9–20)
CALCIUM SERPL-MCNC: 9 MG/DL (ref 8.6–10.4)
CHLORIDE BLD-SCNC: 104 MMOL/L (ref 98–107)
CO2: 28 MMOL/L (ref 20–31)
CREAT SERPL-MCNC: 0.97 MG/DL (ref 0.7–1.2)
GFR AFRICAN AMERICAN: >60 ML/MIN
GFR NON-AFRICAN AMERICAN: >60 ML/MIN
GFR SERPL CREATININE-BSD FRML MDRD: NORMAL ML/MIN/{1.73_M2}
GLUCOSE BLD-MCNC: 85 MG/DL (ref 70–99)
POTASSIUM SERPL-SCNC: 3.9 MMOL/L (ref 3.7–5.3)
SODIUM BLD-SCNC: 142 MMOL/L (ref 135–144)

## 2022-09-19 PROCEDURE — 80048 BASIC METABOLIC PNL TOTAL CA: CPT

## 2022-09-19 PROCEDURE — 36415 COLL VENOUS BLD VENIPUNCTURE: CPT

## 2022-09-20 RX ORDER — SACUBITRIL AND VALSARTAN 24; 26 MG/1; MG/1
TABLET, FILM COATED ORAL
Qty: 60 TABLET | Refills: 3 | Status: SHIPPED | OUTPATIENT
Start: 2022-09-20

## 2022-11-22 ENCOUNTER — OFFICE VISIT (OUTPATIENT)
Dept: FAMILY MEDICINE CLINIC | Age: 67
End: 2022-11-22
Payer: MEDICARE

## 2022-11-22 VITALS
OXYGEN SATURATION: 96 % | WEIGHT: 255 LBS | TEMPERATURE: 97.1 F | HEART RATE: 60 BPM | HEIGHT: 72 IN | BODY MASS INDEX: 34.54 KG/M2 | SYSTOLIC BLOOD PRESSURE: 132 MMHG | DIASTOLIC BLOOD PRESSURE: 84 MMHG

## 2022-11-22 DIAGNOSIS — Z12.5 SCREENING PSA (PROSTATE SPECIFIC ANTIGEN): ICD-10-CM

## 2022-11-22 DIAGNOSIS — Z72.0 TOBACCO ABUSE: ICD-10-CM

## 2022-11-22 DIAGNOSIS — E78.5 HYPERLIPIDEMIA WITH TARGET LDL LESS THAN 70: ICD-10-CM

## 2022-11-22 DIAGNOSIS — E03.9 HYPOTHYROIDISM, UNSPECIFIED TYPE: ICD-10-CM

## 2022-11-22 DIAGNOSIS — N52.9 ERECTILE DYSFUNCTION, UNSPECIFIED ERECTILE DYSFUNCTION TYPE: ICD-10-CM

## 2022-11-22 DIAGNOSIS — I47.20 VENTRICULAR TACHYCARDIA: ICD-10-CM

## 2022-11-22 DIAGNOSIS — I25.10 CORONARY ARTERY DISEASE INVOLVING NATIVE CORONARY ARTERY OF NATIVE HEART WITHOUT ANGINA PECTORIS: Primary | ICD-10-CM

## 2022-11-22 DIAGNOSIS — Z23 NEED FOR IMMUNIZATION AGAINST INFLUENZA: ICD-10-CM

## 2022-11-22 DIAGNOSIS — I10 ESSENTIAL HYPERTENSION: ICD-10-CM

## 2022-11-22 DIAGNOSIS — G47.33 OSA (OBSTRUCTIVE SLEEP APNEA): ICD-10-CM

## 2022-11-22 DIAGNOSIS — F32.5 MAJOR DEPRESSIVE DISORDER, SINGLE EPISODE, IN FULL REMISSION (HCC): ICD-10-CM

## 2022-11-22 PROCEDURE — 1123F ACP DISCUSS/DSCN MKR DOCD: CPT | Performed by: FAMILY MEDICINE

## 2022-11-22 PROCEDURE — 99214 OFFICE O/P EST MOD 30 MIN: CPT | Performed by: FAMILY MEDICINE

## 2022-11-22 PROCEDURE — 3078F DIAST BP <80 MM HG: CPT | Performed by: FAMILY MEDICINE

## 2022-11-22 PROCEDURE — 90694 VACC AIIV4 NO PRSRV 0.5ML IM: CPT | Performed by: FAMILY MEDICINE

## 2022-11-22 PROCEDURE — 3074F SYST BP LT 130 MM HG: CPT | Performed by: FAMILY MEDICINE

## 2022-11-22 PROCEDURE — PBSHW INFLUENZA, FLUAD, (AGE 65 Y+), IM, PF, 0.5 ML: Performed by: FAMILY MEDICINE

## 2022-11-22 ASSESSMENT — ENCOUNTER SYMPTOMS
EYES NEGATIVE: 1
COUGH: 0
SHORTNESS OF BREATH: 0
RESPIRATORY NEGATIVE: 1
ALLERGIC/IMMUNOLOGIC NEGATIVE: 1
GASTROINTESTINAL NEGATIVE: 1

## 2022-11-22 NOTE — PROGRESS NOTES
Subjective:      Patient ID: Lester Venegas is a 75 y.o. male. Coronary Artery Disease  Pertinent negatives include no shortness of breath. Risk factors include hyperlipidemia. Loss of Consciousness  His past medical history is significant for CAD. Hyperlipidemia  Pertinent negatives include no myalgias or shortness of breath. Hypertension  Pertinent negatives include no neck pain or shortness of breath. Erectile Dysfunction     Routine follow up on chronic medical conditions, refills, and review of updated labs. I have reviewed the patient's medical history in detail and updated the computerized patient record. Feeling well over the interval.  No chest pain or palpitations. bp under good control. He has quit smoking On his [birth year]. Restarted again. chantix had helped. Still smoking again at present. Started cpap late January. Feeling a little sob, trouble getting used to the mask, mostly due to recurrent nasal congestion. He had positive influenza illness recently and made him a little shorter of breath, can seem to tolerate at present. No back pain at present. Feeling well. Activity related exacerbations with lifting things. No chest pain      Knees starting to bother him more. Takes a lot of effort to get off the knees. Compliant with medications. Syncope in the past.  Sudden loss of conciousness while outside working on his golf cart. A little hot, but taking breaks and drinking gatorade. No warning or prodromal symptoms. He fell from standing into the grass. No injuries. He woke up and got up on his own. He had no sense of defibrillator firing, but apparently it picked up the event on his cardiology follow up. He then had updated Cardiac evan. 9/4/20. 1. Patent LAD stents   2. Intermediate 50% stenosis in LCx same as on last cath in 2012   3.  Known occluded () RCA with Collaterals       ICD interrogation today shows ventricular tachycardia, polymorphic, rate greater than 330 which was terminated by one 35 J shock. There were few episodes of few nonsustained ventricular tachycardia on the device interrogation as well   Defibrillator replaced 6/22/21. Started amiodarone at the time of his initial defibrillator. No interval shocks. No chest pain. Past Medical History:   Diagnosis Date    Anxiety     Atrial fibrillation ([address]     CAD (coronary artery disease) 2012    stent to LAD after MI    Erectile dysfunction     Headache(784.0)     High cholesterol     Hx of adenomatous colonic polyps     4 tubular adenomas, 2 hyperplastic polyps 11/11/15    Hypertension     ICD (implantable cardioverter-defibrillator) discharge 08/2020    Ischemic cardiomyopathy     AICD 1/13 for EF 20-25%    Low back pain     history of     MI, old 2012    Osteoarthritis     knees    Plantar fasciitis     Seborrheic keratosis     history of     Smoker     Syncopal episodes 08/2020    STATES PASSED OUT AND THAT ICD HAD FIRED WHEN DEVICE WAS INTERROGATED     Current Outpatient Medications   Medication Sig Dispense Refill    ENTRESTO 24-26 MG per tablet Take 1 tablet by mouth twice daily 60 tablet 3    furosemide (LASIX) 40 MG tablet Take 1 tablet by mouth daily 30 tablet 3    atorvastatin (LIPITOR) 80 MG tablet TAKE ONE TABLET BY MOUTH ONCE NIGHTLY 90 tablet 2    Ascorbic Acid (VITAMIN C) 250 MG tablet Take 250 mg by mouth daily      metoprolol tartrate (LOPRESSOR) 25 MG tablet Take one tablet in am and take 1/2 tablet in pm. 135 tablet 3    amiodarone (CORDARONE) 200 MG tablet TAKE ONE TABLET BY MOUTH DAILY 90 tablet 1    albuterol sulfate HFA (PROVENTIL HFA) 108 (90 Base) MCG/ACT inhaler Inhale 2 puffs into the lungs every 4 hours as needed for Wheezing or Shortness of Breath (Space out to every 6 hours as symptoms improve) Space out to every 6 hours as symptoms improve.  18 g 0    clopidogrel (PLAVIX) 75 MG tablet 1 po daily 90 tablet 2    fluticasone (FLONASE) 50 MCG/ACT nasal spray 2 sprays by Nasal route daily 3 each 1    calcium carbonate (OSCAL) 500 MG TABS tablet Take 500 mg by mouth daily      sildenafil (VIAGRA) 100 MG tablet TAKE 1 TABLET BY MOUTH AS NEEDED FOR ERECTILE DYSFUNCTION 10 tablet 2    aspirin 81 MG tablet Take 1 tablet by mouth daily 30 tablet 6    Omega-3 Fatty Acids (FISH OIL) 1000 MG CAPS Take 1,200 mg by mouth 2 times daily       Multiple Vitamins-Minerals (THERAPEUTIC MULTIVITAMIN-MINERALS) tablet Take 1 tablet by mouth daily. ipratropium-albuterol (DUONEB) 0.5-2.5 (3) MG/3ML SOLN nebulizer solution Inhale 3 mLs into the lungs every 4 hours (Patient not taking: Reported on 11/22/2022) 360 mL 0     No current facility-administered medications for this visit. No Known Allergies        Review of Systems   Constitutional: Negative. HENT: Negative. Eyes: Negative. Respiratory: Negative. Negative for cough and shortness of breath. Gastrointestinal: Negative. Endocrine: Negative. Genitourinary: Negative. Musculoskeletal: Negative. Negative for myalgias, neck pain and neck stiffness. Skin: Negative. Negative for rash. Allergic/Immunologic: Negative. Neurological:  Negative for syncope. Hematological: Negative. Does not bruise/bleed easily. Psychiatric/Behavioral: Negative. Objective:   Physical Exam  Constitutional:       General: He is not in acute distress. Appearance: He is well-developed. HENT:      Head: Normocephalic and atraumatic. Right Ear: External ear normal.      Left Ear: External ear normal.      Mouth/Throat:      Pharynx: No oropharyngeal exudate. Eyes:      General: No scleral icterus. Conjunctiva/sclera: Conjunctivae normal.   Neck:      Thyroid: No thyromegaly. Cardiovascular:      Rate and Rhythm: Normal rate and regular rhythm. Heart sounds: Normal heart sounds. No murmur heard. Pulmonary:      Effort: Pulmonary effort is normal. No respiratory distress. Breath sounds: Normal breath sounds. No wheezing. Abdominal:      General: Bowel sounds are normal. There is no distension. Palpations: Abdomen is soft. Tenderness: There is no abdominal tenderness. There is no rebound. Musculoskeletal:         General: No tenderness (trace edema, improved at present. ). Normal range of motion. Cervical back: Neck supple. Right lower leg: Edema (1-2 pitting bilaterally) present. Left lower leg: Edema present. Skin:     General: Skin is warm and dry. Findings: No erythema or rash. Neurological:      Mental Status: He is alert and oriented to person, place, and time. Psychiatric:         Behavior: Behavior normal.         Thought Content: Thought content normal.         Judgment: Judgment normal.     /84 (Site: Left Upper Arm, Position: Sitting, Cuff Size: Large Adult)   Pulse 60   Temp 97.1 °F (36.2 °C) (Tympanic)   Ht 6' (1.829 m)   Wt 255 lb (115.7 kg)   SpO2 96%   BMI 34.58 kg/m²     No visits with results within 1 Month(s) from this visit. Latest known visit with results is:   Hospital Outpatient Visit on 09/19/2022   Component Date Value Ref Range Status    Glucose 09/19/2022 85  70 - 99 mg/dL Final    BUN 09/19/2022 11  8 - 23 mg/dL Final    Creatinine 09/19/2022 0.97  0.70 - 1.20 mg/dL Final    Bun/Cre Ratio 09/19/2022 11  9 - 20 Final    Calcium 09/19/2022 9.0  8.6 - 10.4 mg/dL Final    Sodium 09/19/2022 142  135 - 144 mmol/L Final    Potassium 09/19/2022 3.9  3.7 - 5.3 mmol/L Final    Chloride 09/19/2022 104  98 - 107 mmol/L Final    CO2 09/19/2022 28  20 - 31 mmol/L Final    Anion Gap 09/19/2022 10  9 - 17 mmol/L Final    GFR Non- 09/19/2022 >60  >60 mL/min Final    GFR  09/19/2022 >60  >60 mL/min Final    GFR Comment 09/19/2022        Final       TTE 02/09/18     1. Left ventricular dimensions are normal.  Contractility is mildly  reduced.   There are multiple segmental wall motion abnormality mainly  involving the anteroseptal and apical areas. There is grade 1 diastolic  dysfunction of the left ventricle. 2.  Right ventricle appears to be normal in size and function. 3.  Mild left atrial enlargement. 4.  Aortic root diameter is normal at 3.2 cm. 5.  Aortic valve is unremarkable. 6.  Mitral valve shows mild regurgitation. No stenosis. 7.  There is mild tricuspid regurgitation. 8.  Right ventricular systolic pressure was estimated at 30 mmHg. 9.  Pulmonic valve is unremarkable. 10.  There is no pericardial effusion. No visits with results within 1 Month(s) from this visit. Latest known visit with results is:   Hospital Outpatient Visit on 09/19/2022   Component Date Value Ref Range Status    Glucose 09/19/2022 85  70 - 99 mg/dL Final    BUN 09/19/2022 11  8 - 23 mg/dL Final    Creatinine 09/19/2022 0.97  0.70 - 1.20 mg/dL Final    Bun/Cre Ratio 09/19/2022 11  9 - 20 Final    Calcium 09/19/2022 9.0  8.6 - 10.4 mg/dL Final    Sodium 09/19/2022 142  135 - 144 mmol/L Final    Potassium 09/19/2022 3.9  3.7 - 5.3 mmol/L Final    Chloride 09/19/2022 104  98 - 107 mmol/L Final    CO2 09/19/2022 28  20 - 31 mmol/L Final    Anion Gap 09/19/2022 10  9 - 17 mmol/L Final    GFR Non- 09/19/2022 >60  >60 mL/min Final    GFR  09/19/2022 >60  >60 mL/min Final    GFR Comment 09/19/2022        Final         Assessment:       Encounter Diagnoses   Name Primary? Coronary artery disease involving native coronary artery of native heart without angina pectoris Yes    Ventricular tachycardia     Essential hypertension     Major depressive disorder, single episode, in full remission ([facility] [address]     Erectile dysfunction, unspecified erectile dysfunction type     Hyperlipidemia with target LDL less than 70     Hypothyroidism, unspecified type     Tobacco abuse     DAVID (obstructive sleep apnea)     Screening PSA (prostate specific antigen)        Updated labs currently pending. Plan:      CAD; clinically stable. S/p NOAH to lad after MI 2012. Ischemic cmo s/p aicd. No functional limitations. Denies cp. Denies changes in exercise tolerance. Consider updated echo at follow up. Last EF 20-25% s/p MI 2013. AICD in place due to ischemic cardiomyopathy. Interrogation through cardiology, compliant. New defibrillator placed 6/22/21/      Ventricular tachycardia: episode ~8/22/20 where he passed out while standing outside. No prodromal warning/symptoms. Felt fine afterwards. icd interrogation showing shock delivered and terminating the arrhythmia. Now on amiodarone. Plans for follow up with electrophysiology cardiologist.      Htn: fair control at present. Cont current meds. Depression/anxiety: doing ok off wellbutrin. Had some excessive perspiration he blamed on the drug. Seems better off the drug. No concerns for depression/anxiety atresent. ED: no nitrate use. viagra not seeming to work as well at present. Hyperlipidemia: good control historically. Cont. lipitor 80mg. Updated labs pending. Hypothyroid hx. No active treatment at present. Remains well balanced at last check. Now on amiodarone, will cont. Serial checks. Tobacco abuse. He just quit smoking on his birthday. Unfortunately  restarted. chantix helped. Rec. Restarting . Discussed CT screening program.  He is interested. Completed. Still smoking. Law: having trouble with gi gas/distention/pain. rx changed to autopap machine 2/20/20. New machine. He still struggles a little with a sense of not breathing. psa normal.  Cont. Annual screening. .   Plan to review updated labs as available.

## 2022-12-16 ENCOUNTER — PROCEDURE VISIT (OUTPATIENT)
Dept: CARDIOLOGY | Age: 67
End: 2022-12-16
Payer: MEDICARE

## 2022-12-16 ENCOUNTER — OFFICE VISIT (OUTPATIENT)
Dept: CARDIOLOGY | Age: 67
End: 2022-12-16
Payer: MEDICARE

## 2022-12-16 VITALS
WEIGHT: 255 LBS | OXYGEN SATURATION: 97 % | DIASTOLIC BLOOD PRESSURE: 82 MMHG | RESPIRATION RATE: 16 BRPM | SYSTOLIC BLOOD PRESSURE: 138 MMHG | HEART RATE: 57 BPM | BODY MASS INDEX: 34.58 KG/M2

## 2022-12-16 DIAGNOSIS — I48.0 PAROXYSMAL ATRIAL FIBRILLATION (HCC): ICD-10-CM

## 2022-12-16 DIAGNOSIS — I10 PRIMARY HYPERTENSION: ICD-10-CM

## 2022-12-16 DIAGNOSIS — I25.5 ISCHEMIC CARDIOMYOPATHY: Primary | ICD-10-CM

## 2022-12-16 DIAGNOSIS — I25.5 ISCHEMIC CARDIOMYOPATHY: ICD-10-CM

## 2022-12-16 DIAGNOSIS — I25.10 CORONARY ARTERY DISEASE INVOLVING NATIVE CORONARY ARTERY OF NATIVE HEART WITHOUT ANGINA PECTORIS: Primary | ICD-10-CM

## 2022-12-16 DIAGNOSIS — Z95.810 AICD (AUTOMATIC CARDIOVERTER/DEFIBRILLATOR) PRESENT: ICD-10-CM

## 2022-12-16 PROCEDURE — 93005 ELECTROCARDIOGRAM TRACING: CPT | Performed by: INTERNAL MEDICINE

## 2022-12-16 PROCEDURE — 99213 OFFICE O/P EST LOW 20 MIN: CPT | Performed by: INTERNAL MEDICINE

## 2022-12-16 PROCEDURE — 93289 INTERROG DEVICE EVAL HEART: CPT | Performed by: INTERNAL MEDICINE

## 2022-12-16 ASSESSMENT — PATIENT HEALTH QUESTIONNAIRE - PHQ9
SUM OF ALL RESPONSES TO PHQ QUESTIONS 1-9: 0
SUM OF ALL RESPONSES TO PHQ QUESTIONS 1-9: 0
1. LITTLE INTEREST OR PLEASURE IN DOING THINGS: 0
SUM OF ALL RESPONSES TO PHQ QUESTIONS 1-9: 0
SUM OF ALL RESPONSES TO PHQ QUESTIONS 1-9: 0
2. FEELING DOWN, DEPRESSED OR HOPELESS: 0
SUM OF ALL RESPONSES TO PHQ9 QUESTIONS 1 & 2: 0

## 2022-12-16 ASSESSMENT — ENCOUNTER SYMPTOMS
EYE ITCHING: 0
EYE DISCHARGE: 0
VOMITING: 0
CHEST TIGHTNESS: 0
STRIDOR: 0
NAUSEA: 0
ABDOMINAL PAIN: 0
COUGH: 0

## 2022-12-16 NOTE — PROGRESS NOTES
Today's Date: 12/16/2022  Patient's Name: Karen Bundy  Patient's age: 75 y. o., 1955    Subjective: The patient is a 75y.o. year old, , male is in the office for CAD. Denies exertional chest pain or SOB, no dizziness or syncope and no palpitations. Past Medical History:   has a past medical history of Anxiety, Atrial fibrillation ([address] CAD (coronary artery disease), Erectile dysfunction, Headache(784.0), High cholesterol, Hx of adenomatous colonic polyps, Hypertension, ICD (implantable cardioverter-defibrillator) discharge, Ischemic cardiomyopathy, Low back pain, MI, old, Osteoarthritis, Plantar fasciitis, Seborrheic keratosis, Smoker, and Syncopal episodes. Past Surgical History:   has a past surgical history that includes Rotator cuff repair (Bilateral, 1997); Vasectomy (1980); Cardiac defibrillator placement (01/07/2013); cyst removal; Hand tendon surgery (Left); Colonoscopy (11/11/2015); Colonoscopy (12/21/2016); Coronary angioplasty with stent (09/2012); Cardiac catheterization (09/04/2020); and Cardiac defibrillator placement (06/22/2021). Home Medications:  Prior to Admission medications    Medication Sig Start Date End Date Taking?  Authorizing Provider   ENTRESTO 24-26 MG per tablet Take 1 tablet by mouth twice daily 9/20/22  Yes Jo Ann Patton DO   furosemide (LASIX) 40 MG tablet Take 1 tablet by mouth daily 9/9/22  Yes Chelsey Child MD   atorvastatin (LIPITOR) 80 MG tablet TAKE ONE TABLET BY MOUTH ONCE NIGHTLY 6/21/22  Yes Arnel Ramirez MD   Ascorbic Acid (VITAMIN C) 250 MG tablet Take 250 mg by mouth daily   Yes Historical Provider, MD   metoprolol tartrate (LOPRESSOR) 25 MG tablet Take one tablet in am and take 1/2 tablet in pm. 5/16/22  Yes Arnel Ramirez MD   amiodarone (CORDARONE) 200 MG tablet TAKE ONE TABLET BY MOUTH DAILY 5/16/22  Yes Arnel Ramirez MD   ipratropium-albuterol (DUONEB) 0.5-2.5 (3) MG/3ML SOLN nebulizer solution Inhale 3 mLs into the lungs every 4 hours 5/16/22  Yes Zafar Vargas MD   albuterol sulfate HFA (PROVENTIL HFA) 108 (90 Base) MCG/ACT inhaler Inhale 2 puffs into the lungs every 4 hours as needed for Wheezing or Shortness of Breath (Space out to every 6 hours as symptoms improve) Space out to every 6 hours as symptoms improve. 5/16/22  Yes Zafar Vargas MD   clopidogrel (PLAVIX) 75 MG tablet 1 po daily 5/16/22  Yes Zafar Vargas MD   fluticasone [facility]) 50 MCG/ACT nasal spray 2 sprays by Nasal route daily 5/16/22  Yes Zafar Vargas MD   calcium carbonate (OSCAL) 500 MG TABS tablet Take 500 mg by mouth daily   Yes Historical Provider, MD   sildenafil (VIAGRA) 100 MG tablet TAKE 1 TABLET BY MOUTH AS NEEDED FOR ERECTILE DYSFUNCTION 3/4/19  Yes Zafar Vargas MD   aspirin 81 MG tablet Take 1 tablet by mouth daily 3/20/17  Yes Andrade Harper MD   Omega-3 Fatty Acids (FISH OIL) 1000 MG CAPS Take 1,200 mg by mouth 2 times daily    Yes Historical Provider, MD   Multiple Vitamins-Minerals (THERAPEUTIC MULTIVITAMIN-MINERALS) tablet Take 1 tablet by mouth daily. Yes Historical Provider, MD       Allergies:  Patient has no known allergies. Social History:   reports that he has been smoking cigarettes. He started smoking about 52 years ago. He has a 40.50 pack-year smoking history. He has never used smokeless tobacco. He reports that he does not currently use alcohol. He reports that he does not use drugs. Review of Systems:  Review of Systems   Constitutional:  Negative for chills, fatigue and fever. HENT:  Negative for congestion and dental problem. Eyes:  Negative for discharge and itching. Respiratory:  Negative for cough, chest tightness and stridor. Cardiovascular:  Negative for chest pain and palpitations. Gastrointestinal:  Negative for abdominal pain, nausea and vomiting. Endocrine: Negative for cold intolerance and polydipsia. Musculoskeletal:  Negative for arthralgias.    Neurological:  Negative for dizziness and syncope. Psychiatric/Behavioral:  Negative for agitation and behavioral problems. Physical Exam:  /82 (Site: Left Upper Arm, Position: Sitting, Cuff Size: Large Adult)   Pulse 57   Resp 16   Wt 255 lb (115.7 kg)   SpO2 97%   BMI 34.58 kg/m²    Physical Exam  Constitutional:       Appearance: Normal appearance. HENT:      Head: Normocephalic and atraumatic. Cardiovascular:      Rate and Rhythm: Normal rate and regular rhythm. Pulses: Normal pulses. Heart sounds: Normal heart sounds. No murmur heard. Pulmonary:      Effort: Pulmonary effort is normal. No respiratory distress. Breath sounds: Normal breath sounds. No stridor. Abdominal:      General: There is no distension. Palpations: There is no mass. Musculoskeletal:         General: No swelling or tenderness. Cervical back: No rigidity or tenderness. Skin:     Capillary Refill: Capillary refill takes less than 2 seconds. Neurological:      General: No focal deficit present. Mental Status: He is alert and oriented to person, place, and time. Psychiatric:         Mood and Affect: Mood normal.         Behavior: Behavior normal.       Cardiac Data:      Labs:     CBC: No results for input(s): WBC, HGB, HCT, PLT in the last 72 hours. BMP:No results for input(s): NA, K, CO2, BUN, CREATININE, LABGLOM, GLUCOSE in the last 72 hours. PT/INR: No results for input(s): PROTIME, INR in the last 72 hours. FASTING LIPID PANEL:  Lab Results   Component Value Date/Time    HDL 35 05/09/2022 09:11 AM    LDLDIRECT 98 09/30/2012 12:01 PM    TRIG 283 05/09/2022 09:11 AM     LIVER PROFILE:No results for input(s): AST, ALT, LABALBU in the last 72 hours.     IMPRESSION:    Patient Active Problem List   Diagnosis    CAD (coronary artery disease)    HTN (hypertension)    Anxiety    Hyperlipemia    Memory loss    Hypothyroidism    Depression    Hyperlipidemia with target LDL less than 70    Atrial fibrillation (HCC)    Ischemic cardiomyopathy    Tobacco abuse    Tobacco abuse counseling    Hx of adenomatous colonic polyps    Colon polyps    Chronic systolic congestive heart failure (HCC)       Assessment/Plan:  CAD with hx of NOAH to LAD in 2012.  of RCA. (patent stents on last cath 09/2020, 50% LCX stenosis, RCA ). Stable, continue current medications. Syncope due to Ventricular tachycardia terminated by ICD shock in August 2020, started on po amio post repeat cath. Has been doing well. Device check on 9/2022  showed normal function. HFrEF secondary to Ischemic. Echo 8/2021 EF 35%. Grade I DD. Mild MR . Stable with no signs of volume overload. Continue current meds. HTN-BP Well controlled. Obesity  Hyperlipidemia (last lipid panel in 5/2022, LDL 57). On Lipitor. Chronic smoking. Counseled to stop smoking.          Gabriel Watson MD, MD  [facility]           [phone]

## 2022-12-19 RX ORDER — AMIODARONE HYDROCHLORIDE 200 MG/1
TABLET ORAL
Qty: 90 TABLET | Refills: 0 | Status: SHIPPED | OUTPATIENT
Start: 2022-12-19

## 2023-01-09 RX ORDER — FUROSEMIDE 40 MG/1
TABLET ORAL
Qty: 30 TABLET | Refills: 3 | Status: SHIPPED | OUTPATIENT
Start: 2023-01-09

## 2023-01-23 RX ORDER — SACUBITRIL AND VALSARTAN 24; 26 MG/1; MG/1
TABLET, FILM COATED ORAL
Qty: 60 TABLET | Refills: 0 | Status: SHIPPED | OUTPATIENT
Start: 2023-01-23

## 2023-02-27 RX ORDER — SACUBITRIL AND VALSARTAN 24; 26 MG/1; MG/1
TABLET, FILM COATED ORAL
Qty: 60 TABLET | Refills: 3 | Status: SHIPPED | OUTPATIENT
Start: 2023-02-27

## 2023-02-27 RX ORDER — ATORVASTATIN CALCIUM 80 MG/1
TABLET, FILM COATED ORAL
Qty: 90 TABLET | Refills: 0 | Status: SHIPPED | OUTPATIENT
Start: 2023-02-27

## 2023-03-28 RX ORDER — AMIODARONE HYDROCHLORIDE 200 MG/1
TABLET ORAL
Qty: 90 TABLET | Refills: 1 | Status: SHIPPED | OUTPATIENT
Start: 2023-03-28

## 2023-05-23 ENCOUNTER — HOSPITAL ENCOUNTER (OUTPATIENT)
Age: 68
Discharge: HOME | End: 2023-05-23
Payer: MEDICARE

## 2023-05-23 DIAGNOSIS — E03.9 HYPOTHYROIDISM, UNSPECIFIED TYPE: ICD-10-CM

## 2023-05-23 DIAGNOSIS — I10 ESSENTIAL HYPERTENSION: ICD-10-CM

## 2023-05-23 DIAGNOSIS — E78.5 HYPERLIPIDEMIA WITH TARGET LDL LESS THAN 70: ICD-10-CM

## 2023-05-23 LAB
ALBUMIN SERPL-MCNC: 4.3 G/DL (ref 3.5–5.2)
ALBUMIN/GLOB SERPL: 1.5 {RATIO} (ref 1–2.5)
ALP SERPL-CCNC: 62 U/L (ref 40–129)
ALT SERPL-CCNC: 20 U/L (ref 5–41)
ANION GAP SERPL CALCULATED.3IONS-SCNC: 11 MMOL/L (ref 9–17)
AST SERPL-CCNC: 20 U/L
BASOPHILS # BLD: 0.05 K/UL (ref 0–0.2)
BASOPHILS NFR BLD: 1 % (ref 0–2)
BILIRUB SERPL-MCNC: 0.5 MG/DL (ref 0.3–1.2)
BUN SERPL-MCNC: 17 MG/DL (ref 8–23)
BUN/CREAT SERPL: 16 (ref 9–20)
CALCIUM SERPL-MCNC: 9.7 MG/DL (ref 8.6–10.4)
CHLORIDE SERPL-SCNC: 103 MMOL/L (ref 98–107)
CHOLEST SERPL-MCNC: 169 MG/DL
CHOLESTEROL/HDL RATIO: 4.8
CO2 SERPL-SCNC: 27 MMOL/L (ref 20–31)
CREAT SERPL-MCNC: 1.05 MG/DL (ref 0.7–1.2)
EOSINOPHIL # BLD: 0.36 K/UL (ref 0–0.44)
EOSINOPHILS RELATIVE PERCENT: 5 % (ref 1–4)
ERYTHROCYTE [DISTWIDTH] IN BLOOD BY AUTOMATED COUNT: 12.7 % (ref 11.8–14.4)
GFR SERPL CREATININE-BSD FRML MDRD: >60 ML/MIN/1.73M2
GLUCOSE SERPL-MCNC: 101 MG/DL (ref 70–99)
HCT VFR BLD AUTO: 42 % (ref 40.7–50.3)
HDLC SERPL-MCNC: 35 MG/DL
HGB BLD-MCNC: 14.7 G/DL (ref 13–17)
IMM GRANULOCYTES # BLD AUTO: <0.03 K/UL (ref 0–0.3)
IMM GRANULOCYTES NFR BLD: 0 %
LDLC SERPL CALC-MCNC: 71 MG/DL (ref 0–130)
LYMPHOCYTES # BLD: 34 % (ref 24–43)
LYMPHOCYTES NFR BLD: 2.65 K/UL (ref 1.1–3.7)
MCH RBC QN AUTO: 34.1 PG (ref 25.2–33.5)
MCHC RBC AUTO-ENTMCNC: 35 G/DL (ref 25.2–33.5)
MCV RBC AUTO: 97.4 FL (ref 82.6–102.9)
MONOCYTES NFR BLD: 0.59 K/UL (ref 0.1–1.2)
MONOCYTES NFR BLD: 8 % (ref 3–12)
NEUTROPHILS NFR BLD: 52 % (ref 36–65)
NEUTS SEG NFR BLD: 4.07 K/UL (ref 1.5–8.1)
NRBC AUTOMATED: 0 PER 100 WBC
PLATELET # BLD AUTO: 176 K/UL (ref 138–453)
PMV BLD AUTO: 11.8 FL (ref 8.1–13.5)
POTASSIUM SERPL-SCNC: 3.9 MMOL/L (ref 3.7–5.3)
PROT SERPL-MCNC: 7.2 G/DL (ref 6.4–8.3)
RBC # BLD AUTO: 4.31 M/UL (ref 4.21–5.77)
SODIUM SERPL-SCNC: 141 MMOL/L (ref 135–144)
T4 FREE SERPL-MCNC: 1.7 NG/DL (ref 0.9–1.7)
TRIGL SERPL-MCNC: 317 MG/DL
TSH SERPL-MCNC: 3.43 UIU/ML (ref 0.3–5)
WBC OTHER # BLD: 7.7 K/UL (ref 3.5–11.3)

## 2023-05-23 PROCEDURE — 36415 COLL VENOUS BLD VENIPUNCTURE: CPT

## 2023-05-23 PROCEDURE — 85025 COMPLETE CBC W/AUTO DIFF WBC: CPT

## 2023-05-23 PROCEDURE — 80053 COMPREHEN METABOLIC PANEL: CPT

## 2023-05-23 PROCEDURE — 80061 LIPID PANEL: CPT

## 2023-05-23 PROCEDURE — 84443 ASSAY THYROID STIM HORMONE: CPT

## 2023-05-23 PROCEDURE — 84439 ASSAY OF FREE THYROXINE: CPT

## 2023-05-24 ENCOUNTER — OFFICE VISIT (OUTPATIENT)
Dept: FAMILY MEDICINE CLINIC | Age: 68
End: 2023-05-24
Payer: MEDICARE

## 2023-05-24 VITALS
OXYGEN SATURATION: 95 % | HEIGHT: 72 IN | DIASTOLIC BLOOD PRESSURE: 60 MMHG | HEART RATE: 58 BPM | SYSTOLIC BLOOD PRESSURE: 120 MMHG | WEIGHT: 252.6 LBS | BODY MASS INDEX: 34.21 KG/M2

## 2023-05-24 DIAGNOSIS — G47.33 OSA (OBSTRUCTIVE SLEEP APNEA): ICD-10-CM

## 2023-05-24 DIAGNOSIS — E78.5 HYPERLIPIDEMIA WITH TARGET LDL LESS THAN 70: ICD-10-CM

## 2023-05-24 DIAGNOSIS — I10 ESSENTIAL HYPERTENSION: ICD-10-CM

## 2023-05-24 DIAGNOSIS — I47.20 VENTRICULAR TACHYCARDIA (HCC): ICD-10-CM

## 2023-05-24 DIAGNOSIS — N52.9 ERECTILE DYSFUNCTION, UNSPECIFIED ERECTILE DYSFUNCTION TYPE: ICD-10-CM

## 2023-05-24 DIAGNOSIS — E03.9 HYPOTHYROIDISM, UNSPECIFIED TYPE: ICD-10-CM

## 2023-05-24 DIAGNOSIS — Z12.5 SCREENING PSA (PROSTATE SPECIFIC ANTIGEN): ICD-10-CM

## 2023-05-24 DIAGNOSIS — F32.5 MAJOR DEPRESSIVE DISORDER, SINGLE EPISODE, IN FULL REMISSION (HCC): ICD-10-CM

## 2023-05-24 DIAGNOSIS — I25.10 CORONARY ARTERY DISEASE INVOLVING NATIVE CORONARY ARTERY OF NATIVE HEART WITHOUT ANGINA PECTORIS: Primary | ICD-10-CM

## 2023-05-24 DIAGNOSIS — Z72.0 TOBACCO ABUSE: ICD-10-CM

## 2023-05-24 PROCEDURE — 1123F ACP DISCUSS/DSCN MKR DOCD: CPT | Performed by: FAMILY MEDICINE

## 2023-05-24 PROCEDURE — 99213 OFFICE O/P EST LOW 20 MIN: CPT | Performed by: FAMILY MEDICINE

## 2023-05-24 PROCEDURE — 3078F DIAST BP <80 MM HG: CPT | Performed by: FAMILY MEDICINE

## 2023-05-24 PROCEDURE — 3074F SYST BP LT 130 MM HG: CPT | Performed by: FAMILY MEDICINE

## 2023-05-24 PROCEDURE — 99214 OFFICE O/P EST MOD 30 MIN: CPT | Performed by: FAMILY MEDICINE

## 2023-05-24 RX ORDER — FUROSEMIDE 40 MG/1
40 TABLET ORAL DAILY
Qty: 30 TABLET | Refills: 3 | Status: SHIPPED | OUTPATIENT
Start: 2023-05-24

## 2023-05-24 RX ORDER — SACUBITRIL AND VALSARTAN 24; 26 MG/1; MG/1
1 TABLET, FILM COATED ORAL 2 TIMES DAILY
Qty: 60 TABLET | Refills: 3 | Status: SHIPPED | OUTPATIENT
Start: 2023-05-24

## 2023-05-24 RX ORDER — ATORVASTATIN CALCIUM 80 MG/1
TABLET, FILM COATED ORAL
Qty: 90 TABLET | Refills: 0 | Status: SHIPPED | OUTPATIENT
Start: 2023-05-24

## 2023-05-24 RX ORDER — AMIODARONE HYDROCHLORIDE 200 MG/1
200 TABLET ORAL DAILY
Qty: 90 TABLET | Refills: 1 | Status: SHIPPED | OUTPATIENT
Start: 2023-05-24

## 2023-05-24 SDOH — ECONOMIC STABILITY: FOOD INSECURITY: WITHIN THE PAST 12 MONTHS, YOU WORRIED THAT YOUR FOOD WOULD RUN OUT BEFORE YOU GOT MONEY TO BUY MORE.: NEVER TRUE

## 2023-05-24 SDOH — ECONOMIC STABILITY: INCOME INSECURITY: HOW HARD IS IT FOR YOU TO PAY FOR THE VERY BASICS LIKE FOOD, HOUSING, MEDICAL CARE, AND HEATING?: NOT HARD AT ALL

## 2023-05-24 SDOH — ECONOMIC STABILITY: HOUSING INSECURITY
IN THE LAST 12 MONTHS, WAS THERE A TIME WHEN YOU DID NOT HAVE A STEADY PLACE TO SLEEP OR SLEPT IN A SHELTER (INCLUDING NOW)?: NO

## 2023-05-24 SDOH — ECONOMIC STABILITY: FOOD INSECURITY: WITHIN THE PAST 12 MONTHS, THE FOOD YOU BOUGHT JUST DIDN'T LAST AND YOU DIDN'T HAVE MONEY TO GET MORE.: NEVER TRUE

## 2023-05-24 ASSESSMENT — PATIENT HEALTH QUESTIONNAIRE - PHQ9
9. THOUGHTS THAT YOU WOULD BE BETTER OFF DEAD, OR OF HURTING YOURSELF: 0
6. FEELING BAD ABOUT YOURSELF - OR THAT YOU ARE A FAILURE OR HAVE LET YOURSELF OR YOUR FAMILY DOWN: 0
SUM OF ALL RESPONSES TO PHQ QUESTIONS 1-9: 3
SUM OF ALL RESPONSES TO PHQ9 QUESTIONS 1 & 2: 0
SUM OF ALL RESPONSES TO PHQ QUESTIONS 1-9: 3
2. FEELING DOWN, DEPRESSED OR HOPELESS: 0
SUM OF ALL RESPONSES TO PHQ QUESTIONS 1-9: 3
8. MOVING OR SPEAKING SO SLOWLY THAT OTHER PEOPLE COULD HAVE NOTICED. OR THE OPPOSITE, BEING SO FIGETY OR RESTLESS THAT YOU HAVE BEEN MOVING AROUND A LOT MORE THAN USUAL: 0
5. POOR APPETITE OR OVEREATING: 0
4. FEELING TIRED OR HAVING LITTLE ENERGY: 3
1. LITTLE INTEREST OR PLEASURE IN DOING THINGS: 0
3. TROUBLE FALLING OR STAYING ASLEEP: 0
10. IF YOU CHECKED OFF ANY PROBLEMS, HOW DIFFICULT HAVE THESE PROBLEMS MADE IT FOR YOU TO DO YOUR WORK, TAKE CARE OF THINGS AT HOME, OR GET ALONG WITH OTHER PEOPLE: 0
SUM OF ALL RESPONSES TO PHQ QUESTIONS 1-9: 3
7. TROUBLE CONCENTRATING ON THINGS, SUCH AS READING THE NEWSPAPER OR WATCHING TELEVISION: 0

## 2023-05-24 ASSESSMENT — ENCOUNTER SYMPTOMS
ALLERGIC/IMMUNOLOGIC NEGATIVE: 1
SHORTNESS OF BREATH: 0
EYES NEGATIVE: 1
RESPIRATORY NEGATIVE: 1
COUGH: 0
GASTROINTESTINAL NEGATIVE: 1

## 2023-05-24 NOTE — PATIENT INSTRUCTIONS
Hospital Outpatient Visit on 05/23/2023   Component Date Value Ref Range Status    Thyroxine, Free 05/23/2023 1.7  0.9 - 1.7 ng/dL Final    TSH 05/23/2023 3.43  0.30 - 5.00 uIU/mL Final    WBC 05/23/2023 7.7  3.5 - 11.3 k/uL Final    RBC 05/23/2023 4.31  4.21 - 5.77 m/uL Final    Hemoglobin 05/23/2023 14.7  13.0 - 17.0 g/dL Final    Hematocrit 05/23/2023 42.0  40.7 - 50.3 % Final    MCV 05/23/2023 97.4  82.6 - 102.9 fL Final    MCH 05/23/2023 34.1 (H)  25.2 - 33.5 pg Final    MCHC 05/23/2023 35.0 (H)  25.2 - 33.5 g/dL Final    RDW 05/23/2023 12.7  11.8 - 14.4 % Final    Platelets 64/60/1789 176  138 - 453 k/uL Final    MPV 05/23/2023 11.8  8.1 - 13.5 fL Final    NRBC Automated 05/23/2023 0.0  0.0 per 100 WBC Final    Seg Neutrophils 05/23/2023 52  36 - 65 % Final    Lymphocytes 05/23/2023 34  24 - 43 % Final    Monocytes 05/23/2023 8  3 - 12 % Final    Eosinophils % 05/23/2023 5 (H)  1 - 4 % Final    Basophils 05/23/2023 1  0 - 2 % Final    Immature Granulocytes 05/23/2023 0  0 % Final    Segs Absolute 05/23/2023 4.07  1.50 - 8.10 k/uL Final    Absolute Lymph # 05/23/2023 2.65  1.10 - 3.70 k/uL Final    Absolute Mono # 05/23/2023 0.59  0.10 - 1.20 k/uL Final    Absolute Eos # 05/23/2023 0.36  0.00 - 0.44 k/uL Final    Basophils Absolute 05/23/2023 0.05  0.00 - 0.20 k/uL Final    Absolute Immature Granulocyte 05/23/2023 <0.03  0.00 - 0.30 k/uL Final    Glucose 05/23/2023 101 (H)  70 - 99 mg/dL Final    BUN 05/23/2023 17  8 - 23 mg/dL Final    Creatinine 05/23/2023 1.05  0.70 - 1.20 mg/dL Final    Est, Glom Filt Rate 05/23/2023 >60  >60 mL/min/1.73m2 Final    Comment:       These results are not intended for use in patients <25years of age. eGFR results are calculated without a race factor using the 2021 CKD-EPI equation. Careful clinical correlation is recommended, particularly when comparing to results   calculated using previous equations.   The CKD-EPI equation is less accurate in patients with extremes of

## 2023-05-24 NOTE — PROGRESS NOTES
Pt declines covid booster at this time.
(PROVENTIL HFA) 108 (90 Base) MCG/ACT inhaler Inhale 2 puffs into the lungs every 4 hours as needed for Wheezing or Shortness of Breath (Space out to every 6 hours as symptoms improve) Space out to every 6 hours as symptoms improve. 18 g 0    clopidogrel (PLAVIX) 75 MG tablet 1 po daily 90 tablet 2    fluticasone (FLONASE) 50 MCG/ACT nasal spray 2 sprays by Nasal route daily 3 each 1    calcium carbonate (OSCAL) 500 MG TABS tablet Take 1 tablet by mouth daily      sildenafil (VIAGRA) 100 MG tablet TAKE 1 TABLET BY MOUTH AS NEEDED FOR ERECTILE DYSFUNCTION 10 tablet 2    aspirin 81 MG tablet Take 1 tablet by mouth daily 30 tablet 6    Omega-3 Fatty Acids (FISH OIL) 1000 MG CAPS Take 1,200 mg by mouth 2 times daily       Multiple Vitamins-Minerals (THERAPEUTIC MULTIVITAMIN-MINERALS) tablet Take 1 tablet by mouth daily       No current facility-administered medications for this visit. No Known Allergies        Review of Systems   Constitutional: Negative. HENT: Negative. Eyes: Negative. Respiratory: Negative. Negative for cough and shortness of breath. Gastrointestinal: Negative. Endocrine: Negative. Genitourinary: Negative. Musculoskeletal: Negative. Negative for myalgias, neck pain and neck stiffness. Skin: Negative. Negative for rash. Allergic/Immunologic: Negative. Neurological:  Negative for syncope. Hematological: Negative. Does not bruise/bleed easily. Psychiatric/Behavioral: Negative. Objective:   Physical Exam  Constitutional:       General: He is not in acute distress. Appearance: He is well-developed. HENT:      Head: Normocephalic and atraumatic. Right Ear: External ear normal.      Left Ear: External ear normal.      Mouth/Throat:      Pharynx: No oropharyngeal exudate. Eyes:      General: No scleral icterus. Conjunctiva/sclera: Conjunctivae normal.   Neck:      Thyroid: No thyromegaly.    Cardiovascular:      Rate and Rhythm: Normal rate and

## 2023-06-23 ENCOUNTER — OFFICE VISIT (OUTPATIENT)
Dept: CARDIOLOGY | Age: 68
End: 2023-06-23
Payer: MEDICARE

## 2023-06-23 ENCOUNTER — NURSE ONLY (OUTPATIENT)
Dept: CARDIOLOGY | Age: 68
End: 2023-06-23
Payer: MEDICARE

## 2023-06-23 VITALS
SYSTOLIC BLOOD PRESSURE: 126 MMHG | HEART RATE: 58 BPM | DIASTOLIC BLOOD PRESSURE: 72 MMHG | RESPIRATION RATE: 18 BRPM | OXYGEN SATURATION: 95 % | BODY MASS INDEX: 34.4 KG/M2 | WEIGHT: 254 LBS | HEIGHT: 72 IN

## 2023-06-23 DIAGNOSIS — E78.2 MIXED HYPERLIPIDEMIA: ICD-10-CM

## 2023-06-23 DIAGNOSIS — I25.10 CORONARY ARTERY DISEASE INVOLVING NATIVE CORONARY ARTERY OF NATIVE HEART WITHOUT ANGINA PECTORIS: Primary | ICD-10-CM

## 2023-06-23 DIAGNOSIS — I25.5 ISCHEMIC CARDIOMYOPATHY: Primary | ICD-10-CM

## 2023-06-23 DIAGNOSIS — Z95.810 AICD (AUTOMATIC CARDIOVERTER/DEFIBRILLATOR) PRESENT: ICD-10-CM

## 2023-06-23 DIAGNOSIS — I50.22 CHRONIC SYSTOLIC CONGESTIVE HEART FAILURE (HCC): ICD-10-CM

## 2023-06-23 DIAGNOSIS — I25.5 ISCHEMIC CARDIOMYOPATHY: ICD-10-CM

## 2023-06-23 PROCEDURE — 99213 OFFICE O/P EST LOW 20 MIN: CPT | Performed by: INTERNAL MEDICINE

## 2023-06-23 PROCEDURE — 93289 INTERROG DEVICE EVAL HEART: CPT | Performed by: INTERNAL MEDICINE

## 2023-06-23 PROCEDURE — 93005 ELECTROCARDIOGRAM TRACING: CPT | Performed by: INTERNAL MEDICINE

## 2023-07-03 RX ORDER — FUROSEMIDE 40 MG/1
TABLET ORAL
Qty: 30 TABLET | Refills: 3 | Status: SHIPPED | OUTPATIENT
Start: 2023-07-03

## 2023-07-19 RX ORDER — ATORVASTATIN CALCIUM 80 MG/1
TABLET, FILM COATED ORAL
Qty: 90 TABLET | Refills: 1 | Status: SHIPPED | OUTPATIENT
Start: 2023-07-19

## 2023-07-19 NOTE — TELEPHONE ENCOUNTER
Maggie Six called requesting a refill of the below medication which has been pended for you:     Requested Prescriptions     Pending Prescriptions Disp Refills    atorvastatin (LIPITOR) 80 MG tablet [Pharmacy Med Name: Atorvastatin Calcium 80 MG Oral Tablet] 90 tablet 1     Sig: TAKE 1 TABLET BY MOUTH ONCE DAILY AT NIGHT       Last Appointment Date: 5/24/2023  Next Appointment Date: 11/27/2023    No Known Allergies

## 2023-08-18 ENCOUNTER — OFFICE VISIT (OUTPATIENT)
Dept: PRIMARY CARE CLINIC | Age: 68
End: 2023-08-18
Payer: MEDICARE

## 2023-08-18 VITALS
WEIGHT: 261.2 LBS | TEMPERATURE: 97.6 F | BODY MASS INDEX: 35.38 KG/M2 | SYSTOLIC BLOOD PRESSURE: 144 MMHG | HEART RATE: 58 BPM | OXYGEN SATURATION: 98 % | HEIGHT: 72 IN | DIASTOLIC BLOOD PRESSURE: 82 MMHG

## 2023-08-18 DIAGNOSIS — L30.9 DERMATITIS: Primary | ICD-10-CM

## 2023-08-18 DIAGNOSIS — B35.4 TINEA CORPORIS: ICD-10-CM

## 2023-08-18 PROCEDURE — 99212 OFFICE O/P EST SF 10 MIN: CPT | Performed by: NURSE PRACTITIONER

## 2023-08-18 PROCEDURE — 1123F ACP DISCUSS/DSCN MKR DOCD: CPT | Performed by: NURSE PRACTITIONER

## 2023-08-18 PROCEDURE — 3079F DIAST BP 80-89 MM HG: CPT | Performed by: NURSE PRACTITIONER

## 2023-08-18 PROCEDURE — 99213 OFFICE O/P EST LOW 20 MIN: CPT | Performed by: NURSE PRACTITIONER

## 2023-08-18 PROCEDURE — 3077F SYST BP >= 140 MM HG: CPT | Performed by: NURSE PRACTITIONER

## 2023-08-18 RX ORDER — CLOBETASOL PROPIONATE 0.5 MG/G
OINTMENT TOPICAL
Qty: 15 G | Refills: 0 | Status: SHIPPED | OUTPATIENT
Start: 2023-08-18

## 2023-08-18 RX ORDER — NYSTATIN 100000 [USP'U]/G
POWDER TOPICAL
Qty: 30 G | Refills: 0 | Status: SHIPPED | OUTPATIENT
Start: 2023-08-18

## 2023-09-05 RX ORDER — FUROSEMIDE 40 MG/1
40 TABLET ORAL DAILY
Qty: 30 TABLET | Refills: 5 | Status: SHIPPED | OUTPATIENT
Start: 2023-09-05

## 2023-10-27 ENCOUNTER — HOSPITAL ENCOUNTER (EMERGENCY)
Age: 68
Discharge: HOME | End: 2023-10-27
Attending: EMERGENCY MEDICINE
Payer: MEDICARE

## 2023-10-27 VITALS
BODY MASS INDEX: 34.95 KG/M2 | HEIGHT: 72 IN | OXYGEN SATURATION: 97 % | DIASTOLIC BLOOD PRESSURE: 65 MMHG | TEMPERATURE: 97.7 F | WEIGHT: 258 LBS | RESPIRATION RATE: 16 BRPM | SYSTOLIC BLOOD PRESSURE: 128 MMHG | HEART RATE: 59 BPM

## 2023-10-27 DIAGNOSIS — B37.2 CANDIDIASIS OF SKIN AND NAILS: Primary | ICD-10-CM

## 2023-10-27 PROCEDURE — 6370000000 HC RX 637 (ALT 250 FOR IP): Performed by: EMERGENCY MEDICINE

## 2023-10-27 PROCEDURE — 99283 EMERGENCY DEPT VISIT LOW MDM: CPT

## 2023-10-27 RX ORDER — NYSTATIN 100000 U/G
CREAM TOPICAL ONCE
Status: COMPLETED | OUTPATIENT
Start: 2023-10-27 | End: 2023-10-27

## 2023-10-27 RX ORDER — NYSTATIN 100000 U/G
CREAM TOPICAL
Qty: 30 G | Refills: 0 | Status: SHIPPED | OUTPATIENT
Start: 2023-10-27

## 2023-10-27 RX ADMIN — NYSTATIN: 100000 CREAM TOPICAL at 19:27

## 2023-10-27 ASSESSMENT — LIFESTYLE VARIABLES
HOW OFTEN DO YOU HAVE A DRINK CONTAINING ALCOHOL: NEVER
HOW MANY STANDARD DRINKS CONTAINING ALCOHOL DO YOU HAVE ON A TYPICAL DAY: PATIENT DOES NOT DRINK

## 2023-10-27 ASSESSMENT — PAIN SCALES - GENERAL: PAINLEVEL_OUTOF10: 7

## 2023-10-27 ASSESSMENT — PAIN DESCRIPTION - LOCATION: LOCATION: GROIN

## 2023-10-27 ASSESSMENT — PAIN - FUNCTIONAL ASSESSMENT: PAIN_FUNCTIONAL_ASSESSMENT: 0-10

## 2023-10-27 ASSESSMENT — PAIN DESCRIPTION - ORIENTATION: ORIENTATION: LEFT

## 2023-10-27 NOTE — ED PROVIDER NOTES
[facility] ED  [facility]  [place] [address] [place] [address]  Phone: [phone]      Pt Name: Rosalinda Abdi  ZQW:[id number]  Birthdate 1955  Date of evaluation: 10/27/2023      CHIEF COMPLAINT       Chief Complaint   Patient presents with    Rash     L groin area         HISTORY OF PRESENT ILLNESS   42-year-old male presents to the emergency department today complaining of a 2-day history of painful rash in his left groin area. Started after he worked outside and reports that he had a lot of sweating in that area. He complains of pain to this area. No constitutional symptoms. Palpation or ambulation makes pain worse. Not makes pain better. REVIEWOF SYSTEMS     Review of Systems   All other systems reviewed and are negative. PAST MEDICAL HISTORY    has a past medical history of Anxiety, Atrial fibrillation ([address]), CAD (coronary artery disease), Erectile dysfunction, Headache(784.0), High cholesterol, Hx of adenomatous colonic polyps, Hypertension, ICD (implantable cardioverter-defibrillator) discharge, Ischemic cardiomyopathy, Low back pain, MI, old, Osteoarthritis, Plantar fasciitis, Seborrheic keratosis, Smoker, and Syncopal episodes. SURGICAL HISTORY      has a past surgical history that includes Rotator cuff repair (Bilateral, 1997); Vasectomy (1980); Cardiac defibrillator placement (01/07/2013); cyst removal; Hand tendon surgery (Left); Colonoscopy (11/11/2015); Colonoscopy (12/21/2016); Coronary angioplasty with stent (09/2012); Cardiac catheterization (09/04/2020); and Cardiac defibrillator placement (06/22/2021). CURRENTMEDICATIONS       Previous Medications    ALBUTEROL SULFATE HFA (PROVENTIL HFA) 108 (90 BASE) MCG/ACT INHALER    Inhale 2 puffs into the lungs every 4 hours as needed for Wheezing or Shortness of Breath (Space out to every 6 hours as symptoms improve) Space out to every 6 hours as symptoms improve.     AMIODARONE (CORDARONE) 200 MG TABLET    Take 1 tablet by

## 2023-11-21 ENCOUNTER — HOSPITAL ENCOUNTER (OUTPATIENT)
Age: 68
Discharge: HOME | End: 2023-11-21
Payer: MEDICARE

## 2023-11-21 DIAGNOSIS — E78.5 HYPERLIPIDEMIA WITH TARGET LDL LESS THAN 70: ICD-10-CM

## 2023-11-21 DIAGNOSIS — I10 ESSENTIAL HYPERTENSION: ICD-10-CM

## 2023-11-21 DIAGNOSIS — E03.9 HYPOTHYROIDISM, UNSPECIFIED TYPE: ICD-10-CM

## 2023-11-21 LAB
ALBUMIN SERPL-MCNC: 4.5 G/DL (ref 3.5–5.2)
ALBUMIN/GLOB SERPL: 1.5 {RATIO} (ref 1–2.5)
ALP SERPL-CCNC: 75 U/L (ref 40–129)
ALT SERPL-CCNC: 27 U/L (ref 5–41)
ANION GAP SERPL CALCULATED.3IONS-SCNC: 10 MMOL/L (ref 9–17)
AST SERPL-CCNC: 22 U/L
BASOPHILS # BLD: 0.05 K/UL (ref 0–0.2)
BASOPHILS NFR BLD: 1 % (ref 0–2)
BILIRUB SERPL-MCNC: 0.7 MG/DL (ref 0.3–1.2)
BUN SERPL-MCNC: 13 MG/DL (ref 8–23)
BUN/CREAT SERPL: 11 (ref 9–20)
CALCIUM SERPL-MCNC: 9.6 MG/DL (ref 8.6–10.4)
CHLORIDE SERPL-SCNC: 103 MMOL/L (ref 98–107)
CHOLEST SERPL-MCNC: 151 MG/DL
CHOLESTEROL/HDL RATIO: 4.6
CO2 SERPL-SCNC: 30 MMOL/L (ref 20–31)
CREAT SERPL-MCNC: 1.2 MG/DL (ref 0.7–1.2)
EOSINOPHIL # BLD: 0.34 K/UL (ref 0–0.44)
EOSINOPHILS RELATIVE PERCENT: 5 % (ref 1–4)
ERYTHROCYTE [DISTWIDTH] IN BLOOD BY AUTOMATED COUNT: 13.1 % (ref 11.8–14.4)
GFR SERPL CREATININE-BSD FRML MDRD: >60 ML/MIN/1.73M2
GLUCOSE SERPL-MCNC: 95 MG/DL (ref 70–99)
HCT VFR BLD AUTO: 44.1 % (ref 40.7–50.3)
HDLC SERPL-MCNC: 33 MG/DL
HGB BLD-MCNC: 15.4 G/DL (ref 13–17)
IMM GRANULOCYTES # BLD AUTO: <0.03 K/UL (ref 0–0.3)
IMM GRANULOCYTES NFR BLD: 0 %
LDLC SERPL CALC-MCNC: 79 MG/DL (ref 0–130)
LYMPHOCYTES NFR BLD: 2.36 K/UL (ref 1.1–3.7)
LYMPHOCYTES RELATIVE PERCENT: 34 % (ref 24–43)
MCH RBC QN AUTO: 34.4 PG (ref 25.2–33.5)
MCHC RBC AUTO-ENTMCNC: 34.9 G/DL (ref 25.2–33.5)
MCV RBC AUTO: 98.4 FL (ref 82.6–102.9)
MONOCYTES NFR BLD: 0.58 K/UL (ref 0.1–1.2)
MONOCYTES NFR BLD: 8 % (ref 3–12)
NEUTROPHILS NFR BLD: 52 % (ref 36–65)
NEUTS SEG NFR BLD: 3.56 K/UL (ref 1.5–8.1)
NRBC BLD-RTO: 0 PER 100 WBC
PLATELET # BLD AUTO: 167 K/UL (ref 138–453)
PMV BLD AUTO: 11.6 FL (ref 8.1–13.5)
POTASSIUM SERPL-SCNC: 4.2 MMOL/L (ref 3.7–5.3)
PROT SERPL-MCNC: 7.5 G/DL (ref 6.4–8.3)
RBC # BLD AUTO: 4.48 M/UL (ref 4.21–5.77)
SODIUM SERPL-SCNC: 143 MMOL/L (ref 135–144)
T4 FREE SERPL-MCNC: 1.6 NG/DL (ref 0.9–1.7)
TRIGL SERPL-MCNC: 194 MG/DL
TSH SERPL DL<=0.05 MIU/L-ACNC: 4.08 UIU/ML (ref 0.3–5)
WBC OTHER # BLD: 6.9 K/UL (ref 3.5–11.3)

## 2023-11-21 PROCEDURE — 36415 COLL VENOUS BLD VENIPUNCTURE: CPT

## 2023-11-21 PROCEDURE — 84439 ASSAY OF FREE THYROXINE: CPT

## 2023-11-21 PROCEDURE — 80053 COMPREHEN METABOLIC PANEL: CPT

## 2023-11-21 PROCEDURE — 84443 ASSAY THYROID STIM HORMONE: CPT

## 2023-11-21 PROCEDURE — 80061 LIPID PANEL: CPT

## 2023-11-21 PROCEDURE — 85025 COMPLETE CBC W/AUTO DIFF WBC: CPT

## 2023-11-27 RX ORDER — SACUBITRIL AND VALSARTAN 24; 26 MG/1; MG/1
1 TABLET, FILM COATED ORAL 2 TIMES DAILY
Qty: 60 TABLET | Refills: 5 | OUTPATIENT
Start: 2023-11-27

## 2023-11-28 ENCOUNTER — OFFICE VISIT (OUTPATIENT)
Dept: FAMILY MEDICINE CLINIC | Age: 68
End: 2023-11-28
Payer: MEDICARE

## 2023-11-28 VITALS
WEIGHT: 263.2 LBS | HEART RATE: 53 BPM | OXYGEN SATURATION: 96 % | BODY MASS INDEX: 35.65 KG/M2 | HEIGHT: 72 IN | DIASTOLIC BLOOD PRESSURE: 70 MMHG | SYSTOLIC BLOOD PRESSURE: 130 MMHG

## 2023-11-28 DIAGNOSIS — I25.10 CORONARY ARTERY DISEASE INVOLVING NATIVE CORONARY ARTERY OF NATIVE HEART WITHOUT ANGINA PECTORIS: Primary | ICD-10-CM

## 2023-11-28 DIAGNOSIS — E03.9 HYPOTHYROIDISM, UNSPECIFIED TYPE: ICD-10-CM

## 2023-11-28 DIAGNOSIS — I10 ESSENTIAL HYPERTENSION: ICD-10-CM

## 2023-11-28 DIAGNOSIS — Z72.0 TOBACCO ABUSE: ICD-10-CM

## 2023-11-28 DIAGNOSIS — F32.5 MAJOR DEPRESSIVE DISORDER, SINGLE EPISODE, IN FULL REMISSION (HCC): ICD-10-CM

## 2023-11-28 DIAGNOSIS — N52.9 ERECTILE DYSFUNCTION, UNSPECIFIED ERECTILE DYSFUNCTION TYPE: ICD-10-CM

## 2023-11-28 DIAGNOSIS — G47.33 OSA (OBSTRUCTIVE SLEEP APNEA): ICD-10-CM

## 2023-11-28 DIAGNOSIS — I47.20 VENTRICULAR TACHYCARDIA (HCC): ICD-10-CM

## 2023-11-28 DIAGNOSIS — Z12.5 SCREENING PSA (PROSTATE SPECIFIC ANTIGEN): ICD-10-CM

## 2023-11-28 DIAGNOSIS — E78.5 HYPERLIPIDEMIA WITH TARGET LDL LESS THAN 70: ICD-10-CM

## 2023-11-28 PROCEDURE — 99214 OFFICE O/P EST MOD 30 MIN: CPT | Performed by: FAMILY MEDICINE

## 2023-11-28 PROCEDURE — 3075F SYST BP GE 130 - 139MM HG: CPT | Performed by: FAMILY MEDICINE

## 2023-11-28 PROCEDURE — 3078F DIAST BP <80 MM HG: CPT | Performed by: FAMILY MEDICINE

## 2023-11-28 PROCEDURE — 1123F ACP DISCUSS/DSCN MKR DOCD: CPT | Performed by: FAMILY MEDICINE

## 2023-11-28 PROCEDURE — 99213 OFFICE O/P EST LOW 20 MIN: CPT | Performed by: FAMILY MEDICINE

## 2023-11-28 RX ORDER — SACUBITRIL AND VALSARTAN 24; 26 MG/1; MG/1
1 TABLET, FILM COATED ORAL 2 TIMES DAILY
Qty: 60 TABLET | Refills: 5 | Status: SHIPPED | OUTPATIENT
Start: 2023-11-28

## 2023-11-28 RX ORDER — AMIODARONE HYDROCHLORIDE 200 MG/1
200 TABLET ORAL DAILY
Qty: 90 TABLET | Refills: 1 | Status: SHIPPED | OUTPATIENT
Start: 2023-11-28

## 2023-11-28 RX ORDER — CLOPIDOGREL BISULFATE 75 MG/1
TABLET ORAL
Qty: 90 TABLET | Refills: 2 | Status: SHIPPED | OUTPATIENT
Start: 2023-11-28

## 2023-11-28 ASSESSMENT — ENCOUNTER SYMPTOMS
COUGH: 0
EYES NEGATIVE: 1
ALLERGIC/IMMUNOLOGIC NEGATIVE: 1
SHORTNESS OF BREATH: 0
RESPIRATORY NEGATIVE: 1
GASTROINTESTINAL NEGATIVE: 1

## 2023-11-28 NOTE — PATIENT INSTRUCTIONS
Hospital Outpatient Visit on 11/21/2023   Component Date Value Ref Range Status    WBC 11/21/2023 6.9  3.5 - 11.3 k/uL Final    RBC 11/21/2023 4.48  4.21 - 5.77 m/uL Final    Hemoglobin 11/21/2023 15.4  13.0 - 17.0 g/dL Final    Hematocrit 11/21/2023 44.1  40.7 - 50.3 % Final    MCV 11/21/2023 98.4  82.6 - 102.9 fL Final    MCH 11/21/2023 34.4 (H)  25.2 - 33.5 pg Final    MCHC 11/21/2023 34.9 (H)  25.2 - 33.5 g/dL Final    RDW 11/21/2023 13.1  11.8 - 14.4 % Final    Platelets 80/17/3694 167  138 - 453 k/uL Final    MPV 11/21/2023 11.6  8.1 - 13.5 fL Final    NRBC Automated 11/21/2023 0.0  0.0 per 100 WBC Final    Neutrophils % 11/21/2023 52  36 - 65 % Final    Lymphocytes % 11/21/2023 34  24 - 43 % Final    Monocytes % 11/21/2023 8  3 - 12 % Final    Eosinophils % 11/21/2023 5 (H)  1 - 4 % Final    Basophils % 11/21/2023 1  0 - 2 % Final    Immature Granulocytes 11/21/2023 0  0 % Final    Neutrophils Absolute 11/21/2023 3.56  1.50 - 8.10 k/uL Final    Lymphocytes Absolute 11/21/2023 2.36  1.10 - 3.70 k/uL Final    Monocytes Absolute 11/21/2023 0.58  0.10 - 1.20 k/uL Final    Eosinophils Absolute 11/21/2023 0.34  0.00 - 0.44 k/uL Final    Basophils Absolute 11/21/2023 0.05  0.00 - 0.20 k/uL Final    Absolute Immature Granulocyte 11/21/2023 <0.03  0.00 - 0.30 k/uL Final    Sodium 11/21/2023 143  135 - 144 mmol/L Final    Potassium 11/21/2023 4.2  3.7 - 5.3 mmol/L Final    Chloride 11/21/2023 103  98 - 107 mmol/L Final    CO2 11/21/2023 30  20 - 31 mmol/L Final    Anion Gap 11/21/2023 10  9 - 17 mmol/L Final    Glucose 11/21/2023 95  70 - 99 mg/dL Final    BUN 11/21/2023 13  8 - 23 mg/dL Final    Creatinine 11/21/2023 1.2  0.7 - 1.2 mg/dL Final    Est, Glom Filt Rate 11/21/2023 >60  >60 mL/min/1.73m2 Final    Comment:       These results are not intended for use in patients <25years of age. eGFR results are calculated without a race factor using the 2021 CKD-EPI equation.   Careful clinical correlation is

## 2023-11-28 NOTE — PROGRESS NOTES
WBC 11/21/2023 6.9  3.5 - 11.3 k/uL Final    RBC 11/21/2023 4.48  4.21 - 5.77 m/uL Final    Hemoglobin 11/21/2023 15.4  13.0 - 17.0 g/dL Final    Hematocrit 11/21/2023 44.1  40.7 - 50.3 % Final    MCV 11/21/2023 98.4  82.6 - 102.9 fL Final    MCH 11/21/2023 34.4 (H)  25.2 - 33.5 pg Final    MCHC 11/21/2023 34.9 (H)  25.2 - 33.5 g/dL Final    RDW 11/21/2023 13.1  11.8 - 14.4 % Final    Platelets 08/10/7971 167  138 - 453 k/uL Final    MPV 11/21/2023 11.6  8.1 - 13.5 fL Final    NRBC Automated 11/21/2023 0.0  0.0 per 100 WBC Final    Neutrophils % 11/21/2023 52  36 - 65 % Final    Lymphocytes % 11/21/2023 34  24 - 43 % Final    Monocytes % 11/21/2023 8  3 - 12 % Final    Eosinophils % 11/21/2023 5 (H)  1 - 4 % Final    Basophils % 11/21/2023 1  0 - 2 % Final    Immature Granulocytes 11/21/2023 0  0 % Final    Neutrophils Absolute 11/21/2023 3.56  1.50 - 8.10 k/uL Final    Lymphocytes Absolute 11/21/2023 2.36  1.10 - 3.70 k/uL Final    Monocytes Absolute 11/21/2023 0.58  0.10 - 1.20 k/uL Final    Eosinophils Absolute 11/21/2023 0.34  0.00 - 0.44 k/uL Final    Basophils Absolute 11/21/2023 0.05  0.00 - 0.20 k/uL Final    Absolute Immature Granulocyte 11/21/2023 <0.03  0.00 - 0.30 k/uL Final    Sodium 11/21/2023 143  135 - 144 mmol/L Final    Potassium 11/21/2023 4.2  3.7 - 5.3 mmol/L Final    Chloride 11/21/2023 103  98 - 107 mmol/L Final    CO2 11/21/2023 30  20 - 31 mmol/L Final    Anion Gap 11/21/2023 10  9 - 17 mmol/L Final    Glucose 11/21/2023 95  70 - 99 mg/dL Final    BUN 11/21/2023 13  8 - 23 mg/dL Final    Creatinine 11/21/2023 1.2  0.7 - 1.2 mg/dL Final    Est, Glom Filt Rate 11/21/2023 >60  >60 mL/min/1.73m2 Final    Bun/Cre Ratio 11/21/2023 11  9 - 20 Final    Calcium 11/21/2023 9.6  8.6 - 10.4 mg/dL Final    Total Protein 11/21/2023 7.5  6.4 - 8.3 g/dL Final    Albumin 11/21/2023 4.5  3.5 - 5.2 g/dL Final    Albumin/Globulin Ratio 11/21/2023 1.5  1.0 - 2.5 Final    Total Bilirubin 11/21/2023 0.7  0.3 -

## 2023-11-29 ENCOUNTER — TELEPHONE (OUTPATIENT)
Dept: FAMILY MEDICINE CLINIC | Age: 68
End: 2023-11-29

## 2023-11-29 ENCOUNTER — TELEMEDICINE (OUTPATIENT)
Dept: FAMILY MEDICINE CLINIC | Age: 68
End: 2023-11-29
Payer: MEDICARE

## 2023-11-29 DIAGNOSIS — Z00.00 MEDICARE ANNUAL WELLNESS VISIT, SUBSEQUENT: Primary | ICD-10-CM

## 2023-11-29 PROCEDURE — G0439 PPPS, SUBSEQ VISIT: HCPCS | Performed by: FAMILY MEDICINE

## 2023-11-29 PROCEDURE — 1123F ACP DISCUSS/DSCN MKR DOCD: CPT | Performed by: FAMILY MEDICINE

## 2023-11-29 ASSESSMENT — PATIENT HEALTH QUESTIONNAIRE - PHQ9
5. POOR APPETITE OR OVEREATING: 0
SUM OF ALL RESPONSES TO PHQ QUESTIONS 1-9: 3
9. THOUGHTS THAT YOU WOULD BE BETTER OFF DEAD, OR OF HURTING YOURSELF: 0
6. FEELING BAD ABOUT YOURSELF - OR THAT YOU ARE A FAILURE OR HAVE LET YOURSELF OR YOUR FAMILY DOWN: 0
SUM OF ALL RESPONSES TO PHQ QUESTIONS 1-9: 3
10. IF YOU CHECKED OFF ANY PROBLEMS, HOW DIFFICULT HAVE THESE PROBLEMS MADE IT FOR YOU TO DO YOUR WORK, TAKE CARE OF THINGS AT HOME, OR GET ALONG WITH OTHER PEOPLE: 0
1. LITTLE INTEREST OR PLEASURE IN DOING THINGS: 0
SUM OF ALL RESPONSES TO PHQ QUESTIONS 1-9: 3
7. TROUBLE CONCENTRATING ON THINGS, SUCH AS READING THE NEWSPAPER OR WATCHING TELEVISION: 0
3. TROUBLE FALLING OR STAYING ASLEEP: 2
2. FEELING DOWN, DEPRESSED OR HOPELESS: 0
SUM OF ALL RESPONSES TO PHQ QUESTIONS 1-9: 3
4. FEELING TIRED OR HAVING LITTLE ENERGY: 1
SUM OF ALL RESPONSES TO PHQ9 QUESTIONS 1 & 2: 0
8. MOVING OR SPEAKING SO SLOWLY THAT OTHER PEOPLE COULD HAVE NOTICED. OR THE OPPOSITE, BEING SO FIGETY OR RESTLESS THAT YOU HAVE BEEN MOVING AROUND A LOT MORE THAN USUAL: 0

## 2023-11-29 ASSESSMENT — LIFESTYLE VARIABLES
HOW MANY STANDARD DRINKS CONTAINING ALCOHOL DO YOU HAVE ON A TYPICAL DAY: 1 OR 2
HOW OFTEN DO YOU HAVE A DRINK CONTAINING ALCOHOL: MONTHLY OR LESS

## 2023-11-29 NOTE — TELEPHONE ENCOUNTER
Spoke with pt. At appt yesterday, meds were reviewed, and pt had agreed to needing refill of Plavix. Upon speaking today, pt states he has not been taking this medication for 10 years, as it was d/c by cardiology about 10 years ago. He states the cardiologist had him stop the plavix and start metoprolol 1 & 1/2 tabs daily. Reviewed chart, plavix has been marked as taking at past appts with Dr. Gustavo Vincent as well as cardiology. Pt would like to know if he should be taking this medication, or if he should continue to stay off of it. Please advise.

## 2023-11-29 NOTE — TELEPHONE ENCOUNTER
If cardio told him to stop the med at one point in time, then cardio should clarify this medication for the patient. Patient to clarify with cardio on 12/15/23 during appointment.

## 2023-11-29 NOTE — PROGRESS NOTES
Medicare Annual Wellness Visit    Olivia Gilmore is here for Medicare AWV    Assessment & Plan     Recommendations for Preventive Services Due: see orders and patient instructions/AVS.  Recommended screening schedule for the next 5-10 years is provided to the patient in written form: see Patient Instructions/AVS.     No follow-ups on file. Subjective       Patient's complete Health Risk Assessment and screening values have been reviewed and are found in Flowsheets. The following problems were reviewed today and where indicated follow up appointments were made and/or referrals ordered. Positive Risk Factor Screenings with Interventions:                 Weight and Activity:  Physical Activity: Inactive (11/29/2023)    Exercise Vital Sign     Days of Exercise per Week: 0 days     Minutes of Exercise per Session: 0 min     On average, how many days per week do you engage in moderate to strenuous exercise (like a brisk walk)?: 0 days  Have you lost any weight without trying in the past 3 months?: No  There is no height or weight on file to calculate BMI.  (!) Abnormal      Inactivity Interventions:  See AVS for additional education material  Obesity Interventions:  See AVS for additional education material               Advanced Directives:  Do you have a Living Will?: (!) No    Intervention:  has NO advanced directive - information provided        Tobacco Use:  Tobacco Use: High Risk (11/29/2023)    Patient History     Smoking Tobacco Use: Every Day     Smokeless Tobacco Use: Never     Passive Exposure: Not on file     E-cigarette/Vaping       Questions Responses    E-cigarette/Vaping Use Never User    Start Date     Passive Exposure     Quit Date     Counseling Given     Comments           Interventions:  See AVS for additional education material                        Objective      Patient-Reported Vitals  Patient-Reported Height: 6'            No Known Allergies  Prior to Visit Medications    Medication

## 2023-11-29 NOTE — TELEPHONE ENCOUNTER
Pt calling stating he just picked up scripts and Plavix was filled, pt recalls that at one time Cardio took him off his Plavix and started him on Metoprolol, pt questions if he is to be on the Plavix or not, please call with recommendations.

## 2023-12-15 ENCOUNTER — OFFICE VISIT (OUTPATIENT)
Dept: CARDIOLOGY | Age: 68
End: 2023-12-15
Payer: MEDICARE

## 2023-12-15 ENCOUNTER — PROCEDURE VISIT (OUTPATIENT)
Dept: CARDIOLOGY | Age: 68
End: 2023-12-15
Payer: MEDICARE

## 2023-12-15 ENCOUNTER — TELEPHONE (OUTPATIENT)
Dept: CARDIOLOGY | Age: 68
End: 2023-12-15

## 2023-12-15 VITALS
HEART RATE: 57 BPM | HEIGHT: 72 IN | SYSTOLIC BLOOD PRESSURE: 132 MMHG | WEIGHT: 264 LBS | DIASTOLIC BLOOD PRESSURE: 70 MMHG | BODY MASS INDEX: 35.76 KG/M2

## 2023-12-15 DIAGNOSIS — I25.10 CORONARY ARTERY DISEASE INVOLVING NATIVE CORONARY ARTERY OF NATIVE HEART WITHOUT ANGINA PECTORIS: Primary | ICD-10-CM

## 2023-12-15 DIAGNOSIS — Z95.810 AICD (AUTOMATIC CARDIOVERTER/DEFIBRILLATOR) PRESENT: ICD-10-CM

## 2023-12-15 DIAGNOSIS — I10 PRIMARY HYPERTENSION: ICD-10-CM

## 2023-12-15 DIAGNOSIS — I25.5 ISCHEMIC CARDIOMYOPATHY: ICD-10-CM

## 2023-12-15 DIAGNOSIS — E78.2 MIXED HYPERLIPIDEMIA: ICD-10-CM

## 2023-12-15 DIAGNOSIS — I25.5 ISCHEMIC CARDIOMYOPATHY: Primary | ICD-10-CM

## 2023-12-15 PROCEDURE — 3078F DIAST BP <80 MM HG: CPT | Performed by: INTERNAL MEDICINE

## 2023-12-15 PROCEDURE — 99214 OFFICE O/P EST MOD 30 MIN: CPT | Performed by: INTERNAL MEDICINE

## 2023-12-15 PROCEDURE — 1123F ACP DISCUSS/DSCN MKR DOCD: CPT | Performed by: INTERNAL MEDICINE

## 2023-12-15 PROCEDURE — 93005 ELECTROCARDIOGRAM TRACING: CPT | Performed by: INTERNAL MEDICINE

## 2023-12-15 PROCEDURE — 93289 INTERROG DEVICE EVAL HEART: CPT | Performed by: INTERNAL MEDICINE

## 2023-12-15 PROCEDURE — 3075F SYST BP GE 130 - 139MM HG: CPT | Performed by: INTERNAL MEDICINE

## 2023-12-15 PROCEDURE — 99213 OFFICE O/P EST LOW 20 MIN: CPT | Performed by: INTERNAL MEDICINE

## 2023-12-15 PROCEDURE — 93010 ELECTROCARDIOGRAM REPORT: CPT | Performed by: INTERNAL MEDICINE

## 2023-12-15 NOTE — TELEPHONE ENCOUNTER
Pt called per  request to inform of all medication the pt has at home    -atorvastatin 80 mg night  -entresto 24-26 mg BID  -metoprolol tart 25 mg 1 tab am, 1/2 tab pm  -clopidogrel 75 mg daily  -furosemide 40 mg daily  -amiodarone 200 mg daily  -aspirin 81 mg daily  -complete multi vitamin    [phone]    Last Appt:  12/15/2023  Next Appt:

## 2023-12-15 NOTE — PROGRESS NOTES
Date:   12/15/2023  Patient name:  Juan C Guaman  MRN:   [id number]  YOB: 1955  PCP:    Josie Butler MD    Reason for visit: had concerns including Coronary Artery Disease, Hypertension, and Atrial Fibrillation. Subjective:       Clinical Changes / Abnormalities:  Denies exertional chest pain or SOB, no dizziness or syncope and no palpitations. ROS:   General: No dizziness or syncope and no fever or chills, no fatigue. Chest: No cough or phlegm, no SOB  Heart: No chest pain, no SOB, no palpitations. GI: No abdominal pain, no nausea or vomiting. No diarrhea or constipation. Lower extremity: No claudication, no myalgia      MEDICAL HISTORY:         Medications:     Current Outpatient Medications   Medication Sig Dispense Refill    sacubitril-valsartan (ENTRESTO) 24-26 MG per tablet Take 1 tablet by mouth 2 times daily 60 tablet 5    amiodarone (CORDARONE) 200 MG tablet Take 1 tablet by mouth daily 90 tablet 1    clopidogrel (PLAVIX) 75 MG tablet 1 po daily 90 tablet 2    nystatin (MYCOSTATIN) 993917 UNIT/GM cream Apply topically 3 times daily. 30 g 0    furosemide (LASIX) 40 MG tablet Take 1 tablet by mouth daily 30 tablet 5    nystatin (MYCOSTATIN) 603533 UNIT/GM powder Apply 2 times daily to right axilla.  30 g 0    clobetasol (TEMOVATE) 0.05 % ointment Apply topically 2 times daily to right leg 15 g 0    atorvastatin (LIPITOR) 80 MG tablet TAKE 1 TABLET BY MOUTH ONCE DAILY AT NIGHT 90 tablet 1    metoprolol tartrate (LOPRESSOR) 25 MG tablet Take one tablet in am and take 1/2 tablet in pm. 135 tablet 3    Ascorbic Acid (VITAMIN C) 250 MG tablet Take 1 tablet by mouth daily      ipratropium-albuterol (DUONEB) 0.5-2.5 (3) MG/3ML SOLN nebulizer solution Inhale 3 mLs into the lungs every 4 hours 360 mL 0    albuterol sulfate HFA (PROVENTIL HFA) 108 (90 Base) MCG/ACT inhaler Inhale 2 puffs into the lungs every 4 hours as needed for Wheezing or Shortness of Breath

## 2024-02-29 ENCOUNTER — OFFICE VISIT (OUTPATIENT)
Dept: PRIMARY CARE CLINIC | Age: 69
End: 2024-02-29
Payer: MEDICARE

## 2024-02-29 VITALS
BODY MASS INDEX: 35.42 KG/M2 | HEART RATE: 63 BPM | WEIGHT: 267.25 LBS | TEMPERATURE: 97.2 F | SYSTOLIC BLOOD PRESSURE: 128 MMHG | DIASTOLIC BLOOD PRESSURE: 84 MMHG | OXYGEN SATURATION: 92 % | HEIGHT: 73 IN | RESPIRATION RATE: 15 BRPM

## 2024-02-29 DIAGNOSIS — R06.2 WHEEZING: ICD-10-CM

## 2024-02-29 DIAGNOSIS — J40 BRONCHITIS: Primary | ICD-10-CM

## 2024-02-29 PROCEDURE — 3074F SYST BP LT 130 MM HG: CPT | Performed by: STUDENT IN AN ORGANIZED HEALTH CARE EDUCATION/TRAINING PROGRAM

## 2024-02-29 PROCEDURE — 3079F DIAST BP 80-89 MM HG: CPT | Performed by: STUDENT IN AN ORGANIZED HEALTH CARE EDUCATION/TRAINING PROGRAM

## 2024-02-29 PROCEDURE — 99213 OFFICE O/P EST LOW 20 MIN: CPT | Performed by: STUDENT IN AN ORGANIZED HEALTH CARE EDUCATION/TRAINING PROGRAM

## 2024-02-29 PROCEDURE — 1123F ACP DISCUSS/DSCN MKR DOCD: CPT | Performed by: STUDENT IN AN ORGANIZED HEALTH CARE EDUCATION/TRAINING PROGRAM

## 2024-02-29 RX ORDER — PREDNISONE 20 MG/1
20 TABLET ORAL DAILY
Qty: 5 TABLET | Refills: 0 | Status: SHIPPED | OUTPATIENT
Start: 2024-02-29 | End: 2024-03-05

## 2024-02-29 RX ORDER — AZITHROMYCIN 250 MG/1
TABLET, FILM COATED ORAL
Qty: 6 TABLET | Refills: 0 | Status: SHIPPED | OUTPATIENT
Start: 2024-02-29 | End: 2024-03-10

## 2024-02-29 ASSESSMENT — ENCOUNTER SYMPTOMS
BLOOD IN STOOL: 0
ABDOMINAL PAIN: 0
COUGH: 1
CONSTIPATION: 0
EYE PAIN: 0
DIARRHEA: 0
TROUBLE SWALLOWING: 0
NAUSEA: 0
WHEEZING: 1
VOMITING: 0
SHORTNESS OF BREATH: 1

## 2024-02-29 NOTE — PROGRESS NOTES
[facility]             [address] [address]                        Telephone [phone]             Fax [phone]       Andrea Desir  :  1955  Age:  69 y.o.   MRN:  [id number]  Date of visit:  2024     Assessment and Plan:    1. Bronchitis  Symptoms ongoing for the last 6 months however acutely worsened over the past week or so.  Will treat as bronchitis at this time as he does have some wheezing and some cough.  Will trial prednisone 20 mg for 5 days.  Low-dose prednisone pack today due to history of heart failure will have patient continue his water pill at this time.  Recommend daily weights and follow-up if weight is increasing on the prednisone.  Will also give azithromycin at this time to help with bronchitis symptoms.  Recommend follow-up in the office if symptoms worsen or fail to improve.  Could consider further evaluation for possible COPD as he is a smoker and has been dealing with chronic symptoms for the past 6 months including shortness of breath and cough.  - predniSONE (DELTASONE) 20 MG tablet; Take 1 tablet by mouth daily for 5 days  Dispense: 5 tablet; Refill: 0  - azithromycin (ZITHROMAX) 250 MG tablet; 500mg on day 1 followed by 250mg on days 2 - 5  Dispense: 6 tablet; Refill: 0    2. Wheezing  - predniSONE (DELTASONE) 20 MG tablet; Take 1 tablet by mouth daily for 5 days  Dispense: 5 tablet; Refill: 0  - azithromycin (ZITHROMAX) 250 MG tablet; 500mg on day 1 followed by 250mg on days 2 - 5  Dispense: 6 tablet; Refill: 0      Subjective:    Andrea Desir is a 69 y.o. male who presents to [facility] today (2024) for evaluation of:  Cough (He reports having trouble in his lungs, he traveled to [place] last week and he reports it seem to get worse. He reports this has been an on going problem for a good 6 months )    Patient is a 69-year-old male presents to

## 2024-03-11 ENCOUNTER — HOSPITAL ENCOUNTER (OUTPATIENT)
Age: 69
Discharge: HOME | End: 2024-03-11
Payer: MEDICARE

## 2024-03-11 ENCOUNTER — HOSPITAL ENCOUNTER (OUTPATIENT)
Dept: GENERAL RADIOLOGY | Age: 69
Discharge: HOME | End: 2024-03-13
Payer: MEDICARE

## 2024-03-11 ENCOUNTER — OFFICE VISIT (OUTPATIENT)
Dept: PRIMARY CARE CLINIC | Age: 69
End: 2024-03-11
Payer: MEDICARE

## 2024-03-11 VITALS
RESPIRATION RATE: 18 BRPM | SYSTOLIC BLOOD PRESSURE: 138 MMHG | HEART RATE: 63 BPM | DIASTOLIC BLOOD PRESSURE: 72 MMHG | OXYGEN SATURATION: 96 % | HEIGHT: 72 IN | BODY MASS INDEX: 35.81 KG/M2 | TEMPERATURE: 97.2 F | WEIGHT: 264.4 LBS

## 2024-03-11 DIAGNOSIS — I50.22 CHRONIC SYSTOLIC CONGESTIVE HEART FAILURE (HCC): ICD-10-CM

## 2024-03-11 DIAGNOSIS — J02.9 SORE THROAT: Primary | ICD-10-CM

## 2024-03-11 LAB
ALBUMIN SERPL-MCNC: 4.4 G/DL (ref 3.5–5.2)
ALBUMIN/GLOB SERPL: 1.5 {RATIO} (ref 1–2.5)
ALP SERPL-CCNC: 67 U/L (ref 40–129)
ALT SERPL-CCNC: 31 U/L (ref 5–41)
ANION GAP SERPL CALCULATED.3IONS-SCNC: 9 MMOL/L (ref 9–17)
AST SERPL-CCNC: 27 U/L
BASOPHILS # BLD: 0.06 K/UL (ref 0–0.2)
BASOPHILS NFR BLD: 1 % (ref 0–2)
BILIRUB SERPL-MCNC: 0.4 MG/DL (ref 0.3–1.2)
BNP SERPL-MCNC: 150 PG/ML
BUN SERPL-MCNC: 16 MG/DL (ref 8–23)
BUN/CREAT SERPL: 16 (ref 9–20)
CALCIUM SERPL-MCNC: 9.1 MG/DL (ref 8.6–10.4)
CHLORIDE SERPL-SCNC: 103 MMOL/L (ref 98–107)
CO2 SERPL-SCNC: 28 MMOL/L (ref 20–31)
CREAT SERPL-MCNC: 1 MG/DL (ref 0.7–1.2)
EOSINOPHIL # BLD: 0.22 K/UL (ref 0–0.44)
EOSINOPHILS RELATIVE PERCENT: 3 % (ref 1–4)
ERYTHROCYTE [DISTWIDTH] IN BLOOD BY AUTOMATED COUNT: 13.6 % (ref 11.8–14.4)
GFR SERPL CREATININE-BSD FRML MDRD: >60 ML/MIN/1.73M2
GLUCOSE SERPL-MCNC: 82 MG/DL (ref 70–99)
HCT VFR BLD AUTO: 40.2 % (ref 40.7–50.3)
HGB BLD-MCNC: 14.1 G/DL (ref 13–17)
IMM GRANULOCYTES # BLD AUTO: 0.03 K/UL (ref 0–0.3)
IMM GRANULOCYTES NFR BLD: 0 %
LYMPHOCYTES NFR BLD: 2.21 K/UL (ref 1.1–3.7)
LYMPHOCYTES RELATIVE PERCENT: 26 % (ref 24–43)
MCH RBC QN AUTO: 34.7 PG (ref 25.2–33.5)
MCHC RBC AUTO-ENTMCNC: 35.1 G/DL (ref 25.2–33.5)
MCV RBC AUTO: 99 FL (ref 82.6–102.9)
MONOCYTES NFR BLD: 0.76 K/UL (ref 0.1–1.2)
MONOCYTES NFR BLD: 9 % (ref 3–12)
NEUTROPHILS NFR BLD: 61 % (ref 36–65)
NEUTS SEG NFR BLD: 5.15 K/UL (ref 1.5–8.1)
NRBC BLD-RTO: 0 PER 100 WBC
PLATELET # BLD AUTO: 161 K/UL (ref 138–453)
PMV BLD AUTO: 10.7 FL (ref 8.1–13.5)
POTASSIUM SERPL-SCNC: 3.9 MMOL/L (ref 3.7–5.3)
PROT SERPL-MCNC: 7.3 G/DL (ref 6.4–8.3)
RBC # BLD AUTO: 4.06 M/UL (ref 4.21–5.77)
SODIUM SERPL-SCNC: 140 MMOL/L (ref 135–144)
WBC OTHER # BLD: 8.4 K/UL (ref 3.5–11.3)

## 2024-03-11 PROCEDURE — 99213 OFFICE O/P EST LOW 20 MIN: CPT | Performed by: STUDENT IN AN ORGANIZED HEALTH CARE EDUCATION/TRAINING PROGRAM

## 2024-03-11 PROCEDURE — 85025 COMPLETE CBC W/AUTO DIFF WBC: CPT

## 2024-03-11 PROCEDURE — 87880 STREP A ASSAY W/OPTIC: CPT | Performed by: STUDENT IN AN ORGANIZED HEALTH CARE EDUCATION/TRAINING PROGRAM

## 2024-03-11 PROCEDURE — 36415 COLL VENOUS BLD VENIPUNCTURE: CPT

## 2024-03-11 PROCEDURE — 71046 X-RAY EXAM CHEST 2 VIEWS: CPT

## 2024-03-11 PROCEDURE — 99214 OFFICE O/P EST MOD 30 MIN: CPT | Performed by: STUDENT IN AN ORGANIZED HEALTH CARE EDUCATION/TRAINING PROGRAM

## 2024-03-11 PROCEDURE — 80053 COMPREHEN METABOLIC PANEL: CPT

## 2024-03-11 PROCEDURE — PBSHW POCT RAPID STREP A: Performed by: STUDENT IN AN ORGANIZED HEALTH CARE EDUCATION/TRAINING PROGRAM

## 2024-03-11 PROCEDURE — 3078F DIAST BP <80 MM HG: CPT | Performed by: STUDENT IN AN ORGANIZED HEALTH CARE EDUCATION/TRAINING PROGRAM

## 2024-03-11 PROCEDURE — 83880 ASSAY OF NATRIURETIC PEPTIDE: CPT

## 2024-03-11 PROCEDURE — 3075F SYST BP GE 130 - 139MM HG: CPT | Performed by: STUDENT IN AN ORGANIZED HEALTH CARE EDUCATION/TRAINING PROGRAM

## 2024-03-11 PROCEDURE — 1123F ACP DISCUSS/DSCN MKR DOCD: CPT | Performed by: STUDENT IN AN ORGANIZED HEALTH CARE EDUCATION/TRAINING PROGRAM

## 2024-03-11 ASSESSMENT — ENCOUNTER SYMPTOMS
VOMITING: 0
BLOOD IN STOOL: 0
TROUBLE SWALLOWING: 0
DIARRHEA: 0
COUGH: 1
SHORTNESS OF BREATH: 1
WHEEZING: 1
EYE PAIN: 0
CONSTIPATION: 0
NAUSEA: 0
ABDOMINAL PAIN: 0
SORE THROAT: 1

## 2024-03-11 ASSESSMENT — PATIENT HEALTH QUESTIONNAIRE - PHQ9
1. LITTLE INTEREST OR PLEASURE IN DOING THINGS: 1
10. IF YOU CHECKED OFF ANY PROBLEMS, HOW DIFFICULT HAVE THESE PROBLEMS MADE IT FOR YOU TO DO YOUR WORK, TAKE CARE OF THINGS AT HOME, OR GET ALONG WITH OTHER PEOPLE: 0
9. THOUGHTS THAT YOU WOULD BE BETTER OFF DEAD, OR OF HURTING YOURSELF: 0
4. FEELING TIRED OR HAVING LITTLE ENERGY: 3
5. POOR APPETITE OR OVEREATING: 0
SUM OF ALL RESPONSES TO PHQ QUESTIONS 1-9: 10
7. TROUBLE CONCENTRATING ON THINGS, SUCH AS READING THE NEWSPAPER OR WATCHING TELEVISION: 0
3. TROUBLE FALLING OR STAYING ASLEEP: 3
SUM OF ALL RESPONSES TO PHQ9 QUESTIONS 1 & 2: 4
8. MOVING OR SPEAKING SO SLOWLY THAT OTHER PEOPLE COULD HAVE NOTICED. OR THE OPPOSITE, BEING SO FIGETY OR RESTLESS THAT YOU HAVE BEEN MOVING AROUND A LOT MORE THAN USUAL: 0
SUM OF ALL RESPONSES TO PHQ QUESTIONS 1-9: 10
6. FEELING BAD ABOUT YOURSELF - OR THAT YOU ARE A FAILURE OR HAVE LET YOURSELF OR YOUR FAMILY DOWN: 0
2. FEELING DOWN, DEPRESSED OR HOPELESS: 3
SUM OF ALL RESPONSES TO PHQ QUESTIONS 1-9: 10
SUM OF ALL RESPONSES TO PHQ QUESTIONS 1-9: 10

## 2024-03-11 NOTE — PROGRESS NOTES
TempSrc: Tympanic   SpO2: 96%   Weight: 119.9 kg (264 lb 6.4 oz)   Height: 1.829 m (6')     Body mass index is 35.86 kg/m².    Physical Exam  Vitals and nursing note reviewed.   Constitutional:       General: He is not in acute distress.     Appearance: He is well-developed. He is not diaphoretic.   HENT:      Head: Normocephalic and atraumatic.      Right Ear: External ear normal.      Left Ear: External ear normal.      Nose: Nose normal.   Eyes:      General: No scleral icterus.        Right eye: No discharge.         Left eye: No discharge.      Conjunctiva/sclera: Conjunctivae normal.   Cardiovascular:      Rate and Rhythm: Normal rate and regular rhythm.      Heart sounds: Normal heart sounds. No murmur heard.  Pulmonary:      Effort: Pulmonary effort is normal.      Breath sounds: Wheezing present.   Musculoskeletal:      Cervical back: Normal range of motion.   Skin:     General: Skin is warm and dry.      Findings: No erythema or rash.   Neurological:      Mental Status: He is alert and oriented to person, place, and time.      Cranial Nerves: No cranial nerve deficit.   Psychiatric:         Behavior: Behavior normal.         Thought Content: Thought content normal.         Judgment: Judgment normal.               (Please note that portions of this note were completed with a voice-recognition program. Efforts were made to edit the dictation but occasionally words are mis-transcribed.)

## 2024-03-23 ENCOUNTER — OFFICE VISIT (OUTPATIENT)
Dept: PRIMARY CARE CLINIC | Age: 69
End: 2024-03-23
Payer: MEDICARE

## 2024-03-23 VITALS
SYSTOLIC BLOOD PRESSURE: 124 MMHG | WEIGHT: 265 LBS | BODY MASS INDEX: 35.89 KG/M2 | OXYGEN SATURATION: 98 % | TEMPERATURE: 97 F | HEIGHT: 72 IN | DIASTOLIC BLOOD PRESSURE: 78 MMHG | HEART RATE: 63 BPM

## 2024-03-23 DIAGNOSIS — R22.2 SUPRACLAVICULAR FOSSA FULLNESS: ICD-10-CM

## 2024-03-23 DIAGNOSIS — K22.9 ESOPHAGEAL PROBLEM: Primary | ICD-10-CM

## 2024-03-23 PROCEDURE — 1123F ACP DISCUSS/DSCN MKR DOCD: CPT | Performed by: FAMILY MEDICINE

## 2024-03-23 PROCEDURE — 99213 OFFICE O/P EST LOW 20 MIN: CPT

## 2024-03-23 PROCEDURE — 3074F SYST BP LT 130 MM HG: CPT | Performed by: FAMILY MEDICINE

## 2024-03-23 PROCEDURE — 3078F DIAST BP <80 MM HG: CPT | Performed by: FAMILY MEDICINE

## 2024-03-23 PROCEDURE — 99213 OFFICE O/P EST LOW 20 MIN: CPT | Performed by: FAMILY MEDICINE

## 2024-03-23 ASSESSMENT — ENCOUNTER SYMPTOMS
GASTROINTESTINAL NEGATIVE: 1
TROUBLE SWALLOWING: 1
RESPIRATORY NEGATIVE: 1
ALLERGIC/IMMUNOLOGIC NEGATIVE: 1

## 2024-03-23 NOTE — PROGRESS NOTES
Subjective:      Patient ID: Andrea Desir is a 69 y.o. male.    HPI  acute walk in clinic visit for some sense of esophageal pressure with swallowing and noticing the supraclavicular areas are more prominent grossly.  No food getting stuck with swallowing.  No pain with swallowing.  A sense of pressure as near as he can describe it.  No recent illness.      Past Medical History:   Diagnosis Date    Anxiety     Atrial fibrillation (HCC)     CAD (coronary artery disease) 2012    stent to LAD after MI    Erectile dysfunction     Headache(784.0)     High cholesterol     Hx of adenomatous colonic polyps     4 tubular adenomas, 2 hyperplastic polyps 11/11/15    Hypertension     ICD (implantable cardioverter-defibrillator) discharge 08/2020    Ischemic cardiomyopathy     AICD 1/13 for EF 20-25%    Low back pain     history of     MI, old 2012    Osteoarthritis     knees    Plantar fasciitis     Seborrheic keratosis     history of     Smoker     Syncopal episodes 08/2020    STATES PASSED OUT AND THAT ICD HAD FIRED WHEN DEVICE WAS INTERROGATED     Past Surgical History:   Procedure Laterality Date    CARDIAC CATHETERIZATION  09/04/2020    CAD with hx of NOAH to LAD in 2012    CARDIAC DEFIBRILLATOR PLACEMENT  01/07/2013    CARDIAC DEFIBRILLATOR PLACEMENT  06/22/2021    REPLACEMENT  /  DR ALBERTO    COLONOSCOPY  11/11/2015    polyps X 5 Pascale CARBAJAL    COLONOSCOPY  12/21/2016    INT. Hemmorrhoids, Transverse Colon Polyp adenoma, Rectal Polyp hyperplastic.  5 yr follow up    CORONARY ANGIOPLASTY WITH STENT PLACEMENT  09/2012    x3  CAD with hx of NOAH to LAD in 2012    CYST REMOVAL      HAND TENDON SURGERY Left     thumb    ROTATOR CUFF REPAIR Bilateral 1997    VASECTOMY  1980     Current Outpatient Medications   Medication Sig Dispense Refill    sacubitril-valsartan (ENTRESTO) 24-26 MG per tablet Take 1 tablet by mouth 2 times daily 60 tablet 5    amiodarone (CORDARONE) 200 MG tablet Take 1 tablet by mouth daily 90 tablet 1

## 2024-03-27 ENCOUNTER — HOSPITAL ENCOUNTER (OUTPATIENT)
Dept: CT IMAGING | Age: 69
Discharge: HOME | End: 2024-03-29
Attending: FAMILY MEDICINE
Payer: MEDICARE

## 2024-03-27 DIAGNOSIS — R93.89 ABNORMAL CT SCAN, NECK: Primary | ICD-10-CM

## 2024-03-27 DIAGNOSIS — K22.9 ESOPHAGEAL PROBLEM: ICD-10-CM

## 2024-03-27 DIAGNOSIS — R22.2 SUPRACLAVICULAR FOSSA FULLNESS: ICD-10-CM

## 2024-03-27 DIAGNOSIS — R93.89 ABNORMAL CT SCAN, NECK: ICD-10-CM

## 2024-03-27 PROCEDURE — 70491 CT SOFT TISSUE NECK W/DYE: CPT

## 2024-03-27 PROCEDURE — 6360000004 HC RX CONTRAST MEDICATION: Performed by: FAMILY MEDICINE

## 2024-03-27 PROCEDURE — 71260 CT THORAX DX C+: CPT

## 2024-03-27 RX ADMIN — IOPAMIDOL 75 ML: 755 INJECTION, SOLUTION INTRAVENOUS at 14:51

## 2024-03-27 RX ADMIN — IOPAMIDOL 80 ML: 755 INJECTION, SOLUTION INTRAVENOUS at 16:06

## 2024-03-28 ENCOUNTER — TELEPHONE (OUTPATIENT)
Dept: FAMILY MEDICINE CLINIC | Age: 69
End: 2024-03-28

## 2024-03-28 DIAGNOSIS — C34.11 MALIGNANT NEOPLASM OF UPPER LOBE OF RIGHT LUNG (HCC): Primary | ICD-10-CM

## 2024-03-28 NOTE — PROGRESS NOTES
Referrals placed per Dr. Art recommendation, see result note CT chest. Pt aware and transferred to schedule.

## 2024-03-28 NOTE — TELEPHONE ENCOUNTER
Patient was calling for CT results. Patient is informed of results and voiced understanding. Patient agreed to referrals being placed for pulmonary and oncology. Please advise

## 2024-04-01 ENCOUNTER — OFFICE VISIT (OUTPATIENT)
Dept: ONCOLOGY | Age: 69
End: 2024-04-01
Payer: MEDICARE

## 2024-04-01 ENCOUNTER — TELEPHONE (OUTPATIENT)
Dept: GENERAL RADIOLOGY | Age: 69
End: 2024-04-01

## 2024-04-01 VITALS
BODY MASS INDEX: 35.62 KG/M2 | DIASTOLIC BLOOD PRESSURE: 76 MMHG | SYSTOLIC BLOOD PRESSURE: 138 MMHG | TEMPERATURE: 97.2 F | HEART RATE: 67 BPM | HEIGHT: 72 IN | RESPIRATION RATE: 16 BRPM | OXYGEN SATURATION: 95 % | WEIGHT: 263 LBS

## 2024-04-01 DIAGNOSIS — R91.8 LUNG MASS: Primary | ICD-10-CM

## 2024-04-01 DIAGNOSIS — C77.0 METASTASIS TO SUPRACLAVICULAR LYMPH NODE (HCC): ICD-10-CM

## 2024-04-01 PROCEDURE — 3075F SYST BP GE 130 - 139MM HG: CPT | Performed by: INTERNAL MEDICINE

## 2024-04-01 PROCEDURE — 99204 OFFICE O/P NEW MOD 45 MIN: CPT | Performed by: INTERNAL MEDICINE

## 2024-04-01 PROCEDURE — 1123F ACP DISCUSS/DSCN MKR DOCD: CPT | Performed by: INTERNAL MEDICINE

## 2024-04-01 PROCEDURE — 3078F DIAST BP <80 MM HG: CPT | Performed by: INTERNAL MEDICINE

## 2024-04-01 RX ORDER — BENZONATATE 100 MG/1
100 CAPSULE ORAL 3 TIMES DAILY PRN
Qty: 30 CAPSULE | Refills: 2 | Status: SHIPPED | OUTPATIENT
Start: 2024-04-01 | End: 2024-04-11

## 2024-04-01 NOTE — PROGRESS NOTES
Pet/ct scheduled, ct scan scheduled, sent message to IR for biopsy  
aneurysm.  Follow-up recommended every 2 years, see recommendations for reference.    RECOMMENDATIONS:  For management of fusiform AAA:    3.5-3.9 cm AAA, recommend follow-up every 2 years.    * For management of saccular abdominal aortic aneurysms of any size,  recommend vascular consultation.    Note:  For AAA enlargement of >0.5 cm in 6 months or >1 cm in 1 year,  recommend vascular consultation.    References: J Am Flaquita Radiol 2013; 10(10):789-794; J Vasc Surg. 2018; 67:2-77  CT SOFT TISSUE NECK W CONTRAST  Narrative: EXAMINATION:  CT OF THE NECK SOFT TISSUE WITH CONTRAST  3/27/2024    TECHNIQUE:  CT of the neck was performed with the administration of intravenous contrast.  Multiplanar reformatted images are provided for review. Automated exposure  control, iterative reconstruction, and/or weight based adjustment of the  mA/kV was utilized to reduce the radiation dose to as low as reasonably  achievable.    COMPARISON:  None.    HISTORY:  ORDERING SYSTEM PROVIDED HISTORY: Esophageal problem  TECHNOLOGIST PROVIDED HISTORY:    STAT Creatinine as needed:->No  sense of esophageal pressure, acute increase in the size of supraclavicular  fat pads.  Reason for Exam: esophageal pressure with swallowing and noticing the  supraclavicular areas are more prominent grossly for about a week not a  specific area    Initial evaluation    FINDINGS:  There is no abnormality of the visualized brain parenchyma or orbits.    The nasopharyngeal soft tissues and parapharyngeal fat spaces are normal.    There is no obvious abnormality of the visualized tongue or larynx.    There is a large mass in the right lung apex.  There is bulky mediastinal  lymphadenopathy.  There are markedly enlarged supraclavicular lymph nodes  bilaterally.  This is highly suspicious for a lung mass with metastatic  disease to the mediastinum and supraclavicular lymph nodes.  Lymphoma could  be considered but is felt to be less likely given the lung

## 2024-04-01 NOTE — TELEPHONE ENCOUNTER
I have a referral for [facility] to have a Supraclavicular Lymph Node Biopsy.  Per Radiologist Recommendation, we typically ask patients to hold Plavix and Aspirin for 5 days prior to procedure.  Please advise.  Thanks,  Lakisha  Radiology

## 2024-04-02 ENCOUNTER — TELEPHONE (OUTPATIENT)
Dept: ONCOLOGY | Age: 69
End: 2024-04-02

## 2024-04-02 NOTE — TELEPHONE ENCOUNTER
Name: Andrea Desir  : 1955  MRN: [id number]    Oncology Navigation- Initial Note:    Intake-  Contact Type: Telephone    Continuum of Care: Diagnosis/Active Treatment    Smoking hx: current smoker with 54.3 pk yr hx.    Notes:   Navigator received request from Dr. Mendenhall to follow patient.  Phone call to introduce ONN service and provide contact information.  Spoke with patient who wrote down contact information.  Reviewed upcoming appts for scans, etc.    Patient states he is unable to lay flat for any length of time due to pain in back, shoulder and neck.    He requests pain medication to help him through the testing.   He uses [facility].    Electronically signed by Natalia Broussard RN on 2024 at 4:33 PM

## 2024-04-04 ENCOUNTER — TELEPHONE (OUTPATIENT)
Dept: ONCOLOGY | Age: 69
End: 2024-04-04

## 2024-04-04 DIAGNOSIS — R91.8 LUNG MASS: Primary | ICD-10-CM

## 2024-04-04 RX ORDER — HYDROCODONE BITARTRATE AND ACETAMINOPHEN 5; 325 MG/1; MG/1
1 TABLET ORAL EVERY 8 HOURS PRN
COMMUNITY
End: 2024-04-04 | Stop reason: SDUPTHER

## 2024-04-04 RX ORDER — HYDROCODONE BITARTRATE AND ACETAMINOPHEN 5; 325 MG/1; MG/1
1 TABLET ORAL EVERY 8 HOURS PRN
Qty: 30 TABLET | Refills: 0 | Status: SHIPPED | OUTPATIENT
Start: 2024-04-04 | End: 2024-04-14

## 2024-04-04 NOTE — TELEPHONE ENCOUNTER
Name: Andrea Desir  : 1955  MRN: [id number]    Oncology Navigation Follow-Up Note    Contact Type:  Telephone    Notes:   Patient calls asking if Dr. Mendenhall has responded regarding request for medication to help through upcoming procedures.   He also wonders when he will hear from IR to schedule biopsy. Informed him they may be waiting on scan results to plan best place to biopsy.    Text message to Dr. Mendenhall with request for med to follow up on request sent 24.      Electronically signed by Natalia Broussard RN on 2024 at 9:49 AM

## 2024-04-05 ENCOUNTER — HOSPITAL ENCOUNTER (OUTPATIENT)
Dept: CT IMAGING | Age: 69
End: 2024-04-05
Attending: INTERNAL MEDICINE
Payer: MEDICARE

## 2024-04-05 DIAGNOSIS — R91.8 LUNG MASS: ICD-10-CM

## 2024-04-05 PROCEDURE — 6360000004 HC RX CONTRAST MEDICATION: Performed by: INTERNAL MEDICINE

## 2024-04-05 PROCEDURE — 70470 CT HEAD/BRAIN W/O & W/DYE: CPT

## 2024-04-05 RX ADMIN — IOPAMIDOL 75 ML: 755 INJECTION, SOLUTION INTRAVENOUS at 14:05

## 2024-04-08 ENCOUNTER — HOSPITAL ENCOUNTER (OUTPATIENT)
Age: 69
Discharge: HOME | End: 2024-04-10
Attending: STUDENT IN AN ORGANIZED HEALTH CARE EDUCATION/TRAINING PROGRAM
Payer: MEDICARE

## 2024-04-08 ENCOUNTER — TELEPHONE (OUTPATIENT)
Dept: ONCOLOGY | Age: 69
End: 2024-04-08

## 2024-04-08 VITALS
BODY MASS INDEX: 35.62 KG/M2 | DIASTOLIC BLOOD PRESSURE: 70 MMHG | SYSTOLIC BLOOD PRESSURE: 143 MMHG | HEIGHT: 72 IN | HEART RATE: 72 BPM | WEIGHT: 263 LBS

## 2024-04-08 DIAGNOSIS — R91.8 LUNG MASS: Primary | ICD-10-CM

## 2024-04-08 DIAGNOSIS — I50.22 CHRONIC SYSTOLIC CONGESTIVE HEART FAILURE (HCC): ICD-10-CM

## 2024-04-08 LAB
ECHO AO ASC DIAM: 3.5 CM
ECHO AO ASCENDING AORTA INDEX: 1.46 CM/M2
ECHO AO ROOT DIAM: 3.1 CM
ECHO AO ROOT INDEX: 1.3 CM/M2
ECHO AV AREA PEAK VELOCITY: 1.9 CM2
ECHO AV AREA/BSA PEAK VELOCITY: 0.8 CM2/M2
ECHO AV PEAK GRADIENT: 7 MMHG
ECHO AV PEAK VELOCITY: 1.3 M/S
ECHO AV VELOCITY RATIO: 0.54
ECHO BSA: 2.46 M2
ECHO EST RA PRESSURE: 3 MMHG
ECHO LA AREA 4C: 23.7 CM2
ECHO LA DIAMETER INDEX: 1.67 CM/M2
ECHO LA DIAMETER: 4 CM
ECHO LA MAJOR AXIS: 6.1 CM
ECHO LA TO AORTIC ROOT RATIO: 1.29
ECHO LA VOL MOD A4C: 71 ML (ref 18–58)
ECHO LA VOLUME INDEX MOD A4C: 30 ML/M2 (ref 16–34)
ECHO LV E' LATERAL VELOCITY: 5 CM/S
ECHO LV E' SEPTAL VELOCITY: 5 CM/S
ECHO LV EDV A4C: 180 ML
ECHO LV EDV INDEX A4C: 75 ML/M2
ECHO LV EJECTION FRACTION A4C: 39 %
ECHO LV ESV A4C: 110 ML
ECHO LV ESV INDEX A4C: 46 ML/M2
ECHO LV FRACTIONAL SHORTENING: 22 % (ref 28–44)
ECHO LV INTERNAL DIMENSION DIASTOLE INDEX: 2.51 CM/M2
ECHO LV INTERNAL DIMENSION DIASTOLIC: 6 CM (ref 4.2–5.9)
ECHO LV INTERNAL DIMENSION SYSTOLIC INDEX: 1.97 CM/M2
ECHO LV INTERNAL DIMENSION SYSTOLIC: 4.7 CM
ECHO LV ISOVOLUMETRIC RELAXATION TIME (IVRT): 120 MS
ECHO LV IVSD: 1.2 CM (ref 0.6–1)
ECHO LV MASS 2D: 279.6 G (ref 88–224)
ECHO LV MASS INDEX 2D: 117 G/M2 (ref 49–115)
ECHO LV POSTERIOR WALL DIASTOLIC: 1 CM (ref 0.6–1)
ECHO LV RELATIVE WALL THICKNESS RATIO: 0.33
ECHO LVOT AREA: 3.5 CM2
ECHO LVOT DIAM: 2.1 CM
ECHO LVOT PEAK GRADIENT: 2 MMHG
ECHO LVOT PEAK VELOCITY: 0.7 M/S
ECHO MV A VELOCITY: 1.08 M/S
ECHO MV E DECELERATION TIME (DT): 190 MS
ECHO MV E VELOCITY: 0.79 M/S
ECHO MV E/A RATIO: 0.73
ECHO MV E/E' LATERAL: 15.8
ECHO MV E/E' RATIO (AVERAGED): 15.8
ECHO MV MAX VELOCITY: 1.4 M/S
ECHO MV PEAK GRADIENT: 8 MMHG
ECHO PV MAX VELOCITY: 1 M/S
ECHO PV PEAK GRADIENT: 4 MMHG
ECHO RIGHT VENTRICULAR SYSTOLIC PRESSURE (RVSP): 22 MMHG
ECHO TV PEAK GRADIENT: 2 MMHG
ECHO TV REGURGITANT MAX VELOCITY: 2.2 M/S
ECHO TV REGURGITANT PEAK GRADIENT: 19 MMHG

## 2024-04-08 PROCEDURE — 93306 TTE W/DOPPLER COMPLETE: CPT | Performed by: INTERNAL MEDICINE

## 2024-04-08 PROCEDURE — 93306 TTE W/DOPPLER COMPLETE: CPT

## 2024-04-08 RX ORDER — DIAZEPAM 5 MG/1
TABLET ORAL
Qty: 2 TABLET | Refills: 0 | Status: SHIPPED | OUTPATIENT
Start: 2024-04-08 | End: 2024-05-08

## 2024-04-08 NOTE — TELEPHONE ENCOUNTER
Name: Andrea Desir  : 1955  MRN: [id number]    Oncology Navigation Follow-Up Note    Contact Type:  Telephone    Notes:   Received voice message from patient. He states he thinks he needs to cancel his PET for tomorrow because he is anxious about \"going in that tunnel\".  Informed him Dr. Mendenhall could prescribe antianxiety medication. He is agreeable to try if he can have medication to help.  Message to Dr. Mendenhall.    Per Dr. Mendenhall, he will send valium to take prior to procedure.  Spoke with patient by phone and instructed to take valium upon arrival for PET.     He has inthinct but is unable to access it at this time.  Reviewed  instructions for prior to, and time line of the test.  Reviewed time and location for IR BX at [facility]. He had 1:00 pm on his calendar but it is scheduled for 10:00. He was grateful for the correct information.     He verbalized understanding of above instruction.  Navigator following for continuity of care.        Electronically signed by Natalia Broussard RN on 2024 at 12:16 PM

## 2024-04-09 ENCOUNTER — HOSPITAL ENCOUNTER (OUTPATIENT)
Dept: NUCLEAR MEDICINE | Age: 69
Discharge: HOME | End: 2024-04-11
Attending: INTERNAL MEDICINE
Payer: MEDICARE

## 2024-04-09 DIAGNOSIS — R91.8 LUNG MASS: ICD-10-CM

## 2024-04-09 LAB — GLUCOSE BLD-MCNC: 91 MG/DL (ref 75–110)

## 2024-04-09 PROCEDURE — 2580000003 HC RX 258: Performed by: INTERNAL MEDICINE

## 2024-04-09 PROCEDURE — A9609 HC RX DIAGNOSTIC RADIOPHARMACEUTICAL: HCPCS | Performed by: INTERNAL MEDICINE

## 2024-04-09 PROCEDURE — 78815 PET IMAGE W/CT SKULL-THIGH: CPT

## 2024-04-09 PROCEDURE — 3430000000 HC RX DIAGNOSTIC RADIOPHARMACEUTICAL: Performed by: INTERNAL MEDICINE

## 2024-04-09 PROCEDURE — 82947 ASSAY GLUCOSE BLOOD QUANT: CPT

## 2024-04-09 RX ORDER — FLUDEOXYGLUCOSE F 18 200 MCI/ML
10 INJECTION, SOLUTION INTRAVENOUS
Status: COMPLETED | OUTPATIENT
Start: 2024-04-09 | End: 2024-04-09

## 2024-04-09 RX ORDER — SODIUM CHLORIDE 0.9 % (FLUSH) 0.9 %
10 SYRINGE (ML) INJECTION
Status: COMPLETED | OUTPATIENT
Start: 2024-04-09 | End: 2024-04-09

## 2024-04-09 RX ADMIN — FLUDEOXYGLUCOSE F 18 12.3 MILLICURIE: 200 INJECTION, SOLUTION INTRAVENOUS at 13:20

## 2024-04-09 RX ADMIN — SODIUM CHLORIDE, PRESERVATIVE FREE 10 ML: 5 INJECTION INTRAVENOUS at 13:20

## 2024-04-10 ENCOUNTER — HOSPITAL ENCOUNTER (OUTPATIENT)
Dept: ULTRASOUND IMAGING | Age: 69
Discharge: HOME | End: 2024-04-12
Payer: MEDICARE

## 2024-04-10 VITALS — OXYGEN SATURATION: 96 % | HEART RATE: 62 BPM

## 2024-04-10 DIAGNOSIS — C77.0 METASTASIS TO SUPRACLAVICULAR LYMPH NODE (HCC): ICD-10-CM

## 2024-04-10 DIAGNOSIS — R91.8 LUNG MASS: ICD-10-CM

## 2024-04-10 PROCEDURE — 88333 PATH CONSLTJ SURG CYTO XM 1: CPT

## 2024-04-10 PROCEDURE — 88305 TISSUE EXAM BY PATHOLOGIST: CPT

## 2024-04-10 PROCEDURE — 76942 ECHO GUIDE FOR BIOPSY: CPT

## 2024-04-10 PROCEDURE — 88341 IMHCHEM/IMCYTCHM EA ADD ANTB: CPT

## 2024-04-10 PROCEDURE — 88342 IMHCHEM/IMCYTCHM 1ST ANTB: CPT

## 2024-04-10 PROCEDURE — 88334 PATH CONSLTJ SURG CYTO XM EA: CPT

## 2024-04-10 NOTE — PROGRESS NOTES
Patient to US for left supraclavicular lymph node biopsy.  Dr. Boo/ JR HONEYCUTT and THA GONZALEZ at [facility].  Site prepped and draped, area numbed with lidocaine.  Multiple core specimens obtained and given to pathology.  Access removed and band aid placed to site.  Patient tolerated well and is ambulatory from dept.  Ice pack given for comfort.

## 2024-04-10 NOTE — BRIEF OP NOTE
Brief Postoperative Note    Andrea Desir  YOB: 1955  [id number]    Pre-operative Diagnosis: Supraclavicular LN bx    Post-operative Diagnosis: Same    Procedure: Supraclavicular LN bx    Anesthesia: Local    Surgeons/Assistants: MD Rajeev    Estimated Blood Loss: less than 50     Complications: None    Specimens: Was Obtained:     Findings: Successful supraclavicular LN bx.  5 core samples obtained.    Electronically signed by YINKA Leone on 4/10/2024 at 10:50 AM

## 2024-04-11 ENCOUNTER — TELEPHONE (OUTPATIENT)
Dept: ONCOLOGY | Age: 69
End: 2024-04-11

## 2024-04-11 ENCOUNTER — TELEPHONE (OUTPATIENT)
Dept: FAMILY MEDICINE CLINIC | Age: 69
End: 2024-04-11

## 2024-04-11 RX ORDER — TRAZODONE HYDROCHLORIDE 50 MG/1
50 TABLET ORAL NIGHTLY PRN
Qty: 30 TABLET | Refills: 2 | Status: SHIPPED | OUTPATIENT
Start: 2024-04-11

## 2024-04-11 NOTE — TELEPHONE ENCOUNTER
Name: Andrea Desir  : 1955  MRN: [id number]    Oncology Navigation Follow-Up Note    Contact Type:  Telephone    Notes:   Patient called to request his appt with Dr. Mendenhall be moved up.   His biopsy was completed yesterday and all imaging is done.  Informed him that Dr. Mendenhall will be out of the office the week of  and there is one more test that needs to be processed on the biopsy tissue. He verbalized understanding.    He also requests medication to help with sleep. Instructed him to contact his PCP for this.      Electronically signed by Natalia Broussard RN on 2024 at 8:41 AM

## 2024-04-11 NOTE — TELEPHONE ENCOUNTER
Patient is calling requesting medication to help him sleep due to him having a hard time with this. Please advise. [facility] [place] .

## 2024-04-12 LAB — SURGICAL PATHOLOGY REPORT: NORMAL

## 2024-04-15 ENCOUNTER — OFFICE VISIT (OUTPATIENT)
Dept: ONCOLOGY | Age: 69
End: 2024-04-15
Payer: MEDICARE

## 2024-04-15 ENCOUNTER — TELEPHONE (OUTPATIENT)
Dept: ONCOLOGY | Age: 69
End: 2024-04-15

## 2024-04-15 VITALS
TEMPERATURE: 97.2 F | SYSTOLIC BLOOD PRESSURE: 118 MMHG | HEIGHT: 72 IN | RESPIRATION RATE: 12 BRPM | WEIGHT: 260 LBS | BODY MASS INDEX: 35.21 KG/M2 | HEART RATE: 55 BPM | OXYGEN SATURATION: 96 % | DIASTOLIC BLOOD PRESSURE: 68 MMHG

## 2024-04-15 DIAGNOSIS — C34.91 SMALL CELL LUNG CANCER, RIGHT (HCC): ICD-10-CM

## 2024-04-15 DIAGNOSIS — R91.8 LUNG MASS: Primary | ICD-10-CM

## 2024-04-15 PROCEDURE — 3078F DIAST BP <80 MM HG: CPT | Performed by: INTERNAL MEDICINE

## 2024-04-15 PROCEDURE — 99214 OFFICE O/P EST MOD 30 MIN: CPT | Performed by: INTERNAL MEDICINE

## 2024-04-15 PROCEDURE — 1123F ACP DISCUSS/DSCN MKR DOCD: CPT | Performed by: INTERNAL MEDICINE

## 2024-04-15 PROCEDURE — 99215 OFFICE O/P EST HI 40 MIN: CPT | Performed by: INTERNAL MEDICINE

## 2024-04-15 PROCEDURE — 3074F SYST BP LT 130 MM HG: CPT | Performed by: INTERNAL MEDICINE

## 2024-04-15 RX ORDER — ONDANSETRON 2 MG/ML
8 INJECTION INTRAMUSCULAR; INTRAVENOUS
OUTPATIENT
Start: 2024-04-16

## 2024-04-15 RX ORDER — PALONOSETRON 0.05 MG/ML
0.25 INJECTION, SOLUTION INTRAVENOUS ONCE
OUTPATIENT
Start: 2024-04-15 | End: 2024-04-15

## 2024-04-15 RX ORDER — SODIUM CHLORIDE 9 MG/ML
5-250 INJECTION, SOLUTION INTRAVENOUS PRN
OUTPATIENT
Start: 2024-04-16

## 2024-04-15 RX ORDER — FAMOTIDINE 10 MG/ML
20 INJECTION, SOLUTION INTRAVENOUS
OUTPATIENT
Start: 2024-04-15

## 2024-04-15 RX ORDER — SODIUM CHLORIDE 9 MG/ML
5-250 INJECTION, SOLUTION INTRAVENOUS PRN
OUTPATIENT
Start: 2024-04-17

## 2024-04-15 RX ORDER — EPINEPHRINE 1 MG/ML
0.3 INJECTION, SOLUTION, CONCENTRATE INTRAVENOUS PRN
OUTPATIENT
Start: 2024-04-16

## 2024-04-15 RX ORDER — CODEINE PHOSPHATE/GUAIFENESIN 10-100MG/5
5 LIQUID (ML) ORAL 4 TIMES DAILY PRN
Qty: 240 ML | Refills: 0 | Status: SHIPPED | OUTPATIENT
Start: 2024-04-15 | End: 2024-05-15

## 2024-04-15 RX ORDER — FAMOTIDINE 10 MG/ML
20 INJECTION, SOLUTION INTRAVENOUS
OUTPATIENT
Start: 2024-04-16

## 2024-04-15 RX ORDER — ACETAMINOPHEN 325 MG/1
650 TABLET ORAL
OUTPATIENT
Start: 2024-04-17

## 2024-04-15 RX ORDER — ONDANSETRON 2 MG/ML
8 INJECTION INTRAMUSCULAR; INTRAVENOUS
OUTPATIENT
Start: 2024-04-15

## 2024-04-15 RX ORDER — SODIUM CHLORIDE 9 MG/ML
INJECTION, SOLUTION INTRAVENOUS CONTINUOUS
OUTPATIENT
Start: 2024-04-15

## 2024-04-15 RX ORDER — SODIUM CHLORIDE 0.9 % (FLUSH) 0.9 %
5-40 SYRINGE (ML) INJECTION PRN
OUTPATIENT
Start: 2024-04-16

## 2024-04-15 RX ORDER — MEPERIDINE HYDROCHLORIDE 50 MG/ML
12.5 INJECTION INTRAMUSCULAR; INTRAVENOUS; SUBCUTANEOUS PRN
OUTPATIENT
Start: 2024-04-16

## 2024-04-15 RX ORDER — MEPERIDINE HYDROCHLORIDE 50 MG/ML
12.5 INJECTION INTRAMUSCULAR; INTRAVENOUS; SUBCUTANEOUS PRN
OUTPATIENT
Start: 2024-04-15

## 2024-04-15 RX ORDER — HEPARIN SODIUM (PORCINE) LOCK FLUSH IV SOLN 100 UNIT/ML 100 UNIT/ML
500 SOLUTION INTRAVENOUS PRN
OUTPATIENT
Start: 2024-04-17

## 2024-04-15 RX ORDER — HEPARIN SODIUM (PORCINE) LOCK FLUSH IV SOLN 100 UNIT/ML 100 UNIT/ML
500 SOLUTION INTRAVENOUS PRN
OUTPATIENT
Start: 2024-04-16

## 2024-04-15 RX ORDER — SODIUM CHLORIDE 9 MG/ML
5-250 INJECTION, SOLUTION INTRAVENOUS PRN
OUTPATIENT
Start: 2024-04-15

## 2024-04-15 RX ORDER — SODIUM CHLORIDE 0.9 % (FLUSH) 0.9 %
5-40 SYRINGE (ML) INJECTION PRN
OUTPATIENT
Start: 2024-04-17

## 2024-04-15 RX ORDER — MEPERIDINE HYDROCHLORIDE 50 MG/ML
12.5 INJECTION INTRAMUSCULAR; INTRAVENOUS; SUBCUTANEOUS PRN
OUTPATIENT
Start: 2024-04-17

## 2024-04-15 RX ORDER — ACETAMINOPHEN 325 MG/1
650 TABLET ORAL
OUTPATIENT
Start: 2024-04-16

## 2024-04-15 RX ORDER — ALBUTEROL SULFATE 90 UG/1
4 AEROSOL, METERED RESPIRATORY (INHALATION) PRN
OUTPATIENT
Start: 2024-04-15

## 2024-04-15 RX ORDER — DIPHENHYDRAMINE HYDROCHLORIDE 50 MG/ML
50 INJECTION INTRAMUSCULAR; INTRAVENOUS
OUTPATIENT
Start: 2024-04-15

## 2024-04-15 RX ORDER — EPINEPHRINE 1 MG/ML
0.3 INJECTION, SOLUTION, CONCENTRATE INTRAVENOUS PRN
OUTPATIENT
Start: 2024-04-17

## 2024-04-15 RX ORDER — DIPHENHYDRAMINE HYDROCHLORIDE 50 MG/ML
50 INJECTION INTRAMUSCULAR; INTRAVENOUS
OUTPATIENT
Start: 2024-04-16

## 2024-04-15 RX ORDER — DIPHENHYDRAMINE HYDROCHLORIDE 50 MG/ML
50 INJECTION INTRAMUSCULAR; INTRAVENOUS
OUTPATIENT
Start: 2024-04-17

## 2024-04-15 RX ORDER — SODIUM CHLORIDE 9 MG/ML
INJECTION, SOLUTION INTRAVENOUS CONTINUOUS
OUTPATIENT
Start: 2024-04-16

## 2024-04-15 RX ORDER — ACETAMINOPHEN 325 MG/1
650 TABLET ORAL
OUTPATIENT
Start: 2024-04-15

## 2024-04-15 RX ORDER — ONDANSETRON 2 MG/ML
8 INJECTION INTRAMUSCULAR; INTRAVENOUS
OUTPATIENT
Start: 2024-04-17

## 2024-04-15 RX ORDER — ATORVASTATIN CALCIUM 80 MG/1
TABLET, FILM COATED ORAL
Qty: 90 TABLET | Refills: 1 | Status: SHIPPED | OUTPATIENT
Start: 2024-04-15

## 2024-04-15 RX ORDER — ALBUTEROL SULFATE 90 UG/1
4 AEROSOL, METERED RESPIRATORY (INHALATION) PRN
OUTPATIENT
Start: 2024-04-17

## 2024-04-15 RX ORDER — EPINEPHRINE 1 MG/ML
0.3 INJECTION, SOLUTION, CONCENTRATE INTRAVENOUS PRN
OUTPATIENT
Start: 2024-04-15

## 2024-04-15 RX ORDER — HEPARIN SODIUM (PORCINE) LOCK FLUSH IV SOLN 100 UNIT/ML 100 UNIT/ML
500 SOLUTION INTRAVENOUS PRN
OUTPATIENT
Start: 2024-04-15

## 2024-04-15 RX ORDER — FAMOTIDINE 10 MG/ML
20 INJECTION, SOLUTION INTRAVENOUS
OUTPATIENT
Start: 2024-04-17

## 2024-04-15 RX ORDER — SODIUM CHLORIDE 0.9 % (FLUSH) 0.9 %
5-40 SYRINGE (ML) INJECTION PRN
OUTPATIENT
Start: 2024-04-15

## 2024-04-15 RX ORDER — ALBUTEROL SULFATE 90 UG/1
4 AEROSOL, METERED RESPIRATORY (INHALATION) PRN
OUTPATIENT
Start: 2024-04-16

## 2024-04-15 RX ORDER — SODIUM CHLORIDE 9 MG/ML
INJECTION, SOLUTION INTRAVENOUS CONTINUOUS
OUTPATIENT
Start: 2024-04-17

## 2024-04-15 RX ORDER — LORAZEPAM 0.5 MG/1
0.5 TABLET ORAL EVERY 8 HOURS PRN
Qty: 90 TABLET | Refills: 0 | Status: SHIPPED | OUTPATIENT
Start: 2024-04-15 | End: 2024-05-15

## 2024-04-15 NOTE — PROGRESS NOTES
Writer scheduled consult for port placement. Writer notified Yuriy at infusion room for need for tx asap  
50 MG tablet Take 1 tablet by mouth nightly as needed for Sleep 30 tablet 2    sacubitril-valsartan (ENTRESTO) 24-26 MG per tablet Take 1 tablet by mouth 2 times daily 60 tablet 5    amiodarone (CORDARONE) 200 MG tablet Take 1 tablet by mouth daily 90 tablet 1    clopidogrel (PLAVIX) 75 MG tablet 1 po daily 90 tablet 2    furosemide (LASIX) 40 MG tablet Take 1 tablet by mouth daily 30 tablet 5    metoprolol tartrate (LOPRESSOR) 25 MG tablet Take one tablet in am and take 1/2 tablet in pm. 135 tablet 3    Ascorbic Acid (VITAMIN C) 250 MG tablet Take 1 tablet by mouth daily      fluticasone (FLONASE) 50 MCG/ACT nasal spray 2 sprays by Nasal route daily 3 each 1    calcium carbonate (OSCAL) 500 MG TABS tablet Take 1 tablet by mouth daily      sildenafil (VIAGRA) 100 MG tablet TAKE 1 TABLET BY MOUTH AS NEEDED FOR ERECTILE DYSFUNCTION 10 tablet 2    aspirin 81 MG tablet Take 1 tablet by mouth daily 30 tablet 6    Omega-3 Fatty Acids (FISH OIL) 1000 MG CAPS Take 1,200 mg by mouth 2 times daily       Multiple Vitamins-Minerals (THERAPEUTIC MULTIVITAMIN-MINERALS) tablet Take 1 tablet by mouth daily      diazePAM (VALIUM) 5 MG tablet Take one pill prior to scan as needed (Patient not taking: Reported on 4/15/2024) 2 tablet 0    nystatin (MYCOSTATIN) 909451 UNIT/GM cream Apply topically 3 times daily. (Patient not taking: Reported on 3/23/2024) 30 g 0    nystatin (MYCOSTATIN) 570523 UNIT/GM powder Apply 2 times daily to right axilla. (Patient not taking: Reported on 3/23/2024) 30 g 0     No current facility-administered medications for this visit.       Social History     Socioeconomic History    Marital status:      Spouse name: Not on file    Number of children: Not on file    Years of education: Not on file    Highest education level: Not on file   Occupational History    Not on file   Tobacco Use    Smoking status: Every Day     Current packs/day: 1.00     Average packs/day: 1 pack/day for 54.3 years (54.3 ttl

## 2024-04-15 NOTE — TELEPHONE ENCOUNTER
Andrea called requesting a refill of the below medication which has been pended for you:     Requested Prescriptions     Pending Prescriptions Disp Refills    atorvastatin (LIPITOR) 80 MG tablet [Pharmacy Med Name: Atorvastatin Calcium 80 MG Oral Tablet] 90 tablet 0     Sig: TAKE 1 TABLET BY MOUTH ONCE DAILY AT NIGHT       Last Appointment Date: 3/23/2024  Next Appointment Date: 5/28/2024    No Known Allergies

## 2024-04-15 NOTE — TELEPHONE ENCOUNTER
Name: Andrea Desir  : 1955  MRN: [id number]    Oncology Navigation Follow-Up Note    Contact Type:  Telephone    Notes:   Patient calls to ask for a cough medicine with codeine.   He also requests a sooner appt for follow up with Dr. Mendenhall for today instead of 24. All testing has resulted. Almaz AYALA consulted and put him on schedule for today.   Patient notified.          Electronically signed by Natalia Broussard RN on 4/15/2024 at 9:21 AM

## 2024-04-16 ENCOUNTER — TELEPHONE (OUTPATIENT)
Dept: ONCOLOGY | Age: 69
End: 2024-04-16

## 2024-04-16 DIAGNOSIS — R91.8 LUNG MASS: ICD-10-CM

## 2024-04-16 RX ORDER — DIAZEPAM 5 MG/1
TABLET ORAL
Qty: 2 TABLET | Refills: 0 | Status: SHIPPED | OUTPATIENT
Start: 2024-04-16 | End: 2024-05-16

## 2024-04-16 NOTE — TELEPHONE ENCOUNTER
Patient is scheduled for his testing on 4/22. Asking for a prescription for the Valium.    Last Appt:  4/15/2024  Next Appt:   4/29/2024  Med verified in Epic

## 2024-04-16 NOTE — TELEPHONE ENCOUNTER
Patient's testing is scheduled for 4/22. Asking for a prescription for Valium sent to pharmacy.    Last Appt:  4/15/2024  Next Appt:   4/29/2024  Med verified in Epic

## 2024-04-16 NOTE — TELEPHONE ENCOUNTER
Name: Andrea Desir  : 1955  MRN: [id number]    Oncology Navigation Follow-Up Note    Contact Type:  Telephone    Notes:   Phone call to [facility] Scheduling to get MRI scheduled. They will need to contact [employer]def[employer] company and cardiologist for clearance prior to scheduling. Requested appt as soon as possible at [place] or [facility]. They will contact patient to schedule.  Informed that device information has been scanned to EHR.    15:00 reviewing chart. Upcoming appts include    Dr. Jones valle consult   MRI brain w/wo contrast. MRI orbits face neck w/wo contrast at [facility]  24 Dr. Mendenhall follow up      Electronically signed by Natalia Broussard RN on 2024 at 9:20 AM

## 2024-04-16 NOTE — TELEPHONE ENCOUNTER
Name: Andrea Desir  : 1955  MRN: [id number]    Oncology Navigation- Initial Note:  Writer meets with patient, wife, sister, and son while in office for med onc appt and was present during visit.  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 Sister is concerned for what this will mean for patient if he needs additional support during treatment.  Patient needs and barriers to care: Coordination of Care, Knowledge deficit, Emotional Issues/ Fear/ Anxiety, Practical needs/ Family problems (housing/ living alone), Symptom Management, and Transportation     Referral Source: Health Professional    Receptive to Advanced Care Planning/ Palliative Care:  not addressed at this time    Interventions-   General Interventions: reviewed oncology nurse navigation service     Education/Screenings:  yes - Instructed about oncology nurse navigation. Provided resources folder and printed contact information. Instructed on navigator availability and how to access back up assistance for urgent needs.   Substance Use screening:   Tobacco-  1 PPD  54.3 PY  READY TO QUIT: yes, has cut back to 1/2    Alcohol- occasional, but not currently   Drugs- no  Patient tried Chantix in the past until insurance no longer covered. He is interested in cessation but overwhelmed with reality of his stage IV cancer diagnosis. Smoking cessation assistance and resources provided     Currently on HRT: no     Referrals: Smoking Cessation/ Alcohol/ Substance Abuse Program+     Biopsy site status: NA     Continuum of Care: Diagnosis/Active Treatment    Notes:   Patient has supportive family. He is tearful today with news of diagnosis and prognosis. He requests medication to help with cough that keeps him from sleeping well. He also requests  medication for anxiety. Writer informs Dr. Mendenhall.  Dr. Mendenhall requested

## 2024-04-18 ENCOUNTER — APPOINTMENT (OUTPATIENT)
Dept: GENERAL RADIOLOGY | Age: 69
End: 2024-04-18
Payer: MEDICARE

## 2024-04-18 ENCOUNTER — HOSPITAL ENCOUNTER (EMERGENCY)
Age: 69
Discharge: HOME | End: 2024-04-18
Attending: EMERGENCY MEDICINE
Payer: MEDICARE

## 2024-04-18 VITALS
BODY MASS INDEX: 35.21 KG/M2 | SYSTOLIC BLOOD PRESSURE: 147 MMHG | OXYGEN SATURATION: 95 % | RESPIRATION RATE: 16 BRPM | WEIGHT: 260 LBS | HEART RATE: 64 BPM | TEMPERATURE: 96.8 F | HEIGHT: 72 IN | DIASTOLIC BLOOD PRESSURE: 69 MMHG

## 2024-04-18 DIAGNOSIS — R33.8 ACUTE URINARY RETENTION: ICD-10-CM

## 2024-04-18 DIAGNOSIS — K59.00 CONSTIPATION, UNSPECIFIED CONSTIPATION TYPE: Primary | ICD-10-CM

## 2024-04-18 LAB
BILIRUB UR QL STRIP: NEGATIVE
CLARITY UR: CLEAR
COLOR UR: YELLOW
COMMENT: NORMAL
GLUCOSE UR STRIP-MCNC: NEGATIVE MG/DL
HGB UR QL STRIP.AUTO: NEGATIVE
KETONES UR STRIP-MCNC: NEGATIVE MG/DL
LEUKOCYTE ESTERASE UR QL STRIP: NEGATIVE
NITRITE UR QL STRIP: NEGATIVE
PH UR STRIP: 6 [PH] (ref 5–6)
PROT UR STRIP-MCNC: NEGATIVE MG/DL
SP GR UR STRIP: 1.02 (ref 1.01–1.02)
UROBILINOGEN UR STRIP-ACNC: NORMAL EU/DL (ref 0–1)

## 2024-04-18 PROCEDURE — 74018 RADEX ABDOMEN 1 VIEW: CPT

## 2024-04-18 PROCEDURE — 51798 US URINE CAPACITY MEASURE: CPT

## 2024-04-18 PROCEDURE — 81003 URINALYSIS AUTO W/O SCOPE: CPT

## 2024-04-18 PROCEDURE — 51702 INSERT TEMP BLADDER CATH: CPT

## 2024-04-18 PROCEDURE — 99284 EMERGENCY DEPT VISIT MOD MDM: CPT

## 2024-04-18 RX ORDER — ENEMA 19; 7 G/133ML; G/133ML
118 ENEMA RECTAL ONCE
Status: DISCONTINUED | OUTPATIENT
Start: 2024-04-18 | End: 2024-04-18 | Stop reason: HOSPADM

## 2024-04-18 ASSESSMENT — PAIN SCALES - GENERAL: PAINLEVEL_OUTOF10: 8

## 2024-04-18 ASSESSMENT — PAIN - FUNCTIONAL ASSESSMENT: PAIN_FUNCTIONAL_ASSESSMENT: 0-10

## 2024-04-18 ASSESSMENT — PAIN DESCRIPTION - LOCATION: LOCATION: THROAT

## 2024-04-19 ENCOUNTER — HOSPITAL ENCOUNTER (OUTPATIENT)
Dept: INFUSION THERAPY | Age: 69
Discharge: HOME | End: 2024-04-19
Payer: MEDICARE

## 2024-04-19 ENCOUNTER — TELEPHONE (OUTPATIENT)
Dept: SURGERY | Age: 69
End: 2024-04-19

## 2024-04-19 ENCOUNTER — INITIAL CONSULT (OUTPATIENT)
Dept: SURGERY | Age: 69
End: 2024-04-19
Payer: MEDICARE

## 2024-04-19 VITALS
DIASTOLIC BLOOD PRESSURE: 86 MMHG | WEIGHT: 259.6 LBS | BODY MASS INDEX: 35.16 KG/M2 | OXYGEN SATURATION: 95 % | HEART RATE: 63 BPM | SYSTOLIC BLOOD PRESSURE: 138 MMHG | HEIGHT: 72 IN | TEMPERATURE: 97.4 F

## 2024-04-19 DIAGNOSIS — E66.01 SEVERE OBESITY (BMI 35.0-39.9) WITH COMORBIDITY (HCC): ICD-10-CM

## 2024-04-19 DIAGNOSIS — C34.91 BRONCHOGENIC CANCER OF RIGHT LUNG (HCC): Primary | ICD-10-CM

## 2024-04-19 PROCEDURE — 99213 OFFICE O/P EST LOW 20 MIN: CPT

## 2024-04-19 PROCEDURE — 3075F SYST BP GE 130 - 139MM HG: CPT | Performed by: SURGERY

## 2024-04-19 PROCEDURE — 3079F DIAST BP 80-89 MM HG: CPT | Performed by: SURGERY

## 2024-04-19 PROCEDURE — 99214 OFFICE O/P EST MOD 30 MIN: CPT | Performed by: SURGERY

## 2024-04-19 PROCEDURE — 1123F ACP DISCUSS/DSCN MKR DOCD: CPT | Performed by: SURGERY

## 2024-04-19 RX ORDER — BENZONATATE 100 MG/1
100 CAPSULE ORAL 3 TIMES DAILY PRN
COMMUNITY
Start: 2024-04-12

## 2024-04-19 NOTE — TELEPHONE ENCOUNTER
[facility]  General Surgery  Phone # [phone]  Fax # [phone]    Patient:Andrea Desir    :1955     Surgical/Procedure Planned: Right Port Placement     Date & Location: 24 @ [facility]       Outpatient   Planned Length of OR: 60 min.    Sedation: intravenous sedation      Estimated Cardiac Risk for Non-Cardiac Surgery/Procedure     Low______ Moderate____x__ High______    Medication Instructions - Clarification needed by this date: today or ASAP       ASA 81 mg        Hold _7__ Days    Plavix   Hold _7__ Days      Resume medications:     Lovenox indicated: _____Yes __x___NO      Signature of Provider Giving Orders for Medication holds:  Jo Ann Salazar MD  _____________________________________________

## 2024-04-19 NOTE — TELEPHONE ENCOUNTER
Left detailed VM for patient with aspirin & Plavix hold instructions x 7 days prior to Port Placement next week 4/25/24. Patient stated at OV he has not taken his Plavix yet today. Asked for patient to return call so writer knew he received medication hold instructions.

## 2024-04-19 NOTE — ED PROVIDER NOTES
eMERGENCY dEPARTMENT eNCOUnter      Pt Name: Andrea Desir  MRN: [id number]  Birthdate 1955  Date of evaluation: 4/18/2024      CHIEF COMPLAINT       Chief Complaint   Patient presents with    Neck Swelling     Hx of throat cancer states is having trouble breathing and swallowing         HISTORY OF PRESENT ILLNESS    Andrea Desir is a 69 y.o. male who presents with constipation and urinary difficulty.  Patient states she was recently diagnosed with cancer he has noticed some swelling to his neck says he does have some difficulty swallowing but is unchanged since before.  He is able to swallow liquids without difficulty but he says just has taken him small amounts he is scheduled in the next day to get additional scan to see the extent of it but he states he has noted constipation since he has been on narcotic pain pills and he states he has not been able to urinate all day long he denies any fever chills nausea vomiting denies any shortness of breath        REVIEW OF SYSTEMS       Review of systems are all reviewed and negative except stated above in the HPI    PAST MEDICAL HISTORY    has a past medical history of Anxiety, Atrial fibrillation (HCC), CAD (coronary artery disease), Erectile dysfunction, Headache(784.0), High cholesterol, Hx of adenomatous colonic polyps, Hypertension, ICD (implantable cardioverter-defibrillator) discharge, Ischemic cardiomyopathy, Low back pain, MI, old, Osteoarthritis, Plantar fasciitis, Seborrheic keratosis, Smoker, and Syncopal episodes.    SURGICAL HISTORY      has a past surgical history that includes Rotator cuff repair (Bilateral, 1997); Vasectomy (1980); Cardiac defibrillator placement (01/07/2013); cyst removal; Hand tendon surgery (Left); Colonoscopy (11/11/2015); Colonoscopy (12/21/2016); Coronary angioplasty with stent (09/2012); Cardiac catheterization (09/04/2020); Cardiac defibrillator placement (06/22/2021); and US BIOPSY LYMPH NODE (4/10/2024).    CURRENT

## 2024-04-19 NOTE — PROGRESS NOTES
Pt here for chemotherapy teaching. Spent 60 minutes with them.  Teaching sheets from [facility] and verbal information given on chemotherapy agents, action, administration and side effects.   Provided books chemotherapy and you and Eating Hints: Before During, and After Cancer treatment.  Handout about unit with phone numbers for Infusion Center at [facility],  [facility], and [facility] provided.  Drugs discussed: tecentriq, carboplatin, etoposide.  Discussed implanted port and demonstrated     Reviewed anti emetic medications, pt has prescriptions    Questions answered, support given

## 2024-04-19 NOTE — TELEPHONE ENCOUNTER
Contacted patients wife Thelma and informed her writer left VM for patient with med hold instructions.     Thelma accompanied patient to consult today, writer informed her of aspirin & Plavix hold x 7 days prior to port placement next Thursday 4/25/24.

## 2024-04-19 NOTE — PROGRESS NOTES
Patient presents today for port placement consult referred by oncologist Dr. Mendenhall. Patient was diagnosed with right small cell lung cancer 4/10/24.   
in the Last Year: Not on file     Number of Places Lived in the Last Year: Not on file     Unstable Housing in the Last Year: No     Past Surgical History:   Procedure Laterality Date    CARDIAC CATHETERIZATION  09/04/2020    CAD with hx of NOAH to LAD in 2012    CARDIAC DEFIBRILLATOR PLACEMENT  01/07/2013    CARDIAC DEFIBRILLATOR PLACEMENT  06/22/2021    REPLACEMENT  /  DR ALBERTO    COLONOSCOPY  11/11/2015    polyps X 5 Pascale CARBAJAL    COLONOSCOPY  12/21/2016    INT. Hemmorrhoids, Transverse Colon Polyp adenoma, Rectal Polyp hyperplastic.  5 yr follow up    CORONARY ANGIOPLASTY WITH STENT PLACEMENT  09/2012    x3  CAD with hx of NOAH to LAD in 2012    CYST REMOVAL      HAND TENDON SURGERY Left     thumb    ROTATOR CUFF REPAIR Bilateral 1997    US LYMPH NODE BIOPSY  4/10/2024    US LYMPH NODE BIOPSY 4/10/2024 [facility] ULTRASOUND    VASECTOMY  1980     Past Medical History:   Diagnosis Date    Anxiety     Atrial fibrillation (HCC)     CAD (coronary artery disease) 2012    stent to LAD after MI    Erectile dysfunction     Headache(784.0)     High cholesterol     Hx of adenomatous colonic polyps     4 tubular adenomas, 2 hyperplastic polyps 11/11/15    Hypertension     ICD (implantable cardioverter-defibrillator) discharge 08/2020    Ischemic cardiomyopathy     AICD 1/13 for EF 20-25%    Low back pain     history of     MI, old 2012    Osteoarthritis     knees    Plantar fasciitis     Seborrheic keratosis     history of     Smoker     Syncopal episodes 08/2020    STATES PASSED OUT AND THAT ICD HAD FIRED WHEN DEVICE WAS INTERROGATED     Current Outpatient Medications on File Prior to Visit   Medication Sig Dispense Refill    benzonatate (TESSALON) 100 MG capsule Take 1 capsule by mouth 3 times daily as needed for Cough      diazePAM (VALIUM) 5 MG tablet Take one pill prior to scan as needed 2 tablet 0    atorvastatin (LIPITOR) 80 MG tablet TAKE 1 TABLET BY MOUTH ONCE DAILY AT NIGHT 90 tablet 1    guaiFENesin-codeine

## 2024-04-21 ENCOUNTER — ANESTHESIA EVENT (OUTPATIENT)
Dept: OPERATING ROOM | Age: 69
End: 2024-04-21
Payer: MEDICARE

## 2024-04-22 ENCOUNTER — HOSPITAL ENCOUNTER (OUTPATIENT)
Dept: MRI IMAGING | Age: 69
Discharge: HOME | End: 2024-04-24
Attending: INTERNAL MEDICINE
Payer: MEDICARE

## 2024-04-22 ENCOUNTER — TELEPHONE (OUTPATIENT)
Dept: ONCOLOGY | Age: 69
End: 2024-04-22

## 2024-04-22 ENCOUNTER — TELEPHONE (OUTPATIENT)
Dept: UROLOGY | Age: 69
End: 2024-04-22

## 2024-04-22 ENCOUNTER — TELEPHONE (OUTPATIENT)
Dept: INFUSION THERAPY | Age: 69
End: 2024-04-22

## 2024-04-22 VITALS — OXYGEN SATURATION: 94 % | HEART RATE: 84 BPM

## 2024-04-22 DIAGNOSIS — C34.91 SMALL CELL LUNG CANCER, RIGHT (HCC): ICD-10-CM

## 2024-04-22 LAB
CREAT SERPL-MCNC: 1.1 MG/DL (ref 0.7–1.2)
GFR SERPL CREATININE-BSD FRML MDRD: 72 ML/MIN/1.73M2

## 2024-04-22 PROCEDURE — 6360000004 HC RX CONTRAST MEDICATION: Performed by: INTERNAL MEDICINE

## 2024-04-22 PROCEDURE — 70543 MRI ORBT/FAC/NCK W/O &W/DYE: CPT

## 2024-04-22 PROCEDURE — 82565 ASSAY OF CREATININE: CPT

## 2024-04-22 PROCEDURE — 2580000003 HC RX 258: Performed by: INTERNAL MEDICINE

## 2024-04-22 PROCEDURE — A9579 GAD-BASE MR CONTRAST NOS,1ML: HCPCS | Performed by: INTERNAL MEDICINE

## 2024-04-22 PROCEDURE — 70553 MRI BRAIN STEM W/O & W/DYE: CPT

## 2024-04-22 PROCEDURE — 36415 COLL VENOUS BLD VENIPUNCTURE: CPT

## 2024-04-22 RX ORDER — SODIUM CHLORIDE 0.9 % (FLUSH) 0.9 %
10 SYRINGE (ML) INJECTION PRN
Status: DISCONTINUED | OUTPATIENT
Start: 2024-04-22 | End: 2024-04-25 | Stop reason: HOSPADM

## 2024-04-22 RX ADMIN — SODIUM CHLORIDE, PRESERVATIVE FREE 10 ML: 5 INJECTION INTRAVENOUS at 08:39

## 2024-04-22 RX ADMIN — GADOTERIDOL 20 ML: 279.3 INJECTION, SOLUTION INTRAVENOUS at 08:38

## 2024-04-22 NOTE — TELEPHONE ENCOUNTER
Name: Andrea Desir  : 1955  MRN: [id number]    Oncology Navigation Follow-Up Note    Contact Type:  Telephone    Notes:   Writer receives call from son Enrrique (HIPAA). He requests he be called with any updates, information, appts. He states his father is having trouble seeing and his thought process is not consistent.     Patient was to ED last week and now has a alfaro. He requests contact information for Dr. Mariee to schedule follow up on alfaro. This was provided.     MRIs are being done this morning.      Electronically signed by Natalia Broussard RN on 2024 at 8:36 AM

## 2024-04-22 NOTE — TELEPHONE ENCOUNTER
Patient's son called stating his father was in [facility] recently and has a catheter placed and told to follow up with urology.  Call, Enrrique, patient's son, at [phone]

## 2024-04-22 NOTE — TELEPHONE ENCOUNTER
Spoke with Yuriy from the infusion center and discussed patient's port placement scheduled for this Thursday 4/25/24. Yuriy states he spoke with Dr. Mendenhall and he wants patient to start chemo ASAP this week and chemo is a 3 day regimen starting Tuesday, Wednesday & Thursday and then patient will have 2 weeks until next infusion.     Dr. Goldman can we reschedule the port placement to next week?     Please advise, Thank you in advance!

## 2024-04-23 ENCOUNTER — HOSPITAL ENCOUNTER (EMERGENCY)
Age: 69
Discharge: HOME | End: 2024-04-23
Attending: EMERGENCY MEDICINE
Payer: MEDICARE

## 2024-04-23 ENCOUNTER — APPOINTMENT (OUTPATIENT)
Dept: GENERAL RADIOLOGY | Age: 69
End: 2024-04-23
Payer: MEDICARE

## 2024-04-23 ENCOUNTER — TELEPHONE (OUTPATIENT)
Dept: FAMILY MEDICINE CLINIC | Age: 69
End: 2024-04-23

## 2024-04-23 ENCOUNTER — TELEPHONE (OUTPATIENT)
Dept: ONCOLOGY | Age: 69
End: 2024-04-23

## 2024-04-23 ENCOUNTER — HOSPITAL ENCOUNTER (OUTPATIENT)
Dept: INFUSION THERAPY | Age: 69
Discharge: HOME | End: 2024-04-23
Payer: MEDICARE

## 2024-04-23 VITALS
DIASTOLIC BLOOD PRESSURE: 75 MMHG | RESPIRATION RATE: 20 BRPM | BODY MASS INDEX: 35.76 KG/M2 | WEIGHT: 264 LBS | SYSTOLIC BLOOD PRESSURE: 142 MMHG | HEIGHT: 72 IN | HEART RATE: 76 BPM | OXYGEN SATURATION: 94 %

## 2024-04-23 VITALS
OXYGEN SATURATION: 96 % | WEIGHT: 259.4 LBS | TEMPERATURE: 97 F | SYSTOLIC BLOOD PRESSURE: 143 MMHG | RESPIRATION RATE: 16 BRPM | HEIGHT: 72 IN | HEART RATE: 72 BPM | DIASTOLIC BLOOD PRESSURE: 67 MMHG | BODY MASS INDEX: 35.13 KG/M2

## 2024-04-23 DIAGNOSIS — K59.00 CONSTIPATION, UNSPECIFIED CONSTIPATION TYPE: Primary | ICD-10-CM

## 2024-04-23 DIAGNOSIS — C34.91 SMALL CELL LUNG CANCER, RIGHT (HCC): Primary | ICD-10-CM

## 2024-04-23 LAB
ALBUMIN SERPL-MCNC: 3.9 G/DL (ref 3.5–5.2)
ALBUMIN/GLOB SERPL: 1.3 {RATIO} (ref 1–2.5)
ALP SERPL-CCNC: 72 U/L (ref 40–129)
ALT SERPL-CCNC: 19 U/L (ref 5–41)
ANION GAP SERPL CALCULATED.3IONS-SCNC: 11 MMOL/L (ref 9–17)
AST SERPL-CCNC: 29 U/L
BASOPHILS # BLD: 0.05 K/UL (ref 0–0.2)
BASOPHILS NFR BLD: 1 % (ref 0–2)
BILIRUB SERPL-MCNC: 0.8 MG/DL (ref 0.3–1.2)
BUN SERPL-MCNC: 11 MG/DL (ref 8–23)
BUN/CREAT SERPL: 11 (ref 9–20)
CALCIUM SERPL-MCNC: 9.2 MG/DL (ref 8.6–10.4)
CHLORIDE SERPL-SCNC: 102 MMOL/L (ref 98–107)
CO2 SERPL-SCNC: 26 MMOL/L (ref 20–31)
CORTIS SERPL-MCNC: 13.2 UG/DL (ref 2.5–19.5)
CORTISOL COLLECTION INFO: NORMAL
CREAT SERPL-MCNC: 1 MG/DL (ref 0.7–1.2)
EOSINOPHIL # BLD: 0.08 K/UL (ref 0–0.44)
EOSINOPHILS RELATIVE PERCENT: 1 % (ref 1–4)
ERYTHROCYTE [DISTWIDTH] IN BLOOD BY AUTOMATED COUNT: 12.9 % (ref 11.8–14.4)
GFR SERPL CREATININE-BSD FRML MDRD: 81 ML/MIN/1.73M2
GLUCOSE SERPL-MCNC: 143 MG/DL (ref 70–99)
HCT VFR BLD AUTO: 34.7 % (ref 40.7–50.3)
HGB BLD-MCNC: 12.2 G/DL (ref 13–17)
IMM GRANULOCYTES # BLD AUTO: <0.03 K/UL (ref 0–0.3)
IMM GRANULOCYTES NFR BLD: 0 %
LYMPHOCYTES NFR BLD: 1.37 K/UL (ref 1.1–3.7)
LYMPHOCYTES RELATIVE PERCENT: 16 % (ref 24–43)
MCH RBC QN AUTO: 35.2 PG (ref 25.2–33.5)
MCHC RBC AUTO-ENTMCNC: 35.2 G/DL (ref 25.2–33.5)
MCV RBC AUTO: 100 FL (ref 82.6–102.9)
MONOCYTES NFR BLD: 0.56 K/UL (ref 0.1–1.2)
MONOCYTES NFR BLD: 6 % (ref 3–12)
NEUTROPHILS NFR BLD: 76 % (ref 36–65)
NEUTS SEG NFR BLD: 6.68 K/UL (ref 1.5–8.1)
NRBC BLD-RTO: 0 PER 100 WBC
PLATELET # BLD AUTO: 142 K/UL (ref 138–453)
PMV BLD AUTO: 11.5 FL (ref 8.1–13.5)
POTASSIUM SERPL-SCNC: 3.5 MMOL/L (ref 3.7–5.3)
PROT SERPL-MCNC: 6.9 G/DL (ref 6.4–8.3)
RBC # BLD AUTO: 3.47 M/UL (ref 4.21–5.77)
SODIUM SERPL-SCNC: 139 MMOL/L (ref 135–144)
TSH SERPL DL<=0.05 MIU/L-ACNC: 6.14 UIU/ML (ref 0.3–5)
WBC OTHER # BLD: 8.8 K/UL (ref 3.5–11.3)

## 2024-04-23 PROCEDURE — 96415 CHEMO IV INFUSION ADDL HR: CPT

## 2024-04-23 PROCEDURE — 85025 COMPLETE CBC W/AUTO DIFF WBC: CPT

## 2024-04-23 PROCEDURE — 99284 EMERGENCY DEPT VISIT MOD MDM: CPT

## 2024-04-23 PROCEDURE — 6360000002 HC RX W HCPCS: Performed by: INTERNAL MEDICINE

## 2024-04-23 PROCEDURE — 96413 CHEMO IV INFUSION 1 HR: CPT

## 2024-04-23 PROCEDURE — 74018 RADEX ABDOMEN 1 VIEW: CPT

## 2024-04-23 PROCEDURE — 2580000003 HC RX 258: Performed by: INTERNAL MEDICINE

## 2024-04-23 PROCEDURE — 96417 CHEMO IV INFUS EACH ADDL SEQ: CPT

## 2024-04-23 PROCEDURE — 80053 COMPREHEN METABOLIC PANEL: CPT

## 2024-04-23 PROCEDURE — 96367 TX/PROPH/DG ADDL SEQ IV INF: CPT

## 2024-04-23 PROCEDURE — 96375 TX/PRO/DX INJ NEW DRUG ADDON: CPT

## 2024-04-23 PROCEDURE — 82533 TOTAL CORTISOL: CPT

## 2024-04-23 PROCEDURE — 84443 ASSAY THYROID STIM HORMONE: CPT

## 2024-04-23 RX ORDER — SODIUM CHLORIDE 0.9 % (FLUSH) 0.9 %
5-40 SYRINGE (ML) INJECTION PRN
Status: DISCONTINUED | OUTPATIENT
Start: 2024-04-23 | End: 2024-04-24 | Stop reason: HOSPADM

## 2024-04-23 RX ORDER — POLYETHYLENE GLYCOL 3350 17 G/17G
17 POWDER ORAL DAILY
Qty: 225 G | Refills: 0 | Status: SHIPPED | OUTPATIENT
Start: 2024-04-23

## 2024-04-23 RX ORDER — DEXAMETHASONE SODIUM PHOSPHATE 10 MG/ML
10 INJECTION, SOLUTION INTRAMUSCULAR; INTRAVENOUS ONCE
Status: COMPLETED | OUTPATIENT
Start: 2024-04-23 | End: 2024-04-23

## 2024-04-23 RX ORDER — HEPARIN 100 UNIT/ML
500 SYRINGE INTRAVENOUS PRN
Status: DISCONTINUED | OUTPATIENT
Start: 2024-04-23 | End: 2024-04-24 | Stop reason: HOSPADM

## 2024-04-23 RX ORDER — SODIUM CHLORIDE 9 MG/ML
5-250 INJECTION, SOLUTION INTRAVENOUS PRN
Status: DISCONTINUED | OUTPATIENT
Start: 2024-04-23 | End: 2024-04-24 | Stop reason: HOSPADM

## 2024-04-23 RX ORDER — PALONOSETRON 0.05 MG/ML
0.25 INJECTION, SOLUTION INTRAVENOUS ONCE
Status: COMPLETED | OUTPATIENT
Start: 2024-04-23 | End: 2024-04-23

## 2024-04-23 RX ADMIN — SODIUM CHLORIDE 150 MG: 9 INJECTION, SOLUTION INTRAVENOUS at 10:20

## 2024-04-23 RX ADMIN — SODIUM CHLORIDE 20 ML/HR: 9 INJECTION, SOLUTION INTRAVENOUS at 09:06

## 2024-04-23 RX ADMIN — PALONOSETRON HYDROCHLORIDE 0.25 MG: 0.25 INJECTION INTRAVENOUS at 10:08

## 2024-04-23 RX ADMIN — ETOPOSIDE 246 MG: 20 INJECTION, SOLUTION INTRAVENOUS at 12:38

## 2024-04-23 RX ADMIN — DEXAMETHASONE SODIUM PHOSPHATE 10 MG: 10 INJECTION, SOLUTION INTRAMUSCULAR; INTRAVENOUS at 10:08

## 2024-04-23 RX ADMIN — ATEZOLIZUMAB 1200 MG: 1200 INJECTION, SOLUTION INTRAVENOUS at 10:49

## 2024-04-23 RX ADMIN — CARBOPLATIN 580 MG: 10 INJECTION, SOLUTION INTRAVENOUS at 11:51

## 2024-04-23 ASSESSMENT — LIFESTYLE VARIABLES
HOW MANY STANDARD DRINKS CONTAINING ALCOHOL DO YOU HAVE ON A TYPICAL DAY: PATIENT DOES NOT DRINK
HOW OFTEN DO YOU HAVE A DRINK CONTAINING ALCOHOL: NEVER

## 2024-04-23 ASSESSMENT — PAIN - FUNCTIONAL ASSESSMENT: PAIN_FUNCTIONAL_ASSESSMENT: NONE - DENIES PAIN

## 2024-04-23 NOTE — ED PROVIDER NOTES
[facility]  EMERGENCY DEPARTMENT ENCOUNTER      Pt Name: Andrea Desir  MRN: [id number]  Birthdate 1955  Date of evaluation: 4/23/2024      CHIEF COMPLAINT       Chief Complaint   Patient presents with    Constipation         HISTORY OF PRESENT ILLNESS      The patient presents with concern for constipation.  He was seen on the 18th of this month for constipation and difficulty urinating.  He was sent home with an enema to use.  He says it did not work.  He says he has not moved his bowels in 2 weeks.  He is able to eat and drink however.  He says he is not vomiting.  He denies abdominal pain.  He is being treated for lung cancer with mediastinal lymphadenopathy and mass in the trachea and esophagus without occlusion.  He has yet to have a port placed for chemotherapy.      REVIEW OF SYSTEMS       All systems reviewed and negative unless noted in HPI.  The patient denies fever or constitutional symptoms.    Denies vision change.   Denies any sore throat or rhinorrhea.    Denies any neck pain or stiffness.    Denies chest pain or shortness of breath.  History of lung cancer.  History of using a CPAP at night.  No nausea,  vomiting or diarrhea.    Denies any dysuria.  Denies urinary frequency or hematuria.    Constipation as noted in HPI.  Denies any weakness, numbness or focal neurologic deficit.    Denies any skin rash or edema.    No recent psychiatric issues.   No easy bruising or bleeding.   Denies any polyuria, polydypsia or history of immunocompromise.       PAST MEDICAL HISTORY    has a past medical history of Anxiety, Atrial fibrillation (HCC), CAD (coronary artery disease), Erectile dysfunction, Headache(784.0), High cholesterol, Hx of adenomatous colonic polyps, Hypertension, ICD (implantable cardioverter-defibrillator) discharge, Ischemic cardiomyopathy, Low back pain, MI, old, Osteoarthritis, Plantar fasciitis, Seborrheic keratosis, Smoker, and Syncopal episodes.    SURGICAL HISTORY      has

## 2024-04-23 NOTE — PROGRESS NOTES
Dr. Mendenhall notified of pt being seen in ER last week for constipation.  Pt now has a alfaro due to urinary retention and has a follow up with urology. Chemo completed and no sign of reaction at this time.  Pt updated on changes with Dr. Mcgovern and port insertion.  Pt's son states his primary Doctor told him to go to ER and be seen for the constipation again.  Pt ambulated out infusion center without difficulty in stable condition.

## 2024-04-23 NOTE — TELEPHONE ENCOUNTER
Son, Enrrique calling stating pt was seen at ER for not urinating and constipation, pt was cathed and tld to contact Urology, pt spoke with Urology and they told pt to call [facility] to see about a stool softener and they told pt urinary rentention goes along with constipation, pt has cancer and gets his first chemo treatment today.   Son states pt hasn't had a BM since 4-7, pt uses pended pharmacy, please advise son at above number.

## 2024-04-23 NOTE — TELEPHONE ENCOUNTER
Contacted [facility] and spoke with Minerva and port placement rescheduled to Monday 4/29/24. Contacted and informed Yuriy in the infusion room and he states he will inform patient since patient is currently receiving chemo at this time.  Patient had a follow up appointment with Dr. Mendenhall on 4/29/24, writer contacted oncology and spoke with Ebonie and appointment rescheduled to 5/6/24 @ 2:00 pm. Called infusion room back to have them inform patient of rescheduled oncology appointment. Yuriy stated he would print patient updated appointment list.

## 2024-04-23 NOTE — TELEPHONE ENCOUNTER
Spoke with pt's son. He states he's estimating the last time pt had a BM, but he believes it was around 2 weeks ago. Pt was seen in ER on 4/18, and imaging showed mild constipation but no obstruction. Pt was discharged and recommended to do enema at home, which he did. He reports no relief from this treatment. Writer advised it would be best if pt returned to ER for further eval/treatment, but pt's son states he is hesitant to take him in because he is receiving chemo today, and he didn't feel it helped much last time. Pt's son requesting advice from on-call provider prior to taking him to ER. Please advise.

## 2024-04-23 NOTE — DISCHARGE INSTRUCTIONS
Start taking MiraLAX as directed.  Continue other medications as directed.  Use your CPAP machine.  Return for pain, vomiting, decreased bowel movements, or if worse in any way.    Please understand that at this time there is no evidence for a more serious underlying process, but that early in the process of an illness or injury, an emergency department workup can be falsely reassuring.  You should contact your family doctor within the next 48 hours for a follow up appointment    THANK YOU!!!    From [facility] and [facility]    On behalf of the Emergency Department staff at [facility], I would like to thank you for giving us the opportunity to address your health care needs and concerns.    We hope that during your visit, our service was delivered in a professional and caring manner. Please keep [facility] in mind as we walk with you down the path to your own personal wellness.     Please expect an automated text message or email from us so we can ask a few questions about your health and progress. Based on your answers, a clinician may call you back to offer help and instructions.    Please understand that early in the process of an illness or injury, an emergency department workup can be falsely reassuring.  If you notice any worsening, changing or persistent symptoms please call your family doctor or return to the ER immediately.     Tell us how we did during your visit at [email or web address]   and let us know about your experience

## 2024-04-23 NOTE — TELEPHONE ENCOUNTER
If he is not urinating, and not having bowel movements, I agree with evaluation at the emergency department.

## 2024-04-23 NOTE — FLOWSHEET NOTE
rounding in ONC.    Assessment: Patient was receiving his first treatment and accompanied by his son (Enrrique).  Patient was tired from lack of sleep, and had trouble breathing and speaking due to his swollen throat.  Patient has the support of his wife (Thelma) and two sons, and is on the prayer list of his daughter-in-law's Shinto.    Intervention: Engaged in conversation and active listening. Prayed with Patient and his son.     Outcome: Patient expressed appreciation for visit and offer of continued prayer.    Plan: Chaplains are available on site or on call 24/7 for spiritual and emotional support.    Electronically signed by Holly Cowan on 4/23/2024 at 3:44 PM

## 2024-04-24 ENCOUNTER — HOSPITAL ENCOUNTER (OUTPATIENT)
Dept: INFUSION THERAPY | Age: 69
Discharge: HOME | End: 2024-04-24
Payer: MEDICARE

## 2024-04-24 VITALS
WEIGHT: 260 LBS | SYSTOLIC BLOOD PRESSURE: 136 MMHG | DIASTOLIC BLOOD PRESSURE: 76 MMHG | RESPIRATION RATE: 18 BRPM | TEMPERATURE: 98.1 F | OXYGEN SATURATION: 94 % | BODY MASS INDEX: 35.26 KG/M2 | HEART RATE: 71 BPM

## 2024-04-24 DIAGNOSIS — C34.91 SMALL CELL LUNG CANCER, RIGHT (HCC): Primary | ICD-10-CM

## 2024-04-24 PROCEDURE — 2580000003 HC RX 258: Performed by: INTERNAL MEDICINE

## 2024-04-24 PROCEDURE — 6360000002 HC RX W HCPCS: Performed by: INTERNAL MEDICINE

## 2024-04-24 PROCEDURE — 96413 CHEMO IV INFUSION 1 HR: CPT

## 2024-04-24 PROCEDURE — 96375 TX/PRO/DX INJ NEW DRUG ADDON: CPT

## 2024-04-24 RX ORDER — SODIUM CHLORIDE 0.9 % (FLUSH) 0.9 %
5-40 SYRINGE (ML) INJECTION PRN
Status: DISCONTINUED | OUTPATIENT
Start: 2024-04-24 | End: 2024-04-25 | Stop reason: HOSPADM

## 2024-04-24 RX ORDER — DEXAMETHASONE SODIUM PHOSPHATE 10 MG/ML
8 INJECTION, SOLUTION INTRAMUSCULAR; INTRAVENOUS ONCE
Status: COMPLETED | OUTPATIENT
Start: 2024-04-24 | End: 2024-04-24

## 2024-04-24 RX ORDER — ONDANSETRON 4 MG/1
4 TABLET, ORALLY DISINTEGRATING ORAL EVERY 6 HOURS PRN
COMMUNITY

## 2024-04-24 RX ORDER — SODIUM CHLORIDE 9 MG/ML
5-250 INJECTION, SOLUTION INTRAVENOUS PRN
Status: DISCONTINUED | OUTPATIENT
Start: 2024-04-24 | End: 2024-04-25 | Stop reason: HOSPADM

## 2024-04-24 RX ADMIN — ETOPOSIDE 246 MG: 20 INJECTION INTRAVENOUS at 13:40

## 2024-04-24 RX ADMIN — DEXAMETHASONE SODIUM PHOSPHATE 8 MG: 10 INJECTION, SOLUTION INTRAMUSCULAR; INTRAVENOUS at 13:10

## 2024-04-24 RX ADMIN — SODIUM CHLORIDE 20 ML/HR: 9 INJECTION, SOLUTION INTRAVENOUS at 13:21

## 2024-04-24 NOTE — PROGRESS NOTES
On unit for day 2 of cycle.  Son at chair side.  No complaints.  IV flushed and is infusing.  No complaints.

## 2024-04-25 ENCOUNTER — OFFICE VISIT (OUTPATIENT)
Dept: PRIMARY CARE CLINIC | Age: 69
End: 2024-04-25
Payer: MEDICARE

## 2024-04-25 ENCOUNTER — HOSPITAL ENCOUNTER (OUTPATIENT)
Dept: INFUSION THERAPY | Age: 69
Discharge: HOME | End: 2024-04-25
Payer: MEDICARE

## 2024-04-25 ENCOUNTER — ANESTHESIA (OUTPATIENT)
Dept: OPERATING ROOM | Age: 69
End: 2024-04-25
Payer: MEDICARE

## 2024-04-25 VITALS
HEIGHT: 72 IN | RESPIRATION RATE: 16 BRPM | DIASTOLIC BLOOD PRESSURE: 80 MMHG | WEIGHT: 266.38 LBS | SYSTOLIC BLOOD PRESSURE: 130 MMHG | TEMPERATURE: 96.8 F | HEART RATE: 60 BPM | OXYGEN SATURATION: 95 % | BODY MASS INDEX: 36.08 KG/M2

## 2024-04-25 VITALS
HEART RATE: 70 BPM | RESPIRATION RATE: 18 BRPM | TEMPERATURE: 97 F | OXYGEN SATURATION: 96 % | DIASTOLIC BLOOD PRESSURE: 78 MMHG | SYSTOLIC BLOOD PRESSURE: 143 MMHG

## 2024-04-25 DIAGNOSIS — C34.91 SMALL CELL LUNG CANCER, RIGHT (HCC): Primary | ICD-10-CM

## 2024-04-25 DIAGNOSIS — B37.49 CANDIDA INFECTION OF GENITAL REGION: Primary | ICD-10-CM

## 2024-04-25 PROCEDURE — 96375 TX/PRO/DX INJ NEW DRUG ADDON: CPT

## 2024-04-25 PROCEDURE — 6360000002 HC RX W HCPCS: Performed by: INTERNAL MEDICINE

## 2024-04-25 PROCEDURE — 96413 CHEMO IV INFUSION 1 HR: CPT

## 2024-04-25 PROCEDURE — 3079F DIAST BP 80-89 MM HG: CPT

## 2024-04-25 PROCEDURE — 2580000003 HC RX 258: Performed by: INTERNAL MEDICINE

## 2024-04-25 PROCEDURE — 1123F ACP DISCUSS/DSCN MKR DOCD: CPT

## 2024-04-25 PROCEDURE — 3075F SYST BP GE 130 - 139MM HG: CPT

## 2024-04-25 PROCEDURE — 99213 OFFICE O/P EST LOW 20 MIN: CPT

## 2024-04-25 RX ORDER — NYSTATIN 100000 U/G
OINTMENT TOPICAL
Qty: 1 EACH | Refills: 2 | Status: SHIPPED | OUTPATIENT
Start: 2024-04-25

## 2024-04-25 RX ORDER — DEXAMETHASONE SODIUM PHOSPHATE 10 MG/ML
8 INJECTION, SOLUTION INTRAMUSCULAR; INTRAVENOUS ONCE
Status: COMPLETED | OUTPATIENT
Start: 2024-04-25 | End: 2024-04-25

## 2024-04-25 RX ORDER — SODIUM CHLORIDE 0.9 % (FLUSH) 0.9 %
5-40 SYRINGE (ML) INJECTION PRN
Status: DISCONTINUED | OUTPATIENT
Start: 2024-04-25 | End: 2024-04-26 | Stop reason: HOSPADM

## 2024-04-25 RX ORDER — SODIUM CHLORIDE 9 MG/ML
5-250 INJECTION, SOLUTION INTRAVENOUS PRN
Status: DISCONTINUED | OUTPATIENT
Start: 2024-04-25 | End: 2024-04-26 | Stop reason: HOSPADM

## 2024-04-25 RX ADMIN — ETOPOSIDE 246 MG: 20 INJECTION INTRAVENOUS at 13:37

## 2024-04-25 RX ADMIN — DEXAMETHASONE SODIUM PHOSPHATE 8 MG: 10 INJECTION, SOLUTION INTRAMUSCULAR; INTRAVENOUS at 13:06

## 2024-04-25 RX ADMIN — SODIUM CHLORIDE 20 ML/HR: 9 INJECTION, SOLUTION INTRAVENOUS at 12:57

## 2024-04-25 NOTE — PATIENT INSTRUCTIONS
APPLY OINTMENT TWICE A DAY UNTIL SYMPTOMS RESOLVE  KEEP AREA CLEAN AND DRY  USE HAIRDRYER ON COOL SETTING AFTER SHOWER/BATHS TO ENSURE COMPLETELY DRY  PLACE THIN COTTON CLOTH BETWEEN SKIN FOLDS TO DECREASE FRICTION  RETURN OR FOLLOW UP WITH PCP FOR CONTINUED OR WORSENED SYMPTOMS

## 2024-04-25 NOTE — PROGRESS NOTES
[facility]  [address] [facility] [address]  Phone: [phone]  Fax: [phone]      Andrea Desir is a 69 y.o. male who presents to the [facility] today for his medical conditions/complaints as noted below. Andrea Desir is c/o of Rash (Rash in his groin )          HPI:     Rash  This is a new problem. The current episode started yesterday. The problem has been gradually worsening since onset. The affected locations include the groin. He was exposed to nothing. Pertinent negatives include no fever. Treatments tried: otc CREAM. The treatment provided mild relief.       Past Medical History:   Diagnosis Date    Anxiety     Atrial fibrillation (HCC)     CAD (coronary artery disease) 2012    stent to LAD after MI    Erectile dysfunction     Headache(784.0)     High cholesterol     Hx of adenomatous colonic polyps     4 tubular adenomas, 2 hyperplastic polyps 11/11/15    Hypertension     ICD (implantable cardioverter-defibrillator) discharge 08/2020    Ischemic cardiomyopathy     AICD 1/13 for EF 20-25%    Low back pain     history of     MI, old 2012    Osteoarthritis     knees    Plantar fasciitis     Seborrheic keratosis     history of     Smoker     Syncopal episodes 08/2020    STATES PASSED OUT AND THAT ICD HAD FIRED WHEN DEVICE WAS INTERROGATED        No Known Allergies    Wt Readings from Last 3 Encounters:   04/25/24 120.8 kg (266 lb 6 oz)   04/24/24 117.9 kg (260 lb)   04/23/24 119.7 kg (264 lb)     BP Readings from Last 3 Encounters:   04/25/24 130/80   04/25/24 (!) 143/78   04/24/24 136/76      Temp Readings from Last 3 Encounters:   04/25/24 96.8 °F (36 °C) (Tympanic)   04/25/24 97 °F (36.1 °C) (Temporal)   04/24/24 98.1 °F (36.7 °C)     Pulse Readings from Last 3 Encounters:   04/25/24 60   04/25/24 70   04/24/24 71     SpO2 Readings from Last 3 Encounters:   04/25/24 95%   04/25/24 96%   04/24/24 94%       Subjective:

## 2024-04-25 NOTE — PROGRESS NOTES
Etoposide completed and no sign of reaction at this time.  Paperwork given regarding date and time of next treatment.  Pt ambulated out infusion center without difficulty in stable condition.

## 2024-04-28 NOTE — H&P
TABLET BY MOUTH ONCE DAILY AT NIGHT 90 tablet 1    guaiFENesin-codeine (TUSSI-ORGANIDIN NR) 100-10 MG/5ML syrup Take 5 mLs by mouth 4 times daily as needed for Cough for up to 30 days. Max Daily Amount: 20 mLs 240 mL 0    LORazepam (ATIVAN) 0.5 MG tablet Take 1 tablet by mouth every 8 hours as needed for Anxiety for up to 30 days. Max Daily Amount: 1.5 mg 90 tablet 0    traZODone (DESYREL) 50 MG tablet Take 1 tablet by mouth nightly as needed for Sleep 30 tablet 2    sacubitril-valsartan (ENTRESTO) 24-26 MG per tablet Take 1 tablet by mouth 2 times daily 60 tablet 5    amiodarone (CORDARONE) 200 MG tablet Take 1 tablet by mouth daily 90 tablet 1    clopidogrel (PLAVIX) 75 MG tablet 1 po daily 90 tablet 2    nystatin (MYCOSTATIN) 553092 UNIT/GM cream Apply topically 3 times daily. (Patient not taking: Reported on 3/23/2024) 30 g 0    furosemide (LASIX) 40 MG tablet Take 1 tablet by mouth daily 30 tablet 5    nystatin (MYCOSTATIN) 528569 UNIT/GM powder Apply 2 times daily to right axilla. (Patient not taking: Reported on 3/23/2024) 30 g 0    metoprolol tartrate (LOPRESSOR) 25 MG tablet Take one tablet in am and take 1/2 tablet in pm. 135 tablet 3    Ascorbic Acid (VITAMIN C) 250 MG tablet Take 1 tablet by mouth daily      fluticasone (FLONASE) 50 MCG/ACT nasal spray 2 sprays by Nasal route daily 3 each 1    calcium carbonate (OSCAL) 500 MG TABS tablet Take 1 tablet by mouth daily      sildenafil (VIAGRA) 100 MG tablet TAKE 1 TABLET BY MOUTH AS NEEDED FOR ERECTILE DYSFUNCTION 10 tablet 2    aspirin 81 MG tablet Take 1 tablet by mouth daily 30 tablet 6    Omega-3 Fatty Acids (FISH OIL) 1000 MG CAPS Take 1,200 mg by mouth 2 times daily       Multiple Vitamins-Minerals (THERAPEUTIC MULTIVITAMIN-MINERALS) tablet Take 1 tablet by mouth daily       Allergies as of 04/19/2024    (No Known Allergies)     PHYSICAL EXAM:    There were no vitals taken for this visit.    Gen:  A and O x 3, NAD, well nourished  Eyes:  Sclera non

## 2024-04-29 ENCOUNTER — APPOINTMENT (OUTPATIENT)
Dept: GENERAL RADIOLOGY | Age: 69
End: 2024-04-29
Attending: SURGERY
Payer: MEDICARE

## 2024-04-29 ENCOUNTER — HOSPITAL ENCOUNTER (OUTPATIENT)
Age: 69
Setting detail: OUTPATIENT SURGERY
Discharge: HOME | End: 2024-04-29
Attending: SURGERY | Admitting: SURGERY
Payer: MEDICARE

## 2024-04-29 VITALS
DIASTOLIC BLOOD PRESSURE: 98 MMHG | HEIGHT: 72 IN | BODY MASS INDEX: 35.41 KG/M2 | OXYGEN SATURATION: 92 % | SYSTOLIC BLOOD PRESSURE: 128 MMHG | TEMPERATURE: 98.7 F | HEART RATE: 64 BPM | RESPIRATION RATE: 16 BRPM | WEIGHT: 261.4 LBS

## 2024-04-29 DIAGNOSIS — G89.18 POSTOPERATIVE PAIN: Primary | ICD-10-CM

## 2024-04-29 PROCEDURE — 7100000010 HC PHASE II RECOVERY - FIRST 15 MIN: Performed by: SURGERY

## 2024-04-29 PROCEDURE — 3700000001 HC ADD 15 MINUTES (ANESTHESIA): Performed by: SURGERY

## 2024-04-29 PROCEDURE — 2709999900 HC NON-CHARGEABLE SUPPLY: Performed by: SURGERY

## 2024-04-29 PROCEDURE — 71045 X-RAY EXAM CHEST 1 VIEW: CPT

## 2024-04-29 PROCEDURE — 36561 INSERT TUNNELED CV CATH: CPT | Performed by: SURGERY

## 2024-04-29 PROCEDURE — 3700000000 HC ANESTHESIA ATTENDED CARE: Performed by: SURGERY

## 2024-04-29 PROCEDURE — 2500000003 HC RX 250 WO HCPCS

## 2024-04-29 PROCEDURE — 7100000011 HC PHASE II RECOVERY - ADDTL 15 MIN: Performed by: SURGERY

## 2024-04-29 PROCEDURE — 6360000002 HC RX W HCPCS: Performed by: SURGERY

## 2024-04-29 PROCEDURE — 2580000003 HC RX 258: Performed by: SURGERY

## 2024-04-29 PROCEDURE — 3600000012 HC SURGERY LEVEL 2 ADDTL 15MIN: Performed by: SURGERY

## 2024-04-29 PROCEDURE — C1788 PORT, INDWELLING, IMP: HCPCS | Performed by: SURGERY

## 2024-04-29 PROCEDURE — 6360000002 HC RX W HCPCS

## 2024-04-29 PROCEDURE — 77001 FLUOROGUIDE FOR VEIN DEVICE: CPT

## 2024-04-29 PROCEDURE — 2500000003 HC RX 250 WO HCPCS: Performed by: SURGERY

## 2024-04-29 PROCEDURE — 3600000002 HC SURGERY LEVEL 2 BASE: Performed by: SURGERY

## 2024-04-29 DEVICE — POWERPORT M.R.I. IMPLANTABLE PORT WITH PRE-ATTACHED 9.6F  OPEN-ENDED SINGLE-LUMEN VENOUS CATHETER. INTERMEDIATE KIT (WITH SUTURE PLUGS)
Type: IMPLANTABLE DEVICE | Site: CHEST  WALL | Status: FUNCTIONAL
Brand: POWERPORT M.R.I.

## 2024-04-29 RX ORDER — SODIUM CHLORIDE 9 MG/ML
INJECTION, SOLUTION INTRAVENOUS PRN
Status: DISCONTINUED | OUTPATIENT
Start: 2024-04-29 | End: 2024-04-29 | Stop reason: HOSPADM

## 2024-04-29 RX ORDER — SODIUM CHLORIDE 0.9 % (FLUSH) 0.9 %
5-40 SYRINGE (ML) INJECTION EVERY 12 HOURS SCHEDULED
Status: DISCONTINUED | OUTPATIENT
Start: 2024-04-29 | End: 2024-04-29 | Stop reason: HOSPADM

## 2024-04-29 RX ORDER — SODIUM CHLORIDE, SODIUM LACTATE, POTASSIUM CHLORIDE, CALCIUM CHLORIDE 600; 310; 30; 20 MG/100ML; MG/100ML; MG/100ML; MG/100ML
INJECTION, SOLUTION INTRAVENOUS CONTINUOUS
Status: DISCONTINUED | OUTPATIENT
Start: 2024-04-29 | End: 2024-04-29 | Stop reason: HOSPADM

## 2024-04-29 RX ORDER — DEXMEDETOMIDINE HYDROCHLORIDE 100 UG/ML
INJECTION, SOLUTION INTRAVENOUS PRN
Status: DISCONTINUED | OUTPATIENT
Start: 2024-04-29 | End: 2024-04-29 | Stop reason: SDUPTHER

## 2024-04-29 RX ORDER — HYDROCODONE BITARTRATE AND ACETAMINOPHEN 5; 325 MG/1; MG/1
1 TABLET ORAL EVERY 6 HOURS PRN
Qty: 28 TABLET | Refills: 0 | Status: SHIPPED | OUTPATIENT
Start: 2024-04-29 | End: 2024-05-06

## 2024-04-29 RX ORDER — HEPARIN SODIUM 5000 [USP'U]/ML
INJECTION, SOLUTION INTRAVENOUS; SUBCUTANEOUS PRN
Status: DISCONTINUED | OUTPATIENT
Start: 2024-04-29 | End: 2024-04-29 | Stop reason: ALTCHOICE

## 2024-04-29 RX ORDER — SODIUM CHLORIDE 0.9 % (FLUSH) 0.9 %
5-40 SYRINGE (ML) INJECTION PRN
Status: DISCONTINUED | OUTPATIENT
Start: 2024-04-29 | End: 2024-04-29 | Stop reason: HOSPADM

## 2024-04-29 RX ORDER — MIDAZOLAM HYDROCHLORIDE 1 MG/ML
INJECTION INTRAMUSCULAR; INTRAVENOUS PRN
Status: DISCONTINUED | OUTPATIENT
Start: 2024-04-29 | End: 2024-04-29 | Stop reason: SDUPTHER

## 2024-04-29 RX ADMIN — SODIUM CHLORIDE, POTASSIUM CHLORIDE, SODIUM LACTATE AND CALCIUM CHLORIDE: 600; 310; 30; 20 INJECTION, SOLUTION INTRAVENOUS at 14:05

## 2024-04-29 RX ADMIN — DEXMEDETOMIDINE HYDROCHLORIDE 20 MCG: 100 INJECTION, SOLUTION INTRAVENOUS at 14:08

## 2024-04-29 RX ADMIN — MIDAZOLAM HYDROCHLORIDE 2 MG: 1 INJECTION, SOLUTION INTRAMUSCULAR; INTRAVENOUS at 14:08

## 2024-04-29 RX ADMIN — DEXMEDETOMIDINE HYDROCHLORIDE 10 MCG: 100 INJECTION, SOLUTION INTRAVENOUS at 14:23

## 2024-04-29 RX ADMIN — SODIUM CHLORIDE 3000 MG: 900 INJECTION INTRAVENOUS at 14:06

## 2024-04-29 RX ADMIN — MIDAZOLAM HYDROCHLORIDE 1 MG: 1 INJECTION, SOLUTION INTRAMUSCULAR; INTRAVENOUS at 14:17

## 2024-04-29 ASSESSMENT — PAIN SCALES - GENERAL
PAINLEVEL_OUTOF10: 0

## 2024-04-29 ASSESSMENT — PAIN - FUNCTIONAL ASSESSMENT
PAIN_FUNCTIONAL_ASSESSMENT: 0-10
PAIN_FUNCTIONAL_ASSESSMENT: 0-10

## 2024-04-29 ASSESSMENT — LIFESTYLE VARIABLES: SMOKING_STATUS: 1

## 2024-04-29 NOTE — DISCHARGE INSTRUCTIONS
Norco for pain  May use port in 24 hours  May remove the dressings and may shower and get incision wet in 24 hours  No follow up needed but if any issues with port please call Dr Goldman's office.  OK to discharge if postop CXR OK     Post Operative Instructions for INFUSION PORT INSERTION    Activity - In general, you should plan to rest today.  You have received sedation - DO NOT DRIVE A CAR, OPERATE MACHINERY OR PARTICIPATE IN ANY ACTIVITY WHICH HAS A SIGNIFICANT RISK FOR INJURY for at least 24 hours following surgery, or while taking narcotic medication.    Diet - You may resume your usual diet (including any previous restrictions) unless otherwise instructed.  AVOID ALCOHOL WHILE TAKING PAIN MEDICATIONS.    Medication and Pain Management - Unless otherwise instructed, resume all of your usual medications.  In addition, you will be given a prescription for pain medication which should be taken only as labeled.  Keep in mind that narcotic medications can cause constipation - you may wish to take an OTC stool softener.  For mild discomfort you may take any OTC acetaminophen (Tylenol) or ibuprofen (Advil/Motrin) product, instead of the prescription pain medication.    Wound Care -   Leave your bandage intact for 24 hours.  After that time it may be removed, and left off if desired.  After 24 hours you may shower or sponge bathe, do not soak in a tub.  Do not scrub your incisions or apply ointments or lotions, unless otherwise instructed.  If you have paper tapes on your incision (steri-strips, butterfly bandages,etc.,) you may get them wet.  Typically they will loosen and fall off in 5-7 days.  Some bruising and discoloration near the site is common and will gradually fade over a few weeks.  You may use an ice pack to the site for 20 minutes, 3-4 times daily for 2-3 days to help with discomfort, swelling and bruising.    Follow-up - As scheduled.    Notify your physician if you have any of the following

## 2024-04-29 NOTE — ANESTHESIA PRE PROCEDURE
Department of Anesthesiology  Preprocedure Note       Name:  Andrea Desir   Age:  69 y.o.  :  1955                                          MRN:  [id number]         Date:  2024      Surgeon: Surgeon(s):  Franck Goldman MD    Procedure: Procedure(s):  Right Port Placement (1145 CRNA airway assessment)    Medications prior to admission:   Prior to Admission medications    Medication Sig Start Date End Date Taking? Authorizing Provider   nystatin (MYCOSTATIN) 766447 UNIT/GM ointment Apply topically 2 times daily. 24   Lisha Wilder APRN - CNP   ondansetron (ZOFRAN-ODT) 4 MG disintegrating tablet Take 1 tablet by mouth every 6 hours as needed for Nausea or Vomiting    Jonathon Mendenhall MD   polyethylene glycol (MIRALAX) 17 GM/SCOOP POWD powder Take 17 g by mouth daily 24   Sharyn Fish MD   benzonatate (TESSALON) 100 MG capsule Take 1 capsule by mouth 3 times daily as needed for Cough 24   Provider, MD Johanne   diazePAM (VALIUM) 5 MG tablet Take one pill prior to scan as needed 24  Jonathon Mendenhall MD   atorvastatin (LIPITOR) 80 MG tablet TAKE 1 TABLET BY MOUTH ONCE DAILY AT NIGHT 4/15/24   Deon Art MD   guaiFENesin-codeine (TUSSI-ORGANIDIN NR) 100-10 MG/5ML syrup Take 5 mLs by mouth 4 times daily as needed for Cough for up to 30 days. Max Daily Amount: 20 mLs 4/15/24 5/15/24  Jonathon Mendenhall MD   LORazepam (ATIVAN) 0.5 MG tablet Take 1 tablet by mouth every 8 hours as needed for Anxiety for up to 30 days. Max Daily Amount: 1.5 mg 4/15/24 5/15/24  Jonathon Mendenhall MD   traZODone (DESYREL) 50 MG tablet Take 1 tablet by mouth nightly as needed for Sleep 24   Deon Art MD   sacubitril-valsartan (ENTRESTO) 24-26 MG per tablet Take 1 tablet by mouth 2 times daily 23   Deon Art MD   amiodarone (CORDARONE) 200 MG tablet Take 1 tablet by mouth daily 23   Deon Art MD   clopidogrel (PLAVIX) 75 MG tablet 1 po daily 23   Deon Art,

## 2024-04-29 NOTE — ANESTHESIA POSTPROCEDURE EVALUATION
Department of Anesthesiology  Postprocedure Note    Patient: Andrea Desir  MRN: [id number]  YOB: 1955  Date of evaluation: 4/29/2024    Procedure Summary       Date: 04/29/24 Room / Location: [address]    Anesthesia Start: 1405 Anesthesia Stop: 1448    Procedure: Right Port Placement (Right) Diagnosis:       Small cell lung carcinoma, right (HCC)      (Small cell lung carcinoma, right (HCC) [C34.91])    Surgeons: Franck Goldman MD Responsible Provider: Devaughn Muñoz APRN - CRNA    Anesthesia Type: General, TIVA ASA Status: 3            Anesthesia Type: General, TIVA    Garrett Phase I: Garrett Score: 10    Garrett Phase II:      Anesthesia Post Evaluation    Patient location during evaluation: bedside  Level of consciousness: sleepy but conscious  Airway patency: patent  Nausea & Vomiting: no nausea and no vomiting  Cardiovascular status: hemodynamically stable  Respiratory status: spontaneous ventilation  Hydration status: euvolemic  Pain management: satisfactory to patient    No notable events documented.

## 2024-04-29 NOTE — OP NOTE
OP NOTE:  PLACEMENT OF MEDIPORT      Pre op diagnosis:  1.  Right lung cancer    Post op diagnosis:  Same    Operation:  Insertion of a Power \"Smart\" Port into the right subclavian                     Vein.    Surgeon:  Dr. Bales    Anesthesia:  IV sedation with local using 1%lido with epi and .25% marcaine    EBL:  Minimal        Procedure:    Patient was taken to the OR and placed in a slight reverse trendelenburg position.  The right infraclavicular area was prepped and draped in the usual sterile fashion.  The subclavian Vein was entered and a guide wire was inserted into the subclavian vein via the sterile Seldinger technique.  The wire was verified to be in correct position via fluoroscopy.  Then a pocket was made inferior to the wire insertion site.  Then the port was measured to the \"angle of James\".  Then the dilator and sheath were threaded over the guide wire.  The guide wire and the dilator were removed.  The catheter was then inserted through the sheath without difficulty.  The port had good blood return when accessed.  Then we visualized the tip of the catheter under fluoroscopy with contrast.  This showed good position at the junction of the SVC and the right atrium.  The port was then sutured to the pectoralis fascia with 2 3-0 vicryl sutures.  Then the port was flushed with a heparinized solution of 1000 units per 1 cc, with a total of 4 cc.  The incision was closed in layered fashion with 3-0 and 4-0 vicryl.  Steri strips were applied.  A sterile dressing was applied and patient was taken back to recovery in stable condition.  A STAT PCXR was ordered in post op, to rule out pneumothorax and verify good placement.            LUIS BALES MD

## 2024-04-30 NOTE — PROGRESS NOTES
CLINICAL PHARMACY NOTE: MEDS TO BEDS    Total # of Prescriptions Filled: 1   The following medications were delivered to the patient:  Hydrocodone/apap 5/325mg    Additional Documentation:

## 2024-05-06 ENCOUNTER — OFFICE VISIT (OUTPATIENT)
Dept: ONCOLOGY | Age: 69
End: 2024-05-06
Payer: MEDICARE

## 2024-05-06 ENCOUNTER — TELEPHONE (OUTPATIENT)
Dept: ONCOLOGY | Age: 69
End: 2024-05-06

## 2024-05-06 VITALS
SYSTOLIC BLOOD PRESSURE: 116 MMHG | HEART RATE: 59 BPM | DIASTOLIC BLOOD PRESSURE: 58 MMHG | TEMPERATURE: 97.4 F | WEIGHT: 245 LBS | RESPIRATION RATE: 16 BRPM | OXYGEN SATURATION: 96 % | HEIGHT: 72 IN | BODY MASS INDEX: 33.18 KG/M2

## 2024-05-06 DIAGNOSIS — C34.91 SMALL CELL LUNG CANCER, RIGHT (HCC): Primary | ICD-10-CM

## 2024-05-06 PROCEDURE — 99214 OFFICE O/P EST MOD 30 MIN: CPT | Performed by: INTERNAL MEDICINE

## 2024-05-06 PROCEDURE — 99215 OFFICE O/P EST HI 40 MIN: CPT | Performed by: INTERNAL MEDICINE

## 2024-05-06 PROCEDURE — 3078F DIAST BP <80 MM HG: CPT | Performed by: INTERNAL MEDICINE

## 2024-05-06 PROCEDURE — 1123F ACP DISCUSS/DSCN MKR DOCD: CPT | Performed by: INTERNAL MEDICINE

## 2024-05-06 PROCEDURE — 3074F SYST BP LT 130 MM HG: CPT | Performed by: INTERNAL MEDICINE

## 2024-05-06 RX ORDER — SODIUM CHLORIDE 0.9 % (FLUSH) 0.9 %
5-40 SYRINGE (ML) INJECTION PRN
OUTPATIENT
Start: 2024-05-14

## 2024-05-06 RX ORDER — ACETAMINOPHEN 325 MG/1
650 TABLET ORAL
Start: 2024-05-14

## 2024-05-06 RX ORDER — SODIUM CHLORIDE 9 MG/ML
INJECTION, SOLUTION INTRAVENOUS CONTINUOUS
OUTPATIENT
Start: 2024-05-15

## 2024-05-06 RX ORDER — SODIUM CHLORIDE 9 MG/ML
5-250 INJECTION, SOLUTION INTRAVENOUS PRN
OUTPATIENT
Start: 2024-05-14

## 2024-05-06 RX ORDER — SODIUM CHLORIDE 9 MG/ML
5-250 INJECTION, SOLUTION INTRAVENOUS PRN
OUTPATIENT
Start: 2024-05-15

## 2024-05-06 RX ORDER — MEPERIDINE HYDROCHLORIDE 50 MG/ML
12.5 INJECTION INTRAMUSCULAR; INTRAVENOUS; SUBCUTANEOUS PRN
OUTPATIENT
Start: 2024-05-15

## 2024-05-06 RX ORDER — MEPERIDINE HYDROCHLORIDE 50 MG/ML
12.5 INJECTION INTRAMUSCULAR; INTRAVENOUS; SUBCUTANEOUS PRN
OUTPATIENT
Start: 2024-05-14

## 2024-05-06 RX ORDER — FAMOTIDINE 10 MG/ML
20 INJECTION, SOLUTION INTRAVENOUS
OUTPATIENT
Start: 2024-05-16

## 2024-05-06 RX ORDER — ACETAMINOPHEN 325 MG/1
650 TABLET ORAL
OUTPATIENT
Start: 2024-05-16

## 2024-05-06 RX ORDER — ONDANSETRON 2 MG/ML
8 INJECTION INTRAMUSCULAR; INTRAVENOUS
OUTPATIENT
Start: 2024-05-15

## 2024-05-06 RX ORDER — SODIUM CHLORIDE 9 MG/ML
INJECTION, SOLUTION INTRAVENOUS CONTINUOUS
OUTPATIENT
Start: 2024-05-14

## 2024-05-06 RX ORDER — SODIUM CHLORIDE 9 MG/ML
5-250 INJECTION, SOLUTION INTRAVENOUS PRN
OUTPATIENT
Start: 2024-05-16

## 2024-05-06 RX ORDER — EPINEPHRINE 1 MG/ML
0.3 INJECTION, SOLUTION, CONCENTRATE INTRAVENOUS PRN
OUTPATIENT
Start: 2024-05-16

## 2024-05-06 RX ORDER — MEPERIDINE HYDROCHLORIDE 50 MG/ML
12.5 INJECTION INTRAMUSCULAR; INTRAVENOUS; SUBCUTANEOUS PRN
OUTPATIENT
Start: 2024-05-16

## 2024-05-06 RX ORDER — SODIUM CHLORIDE 9 MG/ML
INJECTION, SOLUTION INTRAVENOUS CONTINUOUS
OUTPATIENT
Start: 2024-05-16

## 2024-05-06 RX ORDER — EPINEPHRINE 1 MG/ML
0.3 INJECTION, SOLUTION, CONCENTRATE INTRAVENOUS PRN
OUTPATIENT
Start: 2024-05-14

## 2024-05-06 RX ORDER — HEPARIN SODIUM (PORCINE) LOCK FLUSH IV SOLN 100 UNIT/ML 100 UNIT/ML
500 SOLUTION INTRAVENOUS PRN
OUTPATIENT
Start: 2024-05-14

## 2024-05-06 RX ORDER — ONDANSETRON 2 MG/ML
8 INJECTION INTRAMUSCULAR; INTRAVENOUS
OUTPATIENT
Start: 2024-05-14

## 2024-05-06 RX ORDER — DIPHENHYDRAMINE HYDROCHLORIDE 50 MG/ML
50 INJECTION INTRAMUSCULAR; INTRAVENOUS
OUTPATIENT
Start: 2024-05-14

## 2024-05-06 RX ORDER — SODIUM CHLORIDE 0.9 % (FLUSH) 0.9 %
5-40 SYRINGE (ML) INJECTION PRN
OUTPATIENT
Start: 2024-05-16

## 2024-05-06 RX ORDER — HEPARIN SODIUM (PORCINE) LOCK FLUSH IV SOLN 100 UNIT/ML 100 UNIT/ML
500 SOLUTION INTRAVENOUS PRN
OUTPATIENT
Start: 2024-05-16

## 2024-05-06 RX ORDER — ALBUTEROL SULFATE 90 UG/1
4 AEROSOL, METERED RESPIRATORY (INHALATION) PRN
OUTPATIENT
Start: 2024-05-14

## 2024-05-06 RX ORDER — PALONOSETRON 0.05 MG/ML
0.25 INJECTION, SOLUTION INTRAVENOUS ONCE
OUTPATIENT
Start: 2024-05-14 | End: 2024-05-14

## 2024-05-06 RX ORDER — ALBUTEROL SULFATE 90 UG/1
4 AEROSOL, METERED RESPIRATORY (INHALATION) PRN
OUTPATIENT
Start: 2024-05-15

## 2024-05-06 RX ORDER — ONDANSETRON 2 MG/ML
8 INJECTION INTRAMUSCULAR; INTRAVENOUS
OUTPATIENT
Start: 2024-05-16

## 2024-05-06 RX ORDER — ACETAMINOPHEN 325 MG/1
650 TABLET ORAL
OUTPATIENT
Start: 2024-05-14

## 2024-05-06 RX ORDER — DIPHENHYDRAMINE HYDROCHLORIDE 50 MG/ML
50 INJECTION INTRAMUSCULAR; INTRAVENOUS
OUTPATIENT
Start: 2024-05-15

## 2024-05-06 RX ORDER — FAMOTIDINE 10 MG/ML
20 INJECTION, SOLUTION INTRAVENOUS
OUTPATIENT
Start: 2024-05-15

## 2024-05-06 RX ORDER — ACETAMINOPHEN 325 MG/1
650 TABLET ORAL
OUTPATIENT
Start: 2024-05-15

## 2024-05-06 RX ORDER — BENZONATATE 100 MG/1
100 CAPSULE ORAL 3 TIMES DAILY PRN
Qty: 90 CAPSULE | Refills: 3 | Status: SHIPPED | OUTPATIENT
Start: 2024-05-06

## 2024-05-06 RX ORDER — HEPARIN SODIUM (PORCINE) LOCK FLUSH IV SOLN 100 UNIT/ML 100 UNIT/ML
500 SOLUTION INTRAVENOUS PRN
OUTPATIENT
Start: 2024-05-15

## 2024-05-06 RX ORDER — ALBUTEROL SULFATE 90 UG/1
4 AEROSOL, METERED RESPIRATORY (INHALATION) PRN
OUTPATIENT
Start: 2024-05-16

## 2024-05-06 RX ORDER — SODIUM CHLORIDE 0.9 % (FLUSH) 0.9 %
5-40 SYRINGE (ML) INJECTION PRN
OUTPATIENT
Start: 2024-05-15

## 2024-05-06 RX ORDER — FAMOTIDINE 10 MG/ML
20 INJECTION, SOLUTION INTRAVENOUS
OUTPATIENT
Start: 2024-05-14

## 2024-05-06 RX ORDER — EPINEPHRINE 1 MG/ML
0.3 INJECTION, SOLUTION, CONCENTRATE INTRAVENOUS PRN
OUTPATIENT
Start: 2024-05-15

## 2024-05-06 RX ORDER — CODEINE PHOSPHATE/GUAIFENESIN 10-100MG/5
5 LIQUID (ML) ORAL 4 TIMES DAILY PRN
Qty: 240 ML | Refills: 0 | Status: SHIPPED | OUTPATIENT
Start: 2024-05-06 | End: 2024-06-05

## 2024-05-06 RX ORDER — DIPHENHYDRAMINE HYDROCHLORIDE 50 MG/ML
50 INJECTION INTRAMUSCULAR; INTRAVENOUS
OUTPATIENT
Start: 2024-05-16

## 2024-05-06 NOTE — PROGRESS NOTES
Patient ID: Andrea Desir, 1955, [id number], 69 y.o.  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 male with history of atrial fibrillation, status post AICD, history of CAD status post stent, with a tobacco abuse was seen during initial counseling visit for newly discovered lung mass and lymphadenopathy.    Patient recently was seen by his primary care physician for difficulty swallowing and also swelling in his neck area.  Patient also having cough for past 6 months.  Patient had a CT scan of the neck as well as CT scan of the chest on 3/27/2024 which showed right upper lobe 4.7 cm mass suspicious for primary lung malignancy along with right hilar and bilateral mediastinal bulky lymphadenopathy with extrinsic mass effect on the trachea and esophagus without occlusion.  Patient also noted to have bilateral supraclavicular lymphadenopathy suspicious for metastasis.  Patient was referred to oncology for further recommendations.  He smokes 1 pack cigarettes per day.  He has a history of CAD status post AICD and stent placement.  Currently he is on aspirin and Plavix and following with cardiologist.    He may have lost few pounds over past few weeks.  He denies any alcohol abuse history.    He is complaining of headache for past 3 to 4 days.  Denies any nausea vomiting, blurry vision or tingling numbness.    Interval history:  Patient is here for follow visit and to discuss lab results and further recommendations.  He had MRI of the face as well as brain which did not show any evidence of metastatic disease.  He is tolerated first cycle of chemotherapy well.  His pain has improved.    He still has some cough and using Tessalon as well as

## 2024-05-06 NOTE — TELEPHONE ENCOUNTER
Name: Andrea Desir  : 1955  MRN: [id number]    Oncology Navigation Follow-Up Note    Contact Type:  Medical Oncology    Notes:   Writer met with patient and family while in office for appt and was present during med onc visit.  He states he is doing well with treatment. His son asks about resources for helping with bills. Instructed them to contact [facility] for available agencies. He will be getting MOW but family would like healthier choices to have on hand for them to heat up in microwave.   Patient has tried nicotine gum and patches in the past to quit smoking without success. He did try one script of Chantix, which worked, but he did not continue with use. He is interested in trying it again.  Smoking cessation assistance and resources provided. Instructed to contact PCP to see if he would prescribe Chantix if they are not able to go to [facility] Smoking Cessation program.  Encouraged them to call if they have additional questions/concerns. Understanding verbalized.  Navigator following for continuity of care.        Electronically signed by Natalia Broussard RN on 2024 at 3:41 PM

## 2024-05-07 ENCOUNTER — TELEPHONE (OUTPATIENT)
Facility: HOSPITAL | Age: 69
End: 2024-05-07

## 2024-05-07 DIAGNOSIS — R91.8 LUNG MASS: ICD-10-CM

## 2024-05-07 RX ORDER — DIAZEPAM 5 MG/1
TABLET ORAL
Qty: 2 TABLET | Refills: 0 | Status: SHIPPED | OUTPATIENT
Start: 2024-05-07 | End: 2024-06-06

## 2024-05-07 NOTE — TELEPHONE ENCOUNTER
Patient Assistance           Writer left voice message for patient to return call regarding treatment ordered by Dr. Mendenhall and assistance available to help with out-of-pocket expenses.

## 2024-05-09 DIAGNOSIS — I10 PRIMARY HYPERTENSION: Primary | ICD-10-CM

## 2024-05-09 NOTE — TELEPHONE ENCOUNTER
Andrea called requesting a refill of the below medication which has been pended for you:     Requested Prescriptions     Pending Prescriptions Disp Refills    metoprolol tartrate (LOPRESSOR) 25 MG tablet [Pharmacy Med Name: Metoprolol Tartrate 25 MG Oral Tablet] 135 tablet 0     Sig: TAKE 1 TABLET BY MOUTH IN THE MORNING AND 1/2 (ONE-HALF) IN THE EVENING       Last Appointment Date: 3/23/2024  Next Appointment Date: 5/28/2024    No Known Allergies

## 2024-05-13 ENCOUNTER — HOSPITAL ENCOUNTER (OUTPATIENT)
Dept: INFUSION THERAPY | Age: 69
Discharge: HOME | End: 2024-05-13
Payer: MEDICARE

## 2024-05-13 ENCOUNTER — OFFICE VISIT (OUTPATIENT)
Dept: UROLOGY | Age: 69
End: 2024-05-13
Payer: MEDICARE

## 2024-05-13 VITALS
TEMPERATURE: 97.9 F | DIASTOLIC BLOOD PRESSURE: 68 MMHG | HEART RATE: 72 BPM | BODY MASS INDEX: 32.83 KG/M2 | SYSTOLIC BLOOD PRESSURE: 150 MMHG | RESPIRATION RATE: 16 BRPM | OXYGEN SATURATION: 95 % | HEIGHT: 72 IN | WEIGHT: 242.4 LBS

## 2024-05-13 VITALS
HEART RATE: 67 BPM | BODY MASS INDEX: 33.18 KG/M2 | WEIGHT: 245 LBS | HEIGHT: 72 IN | SYSTOLIC BLOOD PRESSURE: 130 MMHG | DIASTOLIC BLOOD PRESSURE: 70 MMHG

## 2024-05-13 DIAGNOSIS — N40.1 BENIGN LOCALIZED PROSTATIC HYPERPLASIA WITH LOWER URINARY TRACT SYMPTOMS (LUTS): Primary | ICD-10-CM

## 2024-05-13 DIAGNOSIS — C34.91 SMALL CELL LUNG CANCER, RIGHT (HCC): Primary | ICD-10-CM

## 2024-05-13 DIAGNOSIS — R53.83 FATIGUE, UNSPECIFIED TYPE: ICD-10-CM

## 2024-05-13 LAB
ALBUMIN SERPL-MCNC: 4.2 G/DL (ref 3.5–5.2)
ALBUMIN/GLOB SERPL: 1.4 {RATIO} (ref 1–2.5)
ALP SERPL-CCNC: 89 U/L (ref 40–129)
ALT SERPL-CCNC: 13 U/L (ref 5–41)
ANION GAP SERPL CALCULATED.3IONS-SCNC: 12 MMOL/L (ref 9–17)
AST SERPL-CCNC: 14 U/L
BASOPHILS # BLD: 0.05 K/UL (ref 0–0.2)
BASOPHILS NFR BLD: 1 % (ref 0–2)
BILIRUB SERPL-MCNC: 0.4 MG/DL (ref 0.3–1.2)
BUN SERPL-MCNC: 17 MG/DL (ref 8–23)
BUN/CREAT SERPL: 14 (ref 9–20)
CALCIUM SERPL-MCNC: 9.3 MG/DL (ref 8.6–10.4)
CHLORIDE SERPL-SCNC: 100 MMOL/L (ref 98–107)
CO2 SERPL-SCNC: 27 MMOL/L (ref 20–31)
CORTIS SERPL-MCNC: 13.4 UG/DL (ref 2.5–19.5)
CORTISOL COLLECTION INFO: NORMAL
CREAT SERPL-MCNC: 1.2 MG/DL (ref 0.7–1.2)
EOSINOPHIL # BLD: <0.03 K/UL (ref 0–0.44)
EOSINOPHILS RELATIVE PERCENT: 0 % (ref 1–4)
ERYTHROCYTE [DISTWIDTH] IN BLOOD BY AUTOMATED COUNT: 13.4 % (ref 11.8–14.4)
GFR, ESTIMATED: 65 ML/MIN/1.73M2
GLUCOSE SERPL-MCNC: 106 MG/DL (ref 70–99)
HCT VFR BLD AUTO: 35.9 % (ref 40.7–50.3)
HGB BLD-MCNC: 12.9 G/DL (ref 13–17)
IMM GRANULOCYTES # BLD AUTO: 0.05 K/UL (ref 0–0.3)
IMM GRANULOCYTES NFR BLD: 1 %
LYMPHOCYTES NFR BLD: 1.78 K/UL (ref 1.1–3.7)
LYMPHOCYTES RELATIVE PERCENT: 24 % (ref 24–43)
MCH RBC QN AUTO: 35.8 PG (ref 25.2–33.5)
MCHC RBC AUTO-ENTMCNC: 35.9 G/DL (ref 25.2–33.5)
MCV RBC AUTO: 99.7 FL (ref 82.6–102.9)
MONOCYTES NFR BLD: 1.22 K/UL (ref 0.1–1.2)
MONOCYTES NFR BLD: 16 % (ref 3–12)
NEUTROPHILS NFR BLD: 58 % (ref 36–65)
NEUTS SEG NFR BLD: 4.35 K/UL (ref 1.5–8.1)
NRBC BLD-RTO: 0 PER 100 WBC
PLATELET # BLD AUTO: 222 K/UL (ref 138–453)
PMV BLD AUTO: 10.6 FL (ref 8.1–13.5)
POTASSIUM SERPL-SCNC: 3.9 MMOL/L (ref 3.7–5.3)
PROT SERPL-MCNC: 7.3 G/DL (ref 6.4–8.3)
RBC # BLD AUTO: 3.6 M/UL (ref 4.21–5.77)
SODIUM SERPL-SCNC: 139 MMOL/L (ref 135–144)
TSH SERPL DL<=0.05 MIU/L-ACNC: 5.11 UIU/ML (ref 0.3–5)
WBC OTHER # BLD: 7.5 K/UL (ref 3.5–11.3)

## 2024-05-13 PROCEDURE — 6360000002 HC RX W HCPCS: Performed by: INTERNAL MEDICINE

## 2024-05-13 PROCEDURE — 96417 CHEMO IV INFUS EACH ADDL SEQ: CPT

## 2024-05-13 PROCEDURE — 2580000003 HC RX 258: Performed by: INTERNAL MEDICINE

## 2024-05-13 PROCEDURE — 85025 COMPLETE CBC W/AUTO DIFF WBC: CPT

## 2024-05-13 PROCEDURE — 96413 CHEMO IV INFUSION 1 HR: CPT

## 2024-05-13 PROCEDURE — 84443 ASSAY THYROID STIM HORMONE: CPT

## 2024-05-13 PROCEDURE — 1123F ACP DISCUSS/DSCN MKR DOCD: CPT | Performed by: UROLOGY

## 2024-05-13 PROCEDURE — 82533 TOTAL CORTISOL: CPT

## 2024-05-13 PROCEDURE — 3075F SYST BP GE 130 - 139MM HG: CPT | Performed by: UROLOGY

## 2024-05-13 PROCEDURE — 36591 DRAW BLOOD OFF VENOUS DEVICE: CPT

## 2024-05-13 PROCEDURE — 80053 COMPREHEN METABOLIC PANEL: CPT

## 2024-05-13 PROCEDURE — 99214 OFFICE O/P EST MOD 30 MIN: CPT

## 2024-05-13 PROCEDURE — 99204 OFFICE O/P NEW MOD 45 MIN: CPT | Performed by: UROLOGY

## 2024-05-13 PROCEDURE — 96367 TX/PROPH/DG ADDL SEQ IV INF: CPT

## 2024-05-13 PROCEDURE — 96375 TX/PRO/DX INJ NEW DRUG ADDON: CPT

## 2024-05-13 PROCEDURE — 96415 CHEMO IV INFUSION ADDL HR: CPT

## 2024-05-13 PROCEDURE — 3078F DIAST BP <80 MM HG: CPT | Performed by: UROLOGY

## 2024-05-13 RX ORDER — DEXAMETHASONE SODIUM PHOSPHATE 10 MG/ML
10 INJECTION, SOLUTION INTRAMUSCULAR; INTRAVENOUS ONCE
Status: COMPLETED | OUTPATIENT
Start: 2024-05-13 | End: 2024-05-13

## 2024-05-13 RX ORDER — SODIUM CHLORIDE 9 MG/ML
5-250 INJECTION, SOLUTION INTRAVENOUS PRN
Status: DISCONTINUED | OUTPATIENT
Start: 2024-05-13 | End: 2024-05-14 | Stop reason: HOSPADM

## 2024-05-13 RX ORDER — HEPARIN 100 UNIT/ML
500 SYRINGE INTRAVENOUS PRN
Status: DISCONTINUED | OUTPATIENT
Start: 2024-05-13 | End: 2024-05-14 | Stop reason: HOSPADM

## 2024-05-13 RX ORDER — SODIUM CHLORIDE 0.9 % (FLUSH) 0.9 %
5-40 SYRINGE (ML) INJECTION PRN
Status: DISCONTINUED | OUTPATIENT
Start: 2024-05-13 | End: 2024-05-14 | Stop reason: HOSPADM

## 2024-05-13 RX ORDER — PALONOSETRON 0.05 MG/ML
0.25 INJECTION, SOLUTION INTRAVENOUS ONCE
Status: COMPLETED | OUTPATIENT
Start: 2024-05-13 | End: 2024-05-13

## 2024-05-13 RX ADMIN — PALONOSETRON 0.25 MG: 0.05 INJECTION, SOLUTION INTRAVENOUS at 09:42

## 2024-05-13 RX ADMIN — SODIUM CHLORIDE, PRESERVATIVE FREE 10 ML: 5 INJECTION INTRAVENOUS at 12:59

## 2024-05-13 RX ADMIN — HEPARIN 500 UNITS: 100 SYRINGE at 12:59

## 2024-05-13 RX ADMIN — ATEZOLIZUMAB 1200 MG: 1200 INJECTION, SOLUTION INTRAVENOUS at 10:36

## 2024-05-13 RX ADMIN — ETOPOSIDE 246 MG: 20 INJECTION INTRAVENOUS at 11:47

## 2024-05-13 RX ADMIN — SODIUM CHLORIDE 150 MG: 9 INJECTION, SOLUTION INTRAVENOUS at 09:48

## 2024-05-13 RX ADMIN — CARBOPLATIN 490 MG: 10 INJECTION, SOLUTION INTRAVENOUS at 11:09

## 2024-05-13 RX ADMIN — SODIUM CHLORIDE 20 ML/HR: 9 INJECTION, SOLUTION INTRAVENOUS at 08:48

## 2024-05-13 RX ADMIN — DEXAMETHASONE SODIUM PHOSPHATE 10 MG: 10 INJECTION, SOLUTION INTRAMUSCULAR; INTRAVENOUS at 09:42

## 2024-05-13 ASSESSMENT — PAIN SCALES - GENERAL: PAINLEVEL_OUTOF10: 0

## 2024-05-13 NOTE — FLOWSHEET NOTE
rounding in ONC.    Assessment: Patient was sleeping.    Plan: Chaplains are available on site or on call 24/7 for spiritual and emotional support.    Electronically signed by Holly Cowan on 5/13/2024 at 3:43 PM

## 2024-05-13 NOTE — PROGRESS NOTES
Steve Mariee MD.    Formerly [facility] CARE, [facility]  [facility] UROLOGY A DEPARTMENT OF [facility]  [address] [facility] [address]  Dept: [phone]  Dept Fax: [phone]    [facility] Urology Office Note -     Patient:  Andrea Desir  YOB: 1955    The patient is a 69 y.o. male who presents today for evaluation of the following problems:   Chief Complaint   Patient presents with    Urinary Retention     Has alfaro    referred/consultation requested by Deon Art MD.    History of Present Illness:    Urinary retention  After chemo  High volume  Stage 4 small cell  Pt denies luts at baseline- but he is on flomax      Requested/reviewed records from Deon Art MD office and/or outside physician/EMR    (Patient's old records have been requested, reviewed and pertinent findings summarized in today's note.)    Procedures Today: N/A      Last several PSA's:  Lab Results   Component Value Date    PSA 1.77 05/09/2022    PSA 3.63 03/17/2021    PSA 1.86 10/02/2017       Last total testosterone:  Lab Results   Component Value Date    TESTOSTERONE 274 09/19/2013       Urinalysis today:  No results found for this visit on 05/13/24.    Last BUN and creatinine:  Lab Results   Component Value Date    BUN 11 04/23/2024     Lab Results   Component Value Date    CREATININE 1.0 04/23/2024         Imaging Reviewed during this Office Visit:   STEVE MARIEE MD independently reviewed the images and verified the radiology reports from:        CT a/p  Abdomen: The liver demonstrates normal contour.  No intrahepatic biliary  dilatation.  The hepatic veins and portal vasculature are patent.  In the  caudate lobe there is an oval circumscribed 1.3 cm hypodense mass with  average Hounsfield units of 14, consistent with a cyst.  No suspicious  intrahepatic mass.  The gallbladder, common bile duct, pancreas, spleen, and  right adrenal gland are normal.  Left

## 2024-05-13 NOTE — PLAN OF CARE
Problem: Safety - Adult  Goal: Free from fall injury  Outcome: Completed     
well-groomed/no distress/obese

## 2024-05-14 ENCOUNTER — OFFICE VISIT (OUTPATIENT)
Dept: UROLOGY | Age: 69
End: 2024-05-14
Payer: MEDICARE

## 2024-05-14 ENCOUNTER — HOSPITAL ENCOUNTER (OUTPATIENT)
Dept: INFUSION THERAPY | Age: 69
Discharge: HOME | End: 2024-05-14
Payer: MEDICARE

## 2024-05-14 VITALS
SYSTOLIC BLOOD PRESSURE: 118 MMHG | OXYGEN SATURATION: 95 % | HEART RATE: 59 BPM | DIASTOLIC BLOOD PRESSURE: 60 MMHG | RESPIRATION RATE: 16 BRPM | BODY MASS INDEX: 32.78 KG/M2 | TEMPERATURE: 97.6 F | WEIGHT: 242 LBS | HEIGHT: 72 IN

## 2024-05-14 VITALS
HEIGHT: 72 IN | HEART RATE: 59 BPM | SYSTOLIC BLOOD PRESSURE: 118 MMHG | BODY MASS INDEX: 32.78 KG/M2 | WEIGHT: 242 LBS | DIASTOLIC BLOOD PRESSURE: 60 MMHG

## 2024-05-14 DIAGNOSIS — R33.8 ACUTE URINARY RETENTION: ICD-10-CM

## 2024-05-14 DIAGNOSIS — C34.91 SMALL CELL LUNG CANCER, RIGHT (HCC): Primary | ICD-10-CM

## 2024-05-14 DIAGNOSIS — N40.1 BENIGN LOCALIZED PROSTATIC HYPERPLASIA WITH LOWER URINARY TRACT SYMPTOMS (LUTS): Primary | ICD-10-CM

## 2024-05-14 LAB — POST VOID RESIDUAL (PVR): 798 ML

## 2024-05-14 PROCEDURE — 51798 US URINE CAPACITY MEASURE: CPT

## 2024-05-14 PROCEDURE — 6360000002 HC RX W HCPCS: Performed by: INTERNAL MEDICINE

## 2024-05-14 PROCEDURE — 99214 OFFICE O/P EST MOD 30 MIN: CPT

## 2024-05-14 PROCEDURE — 3078F DIAST BP <80 MM HG: CPT

## 2024-05-14 PROCEDURE — 1123F ACP DISCUSS/DSCN MKR DOCD: CPT

## 2024-05-14 PROCEDURE — 3074F SYST BP LT 130 MM HG: CPT

## 2024-05-14 PROCEDURE — PBSHW PR MEAS POST-VOIDING RESIDUAL URINE&/BLADDER CAP

## 2024-05-14 PROCEDURE — 99212 OFFICE O/P EST SF 10 MIN: CPT

## 2024-05-14 PROCEDURE — 96375 TX/PRO/DX INJ NEW DRUG ADDON: CPT

## 2024-05-14 PROCEDURE — 2580000003 HC RX 258: Performed by: INTERNAL MEDICINE

## 2024-05-14 PROCEDURE — 96417 CHEMO IV INFUS EACH ADDL SEQ: CPT

## 2024-05-14 RX ORDER — SODIUM CHLORIDE 0.9 % (FLUSH) 0.9 %
5-40 SYRINGE (ML) INJECTION PRN
Status: DISCONTINUED | OUTPATIENT
Start: 2024-05-14 | End: 2024-05-15 | Stop reason: HOSPADM

## 2024-05-14 RX ORDER — DEXAMETHASONE SODIUM PHOSPHATE 10 MG/ML
8 INJECTION, SOLUTION INTRAMUSCULAR; INTRAVENOUS ONCE
Status: COMPLETED | OUTPATIENT
Start: 2024-05-14 | End: 2024-05-14

## 2024-05-14 RX ORDER — SODIUM CHLORIDE 9 MG/ML
5-250 INJECTION, SOLUTION INTRAVENOUS PRN
Status: DISCONTINUED | OUTPATIENT
Start: 2024-05-14 | End: 2024-05-15 | Stop reason: HOSPADM

## 2024-05-14 RX ORDER — TAMSULOSIN HYDROCHLORIDE 0.4 MG/1
0.4 CAPSULE ORAL DAILY
Qty: 90 CAPSULE | Refills: 3 | Status: SHIPPED | OUTPATIENT
Start: 2024-05-14

## 2024-05-14 RX ORDER — HEPARIN 100 UNIT/ML
500 SYRINGE INTRAVENOUS PRN
Status: DISCONTINUED | OUTPATIENT
Start: 2024-05-14 | End: 2024-05-15 | Stop reason: HOSPADM

## 2024-05-14 RX ADMIN — SODIUM CHLORIDE, PRESERVATIVE FREE 10 ML: 5 INJECTION INTRAVENOUS at 14:58

## 2024-05-14 RX ADMIN — HEPARIN 500 UNITS: 100 SYRINGE at 14:58

## 2024-05-14 RX ADMIN — DEXAMETHASONE SODIUM PHOSPHATE 8 MG: 10 INJECTION, SOLUTION INTRAMUSCULAR; INTRAVENOUS at 13:19

## 2024-05-14 RX ADMIN — SODIUM CHLORIDE 30 ML/HR: 9 INJECTION, SOLUTION INTRAVENOUS at 13:31

## 2024-05-14 RX ADMIN — ETOPOSIDE 246 MG: 20 INJECTION INTRAVENOUS at 13:49

## 2024-05-14 NOTE — PROGRESS NOTES
[facility], [facility] OF [facility]  [address] [facility] [address]  Dept: [phone]  Visit Date: 5/14/2024      HPI  Andrea Desir is a 69 y.o. male that presents to the urology clinic for acute urinary retention.    Patient called our office stating that since removal of his alfaro catheter yesterday, he has been unable to urinate effectively. Small dribbles and leaking with stress (coughing, sneezing). +Suprapubic pain.    Is not currently on Flomax. BPH with obstruction. Also struggling with constipation.    PVR: 793 mL      Last Total Testosterone:  Lab Results   Component Value Date    TESTOSTERONE 274 09/19/2013         Last BUN and Creatinine:  Lab Results   Component Value Date    BUN 17 05/13/2024     Lab Results   Component Value Date    CREATININE 1.2 05/13/2024           PAST MEDICAL, FAMILY AND SOCIAL HISTORY UPDATE:  Past Medical History:   Diagnosis Date    Anxiety     Atrial fibrillation (HCC)     CAD (coronary artery disease) 2012    stent to LAD after MI    Erectile dysfunction     Headache(784.0)     High cholesterol     Hx of adenomatous colonic polyps     4 tubular adenomas, 2 hyperplastic polyps 11/11/15    Hypertension     ICD (implantable cardioverter-defibrillator) discharge 08/2020    Ischemic cardiomyopathy     AICD 1/13 for EF 20-25%    Low back pain     history of     MI, old 2012    Osteoarthritis     knees    Plantar fasciitis     Seborrheic keratosis     history of     Smoker     Syncopal episodes 08/2020    STATES PASSED OUT AND THAT ICD HAD FIRED WHEN DEVICE WAS INTERROGATED     Past Surgical History:   Procedure Laterality Date    CARDIAC CATHETERIZATION  09/04/2020    CAD with hx of NOAH to LAD in 2012    CARDIAC DEFIBRILLATOR PLACEMENT  01/07/2013    CARDIAC DEFIBRILLATOR PLACEMENT  06/22/2021    REPLACEMENT  /  DR ALBERTO    COLONOSCOPY  11/11/2015    polyps X 5 Pascale CARBAJAL

## 2024-05-14 NOTE — PROGRESS NOTES
Patient arrived for infusion.  VSS as charted.  Port accessed without complication. Pre med Patient tolerated infusion well.  Patient left infusion center with all belongings and steady gate.

## 2024-05-14 NOTE — PROGRESS NOTES
Etoposide completed and no sign of reaction at this time.  Pt ambulated out infusion center without difficulty in stable condition.  Port remains accessed at this time and capped off with chloroprep cap.  Pt will be back tomorrow to have day 3 of etoposide.

## 2024-05-15 ENCOUNTER — HOSPITAL ENCOUNTER (OUTPATIENT)
Dept: INFUSION THERAPY | Age: 69
Discharge: HOME | End: 2024-05-15
Payer: MEDICARE

## 2024-05-15 VITALS
BODY MASS INDEX: 32.91 KG/M2 | RESPIRATION RATE: 16 BRPM | TEMPERATURE: 98.1 F | SYSTOLIC BLOOD PRESSURE: 144 MMHG | HEIGHT: 72 IN | WEIGHT: 243 LBS | OXYGEN SATURATION: 98 % | HEART RATE: 71 BPM | DIASTOLIC BLOOD PRESSURE: 87 MMHG

## 2024-05-15 DIAGNOSIS — C34.91 SMALL CELL LUNG CANCER, RIGHT (HCC): Primary | ICD-10-CM

## 2024-05-15 PROCEDURE — 2580000003 HC RX 258: Performed by: INTERNAL MEDICINE

## 2024-05-15 PROCEDURE — 96417 CHEMO IV INFUS EACH ADDL SEQ: CPT

## 2024-05-15 PROCEDURE — 6360000002 HC RX W HCPCS: Performed by: INTERNAL MEDICINE

## 2024-05-15 PROCEDURE — 96413 CHEMO IV INFUSION 1 HR: CPT

## 2024-05-15 PROCEDURE — 96375 TX/PRO/DX INJ NEW DRUG ADDON: CPT

## 2024-05-15 RX ORDER — SODIUM CHLORIDE 9 MG/ML
5-250 INJECTION, SOLUTION INTRAVENOUS PRN
Status: DISCONTINUED | OUTPATIENT
Start: 2024-05-15 | End: 2024-05-16 | Stop reason: HOSPADM

## 2024-05-15 RX ORDER — HEPARIN 100 UNIT/ML
500 SYRINGE INTRAVENOUS PRN
Status: DISCONTINUED | OUTPATIENT
Start: 2024-05-15 | End: 2024-05-16 | Stop reason: HOSPADM

## 2024-05-15 RX ORDER — SODIUM CHLORIDE 0.9 % (FLUSH) 0.9 %
5-40 SYRINGE (ML) INJECTION PRN
Status: DISCONTINUED | OUTPATIENT
Start: 2024-05-15 | End: 2024-05-16 | Stop reason: HOSPADM

## 2024-05-15 RX ORDER — DEXAMETHASONE SODIUM PHOSPHATE 10 MG/ML
8 INJECTION, SOLUTION INTRAMUSCULAR; INTRAVENOUS ONCE
Status: COMPLETED | OUTPATIENT
Start: 2024-05-15 | End: 2024-05-15

## 2024-05-15 RX ADMIN — SODIUM CHLORIDE, PRESERVATIVE FREE 10 ML: 5 INJECTION INTRAVENOUS at 14:47

## 2024-05-15 RX ADMIN — HEPARIN 500 UNITS: 100 SYRINGE at 14:47

## 2024-05-15 RX ADMIN — ETOPOSIDE 246 MG: 20 INJECTION INTRAVENOUS at 13:36

## 2024-05-15 RX ADMIN — DEXAMETHASONE SODIUM PHOSPHATE 8 MG: 10 INJECTION, SOLUTION INTRAMUSCULAR; INTRAVENOUS at 13:14

## 2024-05-15 RX ADMIN — SODIUM CHLORIDE 30 ML/HR: 9 INJECTION, SOLUTION INTRAVENOUS at 12:55

## 2024-05-15 NOTE — PROGRESS NOTES
Patient arrived for infusion.  VSS as charted.  Port accessed without complication. Pre med Patient tolerated infusion well.  Patient left infusion center with all belongings and steady gate.  New appt set and printed out for patient.

## 2024-05-15 NOTE — PROGRESS NOTES
VAD de-accessed after 10ml NS and 5ml of heparin.  Band aid applied to site.  No sign of reaction to infusion at this time.  Pt ambulated out infusion center door without difficulty.  Paperwork given regarding date and time of next treatment.

## 2024-05-16 DIAGNOSIS — C34.91 SMALL CELL LUNG CANCER, RIGHT (HCC): ICD-10-CM

## 2024-05-19 RX ORDER — LORAZEPAM 0.5 MG/1
TABLET ORAL
Qty: 90 TABLET | Refills: 0 | Status: SHIPPED | OUTPATIENT
Start: 2024-05-19 | End: 2024-06-18

## 2024-05-24 ENCOUNTER — NURSE ONLY (OUTPATIENT)
Dept: UROLOGY | Age: 69
End: 2024-05-24
Payer: MEDICARE

## 2024-05-24 DIAGNOSIS — N40.1 BENIGN LOCALIZED PROSTATIC HYPERPLASIA WITH LOWER URINARY TRACT SYMPTOMS (LUTS): Primary | ICD-10-CM

## 2024-05-24 LAB — POST VOID RESIDUAL (PVR): 620 ML

## 2024-05-24 PROCEDURE — PBSHW PR MEAS POST-VOIDING RESIDUAL URINE&/BLADDER CAP: Performed by: UROLOGY

## 2024-05-24 PROCEDURE — 51702 INSERT TEMP BLADDER CATH: CPT | Performed by: UROLOGY

## 2024-05-24 PROCEDURE — 51798 US URINE CAPACITY MEASURE: CPT | Performed by: UROLOGY

## 2024-05-24 PROCEDURE — 99212 OFFICE O/P EST SF 10 MIN: CPT | Performed by: UROLOGY

## 2024-05-24 NOTE — PROGRESS NOTES
Patient present due to not being able to urinate after alfaro removal this am. As noted pvr 620ml. Spoke with Gigi HONEYCUTT. Place alfaro today and schedule patient for Cysto with dr castellanos. Patient's penis was cleansed with betadine.  18 FR coude alfaro was inserted without difficulty.  Upon urine return, balloon inflated with 10cc sterile water.  Alfaro catheter was hooked up to leg bag with straps.  Patient instructed on catheter care including draining catheter bag and keeping catheter bag above the knee to prevent pulling on catheter causing blood.

## 2024-05-24 NOTE — PROGRESS NOTES
Patient present for alfaro removal, yellow color urine noted in drainage bag, 10 ml balloon deflated, and 18 fr coude was removed without difficulty. Instructed patient to drink fluids, patient may experience urinary leakage, blood in the urine, and it may burn when urinating for a few weeks. If patient is unable to urinate in 8 hours, is to call the office to have alfaro placed or needs to go to the emergency room. Patient verbalized understanding.

## 2024-05-28 ENCOUNTER — OFFICE VISIT (OUTPATIENT)
Dept: FAMILY MEDICINE CLINIC | Age: 69
End: 2024-05-28
Payer: MEDICARE

## 2024-05-28 VITALS
OXYGEN SATURATION: 96 % | HEIGHT: 72 IN | BODY MASS INDEX: 32.67 KG/M2 | WEIGHT: 241.2 LBS | HEART RATE: 78 BPM | DIASTOLIC BLOOD PRESSURE: 74 MMHG | SYSTOLIC BLOOD PRESSURE: 134 MMHG

## 2024-05-28 DIAGNOSIS — E78.5 HYPERLIPIDEMIA WITH TARGET LDL LESS THAN 70: ICD-10-CM

## 2024-05-28 DIAGNOSIS — C34.91 SMALL CELL LUNG CANCER, RIGHT (HCC): ICD-10-CM

## 2024-05-28 DIAGNOSIS — Z72.0 TOBACCO ABUSE: ICD-10-CM

## 2024-05-28 DIAGNOSIS — G47.33 OSA (OBSTRUCTIVE SLEEP APNEA): ICD-10-CM

## 2024-05-28 DIAGNOSIS — F32.5 MAJOR DEPRESSIVE DISORDER, SINGLE EPISODE, IN FULL REMISSION (HCC): ICD-10-CM

## 2024-05-28 DIAGNOSIS — E78.5 HYPERLIPIDEMIA, UNSPECIFIED HYPERLIPIDEMIA TYPE: ICD-10-CM

## 2024-05-28 DIAGNOSIS — I48.0 PAROXYSMAL ATRIAL FIBRILLATION (HCC): ICD-10-CM

## 2024-05-28 DIAGNOSIS — I25.10 CORONARY ARTERY DISEASE INVOLVING NATIVE CORONARY ARTERY OF NATIVE HEART WITHOUT ANGINA PECTORIS: ICD-10-CM

## 2024-05-28 DIAGNOSIS — N52.9 ERECTILE DYSFUNCTION, UNSPECIFIED ERECTILE DYSFUNCTION TYPE: ICD-10-CM

## 2024-05-28 DIAGNOSIS — I10 PRIMARY HYPERTENSION: Primary | ICD-10-CM

## 2024-05-28 DIAGNOSIS — Z12.5 SCREENING PSA (PROSTATE SPECIFIC ANTIGEN): ICD-10-CM

## 2024-05-28 DIAGNOSIS — E03.9 HYPOTHYROIDISM, UNSPECIFIED TYPE: ICD-10-CM

## 2024-05-28 DIAGNOSIS — I47.20 VENTRICULAR TACHYCARDIA (HCC): ICD-10-CM

## 2024-05-28 PROCEDURE — 99213 OFFICE O/P EST LOW 20 MIN: CPT | Performed by: FAMILY MEDICINE

## 2024-05-28 RX ORDER — ATORVASTATIN CALCIUM 80 MG/1
80 TABLET, FILM COATED ORAL NIGHTLY
Qty: 90 TABLET | Refills: 1 | Status: SHIPPED | OUTPATIENT
Start: 2024-05-28

## 2024-05-28 RX ORDER — CLOPIDOGREL BISULFATE 75 MG/1
TABLET ORAL
Qty: 90 TABLET | Refills: 2 | Status: SHIPPED | OUTPATIENT
Start: 2024-05-28

## 2024-05-28 RX ORDER — SACUBITRIL AND VALSARTAN 24; 26 MG/1; MG/1
1 TABLET, FILM COATED ORAL 2 TIMES DAILY
Qty: 60 TABLET | Refills: 0 | OUTPATIENT
Start: 2024-05-28

## 2024-05-28 RX ORDER — AMIODARONE HYDROCHLORIDE 200 MG/1
200 TABLET ORAL DAILY
Qty: 90 TABLET | Refills: 1 | Status: SHIPPED | OUTPATIENT
Start: 2024-05-28

## 2024-05-28 RX ORDER — SACUBITRIL AND VALSARTAN 24; 26 MG/1; MG/1
1 TABLET, FILM COATED ORAL 2 TIMES DAILY
Qty: 60 TABLET | Refills: 5 | Status: SHIPPED | OUTPATIENT
Start: 2024-05-28

## 2024-05-28 SDOH — ECONOMIC STABILITY: FOOD INSECURITY: WITHIN THE PAST 12 MONTHS, YOU WORRIED THAT YOUR FOOD WOULD RUN OUT BEFORE YOU GOT MONEY TO BUY MORE.: NEVER TRUE

## 2024-05-28 SDOH — ECONOMIC STABILITY: FOOD INSECURITY: WITHIN THE PAST 12 MONTHS, THE FOOD YOU BOUGHT JUST DIDN'T LAST AND YOU DIDN'T HAVE MONEY TO GET MORE.: NEVER TRUE

## 2024-05-28 SDOH — ECONOMIC STABILITY: INCOME INSECURITY: HOW HARD IS IT FOR YOU TO PAY FOR THE VERY BASICS LIKE FOOD, HOUSING, MEDICAL CARE, AND HEATING?: NOT HARD AT ALL

## 2024-05-28 ASSESSMENT — PATIENT HEALTH QUESTIONNAIRE - PHQ9
10. IF YOU CHECKED OFF ANY PROBLEMS, HOW DIFFICULT HAVE THESE PROBLEMS MADE IT FOR YOU TO DO YOUR WORK, TAKE CARE OF THINGS AT HOME, OR GET ALONG WITH OTHER PEOPLE: NOT DIFFICULT AT ALL
SUM OF ALL RESPONSES TO PHQ QUESTIONS 1-9: 0
SUM OF ALL RESPONSES TO PHQ QUESTIONS 1-9: 0
8. MOVING OR SPEAKING SO SLOWLY THAT OTHER PEOPLE COULD HAVE NOTICED. OR THE OPPOSITE, BEING SO FIGETY OR RESTLESS THAT YOU HAVE BEEN MOVING AROUND A LOT MORE THAN USUAL: NOT AT ALL
9. THOUGHTS THAT YOU WOULD BE BETTER OFF DEAD, OR OF HURTING YOURSELF: NOT AT ALL
2. FEELING DOWN, DEPRESSED OR HOPELESS: NOT AT ALL
SUM OF ALL RESPONSES TO PHQ QUESTIONS 1-9: 0
7. TROUBLE CONCENTRATING ON THINGS, SUCH AS READING THE NEWSPAPER OR WATCHING TELEVISION: NOT AT ALL
3. TROUBLE FALLING OR STAYING ASLEEP: NOT AT ALL
1. LITTLE INTEREST OR PLEASURE IN DOING THINGS: NOT AT ALL
4. FEELING TIRED OR HAVING LITTLE ENERGY: NOT AT ALL
5. POOR APPETITE OR OVEREATING: NOT AT ALL
SUM OF ALL RESPONSES TO PHQ9 QUESTIONS 1 & 2: 0
6. FEELING BAD ABOUT YOURSELF - OR THAT YOU ARE A FAILURE OR HAVE LET YOURSELF OR YOUR FAMILY DOWN: NOT AT ALL
SUM OF ALL RESPONSES TO PHQ QUESTIONS 1-9: 0

## 2024-05-28 ASSESSMENT — ENCOUNTER SYMPTOMS
GASTROINTESTINAL NEGATIVE: 1
EYES NEGATIVE: 1
COUGH: 0
RESPIRATORY NEGATIVE: 1
ALLERGIC/IMMUNOLOGIC NEGATIVE: 1
SHORTNESS OF BREATH: 0

## 2024-05-28 NOTE — PROGRESS NOTES
Est, Glom Filt Rate 05/13/2024 65  >60 mL/min/1.73m2 Final    BUN/Creatinine Ratio 05/13/2024 14  9 - 20 Final    Calcium 05/13/2024 9.3  8.6 - 10.4 mg/dL Final    Total Protein 05/13/2024 7.3  6.4 - 8.3 g/dL Final    Albumin 05/13/2024 4.2  3.5 - 5.2 g/dL Final    Albumin/Globulin Ratio 05/13/2024 1.4  1.0 - 2.5 Final    Total Bilirubin 05/13/2024 0.4  0.3 - 1.2 mg/dL Final    Alkaline Phosphatase 05/13/2024 89  40 - 129 U/L Final    ALT 05/13/2024 13  5 - 41 U/L Final    AST 05/13/2024 14  <40 U/L Final    TSH 05/13/2024 5.11 (H)  0.30 - 5.00 uIU/mL Final    Cortisol 05/13/2024 13.4  2.5 - 19.5 ug/dL Final    Cortisol Collection Info 05/13/2024 HIDE   Final         Assessment:       Encounter Diagnoses   Name Primary?    Hyperlipidemia, unspecified hyperlipidemia type     Paroxysmal atrial fibrillation (HCC)     Coronary artery disease involving native coronary artery of native heart without angina pectoris     Ventricular tachycardia (HCC)     Primary hypertension Yes    Major depressive disorder, single episode, in full remission (HCC)     Erectile dysfunction, unspecified erectile dysfunction type     Hyperlipidemia with target LDL less than 70     Hypothyroidism, unspecified type     Tobacco abuse     DAVID (obstructive sleep apnea)     Screening PSA (prostate specific antigen)     Small cell lung cancer, right (HCC)              Plan:   Assessment & Plan   CAD; clinically stable. S/p NOAH to lad after MI 2012. Ischemic cmo s/p aicd. No functional limitations. Denies cp. Denies changes in exercise tolerance. Consider updated echo at follow up. Last EF 20-25% s/p MI 2013.  AICD in place due to ischemic cardiomyopathy.  Interrogation through cardiology, compliant.  New defibrillator placed 6/22/21.  No discharges.      Ventricular tachycardia: episode ~8/22/20 where he passed out while standing outside.  No prodromal warning/symptoms.  Felt fine afterwards.  icd interrogation showing shock delivered and terminating

## 2024-05-29 ENCOUNTER — HOSPITAL ENCOUNTER (OUTPATIENT)
Dept: CT IMAGING | Age: 69
Discharge: HOME | End: 2024-05-31
Attending: INTERNAL MEDICINE
Payer: MEDICARE

## 2024-05-29 DIAGNOSIS — C34.91 SMALL CELL LUNG CANCER, RIGHT (HCC): ICD-10-CM

## 2024-05-29 PROCEDURE — 6360000004 HC RX CONTRAST MEDICATION: Performed by: INTERNAL MEDICINE

## 2024-05-29 PROCEDURE — 71260 CT THORAX DX C+: CPT

## 2024-05-29 RX ADMIN — IOPAMIDOL 100 ML: 755 INJECTION, SOLUTION INTRAVENOUS at 15:10

## 2024-06-03 ENCOUNTER — OFFICE VISIT (OUTPATIENT)
Dept: ONCOLOGY | Age: 69
End: 2024-06-03
Payer: MEDICARE

## 2024-06-03 ENCOUNTER — HOSPITAL ENCOUNTER (OUTPATIENT)
Dept: INFUSION THERAPY | Age: 69
Discharge: HOME | End: 2024-06-03
Payer: MEDICARE

## 2024-06-03 ENCOUNTER — TELEPHONE (OUTPATIENT)
Dept: ONCOLOGY | Age: 69
End: 2024-06-03

## 2024-06-03 VITALS
BODY MASS INDEX: 32.53 KG/M2 | WEIGHT: 240.2 LBS | RESPIRATION RATE: 16 BRPM | SYSTOLIC BLOOD PRESSURE: 116 MMHG | DIASTOLIC BLOOD PRESSURE: 75 MMHG | HEIGHT: 72 IN | HEART RATE: 66 BPM | OXYGEN SATURATION: 95 % | TEMPERATURE: 98.6 F

## 2024-06-03 VITALS
HEIGHT: 73 IN | DIASTOLIC BLOOD PRESSURE: 75 MMHG | HEART RATE: 66 BPM | OXYGEN SATURATION: 95 % | RESPIRATION RATE: 16 BRPM | WEIGHT: 240 LBS | BODY MASS INDEX: 31.81 KG/M2 | SYSTOLIC BLOOD PRESSURE: 116 MMHG | TEMPERATURE: 98.6 F

## 2024-06-03 DIAGNOSIS — C34.91 SMALL CELL LUNG CANCER, RIGHT (HCC): Primary | ICD-10-CM

## 2024-06-03 DIAGNOSIS — R53.83 FATIGUE, UNSPECIFIED TYPE: ICD-10-CM

## 2024-06-03 LAB
ALBUMIN SERPL-MCNC: 3.8 G/DL (ref 3.5–5.2)
ALBUMIN/GLOB SERPL: 1.2 {RATIO} (ref 1–2.5)
ALP SERPL-CCNC: 75 U/L (ref 40–129)
ALT SERPL-CCNC: 12 U/L (ref 5–41)
ANION GAP SERPL CALCULATED.3IONS-SCNC: 11 MMOL/L (ref 9–17)
AST SERPL-CCNC: 11 U/L
BASOPHILS # BLD: 0.04 K/UL (ref 0–0.2)
BASOPHILS NFR BLD: 1 % (ref 0–2)
BILIRUB SERPL-MCNC: 0.4 MG/DL (ref 0.3–1.2)
BUN SERPL-MCNC: 13 MG/DL (ref 8–23)
BUN/CREAT SERPL: 13 (ref 9–20)
CALCIUM SERPL-MCNC: 9.3 MG/DL (ref 8.6–10.4)
CHLORIDE SERPL-SCNC: 103 MMOL/L (ref 98–107)
CO2 SERPL-SCNC: 26 MMOL/L (ref 20–31)
CORTIS SERPL-MCNC: 6.2 UG/DL (ref 2.5–19.5)
CORTISOL COLLECTION INFO: NORMAL
CREAT SERPL-MCNC: 1 MG/DL (ref 0.7–1.2)
EOSINOPHIL # BLD: 0.07 K/UL (ref 0–0.44)
EOSINOPHILS RELATIVE PERCENT: 1 % (ref 1–4)
ERYTHROCYTE [DISTWIDTH] IN BLOOD BY AUTOMATED COUNT: 13.5 % (ref 11.8–14.4)
GFR, ESTIMATED: 81 ML/MIN/1.73M2
GLUCOSE SERPL-MCNC: 187 MG/DL (ref 70–99)
HCT VFR BLD AUTO: 33.5 % (ref 40.7–50.3)
HGB BLD-MCNC: 12 G/DL (ref 13–17)
IMM GRANULOCYTES # BLD AUTO: 0.05 K/UL (ref 0–0.3)
IMM GRANULOCYTES NFR BLD: 1 %
LYMPHOCYTES NFR BLD: 2.42 K/UL (ref 1.1–3.7)
LYMPHOCYTES RELATIVE PERCENT: 41 % (ref 24–43)
MCH RBC QN AUTO: 35.5 PG (ref 25.2–33.5)
MCHC RBC AUTO-ENTMCNC: 35.8 G/DL (ref 25.2–33.5)
MCV RBC AUTO: 99.1 FL (ref 82.6–102.9)
MONOCYTES NFR BLD: 0.71 K/UL (ref 0.1–1.2)
MONOCYTES NFR BLD: 12 % (ref 3–12)
NEUTROPHILS NFR BLD: 44 % (ref 36–65)
NEUTS SEG NFR BLD: 2.65 K/UL (ref 1.5–8.1)
NRBC BLD-RTO: 0 PER 100 WBC
PLATELET # BLD AUTO: 161 K/UL (ref 138–453)
PMV BLD AUTO: 10.1 FL (ref 8.1–13.5)
POTASSIUM SERPL-SCNC: 3.6 MMOL/L (ref 3.7–5.3)
PROT SERPL-MCNC: 7 G/DL (ref 6.4–8.3)
RBC # BLD AUTO: 3.38 M/UL (ref 4.21–5.77)
SODIUM SERPL-SCNC: 140 MMOL/L (ref 135–144)
TSH SERPL DL<=0.05 MIU/L-ACNC: 7.06 UIU/ML (ref 0.3–5)
WBC OTHER # BLD: 5.9 K/UL (ref 3.5–11.3)

## 2024-06-03 PROCEDURE — 3078F DIAST BP <80 MM HG: CPT | Performed by: INTERNAL MEDICINE

## 2024-06-03 PROCEDURE — 96367 TX/PROPH/DG ADDL SEQ IV INF: CPT

## 2024-06-03 PROCEDURE — 82533 TOTAL CORTISOL: CPT

## 2024-06-03 PROCEDURE — 96375 TX/PRO/DX INJ NEW DRUG ADDON: CPT

## 2024-06-03 PROCEDURE — 6360000002 HC RX W HCPCS: Performed by: INTERNAL MEDICINE

## 2024-06-03 PROCEDURE — 96413 CHEMO IV INFUSION 1 HR: CPT

## 2024-06-03 PROCEDURE — 96417 CHEMO IV INFUS EACH ADDL SEQ: CPT

## 2024-06-03 PROCEDURE — 99213 OFFICE O/P EST LOW 20 MIN: CPT | Performed by: INTERNAL MEDICINE

## 2024-06-03 PROCEDURE — 99215 OFFICE O/P EST HI 40 MIN: CPT | Performed by: INTERNAL MEDICINE

## 2024-06-03 PROCEDURE — 1123F ACP DISCUSS/DSCN MKR DOCD: CPT | Performed by: INTERNAL MEDICINE

## 2024-06-03 PROCEDURE — 2580000003 HC RX 258: Performed by: INTERNAL MEDICINE

## 2024-06-03 PROCEDURE — 84443 ASSAY THYROID STIM HORMONE: CPT

## 2024-06-03 PROCEDURE — 80053 COMPREHEN METABOLIC PANEL: CPT

## 2024-06-03 PROCEDURE — 85025 COMPLETE CBC W/AUTO DIFF WBC: CPT

## 2024-06-03 PROCEDURE — 36591 DRAW BLOOD OFF VENOUS DEVICE: CPT

## 2024-06-03 PROCEDURE — 3074F SYST BP LT 130 MM HG: CPT | Performed by: INTERNAL MEDICINE

## 2024-06-03 RX ORDER — FAMOTIDINE 10 MG/ML
20 INJECTION, SOLUTION INTRAVENOUS
OUTPATIENT
Start: 2024-06-25

## 2024-06-03 RX ORDER — DIPHENHYDRAMINE HYDROCHLORIDE 50 MG/ML
50 INJECTION INTRAMUSCULAR; INTRAVENOUS
Status: CANCELLED | OUTPATIENT
Start: 2024-06-05

## 2024-06-03 RX ORDER — ALBUTEROL SULFATE 90 UG/1
4 AEROSOL, METERED RESPIRATORY (INHALATION) PRN
Status: CANCELLED | OUTPATIENT
Start: 2024-06-04

## 2024-06-03 RX ORDER — HEPARIN 100 UNIT/ML
500 SYRINGE INTRAVENOUS PRN
Status: CANCELLED | OUTPATIENT
Start: 2024-06-05

## 2024-06-03 RX ORDER — MEPERIDINE HYDROCHLORIDE 50 MG/ML
12.5 INJECTION INTRAMUSCULAR; INTRAVENOUS; SUBCUTANEOUS PRN
OUTPATIENT
Start: 2024-06-26

## 2024-06-03 RX ORDER — ACETAMINOPHEN 325 MG/1
650 TABLET ORAL
OUTPATIENT
Start: 2024-06-27

## 2024-06-03 RX ORDER — EPINEPHRINE 1 MG/ML
0.3 INJECTION, SOLUTION, CONCENTRATE INTRAVENOUS PRN
OUTPATIENT
Start: 2024-06-25

## 2024-06-03 RX ORDER — FAMOTIDINE 10 MG/ML
20 INJECTION, SOLUTION INTRAVENOUS
OUTPATIENT
Start: 2024-06-27

## 2024-06-03 RX ORDER — ALBUTEROL SULFATE 90 UG/1
4 AEROSOL, METERED RESPIRATORY (INHALATION) PRN
OUTPATIENT
Start: 2024-06-25

## 2024-06-03 RX ORDER — SODIUM CHLORIDE 9 MG/ML
5-250 INJECTION, SOLUTION INTRAVENOUS PRN
OUTPATIENT
Start: 2024-06-27

## 2024-06-03 RX ORDER — MEPERIDINE HYDROCHLORIDE 50 MG/ML
12.5 INJECTION INTRAMUSCULAR; INTRAVENOUS; SUBCUTANEOUS PRN
Status: CANCELLED | OUTPATIENT
Start: 2024-06-05

## 2024-06-03 RX ORDER — HEPARIN SODIUM (PORCINE) LOCK FLUSH IV SOLN 100 UNIT/ML 100 UNIT/ML
500 SOLUTION INTRAVENOUS PRN
OUTPATIENT
Start: 2024-06-27

## 2024-06-03 RX ORDER — HEPARIN SODIUM (PORCINE) LOCK FLUSH IV SOLN 100 UNIT/ML 100 UNIT/ML
500 SOLUTION INTRAVENOUS PRN
OUTPATIENT
Start: 2024-06-26

## 2024-06-03 RX ORDER — ACETAMINOPHEN 325 MG/1
650 TABLET ORAL
OUTPATIENT
Start: 2024-06-06

## 2024-06-03 RX ORDER — HEPARIN 100 UNIT/ML
500 SYRINGE INTRAVENOUS PRN
Status: CANCELLED | OUTPATIENT
Start: 2024-06-06

## 2024-06-03 RX ORDER — MEPERIDINE HYDROCHLORIDE 50 MG/ML
12.5 INJECTION INTRAMUSCULAR; INTRAVENOUS; SUBCUTANEOUS PRN
OUTPATIENT
Start: 2024-06-25

## 2024-06-03 RX ORDER — SODIUM CHLORIDE 9 MG/ML
5-250 INJECTION, SOLUTION INTRAVENOUS PRN
Status: CANCELLED | OUTPATIENT
Start: 2024-06-05

## 2024-06-03 RX ORDER — SODIUM CHLORIDE 9 MG/ML
5-250 INJECTION, SOLUTION INTRAVENOUS PRN
Status: DISCONTINUED | OUTPATIENT
Start: 2024-06-03 | End: 2024-06-04 | Stop reason: HOSPADM

## 2024-06-03 RX ORDER — ONDANSETRON 2 MG/ML
8 INJECTION INTRAMUSCULAR; INTRAVENOUS
OUTPATIENT
Start: 2024-06-06

## 2024-06-03 RX ORDER — MEPERIDINE HYDROCHLORIDE 50 MG/ML
12.5 INJECTION INTRAMUSCULAR; INTRAVENOUS; SUBCUTANEOUS PRN
Status: CANCELLED | OUTPATIENT
Start: 2024-06-04

## 2024-06-03 RX ORDER — PALONOSETRON 0.05 MG/ML
0.25 INJECTION, SOLUTION INTRAVENOUS ONCE
OUTPATIENT
Start: 2024-06-25 | End: 2024-06-25

## 2024-06-03 RX ORDER — EPINEPHRINE 1 MG/ML
0.3 INJECTION, SOLUTION, CONCENTRATE INTRAVENOUS PRN
OUTPATIENT
Start: 2024-06-06

## 2024-06-03 RX ORDER — ACETAMINOPHEN 325 MG/1
650 TABLET ORAL
OUTPATIENT
Start: 2024-06-25

## 2024-06-03 RX ORDER — EPINEPHRINE 1 MG/ML
0.3 INJECTION, SOLUTION, CONCENTRATE INTRAVENOUS PRN
Status: CANCELLED | OUTPATIENT
Start: 2024-06-05

## 2024-06-03 RX ORDER — EPINEPHRINE 1 MG/ML
0.3 INJECTION, SOLUTION, CONCENTRATE INTRAVENOUS PRN
OUTPATIENT
Start: 2024-06-26

## 2024-06-03 RX ORDER — FAMOTIDINE 10 MG/ML
20 INJECTION, SOLUTION INTRAVENOUS
OUTPATIENT
Start: 2024-06-26

## 2024-06-03 RX ORDER — DEXAMETHASONE SODIUM PHOSPHATE 10 MG/ML
8 INJECTION, SOLUTION INTRAMUSCULAR; INTRAVENOUS ONCE
Status: CANCELLED | OUTPATIENT
Start: 2024-06-06 | End: 2024-06-06

## 2024-06-03 RX ORDER — SODIUM CHLORIDE 9 MG/ML
5-250 INJECTION, SOLUTION INTRAVENOUS PRN
Status: CANCELLED | OUTPATIENT
Start: 2024-06-06

## 2024-06-03 RX ORDER — SODIUM CHLORIDE 9 MG/ML
INJECTION, SOLUTION INTRAVENOUS CONTINUOUS
OUTPATIENT
Start: 2024-06-06

## 2024-06-03 RX ORDER — DIPHENHYDRAMINE HYDROCHLORIDE 50 MG/ML
50 INJECTION INTRAMUSCULAR; INTRAVENOUS
OUTPATIENT
Start: 2024-06-06

## 2024-06-03 RX ORDER — SODIUM CHLORIDE 0.9 % (FLUSH) 0.9 %
5-40 SYRINGE (ML) INJECTION PRN
OUTPATIENT
Start: 2024-06-26

## 2024-06-03 RX ORDER — MEPERIDINE HYDROCHLORIDE 50 MG/ML
12.5 INJECTION INTRAMUSCULAR; INTRAVENOUS; SUBCUTANEOUS PRN
OUTPATIENT
Start: 2024-06-06

## 2024-06-03 RX ORDER — PALONOSETRON 0.05 MG/ML
0.25 INJECTION, SOLUTION INTRAVENOUS ONCE
Status: COMPLETED | OUTPATIENT
Start: 2024-06-03 | End: 2024-06-03

## 2024-06-03 RX ORDER — ALBUTEROL SULFATE 90 UG/1
4 AEROSOL, METERED RESPIRATORY (INHALATION) PRN
OUTPATIENT
Start: 2024-06-06

## 2024-06-03 RX ORDER — SODIUM CHLORIDE 0.9 % (FLUSH) 0.9 %
5-40 SYRINGE (ML) INJECTION PRN
OUTPATIENT
Start: 2024-06-27

## 2024-06-03 RX ORDER — DEXAMETHASONE SODIUM PHOSPHATE 10 MG/ML
10 INJECTION, SOLUTION INTRAMUSCULAR; INTRAVENOUS ONCE
Status: COMPLETED | OUTPATIENT
Start: 2024-06-03 | End: 2024-06-03

## 2024-06-03 RX ORDER — SODIUM CHLORIDE 9 MG/ML
5-250 INJECTION, SOLUTION INTRAVENOUS PRN
OUTPATIENT
Start: 2024-06-25

## 2024-06-03 RX ORDER — SODIUM CHLORIDE 9 MG/ML
5-250 INJECTION, SOLUTION INTRAVENOUS PRN
OUTPATIENT
Start: 2024-06-26

## 2024-06-03 RX ORDER — ACETAMINOPHEN 325 MG/1
650 TABLET ORAL
Status: CANCELLED | OUTPATIENT
Start: 2024-06-05

## 2024-06-03 RX ORDER — SODIUM CHLORIDE 9 MG/ML
INJECTION, SOLUTION INTRAVENOUS CONTINUOUS
Status: CANCELLED | OUTPATIENT
Start: 2024-06-04

## 2024-06-03 RX ORDER — SODIUM CHLORIDE 0.9 % (FLUSH) 0.9 %
5-40 SYRINGE (ML) INJECTION PRN
OUTPATIENT
Start: 2024-06-25

## 2024-06-03 RX ORDER — ACETAMINOPHEN 325 MG/1
650 TABLET ORAL
Start: 2024-06-25

## 2024-06-03 RX ORDER — DIPHENHYDRAMINE HYDROCHLORIDE 50 MG/ML
50 INJECTION INTRAMUSCULAR; INTRAVENOUS
OUTPATIENT
Start: 2024-06-25

## 2024-06-03 RX ORDER — ACETAMINOPHEN 325 MG/1
650 TABLET ORAL
Status: CANCELLED | OUTPATIENT
Start: 2024-06-04

## 2024-06-03 RX ORDER — DEXAMETHASONE SODIUM PHOSPHATE 10 MG/ML
8 INJECTION, SOLUTION INTRAMUSCULAR; INTRAVENOUS ONCE
Status: CANCELLED | OUTPATIENT
Start: 2024-06-05 | End: 2024-06-05

## 2024-06-03 RX ORDER — SODIUM CHLORIDE 0.9 % (FLUSH) 0.9 %
5-40 SYRINGE (ML) INJECTION PRN
Status: CANCELLED | OUTPATIENT
Start: 2024-06-05

## 2024-06-03 RX ORDER — ONDANSETRON 2 MG/ML
8 INJECTION INTRAMUSCULAR; INTRAVENOUS
Status: CANCELLED | OUTPATIENT
Start: 2024-06-04

## 2024-06-03 RX ORDER — SODIUM CHLORIDE 9 MG/ML
5-250 INJECTION, SOLUTION INTRAVENOUS PRN
OUTPATIENT
Start: 2024-06-06

## 2024-06-03 RX ORDER — SODIUM CHLORIDE 9 MG/ML
INJECTION, SOLUTION INTRAVENOUS CONTINUOUS
OUTPATIENT
Start: 2024-06-25

## 2024-06-03 RX ORDER — EPINEPHRINE 1 MG/ML
0.3 INJECTION, SOLUTION, CONCENTRATE INTRAVENOUS PRN
OUTPATIENT
Start: 2024-06-27

## 2024-06-03 RX ORDER — ALBUTEROL SULFATE 90 UG/1
4 AEROSOL, METERED RESPIRATORY (INHALATION) PRN
Status: CANCELLED | OUTPATIENT
Start: 2024-06-05

## 2024-06-03 RX ORDER — SODIUM CHLORIDE 9 MG/ML
INJECTION, SOLUTION INTRAVENOUS CONTINUOUS
Status: CANCELLED | OUTPATIENT
Start: 2024-06-05

## 2024-06-03 RX ORDER — HEPARIN 100 UNIT/ML
500 SYRINGE INTRAVENOUS PRN
Status: DISCONTINUED | OUTPATIENT
Start: 2024-06-03 | End: 2024-06-04 | Stop reason: HOSPADM

## 2024-06-03 RX ORDER — DIPHENHYDRAMINE HYDROCHLORIDE 50 MG/ML
50 INJECTION INTRAMUSCULAR; INTRAVENOUS
OUTPATIENT
Start: 2024-06-26

## 2024-06-03 RX ORDER — ONDANSETRON 2 MG/ML
8 INJECTION INTRAMUSCULAR; INTRAVENOUS
Status: CANCELLED | OUTPATIENT
Start: 2024-06-05

## 2024-06-03 RX ORDER — SODIUM CHLORIDE 0.9 % (FLUSH) 0.9 %
5-40 SYRINGE (ML) INJECTION PRN
Status: CANCELLED | OUTPATIENT
Start: 2024-06-06

## 2024-06-03 RX ORDER — ACETAMINOPHEN 325 MG/1
650 TABLET ORAL
Status: CANCELLED
Start: 2024-06-04

## 2024-06-03 RX ORDER — ACETAMINOPHEN 325 MG/1
650 TABLET ORAL
OUTPATIENT
Start: 2024-06-26

## 2024-06-03 RX ORDER — ALBUTEROL SULFATE 90 UG/1
4 AEROSOL, METERED RESPIRATORY (INHALATION) PRN
OUTPATIENT
Start: 2024-06-27

## 2024-06-03 RX ORDER — ONDANSETRON 2 MG/ML
8 INJECTION INTRAMUSCULAR; INTRAVENOUS
OUTPATIENT
Start: 2024-06-25

## 2024-06-03 RX ORDER — SODIUM CHLORIDE 9 MG/ML
INJECTION, SOLUTION INTRAVENOUS CONTINUOUS
OUTPATIENT
Start: 2024-06-26

## 2024-06-03 RX ORDER — SODIUM CHLORIDE 9 MG/ML
INJECTION, SOLUTION INTRAVENOUS CONTINUOUS
OUTPATIENT
Start: 2024-06-27

## 2024-06-03 RX ORDER — EPINEPHRINE 1 MG/ML
0.3 INJECTION, SOLUTION, CONCENTRATE INTRAVENOUS PRN
Status: CANCELLED | OUTPATIENT
Start: 2024-06-04

## 2024-06-03 RX ORDER — DIPHENHYDRAMINE HYDROCHLORIDE 50 MG/ML
50 INJECTION INTRAMUSCULAR; INTRAVENOUS
OUTPATIENT
Start: 2024-06-27

## 2024-06-03 RX ORDER — SODIUM CHLORIDE 0.9 % (FLUSH) 0.9 %
5-40 SYRINGE (ML) INJECTION PRN
Status: CANCELLED | OUTPATIENT
Start: 2024-06-04

## 2024-06-03 RX ORDER — DIPHENHYDRAMINE HYDROCHLORIDE 50 MG/ML
50 INJECTION INTRAMUSCULAR; INTRAVENOUS
Status: CANCELLED | OUTPATIENT
Start: 2024-06-04

## 2024-06-03 RX ORDER — ALBUTEROL SULFATE 90 UG/1
4 AEROSOL, METERED RESPIRATORY (INHALATION) PRN
OUTPATIENT
Start: 2024-06-26

## 2024-06-03 RX ORDER — ONDANSETRON 2 MG/ML
8 INJECTION INTRAMUSCULAR; INTRAVENOUS
OUTPATIENT
Start: 2024-06-27

## 2024-06-03 RX ORDER — ONDANSETRON 2 MG/ML
8 INJECTION INTRAMUSCULAR; INTRAVENOUS
OUTPATIENT
Start: 2024-06-26

## 2024-06-03 RX ORDER — HEPARIN SODIUM (PORCINE) LOCK FLUSH IV SOLN 100 UNIT/ML 100 UNIT/ML
500 SOLUTION INTRAVENOUS PRN
OUTPATIENT
Start: 2024-06-25

## 2024-06-03 RX ORDER — MEPERIDINE HYDROCHLORIDE 50 MG/ML
12.5 INJECTION INTRAMUSCULAR; INTRAVENOUS; SUBCUTANEOUS PRN
OUTPATIENT
Start: 2024-06-27

## 2024-06-03 RX ADMIN — HEPARIN 500 UNITS: 100 SYRINGE at 15:03

## 2024-06-03 RX ADMIN — ETOPOSIDE 246 MG: 20 INJECTION INTRAVENOUS at 12:26

## 2024-06-03 RX ADMIN — SODIUM CHLORIDE 30 ML/HR: 9 INJECTION, SOLUTION INTRAVENOUS at 08:52

## 2024-06-03 RX ADMIN — CARBOPLATIN 565 MG: 10 INJECTION, SOLUTION INTRAVENOUS at 11:44

## 2024-06-03 RX ADMIN — ATEZOLIZUMAB 1200 MG: 1200 INJECTION, SOLUTION INTRAVENOUS at 11:07

## 2024-06-03 RX ADMIN — SODIUM CHLORIDE 30 ML/HR: 9 INJECTION, SOLUTION INTRAVENOUS at 11:41

## 2024-06-03 RX ADMIN — DEXAMETHASONE SODIUM PHOSPHATE 10 MG: 10 INJECTION, SOLUTION INTRAMUSCULAR; INTRAVENOUS at 10:15

## 2024-06-03 RX ADMIN — SODIUM CHLORIDE 150 MG: 9 INJECTION, SOLUTION INTRAVENOUS at 10:33

## 2024-06-03 RX ADMIN — PALONOSETRON 0.25 MG: 0.05 INJECTION, SOLUTION INTRAVENOUS at 10:15

## 2024-06-03 NOTE — PROGRESS NOTES
Patient arrived for infusion.  VSS as charted. Port accessed without complication. Lab draw off of port access.  Left pt accessed for next infusion in the next two days.   Patient tolerated infusion well.  Patient left infusion center with all belongings and steady gate.

## 2024-06-03 NOTE — TELEPHONE ENCOUNTER
Name: Andrea Desir  : 1955  MRN: [id number]    Oncology Navigation Follow-Up Note    Contact Type:   infusion room    Notes:   Writer met with patient while in infusion room for treatment today. He states he is doing well.  He denies any navigation needs.   Encouraged him to call if he has any questions/concerns. Understanding verbalized.   Navigator following for continuity of care.        Electronically signed by Natalia Broussard RN on 6/3/2024 at 10:32 AM

## 2024-06-03 NOTE — PROGRESS NOTES
Patient ID: Andrea Desir, 1955, [id number], 69 y.o.  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 male with history of atrial fibrillation, status post AICD, history of CAD status post stent, with a tobacco abuse was seen during initial counseling visit for newly discovered lung mass and lymphadenopathy.    Patient recently was seen by his primary care physician for difficulty swallowing and also swelling in his neck area.  Patient also having cough for past 6 months.  Patient had a CT scan of the neck as well as CT scan of the chest on 3/27/2024 which showed right upper lobe 4.7 cm mass suspicious for primary lung malignancy along with right hilar and bilateral mediastinal bulky lymphadenopathy with extrinsic mass effect on the trachea and esophagus without occlusion.  Patient also noted to have bilateral supraclavicular lymphadenopathy suspicious for metastasis.  Patient was referred to oncology for further recommendations.  He smokes 1 pack cigarettes per day.  He has a history of CAD status post AICD and stent placement.  Currently he is on aspirin and Plavix and following with cardiologist.    He may have lost few pounds over past few weeks.  He denies any alcohol abuse history.    He is complaining of headache for past 3 to 4 days.  Denies any nausea vomiting, blurry vision or tingling numbness.    Interval history:  Patient is return for follow visit and to discuss lab results, CT scan results and further recommendations.  He is tolerating chemotherapy well.  He does have some fatigue and tiredness at times.  He does report decreased appetite and he is not eating

## 2024-06-03 NOTE — PLAN OF CARE
Problem: Safety - Adult  Goal: Free from fall injury  Outcome: Adequate for Discharge     
reduction and repair performed by Dr Hinds in ER.  consultation with Dr Valdes, rupesh ortho;  Confirmed appointment tomorrow with Dr Valdes office;  Father (Fernando Gonzáles) [phone] has been in contact with Dr Valdes office  Advised antibiotics, non weight bearing until seen by Dr Valdes.

## 2024-06-03 NOTE — FLOWSHEET NOTE
rounding in ONC.    Assessment: Patient was accompanied by his sister (Thea), and reported that he was doing well with his treatments although experiencing fatigue and lack of appetite.  Patient and his sister shared stories about growing up in a big family (7 children), and discussed some of the complications of dealing with his wife (Thelma)'s dementia.  Patient has the support of his grown sons.    Intervention: Engaged in conversation and active listening. Prayed with Patient and his sister.     Outcome: Patient expressed appreciation for visit and offer of continued prayer.    Plan: Chaplains are available on site or on call 24/7 for spiritual and emotional support.    Electronically signed by Holly Cowan on 6/3/2024 at 3:46 PM

## 2024-06-04 ENCOUNTER — HOSPITAL ENCOUNTER (OUTPATIENT)
Dept: INFUSION THERAPY | Age: 69
Discharge: HOME | End: 2024-06-04
Payer: MEDICARE

## 2024-06-04 VITALS
OXYGEN SATURATION: 98 % | HEART RATE: 56 BPM | WEIGHT: 242 LBS | DIASTOLIC BLOOD PRESSURE: 60 MMHG | RESPIRATION RATE: 16 BRPM | HEIGHT: 73 IN | TEMPERATURE: 98.2 F | BODY MASS INDEX: 32.07 KG/M2 | SYSTOLIC BLOOD PRESSURE: 98 MMHG

## 2024-06-04 DIAGNOSIS — C34.91 SMALL CELL LUNG CANCER, RIGHT (HCC): Primary | ICD-10-CM

## 2024-06-04 PROCEDURE — 96375 TX/PRO/DX INJ NEW DRUG ADDON: CPT

## 2024-06-04 PROCEDURE — 96417 CHEMO IV INFUS EACH ADDL SEQ: CPT

## 2024-06-04 PROCEDURE — 2580000003 HC RX 258: Performed by: INTERNAL MEDICINE

## 2024-06-04 PROCEDURE — 6360000002 HC RX W HCPCS: Performed by: INTERNAL MEDICINE

## 2024-06-04 PROCEDURE — 96413 CHEMO IV INFUSION 1 HR: CPT

## 2024-06-04 RX ORDER — SODIUM CHLORIDE 0.9 % (FLUSH) 0.9 %
5-40 SYRINGE (ML) INJECTION PRN
Status: DISCONTINUED | OUTPATIENT
Start: 2024-06-04 | End: 2024-06-05 | Stop reason: HOSPADM

## 2024-06-04 RX ORDER — HEPARIN 100 UNIT/ML
500 SYRINGE INTRAVENOUS PRN
Status: DISCONTINUED | OUTPATIENT
Start: 2024-06-04 | End: 2024-06-05 | Stop reason: HOSPADM

## 2024-06-04 RX ORDER — DEXAMETHASONE SODIUM PHOSPHATE 10 MG/ML
8 INJECTION, SOLUTION INTRAMUSCULAR; INTRAVENOUS ONCE
Status: COMPLETED | OUTPATIENT
Start: 2024-06-04 | End: 2024-06-04

## 2024-06-04 RX ADMIN — ETOPOSIDE 246 MG: 20 INJECTION INTRAVENOUS at 13:55

## 2024-06-04 RX ADMIN — DEXAMETHASONE SODIUM PHOSPHATE 8 MG: 10 INJECTION, SOLUTION INTRAMUSCULAR; INTRAVENOUS at 13:26

## 2024-06-04 RX ADMIN — SODIUM CHLORIDE, PRESERVATIVE FREE 10 ML: 5 INJECTION INTRAVENOUS at 15:15

## 2024-06-04 RX ADMIN — HEPARIN 500 UNITS: 100 SYRINGE at 15:16

## 2024-06-04 NOTE — PROGRESS NOTES
Patient arrived for Etoposide infusion.  VSS as charted. Port flushed without complication.  Patient tolerated infusion well.  Patient left infusion center with all belongings and steady gate.  Next appt tomorrow.

## 2024-06-05 ENCOUNTER — HOSPITAL ENCOUNTER (OUTPATIENT)
Dept: INFUSION THERAPY | Age: 69
Discharge: HOME | End: 2024-06-05
Payer: MEDICARE

## 2024-06-05 VITALS
TEMPERATURE: 97.7 F | DIASTOLIC BLOOD PRESSURE: 75 MMHG | RESPIRATION RATE: 16 BRPM | OXYGEN SATURATION: 96 % | SYSTOLIC BLOOD PRESSURE: 120 MMHG | HEART RATE: 61 BPM

## 2024-06-05 DIAGNOSIS — C34.91 SMALL CELL LUNG CANCER, RIGHT (HCC): Primary | ICD-10-CM

## 2024-06-05 PROCEDURE — 96417 CHEMO IV INFUS EACH ADDL SEQ: CPT

## 2024-06-05 PROCEDURE — 96375 TX/PRO/DX INJ NEW DRUG ADDON: CPT

## 2024-06-05 PROCEDURE — 6360000002 HC RX W HCPCS: Performed by: INTERNAL MEDICINE

## 2024-06-05 PROCEDURE — 2580000003 HC RX 258: Performed by: INTERNAL MEDICINE

## 2024-06-05 PROCEDURE — 96413 CHEMO IV INFUSION 1 HR: CPT

## 2024-06-05 RX ORDER — SODIUM CHLORIDE 9 MG/ML
5-250 INJECTION, SOLUTION INTRAVENOUS PRN
Status: DISCONTINUED | OUTPATIENT
Start: 2024-06-05 | End: 2024-06-06 | Stop reason: HOSPADM

## 2024-06-05 RX ORDER — DEXAMETHASONE SODIUM PHOSPHATE 10 MG/ML
8 INJECTION, SOLUTION INTRAMUSCULAR; INTRAVENOUS ONCE
Status: COMPLETED | OUTPATIENT
Start: 2024-06-05 | End: 2024-06-05

## 2024-06-05 RX ORDER — SODIUM CHLORIDE 0.9 % (FLUSH) 0.9 %
5-40 SYRINGE (ML) INJECTION PRN
Status: DISCONTINUED | OUTPATIENT
Start: 2024-06-05 | End: 2024-06-06 | Stop reason: HOSPADM

## 2024-06-05 RX ORDER — HEPARIN 100 UNIT/ML
500 SYRINGE INTRAVENOUS PRN
Status: DISCONTINUED | OUTPATIENT
Start: 2024-06-05 | End: 2024-06-06 | Stop reason: HOSPADM

## 2024-06-05 RX ADMIN — SODIUM CHLORIDE, PRESERVATIVE FREE 10 ML: 5 INJECTION INTRAVENOUS at 14:52

## 2024-06-05 RX ADMIN — DEXAMETHASONE SODIUM PHOSPHATE 8 MG: 10 INJECTION, SOLUTION INTRAMUSCULAR; INTRAVENOUS at 13:01

## 2024-06-05 RX ADMIN — ETOPOSIDE 246 MG: 20 INJECTION INTRAVENOUS at 13:33

## 2024-06-05 RX ADMIN — SODIUM CHLORIDE, PRESERVATIVE FREE 10 ML: 5 INJECTION INTRAVENOUS at 14:47

## 2024-06-05 RX ADMIN — HEPARIN 500 UNITS: 100 SYRINGE at 14:47

## 2024-06-05 RX ADMIN — SODIUM CHLORIDE 30 ML/HR: 9 INJECTION, SOLUTION INTRAVENOUS at 12:57

## 2024-06-05 NOTE — PROGRESS NOTES
Patient arrived for Etoposide day 3.  VSS as charted. Port flushed without complication.  Pre meds given. Coffee provided. Patient tolerated infusion well.  Patient left infusion center with all belongings and steady gate.

## 2024-06-10 ENCOUNTER — PROCEDURE VISIT (OUTPATIENT)
Dept: UROLOGY | Age: 69
End: 2024-06-10
Payer: MEDICARE

## 2024-06-10 VITALS
SYSTOLIC BLOOD PRESSURE: 136 MMHG | HEART RATE: 64 BPM | HEIGHT: 73 IN | WEIGHT: 242 LBS | BODY MASS INDEX: 32.07 KG/M2 | DIASTOLIC BLOOD PRESSURE: 70 MMHG

## 2024-06-10 DIAGNOSIS — N40.1 BENIGN LOCALIZED PROSTATIC HYPERPLASIA WITH LOWER URINARY TRACT SYMPTOMS (LUTS): Primary | ICD-10-CM

## 2024-06-10 DIAGNOSIS — R33.8 ACUTE URINARY RETENTION: ICD-10-CM

## 2024-06-10 PROCEDURE — 52000 CYSTOURETHROSCOPY: CPT | Performed by: UROLOGY

## 2024-06-10 NOTE — PROGRESS NOTES
Cystoscopy    Operative Note    Patient:  Andrea Desir  MRN: [id number]  YOB: 1955    Date: 06/10/24  Surgeon: ELI MEJIA MD  Anesthesia: Urojet Local  Indications: urinary retention  Position: Supine  EBL: 0 ml    Findings:   The patient was prepped and draped in the usual sterile fashion.  The flexible cystoscope was advanced through the urethra and into the bladder.  The bladder was thoroughly inspected and the following was noted:    Residual Urine: significant\" \" .  Urine clear, with no obvious infection  Urethra: No abnormalities of the urethra are noted. Urethral dilation was not performed.    Prostate: lateral lobe hypertrophy + present, prostate moderately obstructing, intravesical extension of prostate not present. There was no previous TURP defect.   Bladder: no tumor noted .   Moderate trabeculation noted.  no bladder diverticulum.  Ureters: Orifices with normal configuration and location.      The cystoscope was removed.  The patient tolerated the procedure well.  Multiple failed voiding trials  Place alfaro again today  Cysto greenlight 30 r/b/a discussed

## 2024-06-10 NOTE — PROGRESS NOTES
Trent Storz Cystourethroscope model number [id number]; Serial Number [id number] scope #3 used for procedure today, was removed from sterile container after visual inspection.

## 2024-06-24 ENCOUNTER — TELEPHONE (OUTPATIENT)
Dept: ONCOLOGY | Age: 69
End: 2024-06-24

## 2024-06-24 ENCOUNTER — HOSPITAL ENCOUNTER (OUTPATIENT)
Dept: INFUSION THERAPY | Age: 69
Discharge: HOME | End: 2024-06-24
Payer: MEDICARE

## 2024-06-24 VITALS
TEMPERATURE: 97.8 F | HEIGHT: 73 IN | SYSTOLIC BLOOD PRESSURE: 153 MMHG | BODY MASS INDEX: 31.68 KG/M2 | DIASTOLIC BLOOD PRESSURE: 81 MMHG | RESPIRATION RATE: 16 BRPM | HEART RATE: 69 BPM | WEIGHT: 239 LBS | OXYGEN SATURATION: 99 %

## 2024-06-24 DIAGNOSIS — C34.91 SMALL CELL LUNG CANCER, RIGHT (HCC): Primary | ICD-10-CM

## 2024-06-24 DIAGNOSIS — R53.83 FATIGUE, UNSPECIFIED TYPE: ICD-10-CM

## 2024-06-24 LAB
ALBUMIN SERPL-MCNC: 4.1 G/DL (ref 3.5–5.2)
ALBUMIN/GLOB SERPL: 1.5 {RATIO} (ref 1–2.5)
ALP SERPL-CCNC: 64 U/L (ref 40–129)
ALT SERPL-CCNC: 13 U/L (ref 5–41)
ANION GAP SERPL CALCULATED.3IONS-SCNC: 13 MMOL/L (ref 9–17)
AST SERPL-CCNC: 12 U/L
BASOPHILS # BLD: 0.05 K/UL (ref 0–0.2)
BASOPHILS NFR BLD: 1 % (ref 0–2)
BILIRUB SERPL-MCNC: 0.3 MG/DL (ref 0.3–1.2)
BUN SERPL-MCNC: 15 MG/DL (ref 8–23)
BUN/CREAT SERPL: 14 (ref 9–20)
CALCIUM SERPL-MCNC: 9.6 MG/DL (ref 8.6–10.4)
CHLORIDE SERPL-SCNC: 102 MMOL/L (ref 98–107)
CO2 SERPL-SCNC: 25 MMOL/L (ref 20–31)
CREAT SERPL-MCNC: 1.1 MG/DL (ref 0.7–1.2)
EOSINOPHIL # BLD: 0.1 K/UL (ref 0–0.44)
EOSINOPHILS RELATIVE PERCENT: 2 % (ref 1–4)
ERYTHROCYTE [DISTWIDTH] IN BLOOD BY AUTOMATED COUNT: 15.2 % (ref 11.8–14.4)
GFR, ESTIMATED: 72 ML/MIN/1.73M2
GLUCOSE SERPL-MCNC: 95 MG/DL (ref 70–99)
HCT VFR BLD AUTO: 31.6 % (ref 40.7–50.3)
HGB BLD-MCNC: 11.5 G/DL (ref 13–17)
IMM GRANULOCYTES # BLD AUTO: 0.08 K/UL (ref 0–0.3)
IMM GRANULOCYTES NFR BLD: 1 %
LYMPHOCYTES NFR BLD: 2.35 K/UL (ref 1.1–3.7)
LYMPHOCYTES RELATIVE PERCENT: 40 % (ref 24–43)
MCH RBC QN AUTO: 37.1 PG (ref 25.2–33.5)
MCHC RBC AUTO-ENTMCNC: 36.4 G/DL (ref 25.2–33.5)
MCV RBC AUTO: 101.9 FL (ref 82.6–102.9)
MONOCYTES NFR BLD: 0.85 K/UL (ref 0.1–1.2)
MONOCYTES NFR BLD: 15 % (ref 3–12)
NEUTROPHILS NFR BLD: 41 % (ref 36–65)
NEUTS SEG NFR BLD: 2.37 K/UL (ref 1.5–8.1)
NRBC BLD-RTO: 0.3 PER 100 WBC
PLATELET # BLD AUTO: 194 K/UL (ref 138–453)
PMV BLD AUTO: 10.5 FL (ref 8.1–13.5)
POTASSIUM SERPL-SCNC: 4.2 MMOL/L (ref 3.7–5.3)
PROT SERPL-MCNC: 6.8 G/DL (ref 6.4–8.3)
RBC # BLD AUTO: 3.1 M/UL (ref 4.21–5.77)
RBC # BLD: ABNORMAL 10*6/UL
SODIUM SERPL-SCNC: 140 MMOL/L (ref 135–144)
TSH SERPL DL<=0.05 MIU/L-ACNC: 5.71 UIU/ML (ref 0.3–5)
WBC OTHER # BLD: 5.8 K/UL (ref 3.5–11.3)

## 2024-06-24 PROCEDURE — 85025 COMPLETE CBC W/AUTO DIFF WBC: CPT

## 2024-06-24 PROCEDURE — 84443 ASSAY THYROID STIM HORMONE: CPT

## 2024-06-24 PROCEDURE — 96417 CHEMO IV INFUS EACH ADDL SEQ: CPT

## 2024-06-24 PROCEDURE — 80053 COMPREHEN METABOLIC PANEL: CPT

## 2024-06-24 PROCEDURE — 96375 TX/PRO/DX INJ NEW DRUG ADDON: CPT

## 2024-06-24 PROCEDURE — 2580000003 HC RX 258: Performed by: INTERNAL MEDICINE

## 2024-06-24 PROCEDURE — 36591 DRAW BLOOD OFF VENOUS DEVICE: CPT

## 2024-06-24 PROCEDURE — 96367 TX/PROPH/DG ADDL SEQ IV INF: CPT

## 2024-06-24 PROCEDURE — 6360000002 HC RX W HCPCS: Performed by: INTERNAL MEDICINE

## 2024-06-24 PROCEDURE — 96413 CHEMO IV INFUSION 1 HR: CPT

## 2024-06-24 RX ORDER — SODIUM CHLORIDE 9 MG/ML
5-250 INJECTION, SOLUTION INTRAVENOUS PRN
Status: DISCONTINUED | OUTPATIENT
Start: 2024-06-24 | End: 2024-06-25 | Stop reason: HOSPADM

## 2024-06-24 RX ORDER — SODIUM CHLORIDE 0.9 % (FLUSH) 0.9 %
5-40 SYRINGE (ML) INJECTION PRN
Status: DISCONTINUED | OUTPATIENT
Start: 2024-06-24 | End: 2024-06-25 | Stop reason: HOSPADM

## 2024-06-24 RX ORDER — PALONOSETRON 0.05 MG/ML
0.25 INJECTION, SOLUTION INTRAVENOUS ONCE
Status: COMPLETED | OUTPATIENT
Start: 2024-06-24 | End: 2024-06-24

## 2024-06-24 RX ORDER — HEPARIN 100 UNIT/ML
500 SYRINGE INTRAVENOUS PRN
Status: DISCONTINUED | OUTPATIENT
Start: 2024-06-24 | End: 2024-06-25 | Stop reason: HOSPADM

## 2024-06-24 RX ORDER — DEXAMETHASONE SODIUM PHOSPHATE 10 MG/ML
10 INJECTION, SOLUTION INTRAMUSCULAR; INTRAVENOUS ONCE
Status: COMPLETED | OUTPATIENT
Start: 2024-06-24 | End: 2024-06-24

## 2024-06-24 RX ADMIN — SODIUM CHLORIDE 150 MG: 9 INJECTION, SOLUTION INTRAVENOUS at 08:55

## 2024-06-24 RX ADMIN — HEPARIN 500 UNITS: 100 SYRINGE at 12:29

## 2024-06-24 RX ADMIN — CARBOPLATIN 530 MG: 10 INJECTION, SOLUTION INTRAVENOUS at 10:13

## 2024-06-24 RX ADMIN — PALONOSETRON 0.25 MG: 0.05 INJECTION, SOLUTION INTRAVENOUS at 08:49

## 2024-06-24 RX ADMIN — ATEZOLIZUMAB 1200 MG: 1200 INJECTION, SOLUTION INTRAVENOUS at 09:25

## 2024-06-24 RX ADMIN — DEXAMETHASONE SODIUM PHOSPHATE 10 MG: 10 INJECTION, SOLUTION INTRAMUSCULAR; INTRAVENOUS at 08:49

## 2024-06-24 RX ADMIN — SODIUM CHLORIDE 30 ML/HR: 9 INJECTION, SOLUTION INTRAVENOUS at 09:21

## 2024-06-24 RX ADMIN — SODIUM CHLORIDE, PRESERVATIVE FREE 10 ML: 5 INJECTION INTRAVENOUS at 12:29

## 2024-06-24 RX ADMIN — ETOPOSIDE 246 MG: 20 INJECTION, SOLUTION INTRAVENOUS at 10:49

## 2024-06-24 NOTE — PROGRESS NOTES
Patient arrived for Carbo, Tecentriq, and Etoposide infusion.  VSS as charted. Port accessed without complication.  Lab draw off of port access.  Pre meds provided and coffee.  Patient tolerated infusion well.  Patient left infusion center with all belongings and steady gate.  New appt set for 3 weeks.  Printed out new appt sheet.

## 2024-06-24 NOTE — TELEPHONE ENCOUNTER
Name: Andrea Desir  : 1955  MRN: [id number]    Oncology Navigation Follow-Up Note    Contact Type:   infusion room    Notes:   Writer met with patient and his sister while in infusion room for treatment today.   He denies any navigation needs.   Encouraged him to call if he has any questions/concerns. Understanding verbalized.   Navigator following for continuity of care.        Electronically signed by Natalia Broussard RN on 2024 at 9:57 AM

## 2024-06-25 ENCOUNTER — HOSPITAL ENCOUNTER (OUTPATIENT)
Dept: INFUSION THERAPY | Age: 69
Discharge: HOME | End: 2024-06-25
Payer: MEDICARE

## 2024-06-25 VITALS
OXYGEN SATURATION: 99 % | HEIGHT: 73 IN | RESPIRATION RATE: 16 BRPM | BODY MASS INDEX: 32.34 KG/M2 | DIASTOLIC BLOOD PRESSURE: 62 MMHG | SYSTOLIC BLOOD PRESSURE: 121 MMHG | HEART RATE: 61 BPM | WEIGHT: 244 LBS | TEMPERATURE: 97.2 F

## 2024-06-25 DIAGNOSIS — C34.91 SMALL CELL LUNG CANCER, RIGHT (HCC): Primary | ICD-10-CM

## 2024-06-25 PROCEDURE — 2580000003 HC RX 258: Performed by: INTERNAL MEDICINE

## 2024-06-25 PROCEDURE — 6360000002 HC RX W HCPCS: Performed by: INTERNAL MEDICINE

## 2024-06-25 PROCEDURE — 96375 TX/PRO/DX INJ NEW DRUG ADDON: CPT

## 2024-06-25 PROCEDURE — 96417 CHEMO IV INFUS EACH ADDL SEQ: CPT

## 2024-06-25 PROCEDURE — 96413 CHEMO IV INFUSION 1 HR: CPT

## 2024-06-25 RX ORDER — HEPARIN 100 UNIT/ML
500 SYRINGE INTRAVENOUS PRN
Status: DISCONTINUED | OUTPATIENT
Start: 2024-06-25 | End: 2024-06-26 | Stop reason: HOSPADM

## 2024-06-25 RX ORDER — SODIUM CHLORIDE 0.9 % (FLUSH) 0.9 %
5-40 SYRINGE (ML) INJECTION PRN
Status: DISCONTINUED | OUTPATIENT
Start: 2024-06-25 | End: 2024-06-26 | Stop reason: HOSPADM

## 2024-06-25 RX ORDER — SODIUM CHLORIDE 9 MG/ML
5-250 INJECTION, SOLUTION INTRAVENOUS PRN
Status: DISCONTINUED | OUTPATIENT
Start: 2024-06-25 | End: 2024-06-26 | Stop reason: HOSPADM

## 2024-06-25 RX ORDER — DEXAMETHASONE SODIUM PHOSPHATE 10 MG/ML
8 INJECTION, SOLUTION INTRAMUSCULAR; INTRAVENOUS ONCE
Status: COMPLETED | OUTPATIENT
Start: 2024-06-25 | End: 2024-06-25

## 2024-06-25 RX ADMIN — ETOPOSIDE 246 MG: 20 INJECTION, SOLUTION INTRAVENOUS at 13:29

## 2024-06-25 RX ADMIN — HEPARIN 500 UNITS: 100 SYRINGE at 14:45

## 2024-06-25 RX ADMIN — SODIUM CHLORIDE 200 ML/HR: 9 INJECTION, SOLUTION INTRAVENOUS at 13:08

## 2024-06-25 RX ADMIN — DEXAMETHASONE SODIUM PHOSPHATE 8 MG: 10 INJECTION, SOLUTION INTRAMUSCULAR; INTRAVENOUS at 13:07

## 2024-06-25 RX ADMIN — SODIUM CHLORIDE, PRESERVATIVE FREE 10 ML: 5 INJECTION INTRAVENOUS at 14:45

## 2024-06-25 NOTE — PROGRESS NOTES
Patient arrived for Etoposide infusion.  VSS as charted. Port flushed without complication.  Patient tolerated infusion well.  Patient left infusion center with all belongings and steady gate.  New appt tomorrow.

## 2024-06-26 ENCOUNTER — HOSPITAL ENCOUNTER (OUTPATIENT)
Dept: INFUSION THERAPY | Age: 69
Discharge: HOME | End: 2024-06-26
Payer: MEDICARE

## 2024-06-26 DIAGNOSIS — C34.91 SMALL CELL LUNG CANCER, RIGHT (HCC): Primary | ICD-10-CM

## 2024-06-26 PROCEDURE — 96413 CHEMO IV INFUSION 1 HR: CPT

## 2024-06-26 PROCEDURE — 96417 CHEMO IV INFUS EACH ADDL SEQ: CPT

## 2024-06-26 PROCEDURE — 6360000002 HC RX W HCPCS: Performed by: INTERNAL MEDICINE

## 2024-06-26 PROCEDURE — 2580000003 HC RX 258: Performed by: INTERNAL MEDICINE

## 2024-06-26 PROCEDURE — 96375 TX/PRO/DX INJ NEW DRUG ADDON: CPT

## 2024-06-26 RX ORDER — DEXAMETHASONE SODIUM PHOSPHATE 10 MG/ML
8 INJECTION, SOLUTION INTRAMUSCULAR; INTRAVENOUS ONCE
Status: COMPLETED | OUTPATIENT
Start: 2024-06-26 | End: 2024-06-26

## 2024-06-26 RX ORDER — SODIUM CHLORIDE 9 MG/ML
5-250 INJECTION, SOLUTION INTRAVENOUS PRN
Status: DISCONTINUED | OUTPATIENT
Start: 2024-06-26 | End: 2024-06-27 | Stop reason: HOSPADM

## 2024-06-26 RX ORDER — HEPARIN 100 UNIT/ML
500 SYRINGE INTRAVENOUS PRN
Status: DISCONTINUED | OUTPATIENT
Start: 2024-06-26 | End: 2024-06-27 | Stop reason: HOSPADM

## 2024-06-26 RX ORDER — SODIUM CHLORIDE 0.9 % (FLUSH) 0.9 %
5-40 SYRINGE (ML) INJECTION PRN
Status: DISCONTINUED | OUTPATIENT
Start: 2024-06-26 | End: 2024-06-27 | Stop reason: HOSPADM

## 2024-06-26 RX ORDER — FUROSEMIDE 40 MG/1
40 TABLET ORAL DAILY
Qty: 90 TABLET | Refills: 3 | Status: SHIPPED | OUTPATIENT
Start: 2024-06-26

## 2024-06-26 RX ADMIN — HEPARIN 500 UNITS: 100 SYRINGE at 14:30

## 2024-06-26 RX ADMIN — SODIUM CHLORIDE, PRESERVATIVE FREE 10 ML: 5 INJECTION INTRAVENOUS at 14:30

## 2024-06-26 RX ADMIN — DEXAMETHASONE SODIUM PHOSPHATE 8 MG: 10 INJECTION, SOLUTION INTRAMUSCULAR; INTRAVENOUS at 12:51

## 2024-06-26 RX ADMIN — ETOPOSIDE 246 MG: 20 INJECTION, SOLUTION INTRAVENOUS at 13:20

## 2024-06-26 RX ADMIN — SODIUM CHLORIDE 30 ML/HR: 9 INJECTION, SOLUTION INTRAVENOUS at 13:01

## 2024-06-26 NOTE — PROGRESS NOTES
Patient arrived for Etoposide infusion.  VSS as charted. Port flushed without complication.  Pre meds given.  Coffee provided. Patient tolerated infusion well.  Patient left infusion center with all belongings and steady gate.  New appt set.

## 2024-06-28 ENCOUNTER — OFFICE VISIT (OUTPATIENT)
Dept: CARDIOLOGY | Age: 69
End: 2024-06-28
Payer: MEDICARE

## 2024-06-28 ENCOUNTER — PROCEDURE VISIT (OUTPATIENT)
Dept: CARDIOLOGY | Age: 69
End: 2024-06-28
Payer: MEDICARE

## 2024-06-28 VITALS
WEIGHT: 244 LBS | HEIGHT: 73 IN | SYSTOLIC BLOOD PRESSURE: 118 MMHG | HEART RATE: 56 BPM | DIASTOLIC BLOOD PRESSURE: 66 MMHG | BODY MASS INDEX: 32.34 KG/M2

## 2024-06-28 DIAGNOSIS — Z95.810 AICD (AUTOMATIC CARDIOVERTER/DEFIBRILLATOR) PRESENT: ICD-10-CM

## 2024-06-28 DIAGNOSIS — I25.5 ISCHEMIC CARDIOMYOPATHY: ICD-10-CM

## 2024-06-28 DIAGNOSIS — Z95.810 ICD (IMPLANTABLE CARDIOVERTER-DEFIBRILLATOR) IN PLACE: ICD-10-CM

## 2024-06-28 DIAGNOSIS — I25.5 ISCHEMIC CARDIOMYOPATHY: Primary | ICD-10-CM

## 2024-06-28 DIAGNOSIS — I10 PRIMARY HYPERTENSION: ICD-10-CM

## 2024-06-28 DIAGNOSIS — I50.22 CHRONIC SYSTOLIC CONGESTIVE HEART FAILURE (HCC): ICD-10-CM

## 2024-06-28 DIAGNOSIS — I25.10 CORONARY ARTERY DISEASE INVOLVING NATIVE CORONARY ARTERY OF NATIVE HEART WITHOUT ANGINA PECTORIS: Primary | ICD-10-CM

## 2024-06-28 PROCEDURE — 93005 ELECTROCARDIOGRAM TRACING: CPT | Performed by: INTERNAL MEDICINE

## 2024-06-28 PROCEDURE — 93289 INTERROG DEVICE EVAL HEART: CPT | Performed by: INTERNAL MEDICINE

## 2024-06-28 PROCEDURE — 99214 OFFICE O/P EST MOD 30 MIN: CPT | Performed by: INTERNAL MEDICINE

## 2024-06-28 NOTE — PROGRESS NOTES
[facility], [facility] A DEPARTMENT OF [facility]  [address] [facility] [address]  Dept: [phone]    CC: CAD , NOAH/LAD ,  /RCA, VT , CHF, AICD , HFrF,HLP , non-small cell CA lung getting chemo    Subjective:  The patient is a 69 y.o. year old, , male is in the office for a follow up visit.  According patient he is diagnosed with non-small cell CA lung already got 4 chemo's did well no complication with it.   He denies any chest pain or discomfort. No orthopnea or PND. Denies any palpitation, dizziness or syncope. He is completely asymptomatic from cardiac stand point.  AICD is interrogated had 1 second of VT.  Patient is on amiodarone patient did not felt any palpitations denies any shocks from AICD    Past Medical History:   has a past medical history of Anxiety, Atrial fibrillation (HCC), CAD (coronary artery disease), Erectile dysfunction, Headache(784.0), High cholesterol, Hx of adenomatous colonic polyps, Hypertension, ICD (implantable cardioverter-defibrillator) discharge, Ischemic cardiomyopathy, Low back pain, MI, old, Osteoarthritis, Plantar fasciitis, Seborrheic keratosis, Smoker, and Syncopal episodes.    Past Surgical History:   has a past surgical history that includes Rotator cuff repair (Bilateral, 1997); Vasectomy (1980); Cardiac defibrillator placement (01/07/2013); cyst removal; Hand tendon surgery (Left); Colonoscopy (11/11/2015); Colonoscopy (12/21/2016); Coronary angioplasty with stent (09/2012); Cardiac catheterization (09/04/2020); Cardiac defibrillator placement (06/22/2021); US BIOPSY LYMPH NODE (4/10/2024); and Port Surgery (Right, 4/29/2024).    Home Medications:  Prior to Admission medications    Medication Sig Start Date End Date Taking? Authorizing Provider   furosemide (LASIX) 40 MG tablet Take 1 tablet by mouth once daily 6/26/24  Yes Grisel Silver, APRN - CNP

## 2024-07-01 ENCOUNTER — TELEPHONE (OUTPATIENT)
Dept: UROLOGY | Age: 69
End: 2024-07-01

## 2024-07-01 NOTE — TELEPHONE ENCOUNTER
Pre-Operative Evaluation/Consultation    Name:  Andrea Desir                                         Age:  69 y.o.  MRN:  [id number]       :  1955   Date:  2024         Sex: male    There were no encounter diagnoses.    Surgeon:  Dr. Mariee  Procedure (Planned):  Cystoscopy greenlight pvp of prostate  Date Scheduled surgery: 24    Attending : No att. providers found    Primary Physician:   Cardiologist:     Type of Anesthesia Requested: General    Patient Medical history:  No Known Allergies  Social History     Tobacco Use    Smoking status: Some Days     Current packs/day: 1.00     Average packs/day: 1 pack/day for 54.5 years (54.5 ttl pk-yrs)     Types: Cigarettes     Start date: 1970    Smokeless tobacco: Never    Tobacco comments:     8 cigarettes daily, will see PCP Prn cessation needs.   Vaping Use    Vaping Use: Never used   Substance Use Topics    Alcohol use: Not Currently     Comment: occasional    Drug use: No         Additional ordered pre-operative testing:  []CBC    []ABG      [] BMP   []URINALYSIS   []CMP    []HCG   []COAGS PT/INR  []T&C  []LFTs   []TYPE AND SCREEN    [] EKG  [] Chest X-Ray  [] Other Radiology    [] Sent to Hospitalist None  [] Sent to Anesthesia for your review: None   [] Additional Orders: None     Comments:None   Requests: No Special requests    Signed: Erica Ralph LPN 2024 2:55 PM

## 2024-07-01 NOTE — TELEPHONE ENCOUNTER
[facility]         Patient:Andrea Desir           :1955           Surgical/Procedure Planned: Cystoscopy greenlight pvp of the prostate    Date & Location: 24 [facility]       Outpatient   Planned Length of OR: 1hour      Sedation: general    This is notification of the scheduled procedure only: Cardiac clearance    Estimated Cardiac Risk for Non-Cardiac Surgery/Procedure     Low______ Moderate__X____ High______    Medication Instructions - Clarification needed by this date:   -Insulin:  -Other medications:    ASA 81 mg/325 mg Hold _6__ Days  Plavix   Hold _6__ Days    If  cautery   used need to AICD interrogation afterward  Resume medications:     Lovenox indicated: _____Yes __X___NO    Provider: Dr Landeros       Signature of Provider Giving Orders for Medication holds:    _____________________________________________

## 2024-07-15 ENCOUNTER — TELEPHONE (OUTPATIENT)
Dept: ONCOLOGY | Age: 69
End: 2024-07-15

## 2024-07-15 ENCOUNTER — HOSPITAL ENCOUNTER (OUTPATIENT)
Dept: INFUSION THERAPY | Age: 69
Discharge: HOME | End: 2024-07-15
Payer: MEDICARE

## 2024-07-15 ENCOUNTER — OFFICE VISIT (OUTPATIENT)
Dept: ONCOLOGY | Age: 69
End: 2024-07-15
Payer: MEDICARE

## 2024-07-15 VITALS
HEART RATE: 74 BPM | DIASTOLIC BLOOD PRESSURE: 67 MMHG | HEIGHT: 72 IN | OXYGEN SATURATION: 96 % | WEIGHT: 246 LBS | RESPIRATION RATE: 16 BRPM | SYSTOLIC BLOOD PRESSURE: 109 MMHG | BODY MASS INDEX: 33.32 KG/M2 | TEMPERATURE: 97.6 F

## 2024-07-15 VITALS
RESPIRATION RATE: 16 BRPM | HEART RATE: 74 BPM | BODY MASS INDEX: 32.6 KG/M2 | SYSTOLIC BLOOD PRESSURE: 109 MMHG | OXYGEN SATURATION: 96 % | DIASTOLIC BLOOD PRESSURE: 67 MMHG | WEIGHT: 246 LBS | TEMPERATURE: 97.6 F | HEIGHT: 73 IN

## 2024-07-15 DIAGNOSIS — C34.91 SMALL CELL LUNG CANCER, RIGHT (HCC): Primary | ICD-10-CM

## 2024-07-15 DIAGNOSIS — R53.83 FATIGUE, UNSPECIFIED TYPE: ICD-10-CM

## 2024-07-15 LAB
ALBUMIN SERPL-MCNC: 4 G/DL (ref 3.5–5.2)
ALBUMIN/GLOB SERPL: 1.4 {RATIO} (ref 1–2.5)
ALP SERPL-CCNC: 73 U/L (ref 40–129)
ALT SERPL-CCNC: 12 U/L (ref 5–41)
ANION GAP SERPL CALCULATED.3IONS-SCNC: 13 MMOL/L (ref 9–17)
AST SERPL-CCNC: 15 U/L
ATYPICAL LYMPHOCYTE ABSOLUTE COUNT: 0.1 K/UL
ATYPICAL LYMPHOCYTES: 2 %
BASOPHILS # BLD: 0 K/UL (ref 0–0.2)
BASOPHILS NFR BLD: 0 % (ref 0–2)
BILIRUB SERPL-MCNC: 0.5 MG/DL (ref 0.3–1.2)
BUN SERPL-MCNC: 14 MG/DL (ref 8–23)
BUN/CREAT SERPL: 14 (ref 9–20)
CALCIUM SERPL-MCNC: 8.8 MG/DL (ref 8.6–10.4)
CHLORIDE SERPL-SCNC: 100 MMOL/L (ref 98–107)
CO2 SERPL-SCNC: 27 MMOL/L (ref 20–31)
CORTIS SERPL-MCNC: 9.3 UG/DL (ref 2.5–19.5)
CORTISOL COLLECTION INFO: NORMAL
CREAT SERPL-MCNC: 1 MG/DL (ref 0.7–1.2)
EOSINOPHIL # BLD: 0.1 K/UL (ref 0–0.4)
EOSINOPHILS RELATIVE PERCENT: 2 % (ref 1–8)
ERYTHROCYTE [DISTWIDTH] IN BLOOD BY AUTOMATED COUNT: 17.2 % (ref 11.8–14.4)
GFR, ESTIMATED: 81 ML/MIN/1.73M2
GLUCOSE SERPL-MCNC: 162 MG/DL (ref 70–99)
HCT VFR BLD AUTO: 30.5 % (ref 40.7–50.3)
HGB BLD-MCNC: 10.9 G/DL (ref 13–17)
IMM GRANULOCYTES # BLD AUTO: 0 K/UL (ref 0–0.3)
IMM GRANULOCYTES NFR BLD: 0 %
LYMPHOCYTES NFR BLD: 2.51 K/UL (ref 1–4.8)
LYMPHOCYTES RELATIVE PERCENT: 53 % (ref 15–43)
MCH RBC QN AUTO: 37.5 PG (ref 25.2–33.5)
MCHC RBC AUTO-ENTMCNC: 35.7 G/DL (ref 25.2–33.5)
MCV RBC AUTO: 104.8 FL (ref 82.6–102.9)
MONOCYTES NFR BLD: 0.38 K/UL (ref 0.1–1.2)
MONOCYTES NFR BLD: 8 % (ref 6–14)
MORPHOLOGY: ABNORMAL
NEUTROPHILS NFR BLD: 35 % (ref 44–74)
NEUTS SEG NFR BLD: 1.66 K/UL (ref 1.5–8.1)
NRBC BLD-RTO: 0.4 PER 100 WBC
NUCLEATED RED BLOOD CELLS: 1 PER 100 WBC
PLATELET # BLD AUTO: 185 K/UL (ref 138–453)
PMV BLD AUTO: 10.6 FL (ref 8.1–13.5)
POTASSIUM SERPL-SCNC: 4 MMOL/L (ref 3.7–5.3)
PROT SERPL-MCNC: 6.9 G/DL (ref 6.4–8.3)
RBC # BLD AUTO: 2.91 M/UL (ref 4.21–5.77)
SODIUM SERPL-SCNC: 140 MMOL/L (ref 135–144)
TSH SERPL DL<=0.05 MIU/L-ACNC: 3.55 UIU/ML (ref 0.3–5)
WBC OTHER # BLD: 4.8 K/UL (ref 3.5–11.3)

## 2024-07-15 PROCEDURE — 1123F ACP DISCUSS/DSCN MKR DOCD: CPT | Performed by: INTERNAL MEDICINE

## 2024-07-15 PROCEDURE — 80053 COMPREHEN METABOLIC PANEL: CPT

## 2024-07-15 PROCEDURE — 99214 OFFICE O/P EST MOD 30 MIN: CPT | Performed by: INTERNAL MEDICINE

## 2024-07-15 PROCEDURE — 82533 TOTAL CORTISOL: CPT

## 2024-07-15 PROCEDURE — 85025 COMPLETE CBC W/AUTO DIFF WBC: CPT

## 2024-07-15 PROCEDURE — 99215 OFFICE O/P EST HI 40 MIN: CPT | Performed by: INTERNAL MEDICINE

## 2024-07-15 PROCEDURE — 96415 CHEMO IV INFUSION ADDL HR: CPT

## 2024-07-15 PROCEDURE — 36591 DRAW BLOOD OFF VENOUS DEVICE: CPT

## 2024-07-15 PROCEDURE — 84443 ASSAY THYROID STIM HORMONE: CPT

## 2024-07-15 PROCEDURE — 96375 TX/PRO/DX INJ NEW DRUG ADDON: CPT

## 2024-07-15 PROCEDURE — 96417 CHEMO IV INFUS EACH ADDL SEQ: CPT

## 2024-07-15 PROCEDURE — 96413 CHEMO IV INFUSION 1 HR: CPT

## 2024-07-15 PROCEDURE — 3074F SYST BP LT 130 MM HG: CPT | Performed by: INTERNAL MEDICINE

## 2024-07-15 PROCEDURE — 96367 TX/PROPH/DG ADDL SEQ IV INF: CPT

## 2024-07-15 PROCEDURE — 2580000003 HC RX 258: Performed by: INTERNAL MEDICINE

## 2024-07-15 PROCEDURE — 3078F DIAST BP <80 MM HG: CPT | Performed by: INTERNAL MEDICINE

## 2024-07-15 PROCEDURE — 6360000002 HC RX W HCPCS: Performed by: INTERNAL MEDICINE

## 2024-07-15 RX ORDER — SODIUM CHLORIDE 0.9 % (FLUSH) 0.9 %
5-40 SYRINGE (ML) INJECTION PRN
Status: CANCELLED | OUTPATIENT
Start: 2024-07-18

## 2024-07-15 RX ORDER — ALBUTEROL SULFATE 90 UG/1
4 AEROSOL, METERED RESPIRATORY (INHALATION) PRN
OUTPATIENT
Start: 2024-08-06

## 2024-07-15 RX ORDER — HEPARIN 100 UNIT/ML
500 SYRINGE INTRAVENOUS PRN
Status: CANCELLED | OUTPATIENT
Start: 2024-07-18

## 2024-07-15 RX ORDER — ACETAMINOPHEN 325 MG/1
650 TABLET ORAL
Status: CANCELLED | OUTPATIENT
Start: 2024-07-16

## 2024-07-15 RX ORDER — ACETAMINOPHEN 325 MG/1
650 TABLET ORAL
OUTPATIENT
Start: 2024-08-08

## 2024-07-15 RX ORDER — FAMOTIDINE 10 MG/ML
20 INJECTION, SOLUTION INTRAVENOUS
OUTPATIENT
Start: 2024-08-06

## 2024-07-15 RX ORDER — DIPHENHYDRAMINE HYDROCHLORIDE 50 MG/ML
50 INJECTION INTRAMUSCULAR; INTRAVENOUS
Status: CANCELLED | OUTPATIENT
Start: 2024-07-16

## 2024-07-15 RX ORDER — EPINEPHRINE 1 MG/ML
0.3 INJECTION, SOLUTION, CONCENTRATE INTRAVENOUS PRN
OUTPATIENT
Start: 2024-08-07

## 2024-07-15 RX ORDER — ACETAMINOPHEN 325 MG/1
650 TABLET ORAL
Status: CANCELLED | OUTPATIENT
Start: 2024-07-18

## 2024-07-15 RX ORDER — SODIUM CHLORIDE 9 MG/ML
5-250 INJECTION, SOLUTION INTRAVENOUS PRN
Status: CANCELLED | OUTPATIENT
Start: 2024-07-16

## 2024-07-15 RX ORDER — ONDANSETRON 2 MG/ML
8 INJECTION INTRAMUSCULAR; INTRAVENOUS
OUTPATIENT
Start: 2024-08-08

## 2024-07-15 RX ORDER — DIPHENHYDRAMINE HYDROCHLORIDE 50 MG/ML
50 INJECTION INTRAMUSCULAR; INTRAVENOUS
Status: CANCELLED | OUTPATIENT
Start: 2024-07-17

## 2024-07-15 RX ORDER — EPINEPHRINE 1 MG/ML
0.3 INJECTION, SOLUTION, CONCENTRATE INTRAVENOUS PRN
OUTPATIENT
Start: 2024-08-06

## 2024-07-15 RX ORDER — MEPERIDINE HYDROCHLORIDE 50 MG/ML
12.5 INJECTION INTRAMUSCULAR; INTRAVENOUS; SUBCUTANEOUS PRN
Status: CANCELLED | OUTPATIENT
Start: 2024-07-16

## 2024-07-15 RX ORDER — DIPHENHYDRAMINE HYDROCHLORIDE 50 MG/ML
50 INJECTION INTRAMUSCULAR; INTRAVENOUS
OUTPATIENT
Start: 2024-08-07

## 2024-07-15 RX ORDER — SODIUM CHLORIDE 9 MG/ML
5-250 INJECTION, SOLUTION INTRAVENOUS PRN
Status: CANCELLED | OUTPATIENT
Start: 2024-07-17

## 2024-07-15 RX ORDER — ALBUTEROL SULFATE 90 UG/1
4 AEROSOL, METERED RESPIRATORY (INHALATION) PRN
OUTPATIENT
Start: 2024-08-07

## 2024-07-15 RX ORDER — ALBUTEROL SULFATE 90 UG/1
4 AEROSOL, METERED RESPIRATORY (INHALATION) PRN
Status: CANCELLED | OUTPATIENT
Start: 2024-07-16

## 2024-07-15 RX ORDER — HEPARIN 100 UNIT/ML
500 SYRINGE INTRAVENOUS PRN
Status: CANCELLED | OUTPATIENT
Start: 2024-07-17

## 2024-07-15 RX ORDER — MEPERIDINE HYDROCHLORIDE 50 MG/ML
12.5 INJECTION INTRAMUSCULAR; INTRAVENOUS; SUBCUTANEOUS PRN
Status: CANCELLED | OUTPATIENT
Start: 2024-07-17

## 2024-07-15 RX ORDER — DEXAMETHASONE SODIUM PHOSPHATE 10 MG/ML
8 INJECTION, SOLUTION INTRAMUSCULAR; INTRAVENOUS ONCE
Status: CANCELLED | OUTPATIENT
Start: 2024-07-17 | End: 2024-07-17

## 2024-07-15 RX ORDER — DEXAMETHASONE SODIUM PHOSPHATE 10 MG/ML
8 INJECTION, SOLUTION INTRAMUSCULAR; INTRAVENOUS ONCE
Status: CANCELLED | OUTPATIENT
Start: 2024-07-18 | End: 2024-07-18

## 2024-07-15 RX ORDER — MEPERIDINE HYDROCHLORIDE 50 MG/ML
12.5 INJECTION INTRAMUSCULAR; INTRAVENOUS; SUBCUTANEOUS PRN
OUTPATIENT
Start: 2024-08-07

## 2024-07-15 RX ORDER — SODIUM CHLORIDE 0.9 % (FLUSH) 0.9 %
5-40 SYRINGE (ML) INJECTION PRN
Status: DISCONTINUED | OUTPATIENT
Start: 2024-07-15 | End: 2024-07-16 | Stop reason: HOSPADM

## 2024-07-15 RX ORDER — DIAZEPAM 5 MG/1
TABLET ORAL
Qty: 2 TABLET | Refills: 0 | Status: SHIPPED | OUTPATIENT
Start: 2024-07-15 | End: 2024-07-22

## 2024-07-15 RX ORDER — DIPHENHYDRAMINE HYDROCHLORIDE 50 MG/ML
50 INJECTION INTRAMUSCULAR; INTRAVENOUS
Status: CANCELLED | OUTPATIENT
Start: 2024-07-18

## 2024-07-15 RX ORDER — SODIUM CHLORIDE 9 MG/ML
5-250 INJECTION, SOLUTION INTRAVENOUS PRN
OUTPATIENT
Start: 2024-08-06

## 2024-07-15 RX ORDER — SODIUM CHLORIDE 9 MG/ML
5-250 INJECTION, SOLUTION INTRAVENOUS PRN
Status: CANCELLED | OUTPATIENT
Start: 2024-07-18

## 2024-07-15 RX ORDER — SODIUM CHLORIDE 9 MG/ML
INJECTION, SOLUTION INTRAVENOUS CONTINUOUS
Status: CANCELLED | OUTPATIENT
Start: 2024-07-17

## 2024-07-15 RX ORDER — HEPARIN SODIUM (PORCINE) LOCK FLUSH IV SOLN 100 UNIT/ML 100 UNIT/ML
500 SOLUTION INTRAVENOUS PRN
OUTPATIENT
Start: 2024-08-07

## 2024-07-15 RX ORDER — SODIUM CHLORIDE 0.9 % (FLUSH) 0.9 %
5-40 SYRINGE (ML) INJECTION PRN
OUTPATIENT
Start: 2024-08-08

## 2024-07-15 RX ORDER — DIPHENHYDRAMINE HYDROCHLORIDE 50 MG/ML
50 INJECTION INTRAMUSCULAR; INTRAVENOUS
OUTPATIENT
Start: 2024-08-08

## 2024-07-15 RX ORDER — ACETAMINOPHEN 325 MG/1
650 TABLET ORAL
Status: CANCELLED
Start: 2024-07-16

## 2024-07-15 RX ORDER — SODIUM CHLORIDE 0.9 % (FLUSH) 0.9 %
5-40 SYRINGE (ML) INJECTION PRN
OUTPATIENT
Start: 2024-08-06

## 2024-07-15 RX ORDER — ACETAMINOPHEN 325 MG/1
650 TABLET ORAL
OUTPATIENT
Start: 2024-08-06

## 2024-07-15 RX ORDER — PALONOSETRON 0.05 MG/ML
0.25 INJECTION, SOLUTION INTRAVENOUS ONCE
Status: COMPLETED | OUTPATIENT
Start: 2024-07-15 | End: 2024-07-15

## 2024-07-15 RX ORDER — ALBUTEROL SULFATE 90 UG/1
4 AEROSOL, METERED RESPIRATORY (INHALATION) PRN
Status: CANCELLED | OUTPATIENT
Start: 2024-07-18

## 2024-07-15 RX ORDER — MEPERIDINE HYDROCHLORIDE 50 MG/ML
12.5 INJECTION INTRAMUSCULAR; INTRAVENOUS; SUBCUTANEOUS PRN
OUTPATIENT
Start: 2024-08-06

## 2024-07-15 RX ORDER — EPINEPHRINE 1 MG/ML
0.3 INJECTION, SOLUTION, CONCENTRATE INTRAVENOUS PRN
Status: CANCELLED | OUTPATIENT
Start: 2024-07-16

## 2024-07-15 RX ORDER — SODIUM CHLORIDE 9 MG/ML
INJECTION, SOLUTION INTRAVENOUS CONTINUOUS
Status: CANCELLED | OUTPATIENT
Start: 2024-07-18

## 2024-07-15 RX ORDER — MEPERIDINE HYDROCHLORIDE 50 MG/ML
12.5 INJECTION INTRAMUSCULAR; INTRAVENOUS; SUBCUTANEOUS PRN
Status: CANCELLED | OUTPATIENT
Start: 2024-07-18

## 2024-07-15 RX ORDER — MEPERIDINE HYDROCHLORIDE 50 MG/ML
12.5 INJECTION INTRAMUSCULAR; INTRAVENOUS; SUBCUTANEOUS PRN
OUTPATIENT
Start: 2024-08-08

## 2024-07-15 RX ORDER — FAMOTIDINE 10 MG/ML
20 INJECTION, SOLUTION INTRAVENOUS
OUTPATIENT
Start: 2024-08-07

## 2024-07-15 RX ORDER — SODIUM CHLORIDE 9 MG/ML
INJECTION, SOLUTION INTRAVENOUS CONTINUOUS
OUTPATIENT
Start: 2024-08-08

## 2024-07-15 RX ORDER — DIPHENHYDRAMINE HYDROCHLORIDE 50 MG/ML
50 INJECTION INTRAMUSCULAR; INTRAVENOUS
OUTPATIENT
Start: 2024-08-06

## 2024-07-15 RX ORDER — ACETAMINOPHEN 325 MG/1
650 TABLET ORAL
Start: 2024-08-06

## 2024-07-15 RX ORDER — EPINEPHRINE 1 MG/ML
0.3 INJECTION, SOLUTION, CONCENTRATE INTRAVENOUS PRN
Status: CANCELLED | OUTPATIENT
Start: 2024-07-18

## 2024-07-15 RX ORDER — ONDANSETRON 2 MG/ML
8 INJECTION INTRAMUSCULAR; INTRAVENOUS
Status: CANCELLED | OUTPATIENT
Start: 2024-07-17

## 2024-07-15 RX ORDER — SODIUM CHLORIDE 9 MG/ML
5-250 INJECTION, SOLUTION INTRAVENOUS PRN
OUTPATIENT
Start: 2024-08-07

## 2024-07-15 RX ORDER — SODIUM CHLORIDE 9 MG/ML
INJECTION, SOLUTION INTRAVENOUS CONTINUOUS
OUTPATIENT
Start: 2024-08-06

## 2024-07-15 RX ORDER — ACETAMINOPHEN 325 MG/1
650 TABLET ORAL
Status: CANCELLED | OUTPATIENT
Start: 2024-07-17

## 2024-07-15 RX ORDER — DEXAMETHASONE SODIUM PHOSPHATE 10 MG/ML
10 INJECTION, SOLUTION INTRAMUSCULAR; INTRAVENOUS ONCE
Status: COMPLETED | OUTPATIENT
Start: 2024-07-15 | End: 2024-07-15

## 2024-07-15 RX ORDER — EPINEPHRINE 1 MG/ML
0.3 INJECTION, SOLUTION, CONCENTRATE INTRAVENOUS PRN
OUTPATIENT
Start: 2024-08-08

## 2024-07-15 RX ORDER — HEPARIN 100 UNIT/ML
500 SYRINGE INTRAVENOUS PRN
Status: DISCONTINUED | OUTPATIENT
Start: 2024-07-15 | End: 2024-07-16 | Stop reason: HOSPADM

## 2024-07-15 RX ORDER — HEPARIN SODIUM (PORCINE) LOCK FLUSH IV SOLN 100 UNIT/ML 100 UNIT/ML
500 SOLUTION INTRAVENOUS PRN
OUTPATIENT
Start: 2024-08-06

## 2024-07-15 RX ORDER — ONDANSETRON 2 MG/ML
8 INJECTION INTRAMUSCULAR; INTRAVENOUS
OUTPATIENT
Start: 2024-08-07

## 2024-07-15 RX ORDER — ONDANSETRON 2 MG/ML
8 INJECTION INTRAMUSCULAR; INTRAVENOUS
Status: CANCELLED | OUTPATIENT
Start: 2024-07-16

## 2024-07-15 RX ORDER — PALONOSETRON 0.05 MG/ML
0.25 INJECTION, SOLUTION INTRAVENOUS ONCE
OUTPATIENT
Start: 2024-08-06 | End: 2024-08-06

## 2024-07-15 RX ORDER — HEPARIN SODIUM (PORCINE) LOCK FLUSH IV SOLN 100 UNIT/ML 100 UNIT/ML
500 SOLUTION INTRAVENOUS PRN
OUTPATIENT
Start: 2024-08-08

## 2024-07-15 RX ORDER — SODIUM CHLORIDE 0.9 % (FLUSH) 0.9 %
5-40 SYRINGE (ML) INJECTION PRN
OUTPATIENT
Start: 2024-08-07

## 2024-07-15 RX ORDER — ONDANSETRON 2 MG/ML
8 INJECTION INTRAMUSCULAR; INTRAVENOUS
OUTPATIENT
Start: 2024-08-06

## 2024-07-15 RX ORDER — SODIUM CHLORIDE 9 MG/ML
5-250 INJECTION, SOLUTION INTRAVENOUS PRN
OUTPATIENT
Start: 2024-08-08

## 2024-07-15 RX ORDER — ONDANSETRON 2 MG/ML
8 INJECTION INTRAMUSCULAR; INTRAVENOUS
Status: CANCELLED | OUTPATIENT
Start: 2024-07-18

## 2024-07-15 RX ORDER — ACETAMINOPHEN 325 MG/1
650 TABLET ORAL
OUTPATIENT
Start: 2024-08-07

## 2024-07-15 RX ORDER — SODIUM CHLORIDE 9 MG/ML
INJECTION, SOLUTION INTRAVENOUS CONTINUOUS
OUTPATIENT
Start: 2024-08-07

## 2024-07-15 RX ORDER — EPINEPHRINE 1 MG/ML
0.3 INJECTION, SOLUTION, CONCENTRATE INTRAVENOUS PRN
Status: CANCELLED | OUTPATIENT
Start: 2024-07-17

## 2024-07-15 RX ORDER — SODIUM CHLORIDE 0.9 % (FLUSH) 0.9 %
5-40 SYRINGE (ML) INJECTION PRN
Status: CANCELLED | OUTPATIENT
Start: 2024-07-17

## 2024-07-15 RX ORDER — ALBUTEROL SULFATE 90 UG/1
4 AEROSOL, METERED RESPIRATORY (INHALATION) PRN
Status: CANCELLED | OUTPATIENT
Start: 2024-07-17

## 2024-07-15 RX ORDER — FAMOTIDINE 10 MG/ML
20 INJECTION, SOLUTION INTRAVENOUS
OUTPATIENT
Start: 2024-08-08

## 2024-07-15 RX ORDER — SODIUM CHLORIDE 9 MG/ML
INJECTION, SOLUTION INTRAVENOUS CONTINUOUS
Status: CANCELLED | OUTPATIENT
Start: 2024-07-16

## 2024-07-15 RX ORDER — ALBUTEROL SULFATE 90 UG/1
4 AEROSOL, METERED RESPIRATORY (INHALATION) PRN
OUTPATIENT
Start: 2024-08-08

## 2024-07-15 RX ORDER — SODIUM CHLORIDE 9 MG/ML
5-250 INJECTION, SOLUTION INTRAVENOUS PRN
Status: DISCONTINUED | OUTPATIENT
Start: 2024-07-15 | End: 2024-07-16 | Stop reason: HOSPADM

## 2024-07-15 RX ADMIN — ETOPOSIDE 246 MG: 20 INJECTION, SOLUTION INTRAVENOUS at 11:34

## 2024-07-15 RX ADMIN — SODIUM CHLORIDE, PRESERVATIVE FREE 10 ML: 5 INJECTION INTRAVENOUS at 12:45

## 2024-07-15 RX ADMIN — PALONOSETRON 0.25 MG: 0.05 INJECTION, SOLUTION INTRAVENOUS at 09:37

## 2024-07-15 RX ADMIN — ATEZOLIZUMAB 1200 MG: 1200 INJECTION, SOLUTION INTRAVENOUS at 10:20

## 2024-07-15 RX ADMIN — DEXAMETHASONE SODIUM PHOSPHATE 10 MG: 10 INJECTION, SOLUTION INTRAMUSCULAR; INTRAVENOUS at 09:41

## 2024-07-15 RX ADMIN — FOSAPREPITANT 150 MG: 150 INJECTION, POWDER, LYOPHILIZED, FOR SOLUTION INTRAVENOUS at 09:48

## 2024-07-15 RX ADMIN — SODIUM CHLORIDE 20 ML/HR: 9 INJECTION, SOLUTION INTRAVENOUS at 07:50

## 2024-07-15 RX ADMIN — CARBOPLATIN 575 MG: 10 INJECTION, SOLUTION INTRAVENOUS at 10:56

## 2024-07-15 RX ADMIN — HEPARIN 500 UNITS: 100 SYRINGE at 12:45

## 2024-07-15 NOTE — PROGRESS NOTES
All of chemo infused.  Flushed port with 10 ml of saline and 5 ml of heparin flush.  Alcohol capped applied to ort and heparin lock discharged to home.  Will return today to see Dr. Mendenhall in office.

## 2024-07-15 NOTE — PROGRESS NOTES
Patient ID: Andrea Desir, 1955, [id number], 69 y.o.  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 male with history of atrial fibrillation, status post AICD, history of CAD status post stent, with a tobacco abuse was seen during initial counseling visit for newly discovered lung mass and lymphadenopathy.    Patient recently was seen by his primary care physician for difficulty swallowing and also swelling in his neck area.  Patient also having cough for past 6 months.  Patient had a CT scan of the neck as well as CT scan of the chest on 3/27/2024 which showed right upper lobe 4.7 cm mass suspicious for primary lung malignancy along with right hilar and bilateral mediastinal bulky lymphadenopathy with extrinsic mass effect on the trachea and esophagus without occlusion.  Patient also noted to have bilateral supraclavicular lymphadenopathy suspicious for metastasis.  Patient was referred to oncology for further recommendations.  He smokes 1 pack cigarettes per day.  He has a history of CAD status post AICD and stent placement.  Currently he is on aspirin and Plavix and following with cardiologist.    He may have lost few pounds over past few weeks.  He denies any alcohol abuse history.    He is complaining of headache for past 3 to 4 days.  Denies any nausea vomiting, blurry vision or tingling numbness.    Interval history:  Patient is returning for follow-up sooner to discuss lab results, and further recommendations.  He has completed 4 cycles of chemotherapy and receiving cycle #5.  He is tolerating chemotherapy well.  Denies any chest pain shortness of breath, denies

## 2024-07-15 NOTE — TELEPHONE ENCOUNTER
Name: Andrea Desir  : 1955  MRN: [id number]    Oncology Navigation Follow-Up Note    Contact Type:   infusion room    Notes:   Writer met with patient while in infusion room for treatment today, cycle 5 day 1 Tecentriq.   He denies any navigation needs.   Encouraged him to call if he has any questions/concerns. Understanding verbalized.   Navigator following for continuity of care.        Electronically signed by Natalia Broussard RN on 7/15/2024 at 10:48 AM

## 2024-07-16 ENCOUNTER — HOSPITAL ENCOUNTER (OUTPATIENT)
Dept: INFUSION THERAPY | Age: 69
Discharge: HOME | End: 2024-07-16
Payer: MEDICARE

## 2024-07-16 VITALS
HEART RATE: 61 BPM | DIASTOLIC BLOOD PRESSURE: 62 MMHG | OXYGEN SATURATION: 97 % | TEMPERATURE: 98.4 F | BODY MASS INDEX: 33.59 KG/M2 | HEIGHT: 72 IN | WEIGHT: 248 LBS | RESPIRATION RATE: 16 BRPM | SYSTOLIC BLOOD PRESSURE: 120 MMHG

## 2024-07-16 DIAGNOSIS — C34.91 SMALL CELL LUNG CANCER, RIGHT (HCC): Primary | ICD-10-CM

## 2024-07-16 PROCEDURE — 96417 CHEMO IV INFUS EACH ADDL SEQ: CPT

## 2024-07-16 PROCEDURE — 96413 CHEMO IV INFUSION 1 HR: CPT

## 2024-07-16 PROCEDURE — 2580000003 HC RX 258: Performed by: INTERNAL MEDICINE

## 2024-07-16 PROCEDURE — 6360000002 HC RX W HCPCS: Performed by: INTERNAL MEDICINE

## 2024-07-16 PROCEDURE — 96375 TX/PRO/DX INJ NEW DRUG ADDON: CPT

## 2024-07-16 RX ORDER — DEXAMETHASONE SODIUM PHOSPHATE 10 MG/ML
8 INJECTION, SOLUTION INTRAMUSCULAR; INTRAVENOUS ONCE
Status: COMPLETED | OUTPATIENT
Start: 2024-07-16 | End: 2024-07-16

## 2024-07-16 RX ORDER — HEPARIN 100 UNIT/ML
500 SYRINGE INTRAVENOUS PRN
Status: DISCONTINUED | OUTPATIENT
Start: 2024-07-16 | End: 2024-07-17 | Stop reason: HOSPADM

## 2024-07-16 RX ORDER — SODIUM CHLORIDE 9 MG/ML
5-250 INJECTION, SOLUTION INTRAVENOUS PRN
Status: DISCONTINUED | OUTPATIENT
Start: 2024-07-16 | End: 2024-07-17 | Stop reason: HOSPADM

## 2024-07-16 RX ORDER — SODIUM CHLORIDE 0.9 % (FLUSH) 0.9 %
5-40 SYRINGE (ML) INJECTION PRN
Status: DISCONTINUED | OUTPATIENT
Start: 2024-07-16 | End: 2024-07-17 | Stop reason: HOSPADM

## 2024-07-16 RX ADMIN — SODIUM CHLORIDE 20 ML/HR: 9 INJECTION, SOLUTION INTRAVENOUS at 12:57

## 2024-07-16 RX ADMIN — DEXAMETHASONE SODIUM PHOSPHATE 8 MG: 10 INJECTION, SOLUTION INTRAMUSCULAR; INTRAVENOUS at 13:15

## 2024-07-16 RX ADMIN — HEPARIN 500 UNITS: 100 SYRINGE at 15:13

## 2024-07-16 RX ADMIN — ETOPOSIDE 246 MG: 20 INJECTION, SOLUTION INTRAVENOUS at 13:47

## 2024-07-16 NOTE — FLOWSHEET NOTE
rounding in ONC.    Assessment: Patient reported feeling \"wonderful,\" and was accompanied by his youngest son (Enrrique).  Patient has a CAT scan in a few weeks and is excited for the results.  Patient discussed some challenges of caring for his wife (who suffers with dementia), but is grateful for the support and assistance from his two sons.  Patient has an optimistic attitude and a strong arthur in God.    Intervention: Engaged in conversation and active listening. Prayed with Patient.     Outcome: Patient expressed appreciation for visit and offer of continued prayer.    Plan: Chaplains are available on site or on call 24/7 for spiritual and emotional support.    Electronically signed by Holly Cowan on 7/16/2024 at 3:35 PM

## 2024-07-16 NOTE — PROGRESS NOTES
Patient arrives to Infusion Ctr with steady gait to chair.  No complaints or concerns.  Port is already accessed from appointment yesterday.  Flushes freely, NS infusion started.

## 2024-07-16 NOTE — DISCHARGE INSTR - COC
Continuity of Care Form    Patient Name: Andrea Desir   :  1955  MRN:  [id number]    Admit date:  2024  Discharge date:  ***    Code Status Order: Prior   Advance Directives:     Admitting Physician:  No admitting provider for patient encounter.  PCP: Deon Art MD    Discharging Nurse: ***  Discharging Hospital Unit/Room#: No information available for this encounter.  Discharging Unit Phone Number: ***    Emergency Contact:   Extended Emergency Contact Information  Primary Emergency Contact: Thelma Desir  Address: [address]  Mobile Phone: [phone]  Relation: Spouse  Secondary Emergency Contact: Enrrique Desir  Mobile Phone: [phone]  Relation: Child    Past Surgical History:  Past Surgical History:   Procedure Laterality Date    CARDIAC CATHETERIZATION  2020    CAD with hx of NOAH to LAD in     CARDIAC DEFIBRILLATOR PLACEMENT  2013    CARDIAC DEFIBRILLATOR PLACEMENT  2021    REPLACEMENT  /  DR ALBERTO    COLONOSCOPY  2015    polyps X 5 Pascale CARBAJAL    COLONOSCOPY  2016    INT. Hemmorrhoids, Transverse Colon Polyp adenoma, Rectal Polyp hyperplastic.  5 yr follow up    CORONARY ANGIOPLASTY WITH STENT PLACEMENT  09/2012    x3  CAD with hx of NOAH to LAD in     CYST REMOVAL      HAND TENDON SURGERY Left     thumb    PORT SURGERY Right 2024    Right Port Placement performed by Franck Goldman MD at [facility] OR    ROTATOR CUFF REPAIR Bilateral     US LYMPH NODE BIOPSY  4/10/2024    US LYMPH NODE BIOPSY 4/10/2024 [facility] ULTRASOUND    VASECTOMY         Immunization History:   Immunization History   Administered Date(s) Administered    COVID-19, MODERNA BLUE border, Primary or Immunocompromised, (age 12y+), IM, 100 mcg/0.5mL 2021, 2021, 2021    COVID-19, MODERNA, (- formula), (age 12y+), IM, 50mcg/0.5mL 2023    COVID-19, US Vaccine, Vaccine Unspecified 2021    Influenza,  FLUAD, (age 65 y+), Adjuvanted, 0.5mL 2022    TDaP, ADACEL (age 10y-64y), BOOSTRIX (age 10y+), IM, 0.5mL 2015       Active Problems:  Patient Active Problem List   Diagnosis Code    CAD (coronary artery disease) I25.10    HTN (hypertension) I10    Anxiety F41.9    Hyperlipemia E78.5    Memory loss R41.3    Hypothyroidism E03.9    Depression F32.A    Hyperlipidemia with target LDL less than 70 E78.5    Atrial fibrillation (HCC) I48.91    Ischemic cardiomyopathy I25.5    Tobacco abuse Z72.0    Tobacco abuse counseling Z71.6    Hx of adenomatous colonic polyps Z86.010    Colon polyps K63.5    Chronic systolic congestive heart failure (HCC) I50.22    Small cell lung cancer, right (HCC) C34.91       Isolation/Infection:   Isolation            No Isolation          Patient Infection Status       Infection Onset Added Last Indicated Last Indicated By Review Planned Expiration Resolved Resolved By    None active    Resolved    Influenza 21 Flu A/B Ag Detection   22 Infection             Nurse Assessment:  Last Vital Signs: /62   Pulse 61   Temp 98.4 °F (36.9 °C) (Tympanic)   Resp 16   Ht 1.829 m (6')   Wt 112.5 kg (248 lb)   SpO2 97%   BMI 33.63 kg/m²     Last documented pain score (0-10 scale):    Last Weight:   Wt Readings from Last 1 Encounters:   24 112.5 kg (248 lb)     Mental Status:  {IP PT MENTAL STATUS:52379}    IV Access:  { TINA IV ACCESS:886510828}    Nursing Mobility/ADLs:  Walking   {CHP DME ADLs:003531589}  Transfer  {CHP DME ADLs:671040886}  Bathing  {CHP DME ADLs:063099975}  Dressing  {CHP DME ADLs:942527881}  Toileting  {CHP DME ADLs:500773438}  Feeding  {CHP DME ADLs:094295312}  Med Admin  {CHP DME ADLs:006155163}  Med Delivery   { TINA MED Delivery:006279797}    Wound Care Documentation and Therapy:  Incision 24 Chest Right;Upper (Active)   Number of days: 78       Incision 06/22/21 Chest Anterior (Active)   Number of days: 1120

## 2024-07-16 NOTE — PLAN OF CARE
Problem: Safety - Adult  Goal: Free from fall injury  7/16/2024 1321 by Octavia Mosquera, RN  Outcome: Completed     Problem: Safety - Adult  Goal: Free from fall injury  Outcome: Adequate for Discharge

## 2024-07-17 ENCOUNTER — HOSPITAL ENCOUNTER (OUTPATIENT)
Dept: INFUSION THERAPY | Age: 69
Discharge: HOME | End: 2024-07-17
Payer: MEDICARE

## 2024-07-17 VITALS
HEART RATE: 66 BPM | HEIGHT: 72 IN | WEIGHT: 248 LBS | RESPIRATION RATE: 16 BRPM | TEMPERATURE: 98.3 F | BODY MASS INDEX: 33.59 KG/M2 | OXYGEN SATURATION: 97 % | DIASTOLIC BLOOD PRESSURE: 70 MMHG | SYSTOLIC BLOOD PRESSURE: 130 MMHG

## 2024-07-17 DIAGNOSIS — C34.91 SMALL CELL LUNG CANCER, RIGHT (HCC): Primary | ICD-10-CM

## 2024-07-17 PROCEDURE — 96413 CHEMO IV INFUSION 1 HR: CPT

## 2024-07-17 PROCEDURE — 6360000002 HC RX W HCPCS: Performed by: INTERNAL MEDICINE

## 2024-07-17 PROCEDURE — 2580000003 HC RX 258: Performed by: INTERNAL MEDICINE

## 2024-07-17 PROCEDURE — 96375 TX/PRO/DX INJ NEW DRUG ADDON: CPT

## 2024-07-17 RX ORDER — DEXAMETHASONE SODIUM PHOSPHATE 10 MG/ML
8 INJECTION, SOLUTION INTRAMUSCULAR; INTRAVENOUS ONCE
Status: COMPLETED | OUTPATIENT
Start: 2024-07-17 | End: 2024-07-17

## 2024-07-17 RX ORDER — SODIUM CHLORIDE 0.9 % (FLUSH) 0.9 %
5-40 SYRINGE (ML) INJECTION PRN
Status: DISCONTINUED | OUTPATIENT
Start: 2024-07-17 | End: 2024-07-18 | Stop reason: HOSPADM

## 2024-07-17 RX ORDER — SODIUM CHLORIDE 9 MG/ML
5-250 INJECTION, SOLUTION INTRAVENOUS PRN
Status: DISCONTINUED | OUTPATIENT
Start: 2024-07-17 | End: 2024-07-18 | Stop reason: HOSPADM

## 2024-07-17 RX ORDER — HEPARIN 100 UNIT/ML
500 SYRINGE INTRAVENOUS PRN
Status: DISCONTINUED | OUTPATIENT
Start: 2024-07-17 | End: 2024-07-18 | Stop reason: HOSPADM

## 2024-07-17 RX ADMIN — SODIUM CHLORIDE 20 ML/HR: 9 INJECTION, SOLUTION INTRAVENOUS at 12:44

## 2024-07-17 RX ADMIN — SODIUM CHLORIDE, PRESERVATIVE FREE 10 ML: 5 INJECTION INTRAVENOUS at 12:44

## 2024-07-17 RX ADMIN — DEXAMETHASONE SODIUM PHOSPHATE 8 MG: 10 INJECTION, SOLUTION INTRAMUSCULAR; INTRAVENOUS at 12:50

## 2024-07-17 RX ADMIN — HEPARIN 500 UNITS: 100 SYRINGE at 14:35

## 2024-07-17 RX ADMIN — ETOPOSIDE 246 MG: 20 INJECTION, SOLUTION INTRAVENOUS at 13:19

## 2024-07-17 NOTE — PROGRESS NOTES
Infusion complete.  Flushed VAD with 10 ml of saline and 5 ml of heparin flush. D/C durand needle.  Band aid to site. Discharged to home.  Will return in 3 weeks.

## 2024-07-18 ENCOUNTER — TELEPHONE (OUTPATIENT)
Dept: UROLOGY | Age: 69
End: 2024-07-18

## 2024-07-18 NOTE — TELEPHONE ENCOUNTER
Spoke with patient, wanted to know arrival time for procedure. Let patient know they will call Friday afternoon

## 2024-07-18 NOTE — TELEPHONE ENCOUNTER
Patient is scheduled for a cysto on 7/22/24, and has some questions about his pre surgery instructions. Can contact patient at # [phone].

## 2024-07-22 ENCOUNTER — ANESTHESIA EVENT (OUTPATIENT)
Dept: OPERATING ROOM | Age: 69
End: 2024-07-22
Payer: MEDICARE

## 2024-07-22 ENCOUNTER — HOSPITAL ENCOUNTER (OUTPATIENT)
Age: 69
Setting detail: OUTPATIENT SURGERY
Discharge: HOME | End: 2024-07-22
Attending: UROLOGY | Admitting: UROLOGY
Payer: MEDICARE

## 2024-07-22 ENCOUNTER — ANESTHESIA (OUTPATIENT)
Dept: OPERATING ROOM | Age: 69
End: 2024-07-22
Payer: MEDICARE

## 2024-07-22 VITALS
OXYGEN SATURATION: 97 % | HEIGHT: 72 IN | SYSTOLIC BLOOD PRESSURE: 138 MMHG | WEIGHT: 245 LBS | RESPIRATION RATE: 16 BRPM | BODY MASS INDEX: 33.18 KG/M2 | DIASTOLIC BLOOD PRESSURE: 67 MMHG | TEMPERATURE: 98.2 F | HEART RATE: 71 BPM

## 2024-07-22 DIAGNOSIS — G89.18 POSTOPERATIVE PAIN: Primary | ICD-10-CM

## 2024-07-22 PROCEDURE — 7100000011 HC PHASE II RECOVERY - ADDTL 15 MIN: Performed by: UROLOGY

## 2024-07-22 PROCEDURE — 2709999900 HC NON-CHARGEABLE SUPPLY: Performed by: UROLOGY

## 2024-07-22 PROCEDURE — 6360000002 HC RX W HCPCS: Performed by: NURSE ANESTHETIST, CERTIFIED REGISTERED

## 2024-07-22 PROCEDURE — 7100000001 HC PACU RECOVERY - ADDTL 15 MIN: Performed by: UROLOGY

## 2024-07-22 PROCEDURE — 7100000010 HC PHASE II RECOVERY - FIRST 15 MIN: Performed by: UROLOGY

## 2024-07-22 PROCEDURE — 3700000000 HC ANESTHESIA ATTENDED CARE: Performed by: UROLOGY

## 2024-07-22 PROCEDURE — 6360000002 HC RX W HCPCS: Performed by: UROLOGY

## 2024-07-22 PROCEDURE — 3600000004 HC SURGERY LEVEL 4 BASE: Performed by: UROLOGY

## 2024-07-22 PROCEDURE — 7100000000 HC PACU RECOVERY - FIRST 15 MIN: Performed by: UROLOGY

## 2024-07-22 PROCEDURE — 2500000003 HC RX 250 WO HCPCS: Performed by: NURSE ANESTHETIST, CERTIFIED REGISTERED

## 2024-07-22 PROCEDURE — 3700000001 HC ADD 15 MINUTES (ANESTHESIA): Performed by: UROLOGY

## 2024-07-22 PROCEDURE — 3600000014 HC SURGERY LEVEL 4 ADDTL 15MIN: Performed by: UROLOGY

## 2024-07-22 PROCEDURE — 2580000003 HC RX 258: Performed by: UROLOGY

## 2024-07-22 RX ORDER — SODIUM CHLORIDE 0.9 % (FLUSH) 0.9 %
5-40 SYRINGE (ML) INJECTION EVERY 12 HOURS SCHEDULED
Status: DISCONTINUED | OUTPATIENT
Start: 2024-07-22 | End: 2024-07-22 | Stop reason: HOSPADM

## 2024-07-22 RX ORDER — HYDROCODONE BITARTRATE AND ACETAMINOPHEN 5; 325 MG/1; MG/1
1 TABLET ORAL EVERY 6 HOURS PRN
Qty: 18 TABLET | Refills: 0 | Status: SHIPPED | OUTPATIENT
Start: 2024-07-22 | End: 2024-07-27

## 2024-07-22 RX ORDER — DEXAMETHASONE SODIUM PHOSPHATE 4 MG/ML
INJECTION, SOLUTION INTRA-ARTICULAR; INTRALESIONAL; INTRAMUSCULAR; INTRAVENOUS; SOFT TISSUE PRN
Status: DISCONTINUED | OUTPATIENT
Start: 2024-07-22 | End: 2024-07-22 | Stop reason: SDUPTHER

## 2024-07-22 RX ORDER — SODIUM CHLORIDE, SODIUM LACTATE, POTASSIUM CHLORIDE, CALCIUM CHLORIDE 600; 310; 30; 20 MG/100ML; MG/100ML; MG/100ML; MG/100ML
INJECTION, SOLUTION INTRAVENOUS CONTINUOUS
Status: DISCONTINUED | OUTPATIENT
Start: 2024-07-22 | End: 2024-07-22 | Stop reason: HOSPADM

## 2024-07-22 RX ORDER — HEPARIN SODIUM,PORCINE/PF 10 UNIT/ML
3 SYRINGE (ML) INTRAVENOUS PRN
Status: DISCONTINUED | OUTPATIENT
Start: 2024-07-22 | End: 2024-07-22

## 2024-07-22 RX ORDER — HEPARIN SODIUM (PORCINE) LOCK FLUSH IV SOLN 100 UNIT/ML 100 UNIT/ML
300 SOLUTION INTRAVENOUS PRN
Status: DISCONTINUED | OUTPATIENT
Start: 2024-07-22 | End: 2024-07-22 | Stop reason: HOSPADM

## 2024-07-22 RX ORDER — PROPOFOL 10 MG/ML
INJECTION, EMULSION INTRAVENOUS PRN
Status: DISCONTINUED | OUTPATIENT
Start: 2024-07-22 | End: 2024-07-22 | Stop reason: SDUPTHER

## 2024-07-22 RX ORDER — SODIUM CHLORIDE 0.9 % (FLUSH) 0.9 %
5-40 SYRINGE (ML) INJECTION EVERY 12 HOURS SCHEDULED
Status: CANCELLED | OUTPATIENT
Start: 2024-07-22

## 2024-07-22 RX ORDER — DIPHENHYDRAMINE HYDROCHLORIDE 50 MG/ML
12.5 INJECTION INTRAMUSCULAR; INTRAVENOUS
Status: CANCELLED | OUTPATIENT
Start: 2024-07-22 | End: 2024-07-23

## 2024-07-22 RX ORDER — NALOXONE HYDROCHLORIDE 0.4 MG/ML
INJECTION, SOLUTION INTRAMUSCULAR; INTRAVENOUS; SUBCUTANEOUS PRN
Status: CANCELLED | OUTPATIENT
Start: 2024-07-22

## 2024-07-22 RX ORDER — LABETALOL HYDROCHLORIDE 5 MG/ML
10 INJECTION, SOLUTION INTRAVENOUS
Status: CANCELLED | OUTPATIENT
Start: 2024-07-22

## 2024-07-22 RX ORDER — SODIUM CHLORIDE 0.9 % (FLUSH) 0.9 %
5-40 SYRINGE (ML) INJECTION PRN
Status: CANCELLED | OUTPATIENT
Start: 2024-07-22

## 2024-07-22 RX ORDER — SODIUM CHLORIDE 9 MG/ML
INJECTION, SOLUTION INTRAVENOUS PRN
Status: CANCELLED | OUTPATIENT
Start: 2024-07-22

## 2024-07-22 RX ORDER — FENTANYL CITRATE 50 UG/ML
INJECTION, SOLUTION INTRAMUSCULAR; INTRAVENOUS PRN
Status: DISCONTINUED | OUTPATIENT
Start: 2024-07-22 | End: 2024-07-22 | Stop reason: SDUPTHER

## 2024-07-22 RX ORDER — ONDANSETRON 2 MG/ML
4 INJECTION INTRAMUSCULAR; INTRAVENOUS
Status: CANCELLED | OUTPATIENT
Start: 2024-07-22 | End: 2024-07-23

## 2024-07-22 RX ORDER — LIDOCAINE HYDROCHLORIDE 10 MG/ML
INJECTION, SOLUTION EPIDURAL; INFILTRATION; INTRACAUDAL; PERINEURAL PRN
Status: DISCONTINUED | OUTPATIENT
Start: 2024-07-22 | End: 2024-07-22 | Stop reason: SDUPTHER

## 2024-07-22 RX ORDER — CEPHALEXIN 500 MG/1
500 CAPSULE ORAL 3 TIMES DAILY
Qty: 15 CAPSULE | Refills: 0 | Status: SHIPPED | OUTPATIENT
Start: 2024-07-22 | End: 2024-07-27

## 2024-07-22 RX ORDER — SODIUM CHLORIDE 9 MG/ML
INJECTION, SOLUTION INTRAVENOUS PRN
Status: DISCONTINUED | OUTPATIENT
Start: 2024-07-22 | End: 2024-07-22 | Stop reason: HOSPADM

## 2024-07-22 RX ORDER — IPRATROPIUM BROMIDE AND ALBUTEROL SULFATE 2.5; .5 MG/3ML; MG/3ML
1 SOLUTION RESPIRATORY (INHALATION)
Status: CANCELLED | OUTPATIENT
Start: 2024-07-22 | End: 2024-07-23

## 2024-07-22 RX ORDER — MEPERIDINE HYDROCHLORIDE 50 MG/ML
12.5 INJECTION INTRAMUSCULAR; INTRAVENOUS; SUBCUTANEOUS EVERY 5 MIN PRN
Status: CANCELLED | OUTPATIENT
Start: 2024-07-22

## 2024-07-22 RX ORDER — SODIUM CHLORIDE 0.9 % (FLUSH) 0.9 %
5-40 SYRINGE (ML) INJECTION PRN
Status: DISCONTINUED | OUTPATIENT
Start: 2024-07-22 | End: 2024-07-22 | Stop reason: HOSPADM

## 2024-07-22 RX ORDER — TRAZODONE HYDROCHLORIDE 50 MG/1
50 TABLET ORAL NIGHTLY PRN
Qty: 30 TABLET | Refills: 2 | Status: SHIPPED | OUTPATIENT
Start: 2024-07-22

## 2024-07-22 RX ORDER — ONDANSETRON 2 MG/ML
INJECTION INTRAMUSCULAR; INTRAVENOUS PRN
Status: DISCONTINUED | OUTPATIENT
Start: 2024-07-22 | End: 2024-07-22 | Stop reason: SDUPTHER

## 2024-07-22 RX ADMIN — PROPOFOL 150 MG: 10 INJECTION, EMULSION INTRAVENOUS at 12:53

## 2024-07-22 RX ADMIN — Medication 2000 MG: at 12:57

## 2024-07-22 RX ADMIN — HEPARIN 300 UNITS: 100 SYRINGE at 14:47

## 2024-07-22 RX ADMIN — FENTANYL CITRATE 50 MCG: 50 INJECTION, SOLUTION INTRAMUSCULAR; INTRAVENOUS at 12:53

## 2024-07-22 RX ADMIN — SODIUM CHLORIDE, POTASSIUM CHLORIDE, SODIUM LACTATE AND CALCIUM CHLORIDE: 600; 310; 30; 20 INJECTION, SOLUTION INTRAVENOUS at 11:50

## 2024-07-22 RX ADMIN — FENTANYL CITRATE 50 MCG: 50 INJECTION, SOLUTION INTRAMUSCULAR; INTRAVENOUS at 13:11

## 2024-07-22 RX ADMIN — LIDOCAINE HYDROCHLORIDE 50 MG: 10 INJECTION, SOLUTION EPIDURAL; INFILTRATION; INTRACAUDAL; PERINEURAL at 12:53

## 2024-07-22 RX ADMIN — ONDANSETRON 4 MG: 2 INJECTION INTRAMUSCULAR; INTRAVENOUS at 12:53

## 2024-07-22 RX ADMIN — DEXAMETHASONE SODIUM PHOSPHATE 4 MG: 4 INJECTION INTRA-ARTICULAR; INTRALESIONAL; INTRAMUSCULAR; INTRAVENOUS; SOFT TISSUE at 12:53

## 2024-07-22 ASSESSMENT — PAIN - FUNCTIONAL ASSESSMENT: PAIN_FUNCTIONAL_ASSESSMENT: 0-10

## 2024-07-22 ASSESSMENT — PAIN SCALES - GENERAL
PAINLEVEL_OUTOF10: 0
PAINLEVEL_OUTOF10: 0

## 2024-07-22 ASSESSMENT — LIFESTYLE VARIABLES: SMOKING_STATUS: 1

## 2024-07-22 NOTE — ANESTHESIA POSTPROCEDURE EVALUATION
Department of Anesthesiology  Postprocedure Note    Patient: Andrea Desir  MRN: [id number]  YOB: 1955  Date of evaluation: 7/22/2024    Procedure Summary       Date: 07/22/24 Room / Location: [address]    Anesthesia Start: 1250 Anesthesia Stop: 1330    Procedure: Cystoscopy Photovaporization of Prostate with Greenlight Laser with alfaro placement Diagnosis:       Urinary retention      (Urinary retention [R33.9])    Surgeons: Steve Mariee MD Responsible Provider: Sudhakar Mckenzie II, APRN - CRNA    Anesthesia Type: General ASA Status: 3            Anesthesia Type: General    Garrett Phase I: Garrett Score: 10    Garrett Phase II:      Anesthesia Post Evaluation    Patient location during evaluation: PACU  Patient participation: complete - patient participated  Level of consciousness: awake  Pain score: 2  Airway patency: patent  Nausea & Vomiting: no nausea and no vomiting  Cardiovascular status: blood pressure returned to baseline  Respiratory status: acceptable  Hydration status: euvolemic  Multimodal analgesia pain management approach  Pain management: adequate    No notable events documented.

## 2024-07-22 NOTE — H&P
ELI MEJIA MD  History and Physical    Patient:  Andrea Desir  MRN: [id number]  YOB: 1955    HISTORY OF PRESENT ILLNESS:     The patient is a 69 y.o. male who presents with BPH. Here for procedure.    Patient's old records, notes and chart reviewed and summarized above.     ELI MEJIA MD independently reviewed the images and verified the radiology reports from:    No results found.      Past Medical History:    Past Medical History:   Diagnosis Date    Anxiety     Atrial fibrillation (HCC)     CAD (coronary artery disease) 2012    stent to LAD after MI    Erectile dysfunction     Headache(784.0)     High cholesterol     Hx of adenomatous colonic polyps     4 tubular adenomas, 2 hyperplastic polyps 11/11/15    Hypertension     ICD (implantable cardioverter-defibrillator) discharge 08/2020    Ischemic cardiomyopathy     AICD 1/13 for EF 20-25%    Low back pain     history of     MI, old 2012    Osteoarthritis     knees    Plantar fasciitis     Seborrheic keratosis     history of     Smoker     Syncopal episodes 08/2020    STATES PASSED OUT AND THAT ICD HAD FIRED WHEN DEVICE WAS INTERROGATED       Past Surgical History:    Past Surgical History:   Procedure Laterality Date    CARDIAC CATHETERIZATION  09/04/2020    CAD with hx of NOAH to LAD in 2012    CARDIAC DEFIBRILLATOR PLACEMENT  01/07/2013    CARDIAC DEFIBRILLATOR PLACEMENT  06/22/2021    REPLACEMENT  /  DR ALBERTO    COLONOSCOPY  11/11/2015    polyps X 5 Pascale CARBAJAL    COLONOSCOPY  12/21/2016    INT. Hemmorrhoids, Transverse Colon Polyp adenoma, Rectal Polyp hyperplastic.  5 yr follow up    CORONARY ANGIOPLASTY WITH STENT PLACEMENT  09/2012    x3  CAD with hx of NOAH to LAD in 2012    CYST REMOVAL      HAND TENDON SURGERY Left     thumb    PORT SURGERY Right 4/29/2024    Right Port Placement performed by Franck Goldman MD at [facility] OR    ROTATOR CUFF REPAIR Bilateral 1997    US LYMPH NODE BIOPSY  4/10/2024    US LYMPH NODE BIOPSY 4/10/2024 MERLENE

## 2024-07-22 NOTE — BRIEF OP NOTE
Brief Postoperative Note      Patient: Andrea Desir  YOB: 1955  MRN: [id number]    Date of Procedure: 7/22/2024    Pre-Op Diagnosis Codes:     * Urinary retention [R33.9]    Post-Op Diagnosis: Same       Procedure(s):  Cystoscopy Photovaporization of Prostate with Greenlight Laser with alfaro placement    Surgeon(s):  Steve Mariee MD    Assistant:  * No surgical staff found *    Anesthesia: General    Estimated Blood Loss (mL): Minimal    Complications: None    Specimens:   * No specimens in log *    Implants:  * No implants in log *      Drains:   Urinary Catheter 07/22/24 3 Way (Active)   Urine Color Colorless 07/22/24 1330   Urine Appearance Clear 07/22/24 1330   Collection Container Standard 07/22/24 1330   Securement Method Leg strap 07/22/24 1330   Status Continuous bladder irrigation 07/22/24 1330   Rate [place] 07/22/24 1330   Irrigant Normal saline 07/22/24 1330   Output (mL) 350 mL 07/22/24 1341       [REMOVED] Urinary Catheter 07/22/24 Alfaro (Removed)   Urine Color Yellow 07/22/24 1207   Urine Appearance Clear 07/22/24 1207   Collection Container Leg bag 07/22/24 1207   Securement Method Leg strap 07/22/24 1207       Findings:  Alfaro out tomorrow  Deejay postop check 2-5 weeks    Electronically signed by STEVE MARIEE MD on 7/22/2024 at 1:47 PM

## 2024-07-22 NOTE — ANESTHESIA PRE PROCEDURE
Department of Anesthesiology  Preprocedure Note       Name:  Andrea Desir   Age:  69 y.o.  :  1955                                          MRN:  [id number]         Date:  2024      Surgeon: Surgeon(s):  Steve Mariee MD    Procedure: Procedure(s):  Cystoscopy Photovaporization of Prostate with Greenlight Laser with alfaro placement ([facility] [id number] -Cassie/lp 1200/nr)    Medications prior to admission:   Prior to Admission medications    Medication Sig Start Date End Date Taking? Authorizing Provider   diazePAM (VALIUM) 5 MG tablet Take one pill prior to scan and repeat a s needed 7/15/24 7/22/24  Jonathon Mendenhall MD   furosemide (LASIX) 40 MG tablet Take 1 tablet by mouth once daily 24   Grisel Silver APRN - CNP   atorvastatin (LIPITOR) 80 MG tablet Take 1 tablet by mouth at bedtime 24   Deon Art MD   amiodarone (CORDARONE) 200 MG tablet Take 1 tablet by mouth daily 24   Deon Art MD   clopidogrel (PLAVIX) 75 MG tablet 1 po daily 24   Deon Art MD   sacubitril-valsartan (ENTRESTO) 24-26 MG per tablet Take 1 tablet by mouth 2 times daily 24   Deon Art MD   tamsulosin (FLOMAX) 0.4 MG capsule Take 1 capsule by mouth daily 24   Gigi Whyte PA-C   metoprolol tartrate (LOPRESSOR) 25 MG tablet TAKE 1 TABLET BY MOUTH IN THE MORNING AND 1/2 (ONE-HALF) IN THE EVENING 24   Deon Art MD   benzonatate (TESSALON) 100 MG capsule Take 1 capsule by mouth 3 times daily as needed for Cough 24   Jonathon Mendenhall MD   nystatin (MYCOSTATIN) 346572 UNIT/GM ointment Apply topically 2 times daily. 24   Lisha Wilder APRN - CNP   ondansetron (ZOFRAN-ODT) 4 MG disintegrating tablet Take 1 tablet by mouth every 6 hours as needed for Nausea or Vomiting    Jonathon Mendenhall MD   polyethylene glycol (MIRALAX) 17 GM/SCOOP POWD powder Take 17 g by mouth daily 24   Sharyn Fish MD   traZODone (DESYREL) 50 MG tablet Take 1 tablet by mouth nightly as

## 2024-07-22 NOTE — TELEPHONE ENCOUNTER
Andrea called requesting a refill of the below medication which has been pended for you:     Requested Prescriptions     Pending Prescriptions Disp Refills    traZODone (DESYREL) 50 MG tablet [Pharmacy Med Name: traZODone HCl 50 MG Oral Tablet] 30 tablet 0     Sig: TAKE 1 TABLET BY MOUTH NIGHTLY AS NEEDED FOR SLEEP       Last Appointment Date: 5/28/2024  Next Appointment Date: 12/2/2024    No Known Allergies

## 2024-07-22 NOTE — DISCHARGE INSTRUCTIONS
-May go home.  -Home with alfaro; you may see some blood in urine in the tubing and this is normal as long as the catheter is draining well  -If catheter is not draining, call the office or report to the ER  -Stay well hydrated  -No heavy lifting greater 20 lbs for 6 weeks  -You may see blood with urination on and off for 4 weeks, this is normal and will improve. The most important thing is if the catheter is draining and you are able to empty your bladder.  -You may have burning with urination on and off for 2-3 weeks, this is normal and should improve  - Report to the ER if chest pain, shortness of breath, fever greater 101.5  - You may take tylenol or motrin OTC as needed for pain  - Take antibiotics as prescribed the day before catheter removal  - Make sure you take stool softeners so that you are not straining during bowel movements    Pt will Follow up with ELI MEJIA MD for a void trial. Call office for follow up.  You may resume your blood thinners in 72 hours.         Laser Transurethral Resection of the Prostate (TURP):  Post-Op Instructions      Transurethral resection of the prostate (TURP) is surgery to remove prostate tissue. This is done when an overgrown prostate gland is pressing on the urethra and making it hard for a man to urinate.  After laser surgery, you will have a urinary catheter for a short time. It is a flexible plastic tube used to drain urine from your bladder when you can't urinate on your own. If it is still in place when you go home, your doctor will give you instructions on how to care for it.  For several days after surgery, you may feel burning when you urinate. Your urine may be pink for 1 to 3 weeks after surgery. You also may have bladder cramps, or spasms. Your doctor may give you medicine to help control the spasms.  You may still feel like you need to urinate often in the weeks after your surgery. It often takes up to 6 weeks for this to get better. After you have  progress to your normal diet if you are not nauseated.  If your bowel movements are not regular after surgery, try to avoid constipation and straining.  Drink plenty of water.  You may use an OTC stool softener or mild laxative as needed.    Medications  Resume your usual medications unless otherwise instructed.  Get instructions from your physician before restarting any aspirin or blood thinning medication.  If an antibiotic is prescribed for you, take as directed.  Do not stop taking just because you feel better - take the full course of antibiotic.    Physician Follow up  -follow up with your doctor at your next scheduled appointment.      Call Your Physician If You Experience Any of the Following  -Fever >100.5, Chills, Excessive sweating.  -Any redness or swelling at the IV site.  -Persistant nausea or vomiting  -Severe abdominal or chest pain  -There has been no (or very little) urine draining into your collection bag for 4 or more hours.    If you have any questions or problems, call:  [phone] ([facility]) or after hours call: [phone] ([facility])      Urinary Catheter Care: After Your Visit  Your Care Instructions  A urinary catheter is a flexible plastic tube used to drain urine from your bladder when you cannot urinate on your own. The catheter allows urine to drain from the bladder into a bag. The bag is usually attached to the thigh.  You may need a catheter because you have a medical condition (such as an enlarged prostate) that makes it hard to urinate. Or you may need one if you are not able to control the release of urine or if you have had surgery on the pelvis or urinary tract.     How can you care for yourself at home?  Catheter care  Always wash your hands before and after you handle your catheter.  Make sure that urine is flowing out of the catheter into the urine collection bag. Make sure that the catheter tubing does not get twisted or kinked.  Keep the urine

## 2024-07-23 ENCOUNTER — NURSE ONLY (OUTPATIENT)
Dept: UROLOGY | Age: 69
End: 2024-07-23
Payer: MEDICARE

## 2024-07-23 DIAGNOSIS — N40.1 BENIGN LOCALIZED PROSTATIC HYPERPLASIA WITH LOWER URINARY TRACT SYMPTOMS (LUTS): Primary | ICD-10-CM

## 2024-07-23 PROCEDURE — 99212 OFFICE O/P EST SF 10 MIN: CPT

## 2024-07-23 NOTE — PROGRESS NOTES
Patient present for alfaro removal post Cystoscopy greenight pvp of prostate, yellow color urine noted in drainage bag, 10 ml balloon deflated, and 22 fr was removed without difficulty. Instructed patient to drink fluids, patient may experience urinary leakage, blood in the urine, and it may burn when urinating for a few weeks. If patient is unable to urinate in 8 hours, is to call the office to have alfaro placed or needs to go to the emergency room. Patient verbalized understanding.

## 2024-07-29 NOTE — OP NOTE
Steve Mariee MD.  Urologic Surgery      [place], [place]    DATE: 7/22/2024  Patient:  Andrea Desir  MRN: [id number]  YOB: 1955    SURGEON: Steve Mariee MD.    ASSISTANT: none    PREOPERATIVE DIAGNOSIS: BPH with outlet obstruction    POSTOPERATIVE DIAGNOSIS: BPH with outlet obstruction    PROCEDURE PERFORMED:  Cystoscopy, Greenlight Photovaporization of Prostate,        ANESTHESIA: General    COMPLICATIONS: none    OR BLOOD LOSS:  Minimal    FLUIDS: Cystalloids per Anesthesia    SPECIMENS:  * No specimens in log *  none    DRAINS: [address] alfaro    INDICATIONS FOR PROCEDURE:  The patient is a 69 y.o. male who presents today with Urinary retention [R33.9] here for Cystoscopy Photovaporization of Prostate with Greenlight Laser with alfaro placement. After risks, benefits and alternatives of the procedure were discussed with the patient, the patient elected to proceed.     DETAILS OF PROCEDURE:  After informed consent was obtained in the preoperative area, the patient was taken back to the operating room and transferred to the operating table in supine position.  EPC cuffs were placed. The machine was turned on.  Anesthesia was induced and antibiotics were given.  The patient was placed in modified dorsal lithotomy position and sterilely prepped and draped in a standard fashion.  A timeout occurred.  Two patient identifiers were used.  The 25F rigid scope with the visual obturator was carefully advanced through the urethra.  .  Once within the bladder the visual obturator was exchanged for the resection bridge.     The ureteral orifices were located and noted to be remote from the bladder neck.    The prostate was surveyed. the lateral lobes were noted to be significantly obstructing.. There was no median lobe present. Enucleation of the median lobe was not performed. The vaporization was started proximal with a power setting of 80W. Both the left and right lateral lobes were

## 2024-08-05 ENCOUNTER — HOSPITAL ENCOUNTER (OUTPATIENT)
Dept: INFUSION THERAPY | Age: 69
Discharge: HOME | End: 2024-08-05
Payer: MEDICARE

## 2024-08-05 VITALS
SYSTOLIC BLOOD PRESSURE: 126 MMHG | HEART RATE: 77 BPM | HEIGHT: 72 IN | WEIGHT: 240.6 LBS | TEMPERATURE: 98.3 F | BODY MASS INDEX: 32.59 KG/M2 | DIASTOLIC BLOOD PRESSURE: 74 MMHG | OXYGEN SATURATION: 96 % | RESPIRATION RATE: 14 BRPM

## 2024-08-05 DIAGNOSIS — R53.83 FATIGUE, UNSPECIFIED TYPE: ICD-10-CM

## 2024-08-05 DIAGNOSIS — C34.91 SMALL CELL LUNG CANCER, RIGHT (HCC): Primary | ICD-10-CM

## 2024-08-05 LAB
ALBUMIN SERPL-MCNC: 3.8 G/DL (ref 3.5–5.2)
ALBUMIN/GLOB SERPL: 1.2 {RATIO} (ref 1–2.5)
ALP SERPL-CCNC: 87 U/L (ref 40–129)
ALT SERPL-CCNC: 13 U/L (ref 5–41)
ANION GAP SERPL CALCULATED.3IONS-SCNC: 14 MMOL/L (ref 9–17)
AST SERPL-CCNC: 16 U/L
BASOPHILS # BLD: 0.03 K/UL (ref 0–0.2)
BASOPHILS NFR BLD: 0 % (ref 0–2)
BILIRUB SERPL-MCNC: 0.4 MG/DL (ref 0.3–1.2)
BUN SERPL-MCNC: 14 MG/DL (ref 8–23)
BUN/CREAT SERPL: 13 (ref 9–20)
CALCIUM SERPL-MCNC: 9 MG/DL (ref 8.6–10.4)
CHLORIDE SERPL-SCNC: 97 MMOL/L (ref 98–107)
CO2 SERPL-SCNC: 26 MMOL/L (ref 20–31)
CORTIS SERPL-MCNC: 11.4 UG/DL (ref 2.5–19.5)
CORTISOL COLLECTION INFO: NORMAL
CREAT SERPL-MCNC: 1.1 MG/DL (ref 0.7–1.2)
EOSINOPHIL # BLD: 0.03 K/UL (ref 0–0.44)
EOSINOPHILS RELATIVE PERCENT: 0 % (ref 1–4)
ERYTHROCYTE [DISTWIDTH] IN BLOOD BY AUTOMATED COUNT: 16.1 % (ref 11.8–14.4)
GFR, ESTIMATED: 72 ML/MIN/1.73M2
GLUCOSE SERPL-MCNC: 108 MG/DL (ref 70–99)
HCT VFR BLD AUTO: 27.1 % (ref 40.7–50.3)
HGB BLD-MCNC: 9.6 G/DL (ref 13–17)
IMM GRANULOCYTES # BLD AUTO: 0.06 K/UL (ref 0–0.3)
IMM GRANULOCYTES NFR BLD: 1 %
LYMPHOCYTES NFR BLD: 2 K/UL (ref 1.1–3.7)
LYMPHOCYTES RELATIVE PERCENT: 26 % (ref 24–43)
MCH RBC QN AUTO: 37.9 PG (ref 25.2–33.5)
MCHC RBC AUTO-ENTMCNC: 35.4 G/DL (ref 25.2–33.5)
MCV RBC AUTO: 107.1 FL (ref 82.6–102.9)
MONOCYTES NFR BLD: 1.16 K/UL (ref 0.1–1.2)
MONOCYTES NFR BLD: 15 % (ref 3–12)
NEUTROPHILS NFR BLD: 58 % (ref 36–65)
NEUTS SEG NFR BLD: 4.51 K/UL (ref 1.5–8.1)
NRBC BLD-RTO: 0.4 PER 100 WBC
PLATELET # BLD AUTO: 219 K/UL (ref 138–453)
PMV BLD AUTO: 9.9 FL (ref 8.1–13.5)
POTASSIUM SERPL-SCNC: 3.4 MMOL/L (ref 3.7–5.3)
PROT SERPL-MCNC: 7.1 G/DL (ref 6.4–8.3)
RBC # BLD AUTO: 2.53 M/UL (ref 4.21–5.77)
RBC # BLD: ABNORMAL 10*6/UL
RBC # BLD: ABNORMAL 10*6/UL
SODIUM SERPL-SCNC: 137 MMOL/L (ref 135–144)
TSH SERPL DL<=0.05 MIU/L-ACNC: 3.22 UIU/ML (ref 0.3–5)
WBC OTHER # BLD: 7.8 K/UL (ref 3.5–11.3)

## 2024-08-05 PROCEDURE — 36591 DRAW BLOOD OFF VENOUS DEVICE: CPT

## 2024-08-05 PROCEDURE — 96415 CHEMO IV INFUSION ADDL HR: CPT

## 2024-08-05 PROCEDURE — 96367 TX/PROPH/DG ADDL SEQ IV INF: CPT

## 2024-08-05 PROCEDURE — 80053 COMPREHEN METABOLIC PANEL: CPT

## 2024-08-05 PROCEDURE — 84443 ASSAY THYROID STIM HORMONE: CPT

## 2024-08-05 PROCEDURE — 96413 CHEMO IV INFUSION 1 HR: CPT

## 2024-08-05 PROCEDURE — 6360000002 HC RX W HCPCS: Performed by: INTERNAL MEDICINE

## 2024-08-05 PROCEDURE — 96417 CHEMO IV INFUS EACH ADDL SEQ: CPT

## 2024-08-05 PROCEDURE — 96375 TX/PRO/DX INJ NEW DRUG ADDON: CPT

## 2024-08-05 PROCEDURE — 2580000003 HC RX 258: Performed by: INTERNAL MEDICINE

## 2024-08-05 PROCEDURE — 85025 COMPLETE CBC W/AUTO DIFF WBC: CPT

## 2024-08-05 PROCEDURE — 82533 TOTAL CORTISOL: CPT

## 2024-08-05 RX ORDER — ACETAMINOPHEN 325 MG/1
650 TABLET ORAL
Start: 2024-08-27

## 2024-08-05 RX ORDER — EPINEPHRINE 1 MG/ML
0.3 INJECTION, SOLUTION, CONCENTRATE INTRAVENOUS PRN
OUTPATIENT
Start: 2024-08-27

## 2024-08-05 RX ORDER — SODIUM CHLORIDE 0.9 % (FLUSH) 0.9 %
5-40 SYRINGE (ML) INJECTION PRN
Status: DISCONTINUED | OUTPATIENT
Start: 2024-08-05 | End: 2024-08-06 | Stop reason: HOSPADM

## 2024-08-05 RX ORDER — DEXAMETHASONE SODIUM PHOSPHATE 10 MG/ML
10 INJECTION, SOLUTION INTRAMUSCULAR; INTRAVENOUS ONCE
Status: COMPLETED | OUTPATIENT
Start: 2024-08-05 | End: 2024-08-05

## 2024-08-05 RX ORDER — ONDANSETRON 2 MG/ML
8 INJECTION INTRAMUSCULAR; INTRAVENOUS
OUTPATIENT
Start: 2024-08-27

## 2024-08-05 RX ORDER — DIPHENHYDRAMINE HYDROCHLORIDE 50 MG/ML
50 INJECTION INTRAMUSCULAR; INTRAVENOUS
OUTPATIENT
Start: 2024-08-27

## 2024-08-05 RX ORDER — PALONOSETRON 0.05 MG/ML
0.25 INJECTION, SOLUTION INTRAVENOUS ONCE
Status: COMPLETED | OUTPATIENT
Start: 2024-08-05 | End: 2024-08-05

## 2024-08-05 RX ORDER — HEPARIN 100 UNIT/ML
500 SYRINGE INTRAVENOUS PRN
Status: DISCONTINUED | OUTPATIENT
Start: 2024-08-05 | End: 2024-08-06 | Stop reason: HOSPADM

## 2024-08-05 RX ORDER — ALBUTEROL SULFATE 90 UG/1
4 AEROSOL, METERED RESPIRATORY (INHALATION) PRN
OUTPATIENT
Start: 2024-08-27

## 2024-08-05 RX ORDER — MEPERIDINE HYDROCHLORIDE 50 MG/ML
12.5 INJECTION INTRAMUSCULAR; INTRAVENOUS; SUBCUTANEOUS PRN
OUTPATIENT
Start: 2024-08-27

## 2024-08-05 RX ORDER — SODIUM CHLORIDE 9 MG/ML
INJECTION, SOLUTION INTRAVENOUS CONTINUOUS
OUTPATIENT
Start: 2024-08-27

## 2024-08-05 RX ORDER — HEPARIN 100 UNIT/ML
500 SYRINGE INTRAVENOUS PRN
OUTPATIENT
Start: 2024-08-27

## 2024-08-05 RX ORDER — ACETAMINOPHEN 325 MG/1
650 TABLET ORAL
OUTPATIENT
Start: 2024-08-27

## 2024-08-05 RX ORDER — SODIUM CHLORIDE 9 MG/ML
5-250 INJECTION, SOLUTION INTRAVENOUS PRN
OUTPATIENT
Start: 2024-08-27

## 2024-08-05 RX ORDER — SODIUM CHLORIDE 0.9 % (FLUSH) 0.9 %
5-40 SYRINGE (ML) INJECTION PRN
OUTPATIENT
Start: 2024-08-27

## 2024-08-05 RX ORDER — SODIUM CHLORIDE 9 MG/ML
5-250 INJECTION, SOLUTION INTRAVENOUS PRN
Status: DISCONTINUED | OUTPATIENT
Start: 2024-08-05 | End: 2024-08-06 | Stop reason: HOSPADM

## 2024-08-05 RX ADMIN — DEXAMETHASONE SODIUM PHOSPHATE 10 MG: 10 INJECTION, SOLUTION INTRAMUSCULAR; INTRAVENOUS at 08:45

## 2024-08-05 RX ADMIN — HEPARIN 500 UNITS: 100 SYRINGE at 12:03

## 2024-08-05 RX ADMIN — ATEZOLIZUMAB 1200 MG: 1200 INJECTION, SOLUTION INTRAVENOUS at 09:20

## 2024-08-05 RX ADMIN — SODIUM CHLORIDE 150 MG: 9 INJECTION, SOLUTION INTRAVENOUS at 08:51

## 2024-08-05 RX ADMIN — PALONOSETRON 0.25 MG: 0.05 INJECTION, SOLUTION INTRAVENOUS at 08:45

## 2024-08-05 RX ADMIN — SODIUM CHLORIDE, PRESERVATIVE FREE 10 ML: 5 INJECTION INTRAVENOUS at 12:02

## 2024-08-05 RX ADMIN — SODIUM CHLORIDE 20 ML/HR: 9 INJECTION, SOLUTION INTRAVENOUS at 08:12

## 2024-08-05 RX ADMIN — CARBOPLATIN 525 MG: 10 INJECTION, SOLUTION INTRAVENOUS at 10:08

## 2024-08-05 RX ADMIN — ETOPOSIDE 246 MG: 20 INJECTION, SOLUTION INTRAVENOUS at 10:49

## 2024-08-05 NOTE — PROGRESS NOTES
Chemo completed and no sign of reaction at this time.  Pt ambulated out infusion center without difficulty in stable condition.  Pt will be back tomorrow for next infusion.

## 2024-08-05 NOTE — FLOWSHEET NOTE
rounding in ONC.    Assessment: Patient reported doing well and was accompanied by his daughter-in-law (Paula).  Patient is thankful for the great support of his family through both his personal health challenges and in caring for his wife with dementia.  Patient has a strong arthur in God and a positive outlook on life.    Intervention: Engaged in conversation and active listening. Prayed with Patient and daughter-in-law.     Outcome: Patient expressed appreciation for visit and offer of continued prayer.    Plan: Chaplains are available on site or on call 24/7 for spiritual and emotional support.    Electronically signed by Holly Cowan on 8/5/2024 at 4:19 PM

## 2024-08-06 ENCOUNTER — HOSPITAL ENCOUNTER (OUTPATIENT)
Dept: CT IMAGING | Age: 69
Discharge: HOME | End: 2024-08-08
Attending: INTERNAL MEDICINE
Payer: MEDICARE

## 2024-08-06 ENCOUNTER — HOSPITAL ENCOUNTER (OUTPATIENT)
Dept: INFUSION THERAPY | Age: 69
Discharge: HOME | End: 2024-08-06
Payer: MEDICARE

## 2024-08-06 VITALS
DIASTOLIC BLOOD PRESSURE: 67 MMHG | SYSTOLIC BLOOD PRESSURE: 109 MMHG | HEART RATE: 67 BPM | HEIGHT: 72 IN | WEIGHT: 242.4 LBS | BODY MASS INDEX: 32.83 KG/M2 | TEMPERATURE: 97.4 F | RESPIRATION RATE: 16 BRPM | OXYGEN SATURATION: 97 %

## 2024-08-06 DIAGNOSIS — C34.91 SMALL CELL LUNG CANCER, RIGHT (HCC): Primary | ICD-10-CM

## 2024-08-06 DIAGNOSIS — C34.91 SMALL CELL LUNG CANCER, RIGHT (HCC): ICD-10-CM

## 2024-08-06 PROCEDURE — 6360000002 HC RX W HCPCS: Performed by: INTERNAL MEDICINE

## 2024-08-06 PROCEDURE — 74176 CT ABD & PELVIS W/O CONTRAST: CPT

## 2024-08-06 PROCEDURE — 2580000003 HC RX 258: Performed by: INTERNAL MEDICINE

## 2024-08-06 PROCEDURE — 96375 TX/PRO/DX INJ NEW DRUG ADDON: CPT

## 2024-08-06 PROCEDURE — 96413 CHEMO IV INFUSION 1 HR: CPT

## 2024-08-06 RX ORDER — SODIUM CHLORIDE 9 MG/ML
5-250 INJECTION, SOLUTION INTRAVENOUS PRN
Status: DISCONTINUED | OUTPATIENT
Start: 2024-08-06 | End: 2024-08-07 | Stop reason: HOSPADM

## 2024-08-06 RX ORDER — DEXAMETHASONE SODIUM PHOSPHATE 10 MG/ML
8 INJECTION, SOLUTION INTRAMUSCULAR; INTRAVENOUS ONCE
Status: COMPLETED | OUTPATIENT
Start: 2024-08-06 | End: 2024-08-06

## 2024-08-06 RX ORDER — SODIUM CHLORIDE 0.9 % (FLUSH) 0.9 %
5-40 SYRINGE (ML) INJECTION PRN
Status: DISCONTINUED | OUTPATIENT
Start: 2024-08-06 | End: 2024-08-07 | Stop reason: HOSPADM

## 2024-08-06 RX ORDER — HEPARIN 100 UNIT/ML
500 SYRINGE INTRAVENOUS PRN
Status: DISCONTINUED | OUTPATIENT
Start: 2024-08-06 | End: 2024-08-07 | Stop reason: HOSPADM

## 2024-08-06 RX ADMIN — SODIUM CHLORIDE 20 ML/HR: 9 INJECTION, SOLUTION INTRAVENOUS at 11:02

## 2024-08-06 RX ADMIN — DEXAMETHASONE SODIUM PHOSPHATE 8 MG: 10 INJECTION, SOLUTION INTRAMUSCULAR; INTRAVENOUS at 11:08

## 2024-08-06 RX ADMIN — ETOPOSIDE 246 MG: 20 INJECTION, SOLUTION INTRAVENOUS at 11:29

## 2024-08-06 RX ADMIN — HEPARIN 500 UNITS: 100 SYRINGE at 12:46

## 2024-08-06 NOTE — PROGRESS NOTES
On unit for day 2 of chemotherapy.  No complaints.  Port flushed with blood return .  IV infusing NSS.

## 2024-08-07 ENCOUNTER — HOSPITAL ENCOUNTER (OUTPATIENT)
Dept: INFUSION THERAPY | Age: 69
Discharge: HOME | End: 2024-08-07
Payer: MEDICARE

## 2024-08-07 VITALS
DIASTOLIC BLOOD PRESSURE: 72 MMHG | OXYGEN SATURATION: 99 % | TEMPERATURE: 97.1 F | HEART RATE: 63 BPM | SYSTOLIC BLOOD PRESSURE: 150 MMHG | RESPIRATION RATE: 16 BRPM

## 2024-08-07 DIAGNOSIS — C34.91 SMALL CELL LUNG CANCER, RIGHT (HCC): Primary | ICD-10-CM

## 2024-08-07 PROCEDURE — 2580000003 HC RX 258: Performed by: INTERNAL MEDICINE

## 2024-08-07 PROCEDURE — 96413 CHEMO IV INFUSION 1 HR: CPT

## 2024-08-07 PROCEDURE — 6360000002 HC RX W HCPCS: Performed by: INTERNAL MEDICINE

## 2024-08-07 PROCEDURE — 96375 TX/PRO/DX INJ NEW DRUG ADDON: CPT

## 2024-08-07 RX ORDER — SODIUM CHLORIDE 0.9 % (FLUSH) 0.9 %
5-40 SYRINGE (ML) INJECTION PRN
Status: DISCONTINUED | OUTPATIENT
Start: 2024-08-07 | End: 2024-08-08 | Stop reason: HOSPADM

## 2024-08-07 RX ORDER — SODIUM CHLORIDE 9 MG/ML
5-250 INJECTION, SOLUTION INTRAVENOUS PRN
Status: DISCONTINUED | OUTPATIENT
Start: 2024-08-07 | End: 2024-08-08 | Stop reason: HOSPADM

## 2024-08-07 RX ORDER — DEXAMETHASONE SODIUM PHOSPHATE 10 MG/ML
8 INJECTION, SOLUTION INTRAMUSCULAR; INTRAVENOUS ONCE
Status: COMPLETED | OUTPATIENT
Start: 2024-08-07 | End: 2024-08-07

## 2024-08-07 RX ORDER — HEPARIN 100 UNIT/ML
500 SYRINGE INTRAVENOUS PRN
Status: DISCONTINUED | OUTPATIENT
Start: 2024-08-07 | End: 2024-08-08 | Stop reason: HOSPADM

## 2024-08-07 RX ADMIN — HEPARIN 500 UNITS: 100 SYRINGE at 14:49

## 2024-08-07 RX ADMIN — ETOPOSIDE 246 MG: 20 INJECTION, SOLUTION INTRAVENOUS at 13:32

## 2024-08-07 RX ADMIN — SODIUM CHLORIDE, PRESERVATIVE FREE 10 ML: 5 INJECTION INTRAVENOUS at 14:49

## 2024-08-07 RX ADMIN — SODIUM CHLORIDE 20 ML/HR: 9 INJECTION, SOLUTION INTRAVENOUS at 13:21

## 2024-08-07 RX ADMIN — DEXAMETHASONE SODIUM PHOSPHATE 8 MG: 10 INJECTION, SOLUTION INTRAMUSCULAR; INTRAVENOUS at 13:10

## 2024-08-07 NOTE — PROGRESS NOTES
Patient arrived for E[place] infusion.  VSS as charted.  Port flushed without complication. Coffee provided. Patient tolerated infusion well.  Patient left infusion center with all belongings and steady gate.  New appt scheduled for 3 weeks.

## 2024-08-13 ENCOUNTER — OFFICE VISIT (OUTPATIENT)
Dept: UROLOGY | Age: 69
End: 2024-08-13
Payer: MEDICARE

## 2024-08-13 VITALS
DIASTOLIC BLOOD PRESSURE: 80 MMHG | HEIGHT: 72 IN | HEART RATE: 68 BPM | SYSTOLIC BLOOD PRESSURE: 132 MMHG | BODY MASS INDEX: 32.88 KG/M2

## 2024-08-13 DIAGNOSIS — N40.1 BENIGN LOCALIZED PROSTATIC HYPERPLASIA WITH LOWER URINARY TRACT SYMPTOMS (LUTS): Primary | ICD-10-CM

## 2024-08-13 PROCEDURE — PBSHW MEAS,POST-VOID RES,US,NON-IMAGING

## 2024-08-13 PROCEDURE — 99024 POSTOP FOLLOW-UP VISIT: CPT

## 2024-08-13 PROCEDURE — 99213 OFFICE O/P EST LOW 20 MIN: CPT

## 2024-08-13 PROCEDURE — 51798 US URINE CAPACITY MEASURE: CPT

## 2024-08-13 NOTE — PROGRESS NOTES
[facility] CARE, [facility] UROLOGY A DEPARTMENT OF [facility]  [address] [facility] [address]  Dept: [phone]  Visit Date: 8/13/2024      AYANNA Desir is a 69 y.o. male that presents to the urology clinic for acute urinary retention.    S/P Cystoscopy, Greenlight PVP for patient's BPH w/ Retention. Doing MUCH better following surgery. Patient is emptying effectively and his urinary frequency and nocturia are greatly reduced.     Overall, patient is pleased with post-op course. Hematuria and tissue passage resolved. Mild residual irritative voiding symptoms.    PVR: 158 mL      Last Total Testosterone:  Lab Results   Component Value Date    TESTOSTERONE 274 09/19/2013         Last BUN and Creatinine:  Lab Results   Component Value Date    BUN 14 08/05/2024     Lab Results   Component Value Date    CREATININE 1.1 08/05/2024           PAST MEDICAL, FAMILY AND SOCIAL HISTORY UPDATE:  Past Medical History:   Diagnosis Date    Anxiety     Atrial fibrillation (HCC)     CAD (coronary artery disease) 2012    stent to LAD after MI    Erectile dysfunction     Headache(784.0)     High cholesterol     Hx of adenomatous colonic polyps     4 tubular adenomas, 2 hyperplastic polyps 11/11/15    Hypertension     ICD (implantable cardioverter-defibrillator) discharge 08/2020    Ischemic cardiomyopathy     AICD 1/13 for EF 20-25%    Low back pain     history of     MI, old 2012    Osteoarthritis     knees    Plantar fasciitis     Seborrheic keratosis     history of     Smoker     Syncopal episodes 08/2020    STATES PASSED OUT AND THAT ICD HAD FIRED WHEN DEVICE WAS INTERROGATED     Past Surgical History:   Procedure Laterality Date    CARDIAC CATHETERIZATION  09/04/2020    CAD with hx of NOAH to LAD in 2012    CARDIAC DEFIBRILLATOR PLACEMENT  01/07/2013    CARDIAC DEFIBRILLATOR PLACEMENT  06/22/2021    REPLACEMENT  /  DR ALBERTO    COLONOSCOPY

## 2024-08-22 ENCOUNTER — TELEPHONE (OUTPATIENT)
Dept: PHARMACY | Facility: CLINIC | Age: 69
End: 2024-08-22

## 2024-08-22 NOTE — TELEPHONE ENCOUNTER
[facility] CLINICAL PHARMACY: ADHERENCE REVIEW  Identified care gap per [employer]: fills at [facility]: Statin adherence    ASSESSMENT  STATIN ADHERENCE    Insurance Records claims through 8/10/24 (Prior Year PDC = not reported; YTD PDC = FIRST FILL; Potential Fail Date: 24):   ATORVASTATIN TAB 80MG last filled on 4/15/24 for 90 day supply. Next refill due: 24    Prescribed si tablet/capsule daily    Per Reconcile Dispense History: 1 refill remains    Lab Results   Component Value Date    CHOL 151 2023    TRIG 194 (H) 2023    HDL 33 (L) 2023    LDLDIRECT 98 2012     Lab Results   Component Value Date    LDL 79 2023    LDLDIRECT 98 2012      ALT   Date Value Ref Range Status   2024 13 5 - 41 U/L Final     AST   Date Value Ref Range Status   2024 16 <40 U/L Final     The 10-year ASCVD risk score (Buzz JOSÉ, et al., 2019) is: 27.4%    Values used to calculate the score:      Age: 69 years      Sex: Male      Is Non- : No      Diabetic: No      Tobacco smoker: Yes      Systolic Blood Pressure: 132 mmHg      Is BP treated: Yes      HDL Cholesterol: 33 mg/dL      Total Cholesterol: 151 mg/dL     PLAN  The following are interventions that have been identified:   Patient OVERDUE refilling atorvastatin 80mg daily and active on home medication list.     Attempting to reach patient to review.  Left message asking for return call.    Per [facility] automated phone: will get  90 day supply ready to  since past due. Will be ready tomorrow after 4pm.    Letter sent to patient.     Last Visit: 24  Next Visit: 24    Emilie Mohr PharmD, BCACP  Population Health Pharmacist  [facility] Clinical Pharmacy  Department, toll free: [phone], option 1    =======================================================   For Pharmacy Admin Tracking Only    Program: [employer]irty  CPA in place:  No  Recommendation Provided To: Pharmacy:

## 2024-08-26 ENCOUNTER — OFFICE VISIT (OUTPATIENT)
Dept: ONCOLOGY | Age: 69
End: 2024-08-26
Payer: MEDICARE

## 2024-08-26 ENCOUNTER — HOSPITAL ENCOUNTER (OUTPATIENT)
Dept: INFUSION THERAPY | Age: 69
Discharge: HOME | End: 2024-08-26
Payer: MEDICARE

## 2024-08-26 ENCOUNTER — TELEPHONE (OUTPATIENT)
Dept: ONCOLOGY | Age: 69
End: 2024-08-26

## 2024-08-26 VITALS
RESPIRATION RATE: 16 BRPM | HEIGHT: 72 IN | SYSTOLIC BLOOD PRESSURE: 129 MMHG | OXYGEN SATURATION: 96 % | BODY MASS INDEX: 32.23 KG/M2 | DIASTOLIC BLOOD PRESSURE: 81 MMHG | TEMPERATURE: 98.1 F | WEIGHT: 238 LBS | HEART RATE: 77 BPM

## 2024-08-26 VITALS
RESPIRATION RATE: 16 BRPM | TEMPERATURE: 98.1 F | SYSTOLIC BLOOD PRESSURE: 129 MMHG | DIASTOLIC BLOOD PRESSURE: 81 MMHG | OXYGEN SATURATION: 96 % | BODY MASS INDEX: 32.23 KG/M2 | WEIGHT: 238 LBS | HEART RATE: 77 BPM | HEIGHT: 72 IN

## 2024-08-26 DIAGNOSIS — C34.91 SMALL CELL LUNG CANCER, RIGHT (HCC): Primary | ICD-10-CM

## 2024-08-26 DIAGNOSIS — R53.83 FATIGUE, UNSPECIFIED TYPE: ICD-10-CM

## 2024-08-26 LAB
ALBUMIN SERPL-MCNC: 3.7 G/DL (ref 3.5–5.2)
ALBUMIN/GLOB SERPL: 1.2 {RATIO} (ref 1–2.5)
ALP SERPL-CCNC: 90 U/L (ref 40–129)
ALT SERPL-CCNC: 10 U/L (ref 5–41)
ANION GAP SERPL CALCULATED.3IONS-SCNC: 11 MMOL/L (ref 9–17)
AST SERPL-CCNC: 11 U/L
BASOPHILS # BLD: 0.03 K/UL (ref 0–0.2)
BASOPHILS NFR BLD: 1 % (ref 0–2)
BILIRUB SERPL-MCNC: 0.3 MG/DL (ref 0.3–1.2)
BUN SERPL-MCNC: 15 MG/DL (ref 8–23)
BUN/CREAT SERPL: 15 (ref 9–20)
CALCIUM SERPL-MCNC: 9 MG/DL (ref 8.6–10.4)
CHLORIDE SERPL-SCNC: 103 MMOL/L (ref 98–107)
CO2 SERPL-SCNC: 26 MMOL/L (ref 20–31)
CREAT SERPL-MCNC: 1 MG/DL (ref 0.7–1.2)
EOSINOPHIL # BLD: 0.07 K/UL (ref 0–0.44)
EOSINOPHILS RELATIVE PERCENT: 1 % (ref 1–4)
ERYTHROCYTE [DISTWIDTH] IN BLOOD BY AUTOMATED COUNT: 17.2 % (ref 11.8–14.4)
GFR, ESTIMATED: 81 ML/MIN/1.73M2
GLUCOSE SERPL-MCNC: 93 MG/DL (ref 70–99)
HCT VFR BLD AUTO: 25 % (ref 40.7–50.3)
HGB BLD-MCNC: 8.8 G/DL (ref 13–17)
IMM GRANULOCYTES # BLD AUTO: 0.04 K/UL (ref 0–0.3)
IMM GRANULOCYTES NFR BLD: 1 %
LYMPHOCYTES NFR BLD: 2.21 K/UL (ref 1.1–3.7)
LYMPHOCYTES RELATIVE PERCENT: 35 % (ref 24–43)
MCH RBC QN AUTO: 38.6 PG (ref 25.2–33.5)
MCHC RBC AUTO-ENTMCNC: 35.2 G/DL (ref 25.2–33.5)
MCV RBC AUTO: 109.6 FL (ref 82.6–102.9)
MONOCYTES NFR BLD: 0.96 K/UL (ref 0.1–1.2)
MONOCYTES NFR BLD: 15 % (ref 3–12)
NEUTROPHILS NFR BLD: 47 % (ref 36–65)
NEUTS SEG NFR BLD: 2.96 K/UL (ref 1.5–8.1)
NRBC BLD-RTO: 0 PER 100 WBC
PLATELET # BLD AUTO: 247 K/UL (ref 138–453)
PMV BLD AUTO: 10 FL (ref 8.1–13.5)
POTASSIUM SERPL-SCNC: 3.6 MMOL/L (ref 3.7–5.3)
PROT SERPL-MCNC: 6.9 G/DL (ref 6.4–8.3)
RBC # BLD AUTO: 2.28 M/UL (ref 4.21–5.77)
RBC # BLD: ABNORMAL 10*6/UL
RBC # BLD: ABNORMAL 10*6/UL
SODIUM SERPL-SCNC: 140 MMOL/L (ref 135–144)
TSH SERPL DL<=0.05 MIU/L-ACNC: 2.38 UIU/ML (ref 0.3–5)
WBC OTHER # BLD: 6.3 K/UL (ref 3.5–11.3)

## 2024-08-26 PROCEDURE — 99215 OFFICE O/P EST HI 40 MIN: CPT | Performed by: INTERNAL MEDICINE

## 2024-08-26 PROCEDURE — 96413 CHEMO IV INFUSION 1 HR: CPT

## 2024-08-26 PROCEDURE — 84443 ASSAY THYROID STIM HORMONE: CPT

## 2024-08-26 PROCEDURE — 1123F ACP DISCUSS/DSCN MKR DOCD: CPT | Performed by: INTERNAL MEDICINE

## 2024-08-26 PROCEDURE — 99213 OFFICE O/P EST LOW 20 MIN: CPT | Performed by: INTERNAL MEDICINE

## 2024-08-26 PROCEDURE — 85025 COMPLETE CBC W/AUTO DIFF WBC: CPT

## 2024-08-26 PROCEDURE — 36591 DRAW BLOOD OFF VENOUS DEVICE: CPT

## 2024-08-26 PROCEDURE — 3074F SYST BP LT 130 MM HG: CPT | Performed by: INTERNAL MEDICINE

## 2024-08-26 PROCEDURE — 2580000003 HC RX 258: Performed by: INTERNAL MEDICINE

## 2024-08-26 PROCEDURE — 3079F DIAST BP 80-89 MM HG: CPT | Performed by: INTERNAL MEDICINE

## 2024-08-26 PROCEDURE — 6360000002 HC RX W HCPCS: Performed by: INTERNAL MEDICINE

## 2024-08-26 PROCEDURE — 80053 COMPREHEN METABOLIC PANEL: CPT

## 2024-08-26 RX ORDER — FAMOTIDINE 10 MG/ML
20 INJECTION, SOLUTION INTRAVENOUS
OUTPATIENT
Start: 2024-09-17

## 2024-08-26 RX ORDER — SODIUM CHLORIDE 0.9 % (FLUSH) 0.9 %
5-40 SYRINGE (ML) INJECTION PRN
OUTPATIENT
Start: 2024-09-17

## 2024-08-26 RX ORDER — ALBUTEROL SULFATE 90 UG/1
4 AEROSOL, METERED RESPIRATORY (INHALATION) PRN
OUTPATIENT
Start: 2024-09-17

## 2024-08-26 RX ORDER — ACETAMINOPHEN 325 MG/1
650 TABLET ORAL
Start: 2024-09-17

## 2024-08-26 RX ORDER — ACETAMINOPHEN 325 MG/1
650 TABLET ORAL
OUTPATIENT
Start: 2024-09-17

## 2024-08-26 RX ORDER — DIPHENHYDRAMINE HYDROCHLORIDE 50 MG/ML
50 INJECTION INTRAMUSCULAR; INTRAVENOUS
OUTPATIENT
Start: 2024-09-17

## 2024-08-26 RX ORDER — MEPERIDINE HYDROCHLORIDE 50 MG/ML
12.5 INJECTION INTRAMUSCULAR; INTRAVENOUS; SUBCUTANEOUS PRN
OUTPATIENT
Start: 2024-09-17

## 2024-08-26 RX ORDER — ONDANSETRON 2 MG/ML
8 INJECTION INTRAMUSCULAR; INTRAVENOUS
OUTPATIENT
Start: 2024-09-17

## 2024-08-26 RX ORDER — SODIUM CHLORIDE 9 MG/ML
5-250 INJECTION, SOLUTION INTRAVENOUS PRN
Status: DISCONTINUED | OUTPATIENT
Start: 2024-08-26 | End: 2024-08-27 | Stop reason: HOSPADM

## 2024-08-26 RX ORDER — HEPARIN SODIUM (PORCINE) LOCK FLUSH IV SOLN 100 UNIT/ML 100 UNIT/ML
500 SOLUTION INTRAVENOUS PRN
OUTPATIENT
Start: 2024-09-17

## 2024-08-26 RX ORDER — SODIUM CHLORIDE 0.9 % (FLUSH) 0.9 %
5-40 SYRINGE (ML) INJECTION PRN
Status: DISCONTINUED | OUTPATIENT
Start: 2024-08-26 | End: 2024-08-27 | Stop reason: HOSPADM

## 2024-08-26 RX ORDER — HEPARIN 100 UNIT/ML
500 SYRINGE INTRAVENOUS PRN
Status: DISCONTINUED | OUTPATIENT
Start: 2024-08-26 | End: 2024-08-27 | Stop reason: HOSPADM

## 2024-08-26 RX ORDER — SODIUM CHLORIDE 9 MG/ML
5-250 INJECTION, SOLUTION INTRAVENOUS PRN
OUTPATIENT
Start: 2024-09-17

## 2024-08-26 RX ORDER — SODIUM CHLORIDE 9 MG/ML
INJECTION, SOLUTION INTRAVENOUS CONTINUOUS
OUTPATIENT
Start: 2024-09-17

## 2024-08-26 RX ORDER — EPINEPHRINE 1 MG/ML
0.3 INJECTION, SOLUTION, CONCENTRATE INTRAVENOUS PRN
OUTPATIENT
Start: 2024-09-17

## 2024-08-26 RX ADMIN — SODIUM CHLORIDE 20 ML/HR: 9 INJECTION, SOLUTION INTRAVENOUS at 13:09

## 2024-08-26 RX ADMIN — HEPARIN 500 UNITS: 100 SYRINGE at 14:40

## 2024-08-26 RX ADMIN — SODIUM CHLORIDE, PRESERVATIVE FREE 10 ML: 5 INJECTION INTRAVENOUS at 14:40

## 2024-08-26 RX ADMIN — ATEZOLIZUMAB 1200 MG: 1200 INJECTION, SOLUTION INTRAVENOUS at 13:57

## 2024-08-26 NOTE — PROGRESS NOTES
upper lobe lung SCLC, mediastinal lymphadenopathy, adrenal metastasis and bone metastasis, stage IV disease    I reviewed the laboratory, imaging studies, prior records, discussed possible diagnosis and treatment recommendations.   I reviewed the NCCN guidelines and goals of care with patient I explained that the goal of treatment would be palliative in nature only to improve quality of life and to control and contain disease and in the process to help patient live longer.  I reviewed the risk benefits and side effects of chemotherapy with patient.  We reviewed the side effects of chemotherapy which include, but are not limited to, fatigue, nausea, vomiting, alopecia, myelosuppression, mucositis, allergic reaction, nephrotoxicity, ototoxicity, neurotoxicity, diarrhea, and constipation.    Patient is complaining of some visual symptoms and he has a AICD in place.  His MRI of the brain and orbits did not show any intracranial abnormality and no evidence of intracranial metastatic disease noted however focal dilatation of the superior ophthalmic veins bilaterally left greater than right noted he is planning to see ophthalmologist    During today's visit, the patient and the family had a number of reasonable questions which were answered to their satisfaction.  They verbalized understanding of the information provided and they agreed to proceed as outlined above.      PLAN:   I reviewed recent lab work, imaging studies, discussed diagnosis and treatment recommendations   He has completed 6 cycles of chemotherapy so far and tolerated well   Recent scans showing stable disease suggesting good response to treatment   Will continue maintenance immunotherapy   I reviewed the NCCN guidelines and goals of care  I reviewed the risks and benefits and side effects  Return to clinic in 6 to 7 weeks or earlier if needed      Jonathon Mendenhall MD  Hematologist/Medical Oncologist    On this date 8/26/24  I have spent 40 minutes

## 2024-08-26 NOTE — TELEPHONE ENCOUNTER
Name: Andrea Desir  : 1955  MRN: [id number]    Oncology Navigation Follow-Up Note    Contact Type:  Medical Oncology    Notes:   Writer met with patient and family while in office for oncology appt. He is anxious about his scan result since he was not able to access his MyChart to see the report.  He denies any navigation needs at present. He continues to manage treatment regimen well.  Encouraged him to call with questions or concerns.     Navigator following for continuity of care.        Electronically signed by Natalia Broussard RN on 2024 at 5:47 PM

## 2024-09-16 ENCOUNTER — HOSPITAL ENCOUNTER (OUTPATIENT)
Dept: INFUSION THERAPY | Age: 69
Discharge: HOME | End: 2024-09-16
Payer: MEDICARE

## 2024-09-16 ENCOUNTER — TELEPHONE (OUTPATIENT)
Dept: ONCOLOGY | Age: 69
End: 2024-09-16

## 2024-09-16 VITALS
OXYGEN SATURATION: 98 % | WEIGHT: 232.4 LBS | HEART RATE: 59 BPM | SYSTOLIC BLOOD PRESSURE: 123 MMHG | TEMPERATURE: 98.8 F | RESPIRATION RATE: 16 BRPM | DIASTOLIC BLOOD PRESSURE: 78 MMHG | BODY MASS INDEX: 31.48 KG/M2 | HEIGHT: 72 IN

## 2024-09-16 DIAGNOSIS — R53.83 FATIGUE, UNSPECIFIED TYPE: ICD-10-CM

## 2024-09-16 DIAGNOSIS — C34.91 SMALL CELL LUNG CANCER, RIGHT (HCC): Primary | ICD-10-CM

## 2024-09-16 LAB
ALBUMIN SERPL-MCNC: 3.6 G/DL (ref 3.5–5.2)
ALBUMIN/GLOB SERPL: 1 {RATIO} (ref 1–2.5)
ALP SERPL-CCNC: 84 U/L (ref 40–129)
ALT SERPL-CCNC: 13 U/L (ref 5–41)
ANION GAP SERPL CALCULATED.3IONS-SCNC: 10 MMOL/L (ref 9–17)
AST SERPL-CCNC: 13 U/L
BASOPHILS # BLD: 0.06 K/UL (ref 0–0.2)
BASOPHILS NFR BLD: 1 % (ref 0–2)
BILIRUB SERPL-MCNC: 0.4 MG/DL (ref 0.3–1.2)
BUN SERPL-MCNC: 11 MG/DL (ref 8–23)
BUN/CREAT SERPL: 10 (ref 9–20)
CALCIUM SERPL-MCNC: 9.5 MG/DL (ref 8.6–10.4)
CHLORIDE SERPL-SCNC: 104 MMOL/L (ref 98–107)
CO2 SERPL-SCNC: 25 MMOL/L (ref 20–31)
CORTIS SERPL-MCNC: 7.5 UG/DL (ref 2.5–19.5)
CORTISOL COLLECTION INFO: 1240
CREAT SERPL-MCNC: 1.1 MG/DL (ref 0.7–1.2)
EOSINOPHIL # BLD: 0.3 K/UL (ref 0–0.44)
EOSINOPHILS RELATIVE PERCENT: 4 % (ref 1–4)
ERYTHROCYTE [DISTWIDTH] IN BLOOD BY AUTOMATED COUNT: 14.6 % (ref 11.8–14.4)
GFR, ESTIMATED: 72 ML/MIN/1.73M2
GLUCOSE SERPL-MCNC: 96 MG/DL (ref 70–99)
HCT VFR BLD AUTO: 29.8 % (ref 40.7–50.3)
HGB BLD-MCNC: 10.3 G/DL (ref 13–17)
IMM GRANULOCYTES # BLD AUTO: <0.03 K/UL (ref 0–0.3)
IMM GRANULOCYTES NFR BLD: 0 %
LYMPHOCYTES NFR BLD: 2.41 K/UL (ref 1.1–3.7)
LYMPHOCYTES RELATIVE PERCENT: 31 % (ref 24–43)
MCH RBC QN AUTO: 37.6 PG (ref 25.2–33.5)
MCHC RBC AUTO-ENTMCNC: 34.6 G/DL (ref 25.2–33.5)
MCV RBC AUTO: 108.8 FL (ref 82.6–102.9)
MONOCYTES NFR BLD: 0.61 K/UL (ref 0.1–1.2)
MONOCYTES NFR BLD: 8 % (ref 3–12)
NEUTROPHILS NFR BLD: 56 % (ref 36–65)
NEUTS SEG NFR BLD: 4.36 K/UL (ref 1.5–8.1)
NRBC BLD-RTO: 0 PER 100 WBC
PLATELET # BLD AUTO: 208 K/UL (ref 138–453)
PMV BLD AUTO: 10.3 FL (ref 8.1–13.5)
POTASSIUM SERPL-SCNC: 3.8 MMOL/L (ref 3.7–5.3)
PROT SERPL-MCNC: 7.1 G/DL (ref 6.4–8.3)
RBC # BLD AUTO: 2.74 M/UL (ref 4.21–5.77)
RBC # BLD: ABNORMAL 10*6/UL
RBC # BLD: ABNORMAL 10*6/UL
SODIUM SERPL-SCNC: 139 MMOL/L (ref 135–144)
TSH SERPL DL<=0.05 MIU/L-ACNC: 2.56 UIU/ML (ref 0.3–5)
WBC OTHER # BLD: 7.8 K/UL (ref 3.5–11.3)

## 2024-09-16 PROCEDURE — 2580000003 HC RX 258: Performed by: INTERNAL MEDICINE

## 2024-09-16 PROCEDURE — 85025 COMPLETE CBC W/AUTO DIFF WBC: CPT

## 2024-09-16 PROCEDURE — 96413 CHEMO IV INFUSION 1 HR: CPT

## 2024-09-16 PROCEDURE — 82533 TOTAL CORTISOL: CPT

## 2024-09-16 PROCEDURE — 36591 DRAW BLOOD OFF VENOUS DEVICE: CPT

## 2024-09-16 PROCEDURE — 80053 COMPREHEN METABOLIC PANEL: CPT

## 2024-09-16 PROCEDURE — 6360000002 HC RX W HCPCS: Performed by: INTERNAL MEDICINE

## 2024-09-16 PROCEDURE — 84443 ASSAY THYROID STIM HORMONE: CPT

## 2024-09-16 RX ORDER — SODIUM CHLORIDE 0.9 % (FLUSH) 0.9 %
5-40 SYRINGE (ML) INJECTION PRN
Status: DISCONTINUED | OUTPATIENT
Start: 2024-09-16 | End: 2024-09-17 | Stop reason: HOSPADM

## 2024-09-16 RX ORDER — HEPARIN 100 UNIT/ML
500 SYRINGE INTRAVENOUS PRN
Status: DISCONTINUED | OUTPATIENT
Start: 2024-09-16 | End: 2024-09-17 | Stop reason: HOSPADM

## 2024-09-16 RX ORDER — SODIUM CHLORIDE 9 MG/ML
5-250 INJECTION, SOLUTION INTRAVENOUS PRN
Status: DISCONTINUED | OUTPATIENT
Start: 2024-09-16 | End: 2024-09-17 | Stop reason: HOSPADM

## 2024-09-16 RX ADMIN — HEPARIN 500 UNITS: 100 SYRINGE at 14:49

## 2024-09-16 RX ADMIN — SODIUM CHLORIDE 30 ML/HR: 9 INJECTION, SOLUTION INTRAVENOUS at 12:44

## 2024-09-16 RX ADMIN — ATEZOLIZUMAB 1200 MG: 1200 INJECTION, SOLUTION INTRAVENOUS at 14:12

## 2024-09-16 RX ADMIN — SODIUM CHLORIDE, PRESERVATIVE FREE 10 ML: 5 INJECTION INTRAVENOUS at 14:49

## 2024-10-07 ENCOUNTER — OFFICE VISIT (OUTPATIENT)
Dept: ONCOLOGY | Age: 69
End: 2024-10-07
Payer: MEDICARE

## 2024-10-07 ENCOUNTER — HOSPITAL ENCOUNTER (OUTPATIENT)
Dept: INFUSION THERAPY | Age: 69
Discharge: HOME | End: 2024-10-07
Payer: MEDICARE

## 2024-10-07 ENCOUNTER — TELEPHONE (OUTPATIENT)
Dept: ONCOLOGY | Age: 69
End: 2024-10-07

## 2024-10-07 VITALS
RESPIRATION RATE: 16 BRPM | HEIGHT: 72 IN | OXYGEN SATURATION: 98 % | WEIGHT: 228 LBS | SYSTOLIC BLOOD PRESSURE: 129 MMHG | HEART RATE: 57 BPM | BODY MASS INDEX: 30.88 KG/M2 | TEMPERATURE: 97.5 F | DIASTOLIC BLOOD PRESSURE: 77 MMHG

## 2024-10-07 VITALS
BODY MASS INDEX: 30.88 KG/M2 | WEIGHT: 228 LBS | DIASTOLIC BLOOD PRESSURE: 77 MMHG | HEIGHT: 72 IN | OXYGEN SATURATION: 98 % | HEART RATE: 57 BPM | SYSTOLIC BLOOD PRESSURE: 129 MMHG | TEMPERATURE: 97.5 F

## 2024-10-07 DIAGNOSIS — C34.91 SMALL CELL LUNG CANCER, RIGHT (HCC): Primary | ICD-10-CM

## 2024-10-07 DIAGNOSIS — R53.83 FATIGUE, UNSPECIFIED TYPE: ICD-10-CM

## 2024-10-07 LAB
ALBUMIN SERPL-MCNC: 3.9 G/DL (ref 3.5–5.2)
ALBUMIN/GLOB SERPL: 1.2 {RATIO} (ref 1–2.5)
ALP SERPL-CCNC: 80 U/L (ref 40–129)
ALT SERPL-CCNC: 15 U/L (ref 5–41)
ANION GAP SERPL CALCULATED.3IONS-SCNC: 11 MMOL/L (ref 9–17)
AST SERPL-CCNC: 14 U/L
BASOPHILS # BLD: 0.06 K/UL (ref 0–0.2)
BASOPHILS NFR BLD: 1 % (ref 0–2)
BILIRUB SERPL-MCNC: 0.4 MG/DL (ref 0.3–1.2)
BUN SERPL-MCNC: 20 MG/DL (ref 8–23)
BUN/CREAT SERPL: 17 (ref 9–20)
CALCIUM SERPL-MCNC: 9.3 MG/DL (ref 8.6–10.4)
CHLORIDE SERPL-SCNC: 106 MMOL/L (ref 98–107)
CO2 SERPL-SCNC: 24 MMOL/L (ref 20–31)
CREAT SERPL-MCNC: 1.2 MG/DL (ref 0.7–1.2)
EOSINOPHIL # BLD: 0.29 K/UL (ref 0–0.44)
EOSINOPHILS RELATIVE PERCENT: 3 % (ref 1–4)
ERYTHROCYTE [DISTWIDTH] IN BLOOD BY AUTOMATED COUNT: 12.8 % (ref 11.8–14.4)
GFR, ESTIMATED: 65 ML/MIN/1.73M2
GLUCOSE SERPL-MCNC: 100 MG/DL (ref 70–99)
HCT VFR BLD AUTO: 35 % (ref 40.7–50.3)
HGB BLD-MCNC: 11.9 G/DL (ref 13–17)
IMM GRANULOCYTES # BLD AUTO: 0.03 K/UL (ref 0–0.3)
IMM GRANULOCYTES NFR BLD: 0 %
LYMPHOCYTES NFR BLD: 2.23 K/UL (ref 1.1–3.7)
LYMPHOCYTES RELATIVE PERCENT: 26 % (ref 24–43)
MCH RBC QN AUTO: 37 PG (ref 25.2–33.5)
MCHC RBC AUTO-ENTMCNC: 34 G/DL (ref 25.2–33.5)
MCV RBC AUTO: 108.7 FL (ref 82.6–102.9)
MONOCYTES NFR BLD: 0.48 K/UL (ref 0.1–1.2)
MONOCYTES NFR BLD: 6 % (ref 3–12)
NEUTROPHILS NFR BLD: 64 % (ref 36–65)
NEUTS SEG NFR BLD: 5.35 K/UL (ref 1.5–8.1)
NRBC BLD-RTO: 0 PER 100 WBC
PLATELET # BLD AUTO: 158 K/UL (ref 138–453)
PMV BLD AUTO: 11.1 FL (ref 8.1–13.5)
POTASSIUM SERPL-SCNC: 4 MMOL/L (ref 3.7–5.3)
PROT SERPL-MCNC: 7.2 G/DL (ref 6.4–8.3)
RBC # BLD AUTO: 3.22 M/UL (ref 4.21–5.77)
RBC # BLD: ABNORMAL 10*6/UL
SODIUM SERPL-SCNC: 141 MMOL/L (ref 135–144)
TSH SERPL DL<=0.05 MIU/L-ACNC: 3.35 UIU/ML (ref 0.3–5)
WBC OTHER # BLD: 8.4 K/UL (ref 3.5–11.3)

## 2024-10-07 PROCEDURE — 6360000002 HC RX W HCPCS: Performed by: INTERNAL MEDICINE

## 2024-10-07 PROCEDURE — 99214 OFFICE O/P EST MOD 30 MIN: CPT | Performed by: INTERNAL MEDICINE

## 2024-10-07 PROCEDURE — 96413 CHEMO IV INFUSION 1 HR: CPT

## 2024-10-07 PROCEDURE — 82533 TOTAL CORTISOL: CPT

## 2024-10-07 PROCEDURE — 36591 DRAW BLOOD OFF VENOUS DEVICE: CPT

## 2024-10-07 PROCEDURE — 1123F ACP DISCUSS/DSCN MKR DOCD: CPT | Performed by: INTERNAL MEDICINE

## 2024-10-07 PROCEDURE — 2580000003 HC RX 258: Performed by: INTERNAL MEDICINE

## 2024-10-07 PROCEDURE — 85025 COMPLETE CBC W/AUTO DIFF WBC: CPT

## 2024-10-07 PROCEDURE — 93005 ELECTROCARDIOGRAM TRACING: CPT

## 2024-10-07 PROCEDURE — 99215 OFFICE O/P EST HI 40 MIN: CPT | Performed by: INTERNAL MEDICINE

## 2024-10-07 PROCEDURE — 3074F SYST BP LT 130 MM HG: CPT | Performed by: INTERNAL MEDICINE

## 2024-10-07 PROCEDURE — 80053 COMPREHEN METABOLIC PANEL: CPT

## 2024-10-07 PROCEDURE — 84443 ASSAY THYROID STIM HORMONE: CPT

## 2024-10-07 PROCEDURE — 3078F DIAST BP <80 MM HG: CPT | Performed by: INTERNAL MEDICINE

## 2024-10-07 RX ORDER — ONDANSETRON 2 MG/ML
8 INJECTION INTRAMUSCULAR; INTRAVENOUS
OUTPATIENT
Start: 2024-11-19

## 2024-10-07 RX ORDER — ACETAMINOPHEN 325 MG/1
650 TABLET ORAL
OUTPATIENT
Start: 2024-10-29

## 2024-10-07 RX ORDER — HEPARIN SODIUM (PORCINE) LOCK FLUSH IV SOLN 100 UNIT/ML 100 UNIT/ML
500 SOLUTION INTRAVENOUS PRN
OUTPATIENT
Start: 2024-11-19

## 2024-10-07 RX ORDER — ALBUTEROL SULFATE 90 UG/1
4 INHALANT RESPIRATORY (INHALATION) PRN
OUTPATIENT
Start: 2024-10-29

## 2024-10-07 RX ORDER — EPINEPHRINE 1 MG/ML
0.3 INJECTION, SOLUTION, CONCENTRATE INTRAVENOUS PRN
OUTPATIENT
Start: 2024-11-19

## 2024-10-07 RX ORDER — SODIUM CHLORIDE 9 MG/ML
INJECTION, SOLUTION INTRAVENOUS CONTINUOUS
OUTPATIENT
Start: 2024-10-29

## 2024-10-07 RX ORDER — SODIUM CHLORIDE 0.9 % (FLUSH) 0.9 %
5-40 SYRINGE (ML) INJECTION PRN
Status: DISCONTINUED | OUTPATIENT
Start: 2024-10-07 | End: 2024-10-08 | Stop reason: HOSPADM

## 2024-10-07 RX ORDER — MEPERIDINE HYDROCHLORIDE 50 MG/ML
12.5 INJECTION INTRAMUSCULAR; INTRAVENOUS; SUBCUTANEOUS PRN
OUTPATIENT
Start: 2024-10-29

## 2024-10-07 RX ORDER — ACETAMINOPHEN 325 MG/1
650 TABLET ORAL
OUTPATIENT
Start: 2024-11-19

## 2024-10-07 RX ORDER — DIPHENHYDRAMINE HYDROCHLORIDE 50 MG/ML
50 INJECTION INTRAMUSCULAR; INTRAVENOUS
OUTPATIENT
Start: 2024-11-19

## 2024-10-07 RX ORDER — FAMOTIDINE 10 MG/ML
20 INJECTION, SOLUTION INTRAVENOUS
OUTPATIENT
Start: 2024-11-19

## 2024-10-07 RX ORDER — MEPERIDINE HYDROCHLORIDE 50 MG/ML
12.5 INJECTION INTRAMUSCULAR; INTRAVENOUS; SUBCUTANEOUS PRN
Status: CANCELLED | OUTPATIENT
Start: 2024-10-08

## 2024-10-07 RX ORDER — SODIUM CHLORIDE 9 MG/ML
5-250 INJECTION, SOLUTION INTRAVENOUS PRN
OUTPATIENT
Start: 2024-10-29

## 2024-10-07 RX ORDER — HEPARIN 100 UNIT/ML
500 SYRINGE INTRAVENOUS PRN
Status: DISCONTINUED | OUTPATIENT
Start: 2024-10-07 | End: 2024-10-08 | Stop reason: HOSPADM

## 2024-10-07 RX ORDER — SODIUM CHLORIDE 0.9 % (FLUSH) 0.9 %
5-40 SYRINGE (ML) INJECTION PRN
OUTPATIENT
Start: 2024-11-19

## 2024-10-07 RX ORDER — ONDANSETRON 2 MG/ML
8 INJECTION INTRAMUSCULAR; INTRAVENOUS
Status: CANCELLED | OUTPATIENT
Start: 2024-10-08

## 2024-10-07 RX ORDER — SODIUM CHLORIDE 9 MG/ML
INJECTION, SOLUTION INTRAVENOUS CONTINUOUS
OUTPATIENT
Start: 2024-11-19

## 2024-10-07 RX ORDER — DIPHENHYDRAMINE HYDROCHLORIDE 50 MG/ML
50 INJECTION INTRAMUSCULAR; INTRAVENOUS
Status: CANCELLED | OUTPATIENT
Start: 2024-10-08

## 2024-10-07 RX ORDER — SODIUM CHLORIDE 0.9 % (FLUSH) 0.9 %
5-40 SYRINGE (ML) INJECTION PRN
OUTPATIENT
Start: 2024-10-29

## 2024-10-07 RX ORDER — SODIUM CHLORIDE 9 MG/ML
5-250 INJECTION, SOLUTION INTRAVENOUS PRN
Status: CANCELLED | OUTPATIENT
Start: 2024-10-08

## 2024-10-07 RX ORDER — HEPARIN SODIUM (PORCINE) LOCK FLUSH IV SOLN 100 UNIT/ML 100 UNIT/ML
500 SOLUTION INTRAVENOUS PRN
OUTPATIENT
Start: 2024-10-29

## 2024-10-07 RX ORDER — EPINEPHRINE 1 MG/ML
0.3 INJECTION, SOLUTION, CONCENTRATE INTRAVENOUS PRN
OUTPATIENT
Start: 2024-10-29

## 2024-10-07 RX ORDER — DIPHENHYDRAMINE HYDROCHLORIDE 50 MG/ML
50 INJECTION INTRAMUSCULAR; INTRAVENOUS
OUTPATIENT
Start: 2024-10-29

## 2024-10-07 RX ORDER — SODIUM CHLORIDE 9 MG/ML
5-250 INJECTION, SOLUTION INTRAVENOUS PRN
OUTPATIENT
Start: 2024-11-19

## 2024-10-07 RX ORDER — MEPERIDINE HYDROCHLORIDE 50 MG/ML
12.5 INJECTION INTRAMUSCULAR; INTRAVENOUS; SUBCUTANEOUS PRN
OUTPATIENT
Start: 2024-11-19

## 2024-10-07 RX ORDER — FAMOTIDINE 10 MG/ML
20 INJECTION, SOLUTION INTRAVENOUS
OUTPATIENT
Start: 2024-10-29

## 2024-10-07 RX ORDER — ONDANSETRON 2 MG/ML
8 INJECTION INTRAMUSCULAR; INTRAVENOUS
OUTPATIENT
Start: 2024-10-29

## 2024-10-07 RX ORDER — ACETAMINOPHEN 325 MG/1
650 TABLET ORAL
Status: CANCELLED | OUTPATIENT
Start: 2024-10-08

## 2024-10-07 RX ORDER — ACETAMINOPHEN 325 MG/1
650 TABLET ORAL
Start: 2024-11-19

## 2024-10-07 RX ORDER — ALBUTEROL SULFATE 90 UG/1
4 INHALANT RESPIRATORY (INHALATION) PRN
Status: CANCELLED | OUTPATIENT
Start: 2024-10-08

## 2024-10-07 RX ORDER — ACETAMINOPHEN 325 MG/1
650 TABLET ORAL
Start: 2024-10-29

## 2024-10-07 RX ORDER — SODIUM CHLORIDE 9 MG/ML
5-250 INJECTION, SOLUTION INTRAVENOUS PRN
Status: DISCONTINUED | OUTPATIENT
Start: 2024-10-07 | End: 2024-10-08 | Stop reason: HOSPADM

## 2024-10-07 RX ORDER — ACETAMINOPHEN 325 MG/1
650 TABLET ORAL
Status: CANCELLED
Start: 2024-10-08

## 2024-10-07 RX ORDER — ALBUTEROL SULFATE 90 UG/1
4 INHALANT RESPIRATORY (INHALATION) PRN
OUTPATIENT
Start: 2024-11-19

## 2024-10-07 RX ORDER — SODIUM CHLORIDE 9 MG/ML
INJECTION, SOLUTION INTRAVENOUS CONTINUOUS
Status: CANCELLED | OUTPATIENT
Start: 2024-10-08

## 2024-10-07 RX ORDER — EPINEPHRINE 1 MG/ML
0.3 INJECTION, SOLUTION, CONCENTRATE INTRAVENOUS PRN
Status: CANCELLED | OUTPATIENT
Start: 2024-10-08

## 2024-10-07 RX ADMIN — HEPARIN 500 UNITS: 100 SYRINGE at 14:40

## 2024-10-07 RX ADMIN — ATEZOLIZUMAB 1200 MG: 1200 INJECTION, SOLUTION INTRAVENOUS at 14:02

## 2024-10-07 RX ADMIN — SODIUM CHLORIDE 25 ML/HR: 9 INJECTION, SOLUTION INTRAVENOUS at 12:55

## 2024-10-07 NOTE — PROGRESS NOTES
VAD accessed per protocol with 3/4 inch 20 gauge durand needle.  Flushed easily with saline 10 ml.  Good blood return.  Labs drawn.pulse irregular.  EKG done and showed  Frequent PVC Perfect served Dr. Abdirashid randle to proceed with treatment.

## 2024-10-07 NOTE — PROGRESS NOTES
Patient ID: Andrea Desir, 1955, [id number], 69 y.o.  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 male with history of atrial fibrillation, status post AICD, history of CAD status post stent, with a tobacco abuse was seen during initial counseling visit for newly discovered lung mass and lymphadenopathy.    Patient recently was seen by his primary care physician for difficulty swallowing and also swelling in his neck area.  Patient also having cough for past 6 months.  Patient had a CT scan of the neck as well as CT scan of the chest on 3/27/2024 which showed right upper lobe 4.7 cm mass suspicious for primary lung malignancy along with right hilar and bilateral mediastinal bulky lymphadenopathy with extrinsic mass effect on the trachea and esophagus without occlusion.  Patient also noted to have bilateral supraclavicular lymphadenopathy suspicious for metastasis.  Patient was referred to oncology for further recommendations.  He smokes 1 pack cigarettes per day.  He has a history of CAD status post AICD and stent placement.  Currently he is on aspirin and Plavix and following with cardiologist.    He may have lost few pounds over past few weeks.  He denies any alcohol abuse history.    He is complaining of headache for past 3 to 4 days.  Denies any nausea vomiting, blurry vision or tingling numbness.    Interval history:  Patient is returning for follow visit and to discuss lab

## 2024-10-07 NOTE — TELEPHONE ENCOUNTER
Name: Andrea Desir  : 1955  MRN: [id number]    Oncology Navigation Follow-Up Note    Contact Type:   infusion room    Notes:   Writer met with patient and sister while in infusion room for treatment today, C9D1 of Tecentriq.   He denies any navigation needs.   Encouraged him to call if he has any questions/concerns. Understanding verbalized.   Navigator following for continuity of care.        Electronically signed by Natalia Broussard RN on 10/7/2024 at 2:07 PM

## 2024-10-07 NOTE — PROGRESS NOTES
Infusion complete.  Flushed VAD with 10 ml of saline and 5 ml of heparin flush. D/C durand needle.  Band aid to site. Patient will return in 4 weeks for next infusion.

## 2024-10-08 LAB
CORTIS SERPL-MCNC: 8.5 UG/DL (ref 2.5–19.5)
CORTISOL COLLECTION INFO: NORMAL

## 2024-10-09 LAB
EKG ATRIAL RATE: 70 BPM
EKG P AXIS: 57 DEGREES
EKG P-R INTERVAL: 208 MS
EKG Q-T INTERVAL: 458 MS
EKG QRS DURATION: 160 MS
EKG QTC CALCULATION (BAZETT): 494 MS
EKG R AXIS: -2 DEGREES
EKG T AXIS: 53 DEGREES
EKG VENTRICULAR RATE: 70 BPM

## 2024-11-11 ENCOUNTER — HOSPITAL ENCOUNTER (OUTPATIENT)
Dept: INFUSION THERAPY | Age: 69
Discharge: HOME | End: 2024-11-11
Payer: MEDICARE

## 2024-11-11 ENCOUNTER — TELEPHONE (OUTPATIENT)
Dept: ONCOLOGY | Age: 69
End: 2024-11-11

## 2024-11-11 ENCOUNTER — TELEPHONE (OUTPATIENT)
Dept: UROLOGY | Age: 69
End: 2024-11-11

## 2024-11-11 VITALS
SYSTOLIC BLOOD PRESSURE: 123 MMHG | OXYGEN SATURATION: 96 % | HEART RATE: 62 BPM | DIASTOLIC BLOOD PRESSURE: 78 MMHG | TEMPERATURE: 97.6 F | WEIGHT: 229 LBS | BODY MASS INDEX: 31.02 KG/M2 | HEIGHT: 72 IN | RESPIRATION RATE: 16 BRPM

## 2024-11-11 DIAGNOSIS — C34.91 SMALL CELL LUNG CANCER, RIGHT (HCC): Primary | ICD-10-CM

## 2024-11-11 DIAGNOSIS — R53.83 FATIGUE, UNSPECIFIED TYPE: ICD-10-CM

## 2024-11-11 DIAGNOSIS — R30.9 PAIN WITH URINATION: Primary | ICD-10-CM

## 2024-11-11 LAB
ALBUMIN SERPL-MCNC: 4.1 G/DL (ref 3.5–5.2)
ALBUMIN/GLOB SERPL: 1.2 {RATIO} (ref 1–2.5)
ALP SERPL-CCNC: 76 U/L (ref 40–129)
ALT SERPL-CCNC: 12 U/L (ref 5–41)
ANION GAP SERPL CALCULATED.3IONS-SCNC: 10 MMOL/L (ref 9–17)
AST SERPL-CCNC: 13 U/L
BASOPHILS # BLD: 0.05 K/UL (ref 0–0.2)
BASOPHILS NFR BLD: 1 % (ref 0–2)
BILIRUB SERPL-MCNC: 0.4 MG/DL (ref 0.3–1.2)
BUN SERPL-MCNC: 15 MG/DL (ref 8–23)
BUN/CREAT SERPL: 12 (ref 9–20)
CALCIUM SERPL-MCNC: 9.3 MG/DL (ref 8.6–10.4)
CHLORIDE SERPL-SCNC: 104 MMOL/L (ref 98–107)
CO2 SERPL-SCNC: 24 MMOL/L (ref 20–31)
CORTIS SERPL-MCNC: 8.1 UG/DL (ref 2.5–19.5)
CREAT SERPL-MCNC: 1.3 MG/DL (ref 0.7–1.2)
EOSINOPHIL # BLD: 0.21 K/UL (ref 0–0.44)
EOSINOPHILS RELATIVE PERCENT: 3 % (ref 1–4)
ERYTHROCYTE [DISTWIDTH] IN BLOOD BY AUTOMATED COUNT: 12.7 % (ref 11.8–14.4)
GFR, ESTIMATED: 59 ML/MIN/1.73M2
GLUCOSE SERPL-MCNC: 88 MG/DL (ref 70–99)
HCT VFR BLD AUTO: 36.8 % (ref 40.7–50.3)
HGB BLD-MCNC: 12.9 G/DL (ref 13–17)
IMM GRANULOCYTES # BLD AUTO: 0.03 K/UL (ref 0–0.3)
IMM GRANULOCYTES NFR BLD: 0 %
LYMPHOCYTES NFR BLD: 2.69 K/UL (ref 1.1–3.7)
LYMPHOCYTES RELATIVE PERCENT: 37 % (ref 24–43)
MCH RBC QN AUTO: 35.6 PG (ref 25.2–33.5)
MCHC RBC AUTO-ENTMCNC: 35.1 G/DL (ref 25.2–33.5)
MCV RBC AUTO: 101.7 FL (ref 82.6–102.9)
MONOCYTES NFR BLD: 0.55 K/UL (ref 0.1–1.2)
MONOCYTES NFR BLD: 8 % (ref 3–12)
NEUTROPHILS NFR BLD: 51 % (ref 36–65)
NEUTS SEG NFR BLD: 3.7 K/UL (ref 1.5–8.1)
NRBC BLD-RTO: 0 PER 100 WBC
PLATELET # BLD AUTO: 155 K/UL (ref 138–453)
PMV BLD AUTO: 11.1 FL (ref 8.1–13.5)
POTASSIUM SERPL-SCNC: 4.4 MMOL/L (ref 3.7–5.3)
PROT SERPL-MCNC: 7.5 G/DL (ref 6.4–8.3)
RBC # BLD AUTO: 3.62 M/UL (ref 4.21–5.77)
SODIUM SERPL-SCNC: 138 MMOL/L (ref 135–144)
TSH SERPL DL<=0.05 MIU/L-ACNC: 2.73 UIU/ML (ref 0.3–5)
WBC OTHER # BLD: 7.2 K/UL (ref 3.5–11.3)

## 2024-11-11 PROCEDURE — 85025 COMPLETE CBC W/AUTO DIFF WBC: CPT

## 2024-11-11 PROCEDURE — 2580000003 HC RX 258: Performed by: INTERNAL MEDICINE

## 2024-11-11 PROCEDURE — 96413 CHEMO IV INFUSION 1 HR: CPT

## 2024-11-11 PROCEDURE — 84443 ASSAY THYROID STIM HORMONE: CPT

## 2024-11-11 PROCEDURE — 80053 COMPREHEN METABOLIC PANEL: CPT

## 2024-11-11 PROCEDURE — 82533 TOTAL CORTISOL: CPT

## 2024-11-11 PROCEDURE — 6360000002 HC RX W HCPCS: Performed by: INTERNAL MEDICINE

## 2024-11-11 RX ORDER — SODIUM CHLORIDE 9 MG/ML
5-250 INJECTION, SOLUTION INTRAVENOUS PRN
Status: DISCONTINUED | OUTPATIENT
Start: 2024-11-11 | End: 2024-11-12 | Stop reason: HOSPADM

## 2024-11-11 RX ORDER — HEPARIN 100 UNIT/ML
500 SYRINGE INTRAVENOUS PRN
Status: DISCONTINUED | OUTPATIENT
Start: 2024-11-11 | End: 2024-11-12 | Stop reason: HOSPADM

## 2024-11-11 RX ORDER — SODIUM CHLORIDE 0.9 % (FLUSH) 0.9 %
5-40 SYRINGE (ML) INJECTION PRN
Status: DISCONTINUED | OUTPATIENT
Start: 2024-11-11 | End: 2024-11-12 | Stop reason: HOSPADM

## 2024-11-11 RX ADMIN — ATEZOLIZUMAB 1200 MG: 1200 INJECTION, SOLUTION INTRAVENOUS at 14:13

## 2024-11-11 RX ADMIN — HEPARIN 500 UNITS: 100 SYRINGE at 14:54

## 2024-11-11 RX ADMIN — SODIUM CHLORIDE 30 ML/HR: 9 INJECTION, SOLUTION INTRAVENOUS at 13:31

## 2024-11-11 RX ADMIN — SODIUM CHLORIDE, PRESERVATIVE FREE 10 ML: 5 INJECTION INTRAVENOUS at 14:54

## 2024-11-11 NOTE — TELEPHONE ENCOUNTER
Patient called the office with an update. (Greenlight 7/22/24 Dr Mariee)  Patient was in [place], [place] last week. Patient was having lower abdominal pain, pain with urination and his urine smelled bad. Patient went to [facility]. Patient was told he had a UTI with e coli in his urine and was started on aurobindo. Patient was instructed to call his urologist and have the urine culture report reviewed. Patient continues to have pain but the smell is better.    Patient uses [facility] in [place].      Writer contacted urgent care for the records. Waiting for a call back from the NP  Ph# [phone]  Fax# [phone]

## 2024-11-11 NOTE — TELEPHONE ENCOUNTER
Name: Andrea Desir  : 1955  MRN: [id number]    Oncology Navigation Follow-Up Note    Contact Type:   infusion room    Notes:   Writer met with patient while in infusion room for treatment today, cycle 10 of Tecentriq. He denies any navigation needs.   Encouraged him to call if he has any questions/concerns. Understanding verbalized.   Navigator following for continuity of care.        Electronically signed by Natalia Broussard RN on 2024 at 2:37 PM

## 2024-11-11 NOTE — PROGRESS NOTES
Patient arrived for Tecentriq infusion.  VSS as charted. Port accessed without complication.  Lab draw off of port access.  Patient tolerated infusion well.  Patient left infusion center with all belongings and steady gate.  New appt set for 3 weeks and printed out for patient.

## 2024-11-12 ENCOUNTER — HOSPITAL ENCOUNTER (OUTPATIENT)
Age: 69
Discharge: HOME | End: 2024-11-12
Payer: MEDICARE

## 2024-11-12 DIAGNOSIS — R30.9 PAIN WITH URINATION: ICD-10-CM

## 2024-11-12 PROCEDURE — 87086 URINE CULTURE/COLONY COUNT: CPT

## 2024-11-13 LAB
MICROORGANISM SPEC CULT: NO GROWTH
SERVICE CMNT-IMP: NORMAL
SPECIMEN DESCRIPTION: NORMAL

## 2024-11-22 ENCOUNTER — OFFICE VISIT (OUTPATIENT)
Dept: CARDIOLOGY | Age: 69
End: 2024-11-22
Payer: MEDICARE

## 2024-11-22 ENCOUNTER — PROCEDURE VISIT (OUTPATIENT)
Dept: CARDIOLOGY | Age: 69
End: 2024-11-22

## 2024-11-22 VITALS
HEIGHT: 72 IN | WEIGHT: 233 LBS | DIASTOLIC BLOOD PRESSURE: 62 MMHG | HEART RATE: 69 BPM | BODY MASS INDEX: 31.56 KG/M2 | SYSTOLIC BLOOD PRESSURE: 128 MMHG

## 2024-11-22 DIAGNOSIS — Z95.810 ICD (IMPLANTABLE CARDIOVERTER-DEFIBRILLATOR) IN PLACE: ICD-10-CM

## 2024-11-22 DIAGNOSIS — I10 PRIMARY HYPERTENSION: ICD-10-CM

## 2024-11-22 DIAGNOSIS — I25.10 CORONARY ARTERY DISEASE INVOLVING NATIVE HEART WITHOUT ANGINA PECTORIS, UNSPECIFIED VESSEL OR LESION TYPE: ICD-10-CM

## 2024-11-22 DIAGNOSIS — I25.10 CORONARY ARTERY DISEASE INVOLVING NATIVE CORONARY ARTERY OF NATIVE HEART WITHOUT ANGINA PECTORIS: Primary | ICD-10-CM

## 2024-11-22 DIAGNOSIS — I25.5 ISCHEMIC CARDIOMYOPATHY: Primary | ICD-10-CM

## 2024-11-22 DIAGNOSIS — C34.91 SMALL CELL LUNG CANCER, RIGHT (HCC): ICD-10-CM

## 2024-11-22 PROCEDURE — 99214 OFFICE O/P EST MOD 30 MIN: CPT | Performed by: INTERNAL MEDICINE

## 2024-11-22 PROCEDURE — 93005 ELECTROCARDIOGRAM TRACING: CPT | Performed by: INTERNAL MEDICINE

## 2024-11-22 RX ORDER — AMIODARONE HYDROCHLORIDE 200 MG/1
200 TABLET ORAL DAILY
Qty: 90 TABLET | Refills: 1 | Status: SHIPPED | OUTPATIENT
Start: 2024-11-22

## 2024-11-22 NOTE — PROGRESS NOTES
Deon Art MD   traZODone (DESYREL) 50 MG tablet TAKE 1 TABLET BY MOUTH NIGHTLY AS NEEDED FOR SLEEP 7/22/24  Yes Deon Art MD   furosemide (LASIX) 40 MG tablet Take 1 tablet by mouth once daily 6/26/24  Yes Grisel Silver APRN - CNP   atorvastatin (LIPITOR) 80 MG tablet Take 1 tablet by mouth at bedtime 5/28/24  Yes Deon Art MD   clopidogrel (PLAVIX) 75 MG tablet 1 po daily 5/28/24  Yes Deon Art MD   sacubitril-valsartan (ENTRESTO) 24-26 MG per tablet Take 1 tablet by mouth 2 times daily 5/28/24  Yes Deon Art MD   tamsulosin (FLOMAX) 0.4 MG capsule Take 1 capsule by mouth daily 5/14/24  Yes Gigi Whyte PA-C   metoprolol tartrate (LOPRESSOR) 25 MG tablet TAKE 1 TABLET BY MOUTH IN THE MORNING AND 1/2 (ONE-HALF) IN THE EVENING 5/9/24  Yes Deon Art MD   benzonatate (TESSALON) 100 MG capsule Take 1 capsule by mouth 3 times daily as needed for Cough 5/6/24  Yes Jonathon Mendenhall MD   nystatin (MYCOSTATIN) [id number] UNIT/GM ointment Apply topically 2 times daily. 4/25/24  Yes Lisha Wilder APRN - CNP   ondansetron (ZOFRAN-ODT) 4 MG disintegrating tablet Take 1 tablet by mouth every 6 hours as needed for Nausea or Vomiting   Yes Jonathon Mendenhall MD   polyethylene glycol (MIRALAX) 17 GM/SCOOP POWD powder Take 17 g by mouth daily 4/23/24  Yes Sharyn Fish MD   nystatin (MYCOSTATIN) 379141 UNIT/GM cream Apply topically 3 times daily. 10/27/23  Yes Fortunato Mays MD   nystatin (MYCOSTATIN) 099698 UNIT/GM powder Apply 2 times daily to right axilla. 8/18/23  Yes Sonu Leong APRN - CNP   Ascorbic Acid (VITAMIN C) 250 MG tablet Take 1 tablet by mouth daily   Yes Johanne Sharif MD   fluticasone (FLONASE) 50 MCG/ACT nasal spray 2 sprays by Nasal route daily 5/16/22  Yes Deon Art MD   calcium carbonate (OSCAL) 500 MG TABS tablet Take 1 tablet by mouth daily   Yes Provider, MD Johanne   sildenafil (VIAGRA) 100 MG tablet TAKE 1 TABLET BY MOUTH AS NEEDED FOR

## 2024-11-22 NOTE — TELEPHONE ENCOUNTER
Andrea called requesting a refill of the below medication which has been pended for you:     Requested Prescriptions     Pending Prescriptions Disp Refills    amiodarone (CORDARONE) 200 MG tablet [Pharmacy Med Name: Amiodarone HCl 200 MG Oral Tablet] 90 tablet 1     Sig: Take 1 tablet by mouth once daily       Last Appointment Date: 5/28/2024  Next Appointment Date: 12/3/2024    No Known Allergies

## 2024-11-26 ENCOUNTER — HOSPITAL ENCOUNTER (OUTPATIENT)
Dept: CT IMAGING | Age: 69
Discharge: HOME | End: 2024-11-28
Attending: INTERNAL MEDICINE
Payer: MEDICARE

## 2024-11-26 DIAGNOSIS — E78.5 HYPERLIPIDEMIA, UNSPECIFIED HYPERLIPIDEMIA TYPE: ICD-10-CM

## 2024-11-26 DIAGNOSIS — C34.91 SMALL CELL LUNG CANCER, RIGHT (HCC): ICD-10-CM

## 2024-11-26 PROCEDURE — 6360000004 HC RX CONTRAST MEDICATION: Performed by: INTERNAL MEDICINE

## 2024-11-26 PROCEDURE — 74177 CT ABD & PELVIS W/CONTRAST: CPT

## 2024-11-26 RX ORDER — IOPAMIDOL 755 MG/ML
100 INJECTION, SOLUTION INTRAVASCULAR
Status: COMPLETED | OUTPATIENT
Start: 2024-11-26 | End: 2024-11-26

## 2024-11-26 RX ORDER — ATORVASTATIN CALCIUM 80 MG/1
80 TABLET, FILM COATED ORAL DAILY
Qty: 100 TABLET | Refills: 0 | Status: SHIPPED | OUTPATIENT
Start: 2024-11-26

## 2024-11-26 RX ADMIN — IOPAMIDOL 100 ML: 755 INJECTION, SOLUTION INTRAVENOUS at 13:50

## 2024-11-26 NOTE — TELEPHONE ENCOUNTER
Noted atorvastatin rx for 100ds approved, thank you!    Appears patient currently checked in for CT appt; will send Mychart.    =======================================================   For Pharmacy Admin Tracking Only    Program: [employer] [facility]  CPA in place:  No  Recommendation Provided To: Provider: 1 via Note to Provider  Intervention Detail: New Rx: 1, reason: Improve Adherence  Intervention Accepted By: Provider: 1  Gap Closed?: No   Time Spent (min): 10

## 2024-11-26 NOTE — TELEPHONE ENCOUNTER
Deon Art MD, patient out of refills and patient eligible for up to 100-day supply, if appropriate. Rx(s) pended for your signature/modification as appropriate    Last Visit: 5/28/24  Next Visit: 12/3/24    Thank you,  Emilie Mohr, PharmD, [facility]  Population Health Pharmacist  [facility]  Department, toll free: [phone], option 1

## 2024-11-26 NOTE — TELEPHONE ENCOUNTER
[facility] CLINICAL PHARMACY: ADHERENCE REVIEW  Identified care gap per [employer]: fills at [facility]: Statin adherence    ASSESSMENT  STATIN ADHERENCE    Insurance Records claims through 24 (Prior Year PDC = 100% - PASSED ; YTD PDC = 80%; Potential Fail Date: 12/3/24):   ATORVASTATIN TAB 80MG last filled on 24 for 90 day supply. Next refill due: 24    Prescribed si tablet/capsule daily    Per Reconcile Dispense History: 0 refills remain    Lab Results   Component Value Date    CHOL 151 2023    TRIG 194 (H) 2023    HDL 33 (L) 2023    LDLDIRECT 98 2012     Lab Results   Component Value Date    LDL 79 2023    LDLDIRECT 98 2012      ALT   Date Value Ref Range Status   2024 12 5 - 41 U/L Final     AST   Date Value Ref Range Status   2024 13 <40 U/L Final     The 10-year ASCVD risk score (Buzz DK, et al., 2019) is: 26.1%    Values used to calculate the score:      Age: 69 years      Sex: Male      Is Non- : No      Diabetic: No      Tobacco smoker: Yes      Systolic Blood Pressure: 128 mmHg      Is BP treated: Yes      HDL Cholesterol: 33 mg/dL      Total Cholesterol: 151 mg/dL     PLAN  The following are interventions that have been identified:   Patient OVERDUE refilling atorvastatin 80mg dialy YTD (due to refill again @now) and active on home medication list.   Patient NEEDS REFILLS for atorvastatin  Patient eligible for 100 day supply of rx    Will pend refill request to prescriber.    Last Visit: 24  Next Visit: 12/3/24    Shahram DelaneyD, BCACP  Population Health Pharmacist  [facility] Clinical Pharmacy  Department, toll free: [phone], option 1

## 2024-12-02 ENCOUNTER — HOSPITAL ENCOUNTER (OUTPATIENT)
Dept: INFUSION THERAPY | Age: 69
Discharge: HOME | End: 2024-12-02
Payer: MEDICARE

## 2024-12-02 ENCOUNTER — OFFICE VISIT (OUTPATIENT)
Dept: ONCOLOGY | Age: 69
End: 2024-12-02
Payer: MEDICARE

## 2024-12-02 VITALS
OXYGEN SATURATION: 96 % | WEIGHT: 231.4 LBS | HEIGHT: 72 IN | TEMPERATURE: 97.8 F | SYSTOLIC BLOOD PRESSURE: 142 MMHG | HEART RATE: 67 BPM | DIASTOLIC BLOOD PRESSURE: 90 MMHG | BODY MASS INDEX: 31.34 KG/M2 | RESPIRATION RATE: 16 BRPM

## 2024-12-02 VITALS
BODY MASS INDEX: 31.34 KG/M2 | HEIGHT: 72 IN | WEIGHT: 231.4 LBS | RESPIRATION RATE: 16 BRPM | DIASTOLIC BLOOD PRESSURE: 80 MMHG | SYSTOLIC BLOOD PRESSURE: 152 MMHG | TEMPERATURE: 97.8 F | OXYGEN SATURATION: 96 % | HEART RATE: 67 BPM

## 2024-12-02 DIAGNOSIS — C34.91 SMALL CELL LUNG CANCER, RIGHT (HCC): Primary | ICD-10-CM

## 2024-12-02 DIAGNOSIS — R53.83 FATIGUE, UNSPECIFIED TYPE: ICD-10-CM

## 2024-12-02 LAB
ALBUMIN SERPL-MCNC: 4.1 G/DL (ref 3.5–5.2)
ALBUMIN/GLOB SERPL: 1.2 {RATIO} (ref 1–2.5)
ALP SERPL-CCNC: 84 U/L (ref 40–129)
ALT SERPL-CCNC: 16 U/L (ref 5–41)
ANION GAP SERPL CALCULATED.3IONS-SCNC: 11 MMOL/L (ref 9–17)
AST SERPL-CCNC: 15 U/L
BASOPHILS # BLD: 0.06 K/UL (ref 0–0.2)
BASOPHILS NFR BLD: 1 % (ref 0–2)
BILIRUB SERPL-MCNC: 0.3 MG/DL (ref 0.3–1.2)
BUN SERPL-MCNC: 15 MG/DL (ref 8–23)
BUN/CREAT SERPL: 14 (ref 9–20)
CALCIUM SERPL-MCNC: 8.9 MG/DL (ref 8.6–10.4)
CHLORIDE SERPL-SCNC: 105 MMOL/L (ref 98–107)
CO2 SERPL-SCNC: 25 MMOL/L (ref 20–31)
CORTIS SERPL-MCNC: 10.2 UG/DL (ref 2.5–19.5)
CORTISOL COLLECTION INFO: NORMAL
CREAT SERPL-MCNC: 1.1 MG/DL (ref 0.7–1.2)
EOSINOPHIL # BLD: 0.21 K/UL (ref 0–0.44)
EOSINOPHILS RELATIVE PERCENT: 3 % (ref 1–4)
ERYTHROCYTE [DISTWIDTH] IN BLOOD BY AUTOMATED COUNT: 12.5 % (ref 11.8–14.4)
GFR, ESTIMATED: 72 ML/MIN/1.73M2
GLUCOSE SERPL-MCNC: 101 MG/DL (ref 70–99)
HCT VFR BLD AUTO: 37.7 % (ref 40.7–50.3)
HGB BLD-MCNC: 13.3 G/DL (ref 13–17)
IMM GRANULOCYTES # BLD AUTO: <0.03 K/UL (ref 0–0.3)
IMM GRANULOCYTES NFR BLD: 0 %
LYMPHOCYTES NFR BLD: 2.42 K/UL (ref 1.1–3.7)
LYMPHOCYTES RELATIVE PERCENT: 35 % (ref 24–43)
MCH RBC QN AUTO: 35.8 PG (ref 25.2–33.5)
MCHC RBC AUTO-ENTMCNC: 35.3 G/DL (ref 25.2–33.5)
MCV RBC AUTO: 101.3 FL (ref 82.6–102.9)
MONOCYTES NFR BLD: 0.55 K/UL (ref 0.1–1.2)
MONOCYTES NFR BLD: 8 % (ref 3–12)
NEUTROPHILS NFR BLD: 53 % (ref 36–65)
NEUTS SEG NFR BLD: 3.67 K/UL (ref 1.5–8.1)
NRBC BLD-RTO: 0 PER 100 WBC
PLATELET # BLD AUTO: 144 K/UL (ref 138–453)
PMV BLD AUTO: 11.7 FL (ref 8.1–13.5)
POTASSIUM SERPL-SCNC: 3.8 MMOL/L (ref 3.7–5.3)
PROT SERPL-MCNC: 7.6 G/DL (ref 6.4–8.3)
RBC # BLD AUTO: 3.72 M/UL (ref 4.21–5.77)
SODIUM SERPL-SCNC: 141 MMOL/L (ref 135–144)
TSH SERPL DL<=0.05 MIU/L-ACNC: 3.32 UIU/ML (ref 0.3–5)
WBC OTHER # BLD: 6.9 K/UL (ref 3.5–11.3)

## 2024-12-02 PROCEDURE — 99213 OFFICE O/P EST LOW 20 MIN: CPT | Performed by: INTERNAL MEDICINE

## 2024-12-02 PROCEDURE — 99214 OFFICE O/P EST MOD 30 MIN: CPT | Performed by: INTERNAL MEDICINE

## 2024-12-02 PROCEDURE — 82533 TOTAL CORTISOL: CPT

## 2024-12-02 PROCEDURE — 80053 COMPREHEN METABOLIC PANEL: CPT

## 2024-12-02 PROCEDURE — 2580000003 HC RX 258: Performed by: INTERNAL MEDICINE

## 2024-12-02 PROCEDURE — 1160F RVW MEDS BY RX/DR IN RCRD: CPT | Performed by: INTERNAL MEDICINE

## 2024-12-02 PROCEDURE — 84443 ASSAY THYROID STIM HORMONE: CPT

## 2024-12-02 PROCEDURE — 3077F SYST BP >= 140 MM HG: CPT | Performed by: INTERNAL MEDICINE

## 2024-12-02 PROCEDURE — 3079F DIAST BP 80-89 MM HG: CPT | Performed by: INTERNAL MEDICINE

## 2024-12-02 PROCEDURE — 1123F ACP DISCUSS/DSCN MKR DOCD: CPT | Performed by: INTERNAL MEDICINE

## 2024-12-02 PROCEDURE — 85025 COMPLETE CBC W/AUTO DIFF WBC: CPT

## 2024-12-02 PROCEDURE — 1159F MED LIST DOCD IN RCRD: CPT | Performed by: INTERNAL MEDICINE

## 2024-12-02 PROCEDURE — 6360000002 HC RX W HCPCS: Performed by: INTERNAL MEDICINE

## 2024-12-02 PROCEDURE — 96413 CHEMO IV INFUSION 1 HR: CPT

## 2024-12-02 RX ORDER — HEPARIN SODIUM (PORCINE) LOCK FLUSH IV SOLN 100 UNIT/ML 100 UNIT/ML
500 SOLUTION INTRAVENOUS PRN
OUTPATIENT
Start: 2024-12-23

## 2024-12-02 RX ORDER — ACETAMINOPHEN 325 MG/1
650 TABLET ORAL
Start: 2024-12-23

## 2024-12-02 RX ORDER — HEPARIN 100 UNIT/ML
500 SYRINGE INTRAVENOUS PRN
Status: DISCONTINUED | OUTPATIENT
Start: 2024-12-02 | End: 2024-12-03 | Stop reason: HOSPADM

## 2024-12-02 RX ORDER — ONDANSETRON 2 MG/ML
8 INJECTION INTRAMUSCULAR; INTRAVENOUS
OUTPATIENT
Start: 2025-01-13

## 2024-12-02 RX ORDER — MEPERIDINE HYDROCHLORIDE 50 MG/ML
12.5 INJECTION INTRAMUSCULAR; INTRAVENOUS; SUBCUTANEOUS PRN
OUTPATIENT
Start: 2024-12-23

## 2024-12-02 RX ORDER — SODIUM CHLORIDE 9 MG/ML
INJECTION, SOLUTION INTRAVENOUS CONTINUOUS
OUTPATIENT
Start: 2025-01-13

## 2024-12-02 RX ORDER — SODIUM CHLORIDE 0.9 % (FLUSH) 0.9 %
5-40 SYRINGE (ML) INJECTION PRN
OUTPATIENT
Start: 2024-12-23

## 2024-12-02 RX ORDER — ALBUTEROL SULFATE 90 UG/1
4 INHALANT RESPIRATORY (INHALATION) PRN
OUTPATIENT
Start: 2025-01-13

## 2024-12-02 RX ORDER — SODIUM CHLORIDE 9 MG/ML
INJECTION, SOLUTION INTRAVENOUS CONTINUOUS
OUTPATIENT
Start: 2024-12-23

## 2024-12-02 RX ORDER — EPINEPHRINE 1 MG/ML
0.3 INJECTION, SOLUTION, CONCENTRATE INTRAVENOUS PRN
OUTPATIENT
Start: 2025-01-13

## 2024-12-02 RX ORDER — SODIUM CHLORIDE 9 MG/ML
5-250 INJECTION, SOLUTION INTRAVENOUS PRN
OUTPATIENT
Start: 2025-01-13

## 2024-12-02 RX ORDER — SODIUM CHLORIDE 0.9 % (FLUSH) 0.9 %
5-40 SYRINGE (ML) INJECTION PRN
OUTPATIENT
Start: 2025-01-13

## 2024-12-02 RX ORDER — HYDROCORTISONE SODIUM SUCCINATE 100 MG/2ML
100 INJECTION INTRAMUSCULAR; INTRAVENOUS
OUTPATIENT
Start: 2025-01-13

## 2024-12-02 RX ORDER — FAMOTIDINE 10 MG/ML
20 INJECTION, SOLUTION INTRAVENOUS
OUTPATIENT
Start: 2024-12-23

## 2024-12-02 RX ORDER — SODIUM CHLORIDE 0.9 % (FLUSH) 0.9 %
5-40 SYRINGE (ML) INJECTION PRN
Status: DISCONTINUED | OUTPATIENT
Start: 2024-12-02 | End: 2024-12-03 | Stop reason: HOSPADM

## 2024-12-02 RX ORDER — ACETAMINOPHEN 325 MG/1
650 TABLET ORAL
OUTPATIENT
Start: 2024-12-23

## 2024-12-02 RX ORDER — ALBUTEROL SULFATE 90 UG/1
4 INHALANT RESPIRATORY (INHALATION) PRN
OUTPATIENT
Start: 2024-12-23

## 2024-12-02 RX ORDER — SODIUM CHLORIDE 9 MG/ML
5-250 INJECTION, SOLUTION INTRAVENOUS PRN
Status: DISCONTINUED | OUTPATIENT
Start: 2024-12-02 | End: 2024-12-03 | Stop reason: HOSPADM

## 2024-12-02 RX ORDER — SODIUM CHLORIDE 9 MG/ML
5-250 INJECTION, SOLUTION INTRAVENOUS PRN
OUTPATIENT
Start: 2024-12-23

## 2024-12-02 RX ORDER — EPINEPHRINE 1 MG/ML
0.3 INJECTION, SOLUTION, CONCENTRATE INTRAVENOUS PRN
OUTPATIENT
Start: 2024-12-23

## 2024-12-02 RX ORDER — ONDANSETRON 2 MG/ML
8 INJECTION INTRAMUSCULAR; INTRAVENOUS
OUTPATIENT
Start: 2024-12-23

## 2024-12-02 RX ORDER — DIPHENHYDRAMINE HYDROCHLORIDE 50 MG/ML
50 INJECTION INTRAMUSCULAR; INTRAVENOUS
OUTPATIENT
Start: 2024-12-23

## 2024-12-02 RX ORDER — HYDROCORTISONE SODIUM SUCCINATE 100 MG/2ML
100 INJECTION INTRAMUSCULAR; INTRAVENOUS
OUTPATIENT
Start: 2024-12-23

## 2024-12-02 RX ORDER — HEPARIN SODIUM (PORCINE) LOCK FLUSH IV SOLN 100 UNIT/ML 100 UNIT/ML
500 SOLUTION INTRAVENOUS PRN
OUTPATIENT
Start: 2025-01-13

## 2024-12-02 RX ORDER — DIPHENHYDRAMINE HYDROCHLORIDE 50 MG/ML
50 INJECTION INTRAMUSCULAR; INTRAVENOUS
OUTPATIENT
Start: 2025-01-13

## 2024-12-02 RX ORDER — FAMOTIDINE 10 MG/ML
20 INJECTION, SOLUTION INTRAVENOUS
OUTPATIENT
Start: 2025-01-13

## 2024-12-02 RX ORDER — MEPERIDINE HYDROCHLORIDE 50 MG/ML
12.5 INJECTION INTRAMUSCULAR; INTRAVENOUS; SUBCUTANEOUS PRN
OUTPATIENT
Start: 2025-01-13

## 2024-12-02 RX ORDER — ACETAMINOPHEN 325 MG/1
650 TABLET ORAL
OUTPATIENT
Start: 2025-01-13

## 2024-12-02 RX ORDER — ACETAMINOPHEN 325 MG/1
650 TABLET ORAL
Start: 2025-01-13

## 2024-12-02 RX ADMIN — SODIUM CHLORIDE 20 ML/HR: 9 INJECTION, SOLUTION INTRAVENOUS at 13:45

## 2024-12-02 RX ADMIN — HEPARIN 500 UNITS: 100 SYRINGE at 14:37

## 2024-12-02 RX ADMIN — ATEZOLIZUMAB 1200 MG: 1200 INJECTION, SOLUTION INTRAVENOUS at 14:00

## 2024-12-02 RX ADMIN — SODIUM CHLORIDE, PRESERVATIVE FREE 10 ML: 5 INJECTION INTRAVENOUS at 14:37

## 2024-12-02 NOTE — PROGRESS NOTES
Tecentriq completed and no sign of reaction at this time.  Pt ambulated out infusion center without difficulty in stable condition.  Pt aware of date and time of next treatment.

## 2024-12-02 NOTE — PROGRESS NOTES
site.  Close  interval attention on follow-up at this site PET-CT may be less sensitive due  to subcentimeter size.  3. Persistent mediastinal adenopathy.  4. Stable left adrenal nodule.  5. Moderate progressive distal colonic stool burden concerning for stasis of  retention or constipation.  6. Urinary bladder wall thickening likely cystitis correlate with urinalysis.  7. 3.8 cm infrarenal abdominal aortic aneurysm.  recommend follow-up every 2  years.     ASSESSMENT:    Andrea Desir is a 69 y.o. male with a history of tobacco abuse, CAD, s/p AICD with large right upper lobe lung SCLC, mediastinal lymphadenopathy, adrenal metastasis and bone metastasis, stage IV disease    I reviewed the laboratory, imaging studies, prior records, discussed possible diagnosis and treatment recommendations.   I reviewed the NCCN guidelines and goals of care with patient I explained that the goal of treatment would be palliative in nature only to improve quality of life and to control and contain disease and in the process to help patient live longer.  I reviewed the risk benefits and side effects of chemotherapy with patient.  We reviewed the side effects of chemotherapy which include, but are not limited to, fatigue, nausea, vomiting, alopecia, myelosuppression, mucositis, allergic reaction, nephrotoxicity, ototoxicity, neurotoxicity, diarrhea, and constipation.    Patient is complaining of some visual symptoms and he has a AICD in place.  His MRI of the brain and orbits did not show any intracranial abnormality and no evidence of intracranial metastatic disease noted however focal dilatation of the superior ophthalmic veins bilaterally left greater than right noted he is planning to see ophthalmologist    During today's visit, the patient and the family had a number of reasonable questions which were answered to their satisfaction.  They verbalized understanding of the information provided and they agreed to proceed as outlined

## 2024-12-03 ENCOUNTER — OFFICE VISIT (OUTPATIENT)
Dept: FAMILY MEDICINE CLINIC | Age: 69
End: 2024-12-03
Payer: MEDICARE

## 2024-12-03 VITALS
HEART RATE: 58 BPM | SYSTOLIC BLOOD PRESSURE: 124 MMHG | WEIGHT: 235.4 LBS | HEIGHT: 72 IN | OXYGEN SATURATION: 96 % | DIASTOLIC BLOOD PRESSURE: 62 MMHG | BODY MASS INDEX: 31.89 KG/M2

## 2024-12-03 DIAGNOSIS — Z72.0 TOBACCO ABUSE: ICD-10-CM

## 2024-12-03 DIAGNOSIS — I10 PRIMARY HYPERTENSION: ICD-10-CM

## 2024-12-03 DIAGNOSIS — I25.10 CORONARY ARTERY DISEASE INVOLVING NATIVE CORONARY ARTERY OF NATIVE HEART WITHOUT ANGINA PECTORIS: Primary | ICD-10-CM

## 2024-12-03 DIAGNOSIS — E78.5 HYPERLIPIDEMIA WITH TARGET LDL LESS THAN 70: ICD-10-CM

## 2024-12-03 DIAGNOSIS — N52.9 ERECTILE DYSFUNCTION, UNSPECIFIED ERECTILE DYSFUNCTION TYPE: ICD-10-CM

## 2024-12-03 DIAGNOSIS — G47.33 OSA (OBSTRUCTIVE SLEEP APNEA): ICD-10-CM

## 2024-12-03 DIAGNOSIS — I47.20 VENTRICULAR TACHYCARDIA (HCC): ICD-10-CM

## 2024-12-03 DIAGNOSIS — E03.9 HYPOTHYROIDISM, UNSPECIFIED TYPE: ICD-10-CM

## 2024-12-03 DIAGNOSIS — F32.5 MAJOR DEPRESSIVE DISORDER, SINGLE EPISODE, IN FULL REMISSION (HCC): ICD-10-CM

## 2024-12-03 DIAGNOSIS — Z12.5 SCREENING PSA (PROSTATE SPECIFIC ANTIGEN): ICD-10-CM

## 2024-12-03 PROCEDURE — 99213 OFFICE O/P EST LOW 20 MIN: CPT | Performed by: FAMILY MEDICINE

## 2024-12-03 ASSESSMENT — ENCOUNTER SYMPTOMS
GASTROINTESTINAL NEGATIVE: 1
RESPIRATORY NEGATIVE: 1
EYES NEGATIVE: 1
COUGH: 0
SHORTNESS OF BREATH: 0
ALLERGIC/IMMUNOLOGIC NEGATIVE: 1

## 2024-12-03 NOTE — PROGRESS NOTES
urethral meatus open manually prior to urinating to see if that makes a difference.  Will call if persistent or progressing.  Rec. Urology consult for both issues.  Chemotherapy caused some retention.  Had alfaro catheter in place in the past.  Concerns raised for urethral stricture given his description of urination issues. .     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  Currently with active chemotherapy through oncology.  Palliative chemo, being as aggressive as he can.  Feeling well at present.  Tolerating chemo. Thus far.  Feeling pretty good at present.  Last imaging updates showed improved/stable disease.      Adjustment reaction.  He admits to anxiety and stress from his lung cancer diagnosis.  Feels he is adjusting well,  feeling better about the situation at present.  Chemo. Helping at present.  Neck pressure from tumor much better.

## 2024-12-09 ENCOUNTER — OFFICE VISIT (OUTPATIENT)
Dept: UROLOGY | Age: 69
End: 2024-12-09
Payer: MEDICARE

## 2024-12-09 VITALS
DIASTOLIC BLOOD PRESSURE: 76 MMHG | SYSTOLIC BLOOD PRESSURE: 118 MMHG | BODY MASS INDEX: 31.42 KG/M2 | HEIGHT: 72 IN | OXYGEN SATURATION: 95 % | HEART RATE: 60 BPM | RESPIRATION RATE: 16 BRPM | WEIGHT: 232 LBS

## 2024-12-09 DIAGNOSIS — R33.9 INCOMPLETE BLADDER EMPTYING: Primary | ICD-10-CM

## 2024-12-09 DIAGNOSIS — N40.1 BENIGN LOCALIZED PROSTATIC HYPERPLASIA WITH LOWER URINARY TRACT SYMPTOMS (LUTS): ICD-10-CM

## 2024-12-09 PROCEDURE — 99213 OFFICE O/P EST LOW 20 MIN: CPT | Performed by: UROLOGY

## 2024-12-09 PROCEDURE — 1123F ACP DISCUSS/DSCN MKR DOCD: CPT | Performed by: UROLOGY

## 2024-12-09 PROCEDURE — 51798 US URINE CAPACITY MEASURE: CPT | Performed by: UROLOGY

## 2024-12-09 PROCEDURE — 99214 OFFICE O/P EST MOD 30 MIN: CPT | Performed by: UROLOGY

## 2024-12-09 PROCEDURE — 3074F SYST BP LT 130 MM HG: CPT | Performed by: UROLOGY

## 2024-12-09 PROCEDURE — 1159F MED LIST DOCD IN RCRD: CPT | Performed by: UROLOGY

## 2024-12-09 PROCEDURE — PBSHW MEAS,POST-VOID RES,US,NON-IMAGING: Performed by: UROLOGY

## 2024-12-09 PROCEDURE — 3078F DIAST BP <80 MM HG: CPT | Performed by: UROLOGY

## 2024-12-09 NOTE — PROGRESS NOTES
Steve Mejia MD.    [facility] CARE, [facility]  [facility] UROLOGY A DEPARTMENT OF [facility]  [address] [facility] [address]  Dept: [phone]  Dept Fax: [phone]    [facility] Urology Office Note -     Patient:  Andrea Desir  YOB: 1955    The patient is a 69 y.o. male who presents today for evaluation of the following problems:   Chief Complaint   Patient presents with    Benign Prostatic Hypertrophy     3 month with PVR; c/o split stream (misplaced flomax approx a month ago, unsure if that is when that started)    Urinary Retention    referred/consultation requested by Deon Art MD.    History of Present Illness:    Urinary retention  After chemo  High volume  Stage 4 small cell  Had high volume retention- pvr's in 700-800 range. Now improved  Still some irritative symptoms      Requested/reviewed records from Deon Art MD office and/or outside physician/EMR    (Patient's old records have been requested, reviewed and pertinent findings summarized in today's note.)    Procedures Today: N/A      Last several PSA's:  Lab Results   Component Value Date    PSA 1.77 05/09/2022    PSA 3.63 03/17/2021    PSA 1.86 10/02/2017       Last total testosterone:  Lab Results   Component Value Date    TESTOSTERONE 274 09/19/2013       Urinalysis today:  No results found for this visit on 12/09/24.    Last BUN and creatinine:  Lab Results   Component Value Date    BUN 15 12/02/2024     Lab Results   Component Value Date    CREATININE 1.1 12/02/2024         Imaging Reviewed during this Office Visit:   STEVE MEJIA MD independently reviewed the images and verified the radiology reports from:            PAST MEDICAL, FAMILY AND SOCIAL HISTORY:  Past Medical History:   Diagnosis Date    Anxiety     Atrial fibrillation (HCC)     CAD (coronary artery disease) 2012    stent to LAD after MI    Erectile dysfunction     Headache(784.0)     High

## 2024-12-16 ENCOUNTER — TELEPHONE (OUTPATIENT)
Dept: FAMILY MEDICINE CLINIC | Age: 69
End: 2024-12-16

## 2024-12-16 NOTE — TELEPHONE ENCOUNTER
Left voicemail to return call to complete medicare wellness visit over the phone. Return phone number [phone] option1.

## 2024-12-18 DIAGNOSIS — I10 PRIMARY HYPERTENSION: ICD-10-CM

## 2024-12-18 RX ORDER — METOPROLOL TARTRATE 25 MG/1
TABLET, FILM COATED ORAL
Qty: 135 TABLET | Refills: 1 | Status: SHIPPED | OUTPATIENT
Start: 2024-12-18

## 2024-12-18 NOTE — TELEPHONE ENCOUNTER
Andrea called requesting a refill of the below medication which has been pended for you:     Requested Prescriptions     Pending Prescriptions Disp Refills    metoprolol tartrate (LOPRESSOR) 25 MG tablet [Pharmacy Med Name: Metoprolol Tartrate 25 MG Oral Tablet] 135 tablet 1     Sig: TAKE 1 TABLET BY MOUTH IN THE MORNING AND 1/2 (ONE-HALF) IN THE EVENING       Last Appointment Date: 12/3/2024  Next Appointment Date: 6/3/2025    No Known Allergies

## 2024-12-23 ENCOUNTER — TELEPHONE (OUTPATIENT)
Dept: ONCOLOGY | Age: 69
End: 2024-12-23

## 2024-12-23 ENCOUNTER — HOSPITAL ENCOUNTER (OUTPATIENT)
Dept: INFUSION THERAPY | Age: 69
Discharge: HOME | End: 2024-12-23
Payer: MEDICARE

## 2024-12-23 VITALS
WEIGHT: 234 LBS | RESPIRATION RATE: 16 BRPM | BODY MASS INDEX: 31.69 KG/M2 | HEART RATE: 61 BPM | OXYGEN SATURATION: 95 % | DIASTOLIC BLOOD PRESSURE: 88 MMHG | TEMPERATURE: 97.2 F | SYSTOLIC BLOOD PRESSURE: 138 MMHG | HEIGHT: 72 IN

## 2024-12-23 DIAGNOSIS — C34.91 SMALL CELL LUNG CANCER, RIGHT (HCC): Primary | ICD-10-CM

## 2024-12-23 DIAGNOSIS — R53.83 FATIGUE, UNSPECIFIED TYPE: ICD-10-CM

## 2024-12-23 LAB
ALBUMIN SERPL-MCNC: 4.2 G/DL (ref 3.5–5.2)
ALBUMIN/GLOB SERPL: 1.3 {RATIO} (ref 1–2.5)
ALP SERPL-CCNC: 82 U/L (ref 40–129)
ALT SERPL-CCNC: 11 U/L (ref 5–41)
ANION GAP SERPL CALCULATED.3IONS-SCNC: 11 MMOL/L (ref 9–17)
AST SERPL-CCNC: 18 U/L
BASOPHILS # BLD: 0.05 K/UL (ref 0–0.2)
BASOPHILS NFR BLD: 1 % (ref 0–2)
BILIRUB SERPL-MCNC: 0.6 MG/DL (ref 0.3–1.2)
BUN SERPL-MCNC: 11 MG/DL (ref 8–23)
BUN/CREAT SERPL: 10 (ref 9–20)
CALCIUM SERPL-MCNC: 9.2 MG/DL (ref 8.6–10.4)
CHLORIDE SERPL-SCNC: 104 MMOL/L (ref 98–107)
CO2 SERPL-SCNC: 25 MMOL/L (ref 20–31)
CORTIS SERPL-MCNC: 12.5 UG/DL (ref 2.5–19.5)
CORTISOL COLLECTION INFO: 1317
CREAT SERPL-MCNC: 1.1 MG/DL (ref 0.7–1.2)
EOSINOPHIL # BLD: 0.21 K/UL (ref 0–0.44)
EOSINOPHILS RELATIVE PERCENT: 2 % (ref 1–4)
ERYTHROCYTE [DISTWIDTH] IN BLOOD BY AUTOMATED COUNT: 12.4 % (ref 11.8–14.4)
GFR, ESTIMATED: 72 ML/MIN/1.73M2
GLUCOSE SERPL-MCNC: 92 MG/DL (ref 70–99)
HCT VFR BLD AUTO: 37.8 % (ref 40.7–50.3)
HGB BLD-MCNC: 13.9 G/DL (ref 13–17)
IMM GRANULOCYTES # BLD AUTO: <0.03 K/UL (ref 0–0.3)
IMM GRANULOCYTES NFR BLD: 0 %
LYMPHOCYTES NFR BLD: 2.71 K/UL (ref 1.1–3.7)
LYMPHOCYTES RELATIVE PERCENT: 29 % (ref 24–43)
MCH RBC QN AUTO: 35.5 PG (ref 25.2–33.5)
MCHC RBC AUTO-ENTMCNC: 36.8 G/DL (ref 25.2–33.5)
MCV RBC AUTO: 96.4 FL (ref 82.6–102.9)
MONOCYTES NFR BLD: 0.8 K/UL (ref 0.1–1.2)
MONOCYTES NFR BLD: 9 % (ref 3–12)
NEUTROPHILS NFR BLD: 59 % (ref 36–65)
NEUTS SEG NFR BLD: 5.46 K/UL (ref 1.5–8.1)
NRBC BLD-RTO: 0 PER 100 WBC
PLATELET # BLD AUTO: 148 K/UL (ref 138–453)
PMV BLD AUTO: 11 FL (ref 8.1–13.5)
POTASSIUM SERPL-SCNC: 4.1 MMOL/L (ref 3.7–5.3)
PROT SERPL-MCNC: 7.4 G/DL (ref 6.4–8.3)
RBC # BLD AUTO: 3.92 M/UL (ref 4.21–5.77)
SODIUM SERPL-SCNC: 140 MMOL/L (ref 135–144)
TSH SERPL DL<=0.05 MIU/L-ACNC: 3.86 UIU/ML (ref 0.3–5)
WBC OTHER # BLD: 9.3 K/UL (ref 3.5–11.3)

## 2024-12-23 PROCEDURE — 80053 COMPREHEN METABOLIC PANEL: CPT

## 2024-12-23 PROCEDURE — 84443 ASSAY THYROID STIM HORMONE: CPT

## 2024-12-23 PROCEDURE — 2580000003 HC RX 258: Performed by: INTERNAL MEDICINE

## 2024-12-23 PROCEDURE — 82533 TOTAL CORTISOL: CPT

## 2024-12-23 PROCEDURE — 2500000003 HC RX 250 WO HCPCS: Performed by: INTERNAL MEDICINE

## 2024-12-23 PROCEDURE — 85025 COMPLETE CBC W/AUTO DIFF WBC: CPT

## 2024-12-23 PROCEDURE — 96413 CHEMO IV INFUSION 1 HR: CPT

## 2024-12-23 PROCEDURE — 6360000002 HC RX W HCPCS: Performed by: INTERNAL MEDICINE

## 2024-12-23 RX ORDER — SODIUM CHLORIDE 0.9 % (FLUSH) 0.9 %
5-40 SYRINGE (ML) INJECTION PRN
Status: DISCONTINUED | OUTPATIENT
Start: 2024-12-23 | End: 2024-12-24 | Stop reason: HOSPADM

## 2024-12-23 RX ORDER — BENZONATATE 100 MG/1
100 CAPSULE ORAL 3 TIMES DAILY PRN
Qty: 90 CAPSULE | Refills: 3 | Status: SHIPPED | OUTPATIENT
Start: 2024-12-23

## 2024-12-23 RX ORDER — SODIUM CHLORIDE 9 MG/ML
5-250 INJECTION, SOLUTION INTRAVENOUS PRN
Status: DISCONTINUED | OUTPATIENT
Start: 2024-12-23 | End: 2024-12-24 | Stop reason: HOSPADM

## 2024-12-23 RX ORDER — HEPARIN 100 UNIT/ML
500 SYRINGE INTRAVENOUS PRN
Status: DISCONTINUED | OUTPATIENT
Start: 2024-12-23 | End: 2024-12-24 | Stop reason: HOSPADM

## 2024-12-23 RX ADMIN — SODIUM CHLORIDE 30 ML/HR: 9 INJECTION, SOLUTION INTRAVENOUS at 13:47

## 2024-12-23 RX ADMIN — SODIUM CHLORIDE, PRESERVATIVE FREE 10 ML: 5 INJECTION INTRAVENOUS at 14:50

## 2024-12-23 RX ADMIN — HEPARIN 500 UNITS: 100 SYRINGE at 14:50

## 2024-12-23 RX ADMIN — ATEZOLIZUMAB 1200 MG: 1200 INJECTION, SOLUTION INTRAVENOUS at 14:10

## 2024-12-23 NOTE — TELEPHONE ENCOUNTER
Name: Andrea Desir  : 1955  MRN: [id number]    Oncology Navigation Follow-Up Note    Contact Type:  Telephone    Notes:   Reviewing chart. Patient continuing with Tecentriq infusions with cycle 12 today. Follow up with med onc 25.  Spoke with patient by phone. His only concern is for his cough which has returned. He denies fever. Cough productive cloudy white sputum. He requests new script for Tessalon Perls. Refill request entered and pended to Dr. Mendenhall.      Electronically signed by Natalia Broussard RN on 2024 at 9:01 AM

## 2024-12-23 NOTE — TELEPHONE ENCOUNTER
Patient requests new script for Tessalon 100 mg. Cough has returned. Denies fever. Cough occasionally productive. Sputum cloudy white.  Last appt: 12/2/24  Next appt: 1/6/25

## 2025-01-13 ENCOUNTER — TELEPHONE (OUTPATIENT)
Dept: ONCOLOGY | Age: 70
End: 2025-01-13

## 2025-01-13 ENCOUNTER — OFFICE VISIT (OUTPATIENT)
Dept: ONCOLOGY | Age: 70
End: 2025-01-13
Payer: MEDICARE

## 2025-01-13 ENCOUNTER — HOSPITAL ENCOUNTER (OUTPATIENT)
Dept: INFUSION THERAPY | Age: 70
Discharge: HOME | End: 2025-01-13
Payer: MEDICARE

## 2025-01-13 VITALS
WEIGHT: 235.4 LBS | SYSTOLIC BLOOD PRESSURE: 150 MMHG | TEMPERATURE: 97.4 F | HEART RATE: 64 BPM | HEIGHT: 72 IN | DIASTOLIC BLOOD PRESSURE: 84 MMHG | OXYGEN SATURATION: 99 % | RESPIRATION RATE: 16 BRPM | BODY MASS INDEX: 31.89 KG/M2

## 2025-01-13 VITALS
HEART RATE: 64 BPM | OXYGEN SATURATION: 99 % | TEMPERATURE: 97.4 F | WEIGHT: 235 LBS | RESPIRATION RATE: 16 BRPM | BODY MASS INDEX: 31.86 KG/M2 | DIASTOLIC BLOOD PRESSURE: 80 MMHG | SYSTOLIC BLOOD PRESSURE: 150 MMHG

## 2025-01-13 DIAGNOSIS — C34.91 SMALL CELL LUNG CANCER, RIGHT (HCC): Primary | ICD-10-CM

## 2025-01-13 DIAGNOSIS — R53.83 FATIGUE, UNSPECIFIED TYPE: ICD-10-CM

## 2025-01-13 LAB
ALBUMIN SERPL-MCNC: 4.1 G/DL (ref 3.5–5.2)
ALBUMIN/GLOB SERPL: 1.2 {RATIO} (ref 1–2.5)
ALP SERPL-CCNC: 78 U/L (ref 40–129)
ALT SERPL-CCNC: 17 U/L (ref 5–41)
ANION GAP SERPL CALCULATED.3IONS-SCNC: 9 MMOL/L (ref 9–17)
AST SERPL-CCNC: 19 U/L
BASOPHILS # BLD: 0.06 K/UL (ref 0–0.2)
BASOPHILS NFR BLD: 1 % (ref 0–2)
BILIRUB SERPL-MCNC: 0.3 MG/DL (ref 0.3–1.2)
BUN SERPL-MCNC: 15 MG/DL (ref 8–23)
BUN/CREAT SERPL: 15 (ref 9–20)
CALCIUM SERPL-MCNC: 9.5 MG/DL (ref 8.6–10.4)
CHLORIDE SERPL-SCNC: 106 MMOL/L (ref 98–107)
CO2 SERPL-SCNC: 27 MMOL/L (ref 20–31)
CORTIS SERPL-MCNC: 7.4 UG/DL (ref 2.5–19.5)
CORTISOL COLLECTION INFO: NORMAL
CREAT SERPL-MCNC: 1 MG/DL (ref 0.7–1.2)
EOSINOPHIL # BLD: 0.2 K/UL (ref 0–0.44)
EOSINOPHILS RELATIVE PERCENT: 3 % (ref 1–4)
ERYTHROCYTE [DISTWIDTH] IN BLOOD BY AUTOMATED COUNT: 12.6 % (ref 11.8–14.4)
GFR, ESTIMATED: 80 ML/MIN/1.73M2
GLUCOSE SERPL-MCNC: 91 MG/DL (ref 70–99)
HCT VFR BLD AUTO: 36.8 % (ref 40.7–50.3)
HGB BLD-MCNC: 13.3 G/DL (ref 13–17)
IMM GRANULOCYTES # BLD AUTO: <0.03 K/UL (ref 0–0.3)
IMM GRANULOCYTES NFR BLD: 0 %
LYMPHOCYTES NFR BLD: 3.16 K/UL (ref 1.1–3.7)
LYMPHOCYTES RELATIVE PERCENT: 41 % (ref 24–43)
MCH RBC QN AUTO: 34.5 PG (ref 25.2–33.5)
MCHC RBC AUTO-ENTMCNC: 36.1 G/DL (ref 25.2–33.5)
MCV RBC AUTO: 95.6 FL (ref 82.6–102.9)
MONOCYTES NFR BLD: 0.6 K/UL (ref 0.1–1.2)
MONOCYTES NFR BLD: 8 % (ref 3–12)
NEUTROPHILS NFR BLD: 47 % (ref 36–65)
NEUTS SEG NFR BLD: 3.69 K/UL (ref 1.5–8.1)
NRBC BLD-RTO: 0 PER 100 WBC
PLATELET # BLD AUTO: 191 K/UL (ref 138–453)
PMV BLD AUTO: 10.6 FL (ref 8.1–13.5)
POTASSIUM SERPL-SCNC: 4.1 MMOL/L (ref 3.7–5.3)
PROT SERPL-MCNC: 7.6 G/DL (ref 6.4–8.3)
RBC # BLD AUTO: 3.85 M/UL (ref 4.21–5.77)
SODIUM SERPL-SCNC: 142 MMOL/L (ref 135–144)
TSH SERPL DL<=0.05 MIU/L-ACNC: 4.29 UIU/ML (ref 0.3–5)
WBC OTHER # BLD: 7.7 K/UL (ref 3.5–11.3)

## 2025-01-13 PROCEDURE — 96413 CHEMO IV INFUSION 1 HR: CPT

## 2025-01-13 PROCEDURE — 85025 COMPLETE CBC W/AUTO DIFF WBC: CPT

## 2025-01-13 PROCEDURE — 80053 COMPREHEN METABOLIC PANEL: CPT

## 2025-01-13 PROCEDURE — 1159F MED LIST DOCD IN RCRD: CPT | Performed by: INTERNAL MEDICINE

## 2025-01-13 PROCEDURE — 2580000003 HC RX 258: Performed by: INTERNAL MEDICINE

## 2025-01-13 PROCEDURE — 99214 OFFICE O/P EST MOD 30 MIN: CPT | Performed by: INTERNAL MEDICINE

## 2025-01-13 PROCEDURE — 6360000002 HC RX W HCPCS: Performed by: INTERNAL MEDICINE

## 2025-01-13 PROCEDURE — 3079F DIAST BP 80-89 MM HG: CPT | Performed by: INTERNAL MEDICINE

## 2025-01-13 PROCEDURE — 1123F ACP DISCUSS/DSCN MKR DOCD: CPT | Performed by: INTERNAL MEDICINE

## 2025-01-13 PROCEDURE — 84443 ASSAY THYROID STIM HORMONE: CPT

## 2025-01-13 PROCEDURE — 82533 TOTAL CORTISOL: CPT

## 2025-01-13 PROCEDURE — 99215 OFFICE O/P EST HI 40 MIN: CPT | Performed by: INTERNAL MEDICINE

## 2025-01-13 PROCEDURE — 3077F SYST BP >= 140 MM HG: CPT | Performed by: INTERNAL MEDICINE

## 2025-01-13 PROCEDURE — 2500000003 HC RX 250 WO HCPCS: Performed by: INTERNAL MEDICINE

## 2025-01-13 PROCEDURE — 1160F RVW MEDS BY RX/DR IN RCRD: CPT | Performed by: INTERNAL MEDICINE

## 2025-01-13 RX ORDER — FAMOTIDINE 10 MG/ML
20 INJECTION, SOLUTION INTRAVENOUS
OUTPATIENT
Start: 2025-02-24

## 2025-01-13 RX ORDER — ACETAMINOPHEN 325 MG/1
650 TABLET ORAL
OUTPATIENT
Start: 2025-02-03

## 2025-01-13 RX ORDER — ALBUTEROL SULFATE 90 UG/1
4 INHALANT RESPIRATORY (INHALATION) PRN
OUTPATIENT
Start: 2025-02-03

## 2025-01-13 RX ORDER — SODIUM CHLORIDE 9 MG/ML
INJECTION, SOLUTION INTRAVENOUS CONTINUOUS
OUTPATIENT
Start: 2025-02-24

## 2025-01-13 RX ORDER — SODIUM CHLORIDE 9 MG/ML
5-250 INJECTION, SOLUTION INTRAVENOUS PRN
OUTPATIENT
Start: 2025-02-03

## 2025-01-13 RX ORDER — DIPHENHYDRAMINE HYDROCHLORIDE 50 MG/ML
50 INJECTION INTRAMUSCULAR; INTRAVENOUS
OUTPATIENT
Start: 2025-02-03

## 2025-01-13 RX ORDER — ONDANSETRON 2 MG/ML
8 INJECTION INTRAMUSCULAR; INTRAVENOUS
OUTPATIENT
Start: 2025-02-24

## 2025-01-13 RX ORDER — MEPERIDINE HYDROCHLORIDE 50 MG/ML
12.5 INJECTION INTRAMUSCULAR; INTRAVENOUS; SUBCUTANEOUS PRN
OUTPATIENT
Start: 2025-02-24

## 2025-01-13 RX ORDER — DIPHENHYDRAMINE HYDROCHLORIDE 50 MG/ML
50 INJECTION INTRAMUSCULAR; INTRAVENOUS
OUTPATIENT
Start: 2025-02-24

## 2025-01-13 RX ORDER — DOCUSATE SODIUM 100 MG/1
100 CAPSULE, LIQUID FILLED ORAL DAILY
COMMUNITY

## 2025-01-13 RX ORDER — FAMOTIDINE 10 MG/ML
20 INJECTION, SOLUTION INTRAVENOUS
OUTPATIENT
Start: 2025-02-03

## 2025-01-13 RX ORDER — ACETAMINOPHEN 325 MG/1
650 TABLET ORAL
Start: 2025-02-24

## 2025-01-13 RX ORDER — ALBUTEROL SULFATE 90 UG/1
4 INHALANT RESPIRATORY (INHALATION) PRN
OUTPATIENT
Start: 2025-02-24

## 2025-01-13 RX ORDER — EPINEPHRINE 1 MG/ML
0.3 INJECTION, SOLUTION, CONCENTRATE INTRAVENOUS PRN
OUTPATIENT
Start: 2025-02-03

## 2025-01-13 RX ORDER — MEPERIDINE HYDROCHLORIDE 50 MG/ML
12.5 INJECTION INTRAMUSCULAR; INTRAVENOUS; SUBCUTANEOUS PRN
OUTPATIENT
Start: 2025-02-03

## 2025-01-13 RX ORDER — EPINEPHRINE 1 MG/ML
0.3 INJECTION, SOLUTION, CONCENTRATE INTRAVENOUS PRN
OUTPATIENT
Start: 2025-02-24

## 2025-01-13 RX ORDER — HEPARIN SODIUM (PORCINE) LOCK FLUSH IV SOLN 100 UNIT/ML 100 UNIT/ML
500 SOLUTION INTRAVENOUS PRN
OUTPATIENT
Start: 2025-02-03

## 2025-01-13 RX ORDER — SODIUM CHLORIDE 9 MG/ML
5-250 INJECTION, SOLUTION INTRAVENOUS PRN
Status: DISCONTINUED | OUTPATIENT
Start: 2025-01-13 | End: 2025-01-14 | Stop reason: HOSPADM

## 2025-01-13 RX ORDER — SODIUM CHLORIDE 0.9 % (FLUSH) 0.9 %
5-40 SYRINGE (ML) INJECTION PRN
OUTPATIENT
Start: 2025-02-24

## 2025-01-13 RX ORDER — HYDROCORTISONE SODIUM SUCCINATE 100 MG/2ML
100 INJECTION INTRAMUSCULAR; INTRAVENOUS
OUTPATIENT
Start: 2025-02-03

## 2025-01-13 RX ORDER — SODIUM CHLORIDE 9 MG/ML
5-250 INJECTION, SOLUTION INTRAVENOUS PRN
OUTPATIENT
Start: 2025-02-24

## 2025-01-13 RX ORDER — SODIUM CHLORIDE 0.9 % (FLUSH) 0.9 %
5-40 SYRINGE (ML) INJECTION PRN
Status: DISCONTINUED | OUTPATIENT
Start: 2025-01-13 | End: 2025-01-14 | Stop reason: HOSPADM

## 2025-01-13 RX ORDER — HYDROCORTISONE SODIUM SUCCINATE 100 MG/2ML
100 INJECTION INTRAMUSCULAR; INTRAVENOUS
OUTPATIENT
Start: 2025-02-24

## 2025-01-13 RX ORDER — SODIUM CHLORIDE 9 MG/ML
INJECTION, SOLUTION INTRAVENOUS CONTINUOUS
OUTPATIENT
Start: 2025-02-03

## 2025-01-13 RX ORDER — DIAZEPAM 5 MG/1
5 TABLET ORAL ONCE
COMMUNITY
End: 2025-01-13 | Stop reason: SDUPTHER

## 2025-01-13 RX ORDER — PREDNISONE 20 MG/1
40 TABLET ORAL DAILY
Qty: 10 TABLET | Refills: 0 | Status: ON HOLD | OUTPATIENT
Start: 2025-01-13 | End: 2025-01-17 | Stop reason: HOSPADM

## 2025-01-13 RX ORDER — ACETAMINOPHEN 325 MG/1
650 TABLET ORAL
Start: 2025-02-03

## 2025-01-13 RX ORDER — DIAZEPAM 5 MG/1
5 TABLET ORAL ONCE
Qty: 1 TABLET | Refills: 0 | Status: SHIPPED | OUTPATIENT
Start: 2025-01-13 | End: 2025-01-13

## 2025-01-13 RX ORDER — HEPARIN SODIUM (PORCINE) LOCK FLUSH IV SOLN 100 UNIT/ML 100 UNIT/ML
500 SOLUTION INTRAVENOUS PRN
OUTPATIENT
Start: 2025-02-24

## 2025-01-13 RX ORDER — SODIUM CHLORIDE 0.9 % (FLUSH) 0.9 %
5-40 SYRINGE (ML) INJECTION PRN
OUTPATIENT
Start: 2025-02-03

## 2025-01-13 RX ORDER — ACETAMINOPHEN 325 MG/1
650 TABLET ORAL
OUTPATIENT
Start: 2025-02-24

## 2025-01-13 RX ORDER — ONDANSETRON 2 MG/ML
8 INJECTION INTRAMUSCULAR; INTRAVENOUS
OUTPATIENT
Start: 2025-02-03

## 2025-01-13 RX ORDER — HEPARIN 100 UNIT/ML
500 SYRINGE INTRAVENOUS PRN
Status: DISCONTINUED | OUTPATIENT
Start: 2025-01-13 | End: 2025-01-14 | Stop reason: HOSPADM

## 2025-01-13 RX ADMIN — HEPARIN 500 UNITS: 100 SYRINGE at 15:07

## 2025-01-13 RX ADMIN — SODIUM CHLORIDE 20 ML/HR: 9 INJECTION, SOLUTION INTRAVENOUS at 14:00

## 2025-01-13 RX ADMIN — ATEZOLIZUMAB 1200 MG: 1200 INJECTION, SOLUTION INTRAVENOUS at 14:21

## 2025-01-13 RX ADMIN — SODIUM CHLORIDE, PRESERVATIVE FREE 10 ML: 5 INJECTION INTRAVENOUS at 15:07

## 2025-01-13 NOTE — PROGRESS NOTES
Patient ID: Andrea Desir, 1955, [id number], 70 y.o.  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, adrenal metastasis and bone metastasis  Started on palliative chemotherapy with carboplatin etoposide and Tecentriq on 4/23/2024  Restaging CT scan after 2 cycles showed great response to treatment  Restaging CT chest abdomen pelvis after 6 cycles showed stable right upper lobe lung nodule and slightly decreased right hilar lymphadenopathy  Plan to continue maintenance immunotherapy  HISTORY OF PRESENT ILLNESS:    Oncologic History:  Andrea Desir is a 70 y.o. male with history of atrial fibrillation, status post AICD, history of CAD status post stent, with a tobacco abuse was seen during initial counseling visit for newly discovered lung mass and lymphadenopathy.    Patient recently was seen by his primary care physician for difficulty swallowing and also swelling in his neck area.  Patient also having cough for past 6 months.  Patient had a CT scan of the neck as well as CT scan of the chest on 3/27/2024 which showed right upper lobe 4.7 cm mass suspicious for primary lung malignancy along with right hilar and bilateral mediastinal bulky lymphadenopathy with extrinsic mass effect on the trachea and esophagus without occlusion.  Patient also noted to have bilateral supraclavicular lymphadenopathy suspicious for metastasis.  Patient was referred to oncology for further recommendations.  He smokes 1 pack cigarettes per day.  He has a history of CAD status post AICD and stent placement.  Currently he is on aspirin and Plavix and following with cardiologist.    He may have lost few pounds over past few weeks.  He denies any alcohol abuse history.    He is complaining of headache for past 3 to 4 days.  Denies any nausea vomiting, blurry vision or tingling numbness.    Interval history:  Patient is returning for follow visit and to discuss lab

## 2025-01-13 NOTE — TELEPHONE ENCOUNTER
Name: Andrea Desir  : 1955  MRN: [id number]    Oncology Navigation Follow-Up Note    Contact Type:  Medical Oncology    Notes:   Writer met with patient while in infusion room for treatment today.   He denies any navigation needs.   Encouraged him to call if he has any questions/concerns. Understanding verbalized.   Navigator following for continuity of care.    Writer reviews Dr. Mendenhall's visit note instructions.  MR brain STAT  Ct chest asap  RTc one week          Electronically signed by Natalia Broussard RN on 2025 at 2:30 PM

## 2025-01-15 ENCOUNTER — HOSPITAL ENCOUNTER (OUTPATIENT)
Dept: CT IMAGING | Age: 70
Discharge: HOME | End: 2025-01-17
Attending: INTERNAL MEDICINE
Payer: MEDICARE

## 2025-01-15 ENCOUNTER — TELEPHONE (OUTPATIENT)
Dept: ONCOLOGY | Age: 70
End: 2025-01-15

## 2025-01-15 ENCOUNTER — APPOINTMENT (OUTPATIENT)
Dept: CT IMAGING | Age: 70
DRG: 054 | End: 2025-01-15
Payer: MEDICARE

## 2025-01-15 ENCOUNTER — HOSPITAL ENCOUNTER (INPATIENT)
Age: 70
LOS: 2 days | Discharge: HOME | DRG: 054 | End: 2025-01-17
Attending: STUDENT IN AN ORGANIZED HEALTH CARE EDUCATION/TRAINING PROGRAM
Payer: MEDICARE

## 2025-01-15 ENCOUNTER — HOSPITAL ENCOUNTER (EMERGENCY)
Age: 70
Discharge: ANOTHER HOSPITAL | DRG: 054 | End: 2025-01-15
Attending: EMERGENCY MEDICINE
Payer: MEDICARE

## 2025-01-15 VITALS
TEMPERATURE: 97.9 F | WEIGHT: 235 LBS | BODY MASS INDEX: 31.83 KG/M2 | DIASTOLIC BLOOD PRESSURE: 84 MMHG | OXYGEN SATURATION: 97 % | SYSTOLIC BLOOD PRESSURE: 156 MMHG | HEIGHT: 72 IN | HEART RATE: 70 BPM | RESPIRATION RATE: 16 BRPM

## 2025-01-15 DIAGNOSIS — R90.0 INTRACRANIAL MASS: Primary | ICD-10-CM

## 2025-01-15 DIAGNOSIS — C34.91 SMALL CELL LUNG CANCER, RIGHT (HCC): ICD-10-CM

## 2025-01-15 DIAGNOSIS — D49.6 BRAIN NEOPLASM (HCC): Primary | ICD-10-CM

## 2025-01-15 DIAGNOSIS — C34.90 SMALL CELL CARCINOMA OF LUNG, UNSPECIFIED LATERALITY, UNSPECIFIED PART OF LUNG (HCC): ICD-10-CM

## 2025-01-15 PROBLEM — G93.89 BRAIN MASS: Status: ACTIVE | Noted: 2025-01-15

## 2025-01-15 LAB
ANION GAP SERPL CALCULATED.3IONS-SCNC: 13 MMOL/L (ref 9–16)
BASOPHILS # BLD: 0.03 K/UL (ref 0–0.2)
BASOPHILS NFR BLD: 0 % (ref 0–2)
BUN SERPL-MCNC: 17 MG/DL (ref 8–23)
CALCIUM SERPL-MCNC: 9.9 MG/DL (ref 8.6–10.4)
CHLORIDE SERPL-SCNC: 103 MMOL/L (ref 98–107)
CO2 SERPL-SCNC: 23 MMOL/L (ref 20–31)
CREAT SERPL-MCNC: 1 MG/DL (ref 0.7–1.2)
EOSINOPHIL # BLD: <0.03 K/UL (ref 0–0.44)
EOSINOPHILS RELATIVE PERCENT: 0 % (ref 1–4)
ERYTHROCYTE [DISTWIDTH] IN BLOOD BY AUTOMATED COUNT: 12.7 % (ref 11.8–14.4)
GFR, ESTIMATED: 80 ML/MIN/1.73M2
GLUCOSE SERPL-MCNC: 122 MG/DL (ref 74–99)
HCT VFR BLD AUTO: 38.2 % (ref 40.7–50.3)
HGB BLD-MCNC: 13.5 G/DL (ref 13–17)
IMM GRANULOCYTES # BLD AUTO: 0.03 K/UL (ref 0–0.3)
IMM GRANULOCYTES NFR BLD: 0 %
INR PPP: 1
LYMPHOCYTES NFR BLD: 1.34 K/UL (ref 1.1–3.7)
LYMPHOCYTES RELATIVE PERCENT: 19 % (ref 24–43)
MCH RBC QN AUTO: 33.8 PG (ref 25.2–33.5)
MCHC RBC AUTO-ENTMCNC: 35.3 G/DL (ref 28.4–34.8)
MCV RBC AUTO: 95.5 FL (ref 82.6–102.9)
MONOCYTES NFR BLD: 0.07 K/UL (ref 0.1–1.2)
MONOCYTES NFR BLD: 1 % (ref 3–12)
NEUTROPHILS NFR BLD: 80 % (ref 36–65)
NEUTS SEG NFR BLD: 5.48 K/UL (ref 1.5–8.1)
NRBC BLD-RTO: 0 PER 100 WBC
PARTIAL THROMBOPLASTIN TIME: 25.8 SEC (ref 23–36.5)
PLATELET # BLD AUTO: 198 K/UL (ref 138–453)
PMV BLD AUTO: 11 FL (ref 8.1–13.5)
POTASSIUM SERPL-SCNC: 4.4 MMOL/L (ref 3.7–5.3)
PROTHROMBIN TIME: 13.3 SEC (ref 11.7–14.9)
RBC # BLD AUTO: 4 M/UL (ref 4.21–5.77)
SODIUM SERPL-SCNC: 139 MMOL/L (ref 136–145)
WBC OTHER # BLD: 7 K/UL (ref 3.5–11.3)

## 2025-01-15 PROCEDURE — 6360000002 HC RX W HCPCS: Performed by: EMERGENCY MEDICINE

## 2025-01-15 PROCEDURE — 70450 CT HEAD/BRAIN W/O DYE: CPT

## 2025-01-15 PROCEDURE — 1200000000 HC SEMI PRIVATE

## 2025-01-15 PROCEDURE — 6360000004 HC RX CONTRAST MEDICATION: Performed by: INTERNAL MEDICINE

## 2025-01-15 PROCEDURE — 85025 COMPLETE CBC W/AUTO DIFF WBC: CPT

## 2025-01-15 PROCEDURE — 99223 1ST HOSP IP/OBS HIGH 75: CPT | Performed by: STUDENT IN AN ORGANIZED HEALTH CARE EDUCATION/TRAINING PROGRAM

## 2025-01-15 PROCEDURE — 80048 BASIC METABOLIC PNL TOTAL CA: CPT

## 2025-01-15 PROCEDURE — 85610 PROTHROMBIN TIME: CPT

## 2025-01-15 PROCEDURE — 99285 EMERGENCY DEPT VISIT HI MDM: CPT

## 2025-01-15 PROCEDURE — 71260 CT THORAX DX C+: CPT

## 2025-01-15 PROCEDURE — 85730 THROMBOPLASTIN TIME PARTIAL: CPT

## 2025-01-15 RX ORDER — IOPAMIDOL 755 MG/ML
100 INJECTION, SOLUTION INTRAVASCULAR
Status: COMPLETED | OUTPATIENT
Start: 2025-01-15 | End: 2025-01-15

## 2025-01-15 RX ORDER — ONDANSETRON 2 MG/ML
4 INJECTION INTRAMUSCULAR; INTRAVENOUS EVERY 6 HOURS PRN
Status: DISCONTINUED | OUTPATIENT
Start: 2025-01-15 | End: 2025-01-17 | Stop reason: HOSPADM

## 2025-01-15 RX ORDER — POTASSIUM CHLORIDE 7.45 MG/ML
10 INJECTION INTRAVENOUS PRN
Status: DISCONTINUED | OUTPATIENT
Start: 2025-01-15 | End: 2025-01-17 | Stop reason: HOSPADM

## 2025-01-15 RX ORDER — M-VIT,TX,IRON,MINS/CALC/FOLIC 27MG-0.4MG
1 TABLET ORAL DAILY
Status: DISCONTINUED | OUTPATIENT
Start: 2025-01-16 | End: 2025-01-17 | Stop reason: HOSPADM

## 2025-01-15 RX ORDER — FUROSEMIDE 40 MG/1
40 TABLET ORAL DAILY
Status: DISCONTINUED | OUTPATIENT
Start: 2025-01-16 | End: 2025-01-17 | Stop reason: HOSPADM

## 2025-01-15 RX ORDER — SODIUM CHLORIDE 0.9 % (FLUSH) 0.9 %
10 SYRINGE (ML) INJECTION PRN
Status: DISCONTINUED | OUTPATIENT
Start: 2025-01-15 | End: 2025-01-17 | Stop reason: HOSPADM

## 2025-01-15 RX ORDER — DEXAMETHASONE 4 MG/1
8 TABLET ORAL ONCE
Status: COMPLETED | OUTPATIENT
Start: 2025-01-15 | End: 2025-01-15

## 2025-01-15 RX ORDER — SODIUM CHLORIDE 9 MG/ML
INJECTION, SOLUTION INTRAVENOUS PRN
Status: DISCONTINUED | OUTPATIENT
Start: 2025-01-15 | End: 2025-01-17 | Stop reason: HOSPADM

## 2025-01-15 RX ORDER — SODIUM CHLORIDE 9 MG/ML
INJECTION, SOLUTION INTRAVENOUS CONTINUOUS
Status: DISCONTINUED | OUTPATIENT
Start: 2025-01-15 | End: 2025-01-16

## 2025-01-15 RX ORDER — ONDANSETRON 4 MG/1
4 TABLET, ORALLY DISINTEGRATING ORAL EVERY 8 HOURS PRN
Status: DISCONTINUED | OUTPATIENT
Start: 2025-01-15 | End: 2025-01-17 | Stop reason: HOSPADM

## 2025-01-15 RX ORDER — ACETAMINOPHEN 325 MG/1
650 TABLET ORAL EVERY 6 HOURS PRN
Status: DISCONTINUED | OUTPATIENT
Start: 2025-01-15 | End: 2025-01-17 | Stop reason: HOSPADM

## 2025-01-15 RX ORDER — MAGNESIUM SULFATE 1 G/100ML
1000 INJECTION INTRAVENOUS PRN
Status: DISCONTINUED | OUTPATIENT
Start: 2025-01-15 | End: 2025-01-17 | Stop reason: HOSPADM

## 2025-01-15 RX ORDER — ASPIRIN 81 MG/1
81 TABLET ORAL DAILY
Status: DISCONTINUED | OUTPATIENT
Start: 2025-01-16 | End: 2025-01-17 | Stop reason: HOSPADM

## 2025-01-15 RX ORDER — TRAZODONE HYDROCHLORIDE 50 MG/1
50 TABLET, FILM COATED ORAL NIGHTLY PRN
Status: DISCONTINUED | OUTPATIENT
Start: 2025-01-15 | End: 2025-01-17 | Stop reason: HOSPADM

## 2025-01-15 RX ORDER — FLUTICASONE PROPIONATE 50 MCG
2 SPRAY, SUSPENSION (ML) NASAL DAILY
Status: DISCONTINUED | OUTPATIENT
Start: 2025-01-16 | End: 2025-01-17 | Stop reason: HOSPADM

## 2025-01-15 RX ORDER — MULTIVIT WITH MINERALS/LUTEIN
250 TABLET ORAL DAILY
Status: DISCONTINUED | OUTPATIENT
Start: 2025-01-16 | End: 2025-01-17 | Stop reason: HOSPADM

## 2025-01-15 RX ORDER — ENOXAPARIN SODIUM 100 MG/ML
40 INJECTION SUBCUTANEOUS DAILY
Status: DISCONTINUED | OUTPATIENT
Start: 2025-01-16 | End: 2025-01-17 | Stop reason: HOSPADM

## 2025-01-15 RX ORDER — ACETAMINOPHEN 650 MG/1
650 SUPPOSITORY RECTAL EVERY 6 HOURS PRN
Status: DISCONTINUED | OUTPATIENT
Start: 2025-01-15 | End: 2025-01-17 | Stop reason: HOSPADM

## 2025-01-15 RX ORDER — POLYETHYLENE GLYCOL 3350 17 G/17G
17 POWDER, FOR SOLUTION ORAL DAILY PRN
Status: DISCONTINUED | OUTPATIENT
Start: 2025-01-15 | End: 2025-01-17 | Stop reason: HOSPADM

## 2025-01-15 RX ORDER — DOCUSATE SODIUM 100 MG/1
100 CAPSULE, LIQUID FILLED ORAL DAILY
Status: DISCONTINUED | OUTPATIENT
Start: 2025-01-16 | End: 2025-01-17 | Stop reason: HOSPADM

## 2025-01-15 RX ORDER — METOPROLOL TARTRATE 25 MG/1
25 TABLET, FILM COATED ORAL 2 TIMES DAILY
Status: DISCONTINUED | OUTPATIENT
Start: 2025-01-15 | End: 2025-01-17 | Stop reason: HOSPADM

## 2025-01-15 RX ORDER — CLOPIDOGREL BISULFATE 75 MG/1
75 TABLET ORAL DAILY
Status: DISCONTINUED | OUTPATIENT
Start: 2025-01-16 | End: 2025-01-17 | Stop reason: HOSPADM

## 2025-01-15 RX ORDER — AMIODARONE HYDROCHLORIDE 200 MG/1
200 TABLET ORAL DAILY
Status: DISCONTINUED | OUTPATIENT
Start: 2025-01-16 | End: 2025-01-17 | Stop reason: HOSPADM

## 2025-01-15 RX ORDER — POTASSIUM CHLORIDE 1500 MG/1
40 TABLET, EXTENDED RELEASE ORAL PRN
Status: DISCONTINUED | OUTPATIENT
Start: 2025-01-15 | End: 2025-01-17 | Stop reason: HOSPADM

## 2025-01-15 RX ORDER — SODIUM CHLORIDE 0.9 % (FLUSH) 0.9 %
5-40 SYRINGE (ML) INJECTION EVERY 12 HOURS SCHEDULED
Status: DISCONTINUED | OUTPATIENT
Start: 2025-01-15 | End: 2025-01-17 | Stop reason: HOSPADM

## 2025-01-15 RX ORDER — ATORVASTATIN CALCIUM 80 MG/1
80 TABLET, FILM COATED ORAL DAILY
Status: DISCONTINUED | OUTPATIENT
Start: 2025-01-16 | End: 2025-01-17 | Stop reason: HOSPADM

## 2025-01-15 RX ADMIN — HYDROMORPHONE HYDROCHLORIDE 1 MG: 1 INJECTION, SOLUTION INTRAMUSCULAR; INTRAVENOUS; SUBCUTANEOUS at 17:24

## 2025-01-15 RX ADMIN — DEXAMETHASONE 8 MG: 4 TABLET ORAL at 17:24

## 2025-01-15 RX ADMIN — IOPAMIDOL 100 ML: 755 INJECTION, SOLUTION INTRAVENOUS at 15:02

## 2025-01-15 ASSESSMENT — ENCOUNTER SYMPTOMS
DIARRHEA: 0
SHORTNESS OF BREATH: 0
SORE THROAT: 0
VOMITING: 0

## 2025-01-15 ASSESSMENT — PAIN SCALES - GENERAL
PAINLEVEL_OUTOF10: 5
PAINLEVEL_OUTOF10: 4
PAINLEVEL_OUTOF10: 4

## 2025-01-15 ASSESSMENT — PAIN - FUNCTIONAL ASSESSMENT
PAIN_FUNCTIONAL_ASSESSMENT: 0-10
PAIN_FUNCTIONAL_ASSESSMENT: 0-10

## 2025-01-15 NOTE — ED PROVIDER NOTES
[facility] Emergency Department  [address]  [facility] [address]  Phone: [phone]  Fax: [phone]        [facility] EMERGENCY DEPARTMENT  EMERGENCY DEPARTMENT ENCOUNTER      Pt Name: Andrea Desir  MRN: [id number]  Birthdate 1955  Date of evaluation: 1/15/2025  Provider: Richard Hernandez DO    CHIEF COMPLAINT       Chief Complaint   Patient presents with    Headache     Has had headache for the pst 6 days, no prior history, has small cell lung cancer, active treatment he states every 3 weeks, oncologist wanted him seen if no improvement, states also walking \"side ways\"         HISTORY OF PRESENT ILLNESS   (Location/Symptom, Timing/Onset,Context/Setting, Quality, Duration, Modifying Factors, Severity)  Note limiting factors.   Andrea Desir is a 70 y.o. male who presents to the emergency department for the evaluation of headache.  Patient has had a headache for at least 6 days.  He does not have a history of headaches.  He has a history of small cell lung cancer, and he gets treatment every 3 weeks.  He does have an MRI scheduled in 6 days, however, oncology recommended that if his headache persisted he should come in to the ER for evaluation.  He had a CT scan earlier today in [place].  He has been taking some over-the-counter medication for his headaches without much relief.  No change in vision.  No difficulty speaking, no numbness, tingling or weakness in the arms or leg    Nursing Notes were reviewed.    REVIEW OF SYSTEMS    (2-9systems for level 4, 10 or more for level 5)     Review of Systems   Constitutional:  Negative for fever.   HENT:  Negative for sore throat.    Respiratory:  Negative for shortness of breath.    Cardiovascular:  Negative for chest pain.   Gastrointestinal:  Negative for diarrhea and vomiting.   Genitourinary:  Negative for dysuria.   Skin:  Negative for rash.   Neurological:  Positive for headaches. Negative for weakness.   All other systems  mouth once daily    ASCORBIC ACID (VITAMIN C) 250 MG TABLET    Take 1 tablet by mouth daily    ASPIRIN 81 MG TABLET    Take 1 tablet by mouth daily    ATORVASTATIN (LIPITOR) 80 MG TABLET    Take 1 tablet by mouth daily    BENZONATATE (TESSALON) 100 MG CAPSULE    Take 1 capsule by mouth 3 times daily as needed for Cough    CALCIUM CARBONATE (OSCAL) 500 MG TABS TABLET    Take 1 tablet by mouth daily    CLOPIDOGREL (PLAVIX) 75 MG TABLET    1 po daily    DOCUSATE SODIUM (COLACE) 100 MG CAPSULE    Take 1 capsule by mouth daily    FLUTICASONE (FLONASE) 50 MCG/ACT NASAL SPRAY    2 sprays by Nasal route daily    FUROSEMIDE (LASIX) 40 MG TABLET    Take 1 tablet by mouth once daily    METOPROLOL TARTRATE (LOPRESSOR) 25 MG TABLET    TAKE 1 TABLET BY MOUTH IN THE MORNING AND 1/2 (ONE-HALF) IN THE EVENING    MULTIPLE VITAMINS-MINERALS (THERAPEUTIC MULTIVITAMIN-MINERALS) TABLET    Take 1 tablet by mouth daily    NYSTATIN (MYCOSTATIN) 263165 UNIT/GM CREAM    Apply topically 3 times daily.    NYSTATIN (MYCOSTATIN) 941204 UNIT/GM OINTMENT    Apply topically 2 times daily.    OMEGA-3 FATTY ACIDS (FISH OIL) 1000 MG CAPS    Take 1,200 mg by mouth 2 times daily     POLYETHYLENE GLYCOL (MIRALAX) 17 GM/SCOOP POWD POWDER    Take 17 g by mouth daily    PREDNISONE (DELTASONE) 20 MG TABLET    Take 2 tablets by mouth daily for 5 days    SACUBITRIL-VALSARTAN (ENTRESTO) 24-26 MG PER TABLET    Take 1 tablet by mouth 2 times daily    TRAZODONE (DESYREL) 50 MG TABLET    TAKE 1 TABLET BY MOUTH NIGHTLY AS NEEDED FOR SLEEP       ALLERGIES     Patient has no known allergies.    FAMILY HISTORY       Family History   Problem Relation Age of Onset    Diabetes Mother     Heart Failure Mother         congestive heart failure    Kidney Disease Mother         renal failure    Other Father         benign prostatic hypertrophy    Heart Disease Father     Cancer Father     Other Maternal Grandmother         hepatitis    Heart Disease Maternal Grandmother

## 2025-01-15 NOTE — TELEPHONE ENCOUNTER
FYI Patients son called in stating that patient had CT done, and they are taking patient to [facility] ER, pt is declining and walking with walker. Hoping then can get MRI

## 2025-01-16 ENCOUNTER — APPOINTMENT (OUTPATIENT)
Dept: MRI IMAGING | Age: 70
DRG: 054 | End: 2025-01-16
Payer: MEDICARE

## 2025-01-16 PROBLEM — D49.6 BRAIN NEOPLASM (HCC): Status: ACTIVE | Noted: 2025-01-15

## 2025-01-16 PROBLEM — G93.6 VASOGENIC BRAIN EDEMA (HCC): Status: ACTIVE | Noted: 2025-01-16

## 2025-01-16 LAB
INR PPP: 1.1
PROTHROMBIN TIME: 14.2 SEC (ref 11.7–14.9)

## 2025-01-16 PROCEDURE — A9579 GAD-BASE MR CONTRAST NOS,1ML: HCPCS

## 2025-01-16 PROCEDURE — 36415 COLL VENOUS BLD VENIPUNCTURE: CPT

## 2025-01-16 PROCEDURE — 2580000003 HC RX 258: Performed by: NURSE PRACTITIONER

## 2025-01-16 PROCEDURE — 6360000004 HC RX CONTRAST MEDICATION

## 2025-01-16 PROCEDURE — 6370000000 HC RX 637 (ALT 250 FOR IP): Performed by: NURSE PRACTITIONER

## 2025-01-16 PROCEDURE — 2500000003 HC RX 250 WO HCPCS

## 2025-01-16 PROCEDURE — 6360000002 HC RX W HCPCS: Performed by: INTERNAL MEDICINE

## 2025-01-16 PROCEDURE — 99232 SBSQ HOSP IP/OBS MODERATE 35: CPT | Performed by: INTERNAL MEDICINE

## 2025-01-16 PROCEDURE — 6360000002 HC RX W HCPCS: Performed by: NURSE PRACTITIONER

## 2025-01-16 PROCEDURE — 99223 1ST HOSP IP/OBS HIGH 75: CPT | Performed by: INTERNAL MEDICINE

## 2025-01-16 PROCEDURE — 2500000003 HC RX 250 WO HCPCS: Performed by: NURSE PRACTITIONER

## 2025-01-16 PROCEDURE — 99223 1ST HOSP IP/OBS HIGH 75: CPT | Performed by: NEUROLOGICAL SURGERY

## 2025-01-16 PROCEDURE — 85610 PROTHROMBIN TIME: CPT

## 2025-01-16 PROCEDURE — 6360000002 HC RX W HCPCS: Performed by: REGISTERED NURSE

## 2025-01-16 PROCEDURE — 70553 MRI BRAIN STEM W/O & W/DYE: CPT

## 2025-01-16 PROCEDURE — 6370000000 HC RX 637 (ALT 250 FOR IP): Performed by: REGISTERED NURSE

## 2025-01-16 PROCEDURE — 1200000000 HC SEMI PRIVATE

## 2025-01-16 RX ORDER — SODIUM CHLORIDE 0.9 % (FLUSH) 0.9 %
10 SYRINGE (ML) INJECTION PRN
Status: DISCONTINUED | OUTPATIENT
Start: 2025-01-16 | End: 2025-01-17 | Stop reason: HOSPADM

## 2025-01-16 RX ORDER — DIAZEPAM 5 MG/1
5 TABLET ORAL ONCE
Status: COMPLETED | OUTPATIENT
Start: 2025-01-16 | End: 2025-01-16

## 2025-01-16 RX ORDER — FAMOTIDINE 20 MG/1
20 TABLET, FILM COATED ORAL 2 TIMES DAILY
Status: DISCONTINUED | OUTPATIENT
Start: 2025-01-16 | End: 2025-01-17 | Stop reason: HOSPADM

## 2025-01-16 RX ORDER — LORAZEPAM 2 MG/ML
0.5 INJECTION INTRAMUSCULAR ONCE
Status: COMPLETED | OUTPATIENT
Start: 2025-01-16 | End: 2025-01-16

## 2025-01-16 RX ORDER — DEXAMETHASONE 4 MG/1
4 TABLET ORAL 2 TIMES DAILY
Status: DISCONTINUED | OUTPATIENT
Start: 2025-01-16 | End: 2025-01-17 | Stop reason: HOSPADM

## 2025-01-16 RX ORDER — DEXAMETHASONE SODIUM PHOSPHATE 4 MG/ML
4 INJECTION, SOLUTION INTRA-ARTICULAR; INTRALESIONAL; INTRAMUSCULAR; INTRAVENOUS; SOFT TISSUE EVERY 6 HOURS
Status: DISCONTINUED | OUTPATIENT
Start: 2025-01-16 | End: 2025-01-16

## 2025-01-16 RX ADMIN — GADOTERIDOL 20 ML: 279.3 INJECTION, SOLUTION INTRAVENOUS at 14:35

## 2025-01-16 RX ADMIN — AMIODARONE HYDROCHLORIDE 200 MG: 200 TABLET ORAL at 08:21

## 2025-01-16 RX ADMIN — FAMOTIDINE 20 MG: 20 TABLET, FILM COATED ORAL at 20:25

## 2025-01-16 RX ADMIN — DEXAMETHASONE 4 MG: 4 TABLET ORAL at 20:25

## 2025-01-16 RX ADMIN — DOCUSATE SODIUM 100 MG: 100 CAPSULE, LIQUID FILLED ORAL at 08:20

## 2025-01-16 RX ADMIN — FLUTICASONE PROPIONATE 2 SPRAY: 50 SPRAY, METERED NASAL at 08:20

## 2025-01-16 RX ADMIN — METOPROLOL TARTRATE 25 MG: 50 TABLET, FILM COATED ORAL at 20:25

## 2025-01-16 RX ADMIN — DEXAMETHASONE SODIUM PHOSPHATE 4 MG: 4 INJECTION INTRA-ARTICULAR; INTRALESIONAL; INTRAMUSCULAR; INTRAVENOUS; SOFT TISSUE at 05:57

## 2025-01-16 RX ADMIN — SODIUM CHLORIDE, PRESERVATIVE FREE 10 ML: 5 INJECTION INTRAVENOUS at 14:36

## 2025-01-16 RX ADMIN — SODIUM CHLORIDE, PRESERVATIVE FREE 10 ML: 5 INJECTION INTRAVENOUS at 00:15

## 2025-01-16 RX ADMIN — DEXAMETHASONE SODIUM PHOSPHATE 4 MG: 4 INJECTION INTRA-ARTICULAR; INTRALESIONAL; INTRAMUSCULAR; INTRAVENOUS; SOFT TISSUE at 00:15

## 2025-01-16 RX ADMIN — SACUBITRIL AND VALSARTAN 1 TABLET: 24; 26 TABLET, FILM COATED ORAL at 00:13

## 2025-01-16 RX ADMIN — SODIUM CHLORIDE, PRESERVATIVE FREE 10 ML: 5 INJECTION INTRAVENOUS at 20:25

## 2025-01-16 RX ADMIN — TRAZODONE HYDROCHLORIDE 50 MG: 50 TABLET ORAL at 08:21

## 2025-01-16 RX ADMIN — Medication 250 MG: at 08:21

## 2025-01-16 RX ADMIN — SODIUM CHLORIDE: 9 INJECTION, SOLUTION INTRAVENOUS at 00:14

## 2025-01-16 RX ADMIN — Medication 1 TABLET: at 08:21

## 2025-01-16 RX ADMIN — TRAZODONE HYDROCHLORIDE 50 MG: 50 TABLET ORAL at 00:13

## 2025-01-16 RX ADMIN — TRAZODONE HYDROCHLORIDE 50 MG: 50 TABLET ORAL at 20:25

## 2025-01-16 RX ADMIN — LORAZEPAM 0.5 MG: 2 INJECTION INTRAMUSCULAR; INTRAVENOUS at 14:03

## 2025-01-16 RX ADMIN — ACETAMINOPHEN 650 MG: 325 TABLET ORAL at 12:05

## 2025-01-16 RX ADMIN — DIAZEPAM 5 MG: 5 TABLET ORAL at 13:38

## 2025-01-16 RX ADMIN — METOPROLOL TARTRATE 25 MG: 50 TABLET, FILM COATED ORAL at 00:13

## 2025-01-16 RX ADMIN — FAMOTIDINE 20 MG: 20 TABLET, FILM COATED ORAL at 08:21

## 2025-01-16 RX ADMIN — DEXAMETHASONE SODIUM PHOSPHATE 4 MG: 4 INJECTION INTRA-ARTICULAR; INTRALESIONAL; INTRAMUSCULAR; INTRAVENOUS; SOFT TISSUE at 12:05

## 2025-01-16 RX ADMIN — ATORVASTATIN CALCIUM 80 MG: 80 TABLET, FILM COATED ORAL at 20:29

## 2025-01-16 RX ADMIN — METOPROLOL TARTRATE 25 MG: 50 TABLET, FILM COATED ORAL at 08:21

## 2025-01-16 ASSESSMENT — PAIN DESCRIPTION - LOCATION
LOCATION: HEAD

## 2025-01-16 ASSESSMENT — PAIN SCALES - GENERAL
PAINLEVEL_OUTOF10: 2
PAINLEVEL_OUTOF10: 0
PAINLEVEL_OUTOF10: 4
PAINLEVEL_OUTOF10: 3

## 2025-01-16 ASSESSMENT — PAIN DESCRIPTION - ORIENTATION
ORIENTATION: ANTERIOR
ORIENTATION: ANTERIOR

## 2025-01-16 ASSESSMENT — ENCOUNTER SYMPTOMS
ABDOMINAL PAIN: 0
COUGH: 0
SHORTNESS OF BREATH: 0

## 2025-01-16 ASSESSMENT — PAIN DESCRIPTION - DESCRIPTORS
DESCRIPTORS: ACHING
DESCRIPTORS: ACHING

## 2025-01-16 NOTE — ED NOTES
ICH small   Andrea Desir  1955     Stable 70 M  SCLC  Headache for 6 days sent in for CT  Mass with hemorrhage and edema     Stable. Vitals ok 150s systolic.

## 2025-01-16 NOTE — ED PROVIDER NOTES
[facility]     Emergency Department     Faculty Attestation    I performed a history and physical examination of the patient and discussed management with the resident. I have reviewed and agree with the resident’s findings including all diagnostic interpretations, and treatment plans as written at the time of my review. Any areas of disagreement are noted on the chart. I was personally present for the key portions of any procedures. I have documented in the chart those procedures where I was not present during the key portions. For Physician Assistant/ Nurse Practitioner cases/documentation I have personally evaluated this patient and have completed at least one if not all key elements of the E/M (history, physical exam, and MDM). Additional findings are as noted.    PtName: Andrea Desir  MRN: [id number]  Birthdate 1955  Date of evaluation: 1/15/25  Note Started: 7:28 PM EST    Primary Care Physician: Deon Art MD    Brief HPI:  70-year-old male history of lung cancer currently undergoing chemotherapy presents emergency department from outside hospital emergency department with newly diagnosed cerebellar brain mass.  The patient was given Decadron 8 mg IV prior to transport.  The patient reports difficulty with balance over the past 1 week and worsening bilateral frontal headache.    Pertinent Physical Exam Findings:  Vitals:    01/15/25 1918   BP: 131/87   Pulse: 58   Resp: 18   Temp: 98.1 °F (36.7 °C)   SpO2: 95%   Appears well, resting comfortably, no acute distress, GCS is 15.    Medical Decision Making: Patient is a 70 y.o. male presenting to the emergency department with newfound brain mass. The chart was reviewed for pertinent history relating to the chief complaint.  The patient appears well at this time, no acute distress, vitals are stable.  The history of difficulty with balance is consistent with the patient's newfound brain mass on imaging.

## 2025-01-16 NOTE — PROGRESS NOTES
[facility]  Office: [phone]  Santo Soriano DO, Devaughn Kerns DO, Kishor Sutton DO, Pablo Ramos DO, Candice Ugalde MD, Adriana Bashir MD, Kurt Kauffman MD, Carlie Mercedes MD,  Bryan Abdalla MD, Juancho Mariee MD, Codi Bautista MD,  Anabel Zaldivar DO, Keeley Michel MD, Fadi Stark MD, Dank Soriano DO, Aminta Montes MD,  Tonny Mirza DO, Danielle Piña MD, Karin Welch MD, Bettina Cohen MD, Sangeeta Boland MD,  Dung Altamirano MD, Simona Malcolm MD, Deisy Park MD, Judd Conley MD, Jonn Garcia MD, Naldo Townsend MD, Rubens Marsh DO, Phong España MD, Anabel Nevarez MD, Mohsin Reza, MD, Shirley Waterhouse, CNP,  Mariana Nguyễn CNP, Rubens Beebe, CNP,  Lavonne Jiménez, DNP, Miroslava Alexis, CNP, Sanaz Potter, CNP, Paula Sears, CNP, Fidelina Gutiérrez, CNP, Martha Garrido, PA-C, Renetta Oliveira PA-C, Eli Hernandez, CNP, Marquita David, CNP,  Ro Dunbra, CNP, Evelia Polk, CNP, Brissa Dangelo, CNP,  Armida Read, CNS, Grisel Silver, CNP, Yuliya Masters, CNP,   Olesya Lafleur, CNP         [facility]   IN-PATIENT SERVICE   [facility]    Progress Note    1/16/2025    9:47 AM    Name:   Andrea Desir  MRN:     [id number]     Acct:      [id number]   Room:   40/[facility] Day:  1  Admit Date:  1/15/2025  7:25 PM    PCP:   Deon Art MD  Code Status:  Full Code    Subjective:     Patient has a headache this morning but otherwise feels well.  No nausea, vomiting or diarrhea.  No chills no chest pain..  He tells me he is on maintenance chemotherapy for small cell carcinoma of the lung      Brief History:     3-year-old male with history of small cell carcinoma presents to the hospital for evaluation of gait abnormalities and headache.  CT scan showed a left cerebellar mass possibly hemorrhagic with local edema without any midline shift or herniation.  Admitted for neurosurgery and oncology eval    Medications:     Allergies:  No Known

## 2025-01-16 NOTE — H&P
is scheduled but oncology recommended if headache persist he should go to ED.  Patient was taking over-the-counter medication Aleve 4 tablets twice daily with only mild relief in headaches.  Patient also complained of walking sideways moving towards the right and difficulty maintaining balance also for the past 6 days.    Denies any weakness, numbness, speech change, vision change.    CT scan done showed mass left cerebellar hemisphere possibly hemorrhagic with local edema.  No herniation or midline shift.  Patient was given Decadron 8 mg IV prior to transport.  Neurosurgery consulted.  MRI brain is ordered.  Patient needs some sort of sedation for MRI.  He was declining Versed.    Vitally stable.  Lab workup BMP unremarkable.  CBC unremarkable.  CT chest abdomen and pelvis is pending.    Past Medical History:     Past Medical History:   Diagnosis Date    Anxiety     Atrial fibrillation (HCC)     CAD (coronary artery disease) 2012    stent to LAD after MI    Erectile dysfunction     Headache(784.0)     High cholesterol     Hx of adenomatous colonic polyps     4 tubular adenomas, 2 hyperplastic polyps 11/11/15    Hypertension     ICD (implantable cardioverter-defibrillator) discharge 08/2020    Ischemic cardiomyopathy     AICD 1/13 for EF 20-25%    Low back pain     history of     MI, old 2012    Osteoarthritis     knees    Plantar fasciitis     Seborrheic keratosis     history of     Smoker     Syncopal episodes 08/2020    STATES PASSED OUT AND THAT ICD HAD FIRED WHEN DEVICE WAS INTERROGATED        Past Surgical History:     Past Surgical History:   Procedure Laterality Date    CARDIAC CATHETERIZATION  09/04/2020    CAD with hx of NOAH to LAD in 2012    CARDIAC DEFIBRILLATOR PLACEMENT  01/07/2013    CARDIAC DEFIBRILLATOR PLACEMENT  06/22/2021    REPLACEMENT  /  DR ALBERTO    COLONOSCOPY  11/11/2015    polyps X 5 Pascale CARBAJAL    COLONOSCOPY  12/21/2016    INT. Hemmorrhoids, Transverse Colon Polyp adenoma, Rectal Polyp  fibrillation (HCC) 1/15/2025 Yes    Ischemic cardiomyopathy 1/15/2025 Yes    Overview Signed 10/6/2014  3:49 PM by Deon Art MD     AICD 1/13 for EF 20-25%         Hx of adenomatous colonic polyps 1/15/2025 Yes    Overview Signed 10/7/2016  4:01 PM by Deon Art MD     4 tubular adenomas, 2 hyperplastic polyps 11/11/15         Chronic systolic congestive heart failure (HCC) 1/15/2025 Yes    Small cell lung cancer, right (HCC) 1/15/2025 Yes       Plan:     Patient status inpatient in the Progressive Unit/Step down    New cerebellar brain mass:  History of small cell lung cancer 4/2023 on maintenance treatment:    MRI brain with and without contrast.  Patient is severely claustrophobic.  Need strong sedation/anesthesia for MRI.  Decadron 4 mg IV every 6 hours.  Neurochecks.  Neurosurgery consultation, pending final recommendations  Keeping patient n.p.o.  Oncology consultation    CAD status post stent and AICD: Last echo 4/2024 showed EF of 35 to 40%.  Grade 1 diastolic dysfunction.  Holding aspirin, Plavix and chemical DVT prophylaxis due to concern of hemorrhagic brain mass and edema.  Resuming atorvastatin, metoprolol titrate, amiodarone, holding Entresto and Lasix due to soft blood pressure.    Atrial fibrillation: Resuming metoprolol and amiodarone  Hypertension/hyperlipidemia: Resuming atorvastatin  Depression:    Consultations:   IP CONSULT TO NEUROSURGERY  IP CONSULT TO HOSPITALIST  IP CONSULT TO ONCOLOGY     Patient is admitted as inpatient status because of co-morbidities listed above, severity of signs and symptoms as outlined, requirement for current medical therapies and most importantly because of direct risk to patient if care not provided in a hospital setting.  Expected length of stay > 48 hours.    Simona Malcolm MD  1/15/2025  10:00 PM    Copy sent to Deon Rivera MD

## 2025-01-16 NOTE — ED NOTES
Pt presents to the ER via triage in . Pt is tx from [facility]. Pt had diagnosis of lung cancer back in April and has been getting treatment. Pt started to go in remission and has been getting \"maintence\" treatments. Pt for past 6 days has developed headache, weakness. Pt found to have mass in brain and some edema, report of questionable bleeding. Pt otherwise on arrival A&O x4, in NAD, VSS. Pt placed on bedside cardiac monitoring, EKG taken. IV established, labs drawn.

## 2025-01-16 NOTE — ED PROVIDER NOTES
[facility] EMERGENCY DEPARTMENT  Emergency Department Encounter  Emergency Medicine Resident     Pt Name:Andrea Desir  MRN: [id number]  Birthdate 1955  Date of evaluation: 1/16/25  PCP:  Deon Art MD  Note Started: 2:57 AM EST      CHIEF COMPLAINT       Chief Complaint   Patient presents with    Headache     [place] transfer        HISTORY OF PRESENT ILLNESS  (Location/Symptom, Timing/Onset, Context/Setting, Quality, Duration, Modifying Factors, Severity.)      Andrea Desir is a 70 y.o. male who presents with past medical history of small cell lung cancer currently on chemo every 3 weeks, CAD status post AICD and stent placement, A-fib, hypertension.  Patient presented to our ER as a transfer from [place].  He reports that he has been having frontal headaches for the past 6 days.  Has also noticed some issues with his balance.  He denies any falls or recent trauma to the head.  Patient has been taking over-the-counter NSAIDs with some relief of his headache.  He denies any fever, chills, sweats, visual changes, nausea, vomiting, abdominal pain, chest pain.    PAST MEDICAL / SURGICAL / SOCIAL / FAMILY HISTORY      has a past medical history of Anxiety, Atrial fibrillation (HCC), CAD (coronary artery disease), Erectile dysfunction, Headache(784.0), High cholesterol, Hx of adenomatous colonic polyps, Hypertension, ICD (implantable cardioverter-defibrillator) discharge, Ischemic cardiomyopathy, Low back pain, MI, old, Osteoarthritis, Plantar fasciitis, Seborrheic keratosis, Smoker, and Syncopal episodes.     has a past surgical history that includes Rotator cuff repair (Bilateral, 1997); Vasectomy (1980); Cardiac defibrillator placement (01/07/2013); cyst removal; Hand tendon surgery (Left); Colonoscopy (11/11/2015); Colonoscopy (12/21/2016); Coronary angioplasty with stent (09/2012); Cardiac catheterization (09/04/2020); Cardiac defibrillator placement (06/22/2021); US BIOPSY LYMPH NODE  (4/10/2024); Port Surgery (Right, 4/29/2024); and TURP (N/A, 7/22/2024).    Social History     Socioeconomic History    Marital status:      Spouse name: Not on file    Number of children: Not on file    Years of education: Not on file    Highest education level: Not on file   Occupational History    Not on file   Tobacco Use    Smoking status: Some Days     Current packs/day: 1.00     Average packs/day: 1 pack/day for 55.0 years (55.0 ttl pk-yrs)     Types: Cigarettes     Start date: 1/1/1970    Smokeless tobacco: Never    Tobacco comments:     8 cigarettes daily, will see PCP Prn cessation needs.   Vaping Use    Vaping status: Never Used   Substance and Sexual Activity    Alcohol use: Not Currently     Comment: occasional    Drug use: No    Sexual activity: Yes     Partners: Female   Other Topics Concern    Not on file   Social History Narrative    Not on file     Social Determinants of Health     Financial Resource Strain: Low Risk  (5/28/2024)    Overall Financial Resource Strain (CARDIA)     Difficulty of Paying Living Expenses: Not hard at all   Food Insecurity: No Food Insecurity (5/28/2024)    Hunger Vital Sign     Worried About Running Out of Food in the Last Year: Never true     Ran Out of Food in the Last Year: Never true   Transportation Needs: Unknown (5/28/2024)    PRAPARE - Transportation     Lack of Transportation (Medical): Not on file     Lack of Transportation (Non-Medical): No   Physical Activity: Inactive (11/29/2023)    Exercise Vital Sign     Days of Exercise per Week: 0 days     Minutes of Exercise per Session: 0 min   Stress: Not on file   Social Connections: Not on file   Intimate Partner Violence: Not on file   Housing Stability: Unknown (5/28/2024)    Housing Stability Vital Sign     Unable to Pay for Housing in the Last Year: Not on file     Number of Places Lived in the Last Year: Not on file     Unstable Housing in the Last Year: No       Family History   Problem Relation Age of

## 2025-01-16 NOTE — ED NOTES
ED to inpatient nurses report      Chief Complaint:  Chief Complaint   Patient presents with    Headache     [place] transfer      Present to ED from: home    MOA:     LOC: alert and orientated to name, place, date  Mobility: Independent at baseline  Oxygen Baseline: RA    Current needs required: none  Pending ED orders: none  Present condition: stable    Why did the patient come to the ED?   Pt presents to the ER via triage in . Pt is tx from [facility]. Pt had diagnosis of lung cancer back in April and has been getting treatment. Pt started to go in remission and has been getting \"maintence\" treatments. Pt for past 6 days has developed headache, weakness. Pt found to have mass in brain and some edema, report of questionable bleeding. Pt otherwise on arrival A&O x4, in NAD, VSS. Pt placed on bedside cardiac monitoring, EKG taken. IV established, labs drawn.   What is the plan? Admission, MRI in morning (will need medication and/or sedation)  Any procedures or intervention occur? CT at [facility]  Any safety concerns?? Fall - risk    Mental Status: A&O x4       Psych Assessment:   Psychosocial  Psychosocial (WDL): Within Defined Limits  Vital signs   Vitals:    01/16/25 0304 01/16/25 0310 01/16/25 0314 01/16/25 0316   BP: 108/70      Pulse: 59 60 73 72   Resp: 14 15 13 13   Temp:       SpO2: 91% 94% 91% 92%        Vitals:  Patient Vitals for the past 24 hrs:   BP Temp Pulse Resp SpO2   01/16/25 0316 -- -- 72 13 92 %   01/16/25 0314 -- -- 73 13 91 %   01/16/25 0310 -- -- 60 15 94 %   01/16/25 0304 108/70 -- 59 14 91 %   01/16/25 0158 107/72 -- 71 17 93 %   01/16/25 0018 121/77 -- 60 12 94 %   01/15/25 2349 117/74 -- 59 13 93 %   01/15/25 2314 112/66 -- 65 16 92 %   01/15/25 2229 119/71 -- 58 19 91 %   01/15/25 2159 115/68 -- 57 20 92 %   01/15/25 2129 126/79 -- 57 12 94 %   01/15/25 2029 130/82 -- 61 11 94 %   01/15/25 1927 -- -- 58 -- --   01/15/25 1918 131/87 98.1 °F (36.7 °C) 58 18 95 %   01/15/25 1917 131/87  98.1 °F (36.7 °C) 59 16 96 %      Visit Vitals  /70   Pulse 72   Temp 98.1 °F (36.7 °C)   Resp 13   SpO2 92%        LDAs:      Ambulatory Status:  Presents to emergency department  because of falls (Syncope, seizure, or loss of consciousness): No, Age > 70: Yes, Altered Mental Status, Intoxication with alcohol or substance confusion (Disorientation, impaired judgment, poor safety awaremess, or inability to follow instructions): No, Impaired Mobility: Ambulates or transfers with assistive devices or assistance; Unable to ambulate or transer.: Yes, Nursing Judgement: Yes    Diagnosis:  DISPOSITION Admitted 01/16/2025 03:04:10 AM   Final diagnoses:   Brain neoplasm (HCC)        Consults:  IP CONSULT TO NEUROSURGERY  IP CONSULT TO HOSPITALIST  IP CONSULT TO ONCOLOGY  CONSULT [facility] ONCOLOGY NURSE NAVIGATOR     Treatment Team:   Treatment Team:   Gill, Mohinder K, MD Soudan, Hebah, MD Ahammad, Zubair, DO Waterhouse, Shirley Ann, APRN - CNP  Al-Charles, MD Wilfredo Suggs Taryn, RN    Treatment:  ED Course as of 01/16/25 0320   Wed Prashanth 15, 2025   2041 INR: 1.0 [HS]   2052 Discussed with NSG, pending attending review of images to decide on admitting team. MRI can wait until tomorrow, will require sedation per NSG [HS]   2116 I did discuss the MRI with the patient.  He is requesting us to send tomorrow with anesthesia involved for some kind of sedation.  He is declining Versed. [HS]   [facility]      ED Course User Index  [HS] Misty Davila MD          Skin Assessment:        Pain Score:  Pain Assessment  Pain Assessment: 0-10  Pain Level: 5      SOCIAL HISTORY       Social History     Socioeconomic History    Marital status:    Tobacco Use    Smoking status: Some Days     Current packs/day: 1.00     Average packs/day: 1 pack/day for 55.0 years (55.0 ttl pk-yrs)     Types: Cigarettes     Start date: 1/1/1970    Smokeless tobacco: Never    Tobacco

## 2025-01-16 NOTE — CONSULTS
Department of Neurosurgery                                            Nurse Practitioner Consult Note      Reason for Consult: Mass left cerebellar hemisphere possibly hemorrhagic with local edema  Requesting Physician: Lola   Neurosurgeon:   [x] Dr. Rodriguez  [] Dr. Bennett  [] Dr. Preston  [] Dr. Jones    Neurosurgery notified of consult 1945  Neurosurgery arrival to bedside @2010    History Obtained From:  patient, family member - son     CHIEF COMPLAINT:         Chief Complaint   Patient presents with    Headache     [place] transfer        HISTORY OF PRESENT ILLNESS:       The patient is a 70 y.o. male with known significant past medical history of small cell carcinoma of the right lung with mediastinal lymph and adenopathy adrenal mets as well as bone mets.  Patient is currently undergoing maintenance treatment.  Patient reported to freestanding emergency department with complaints of headache x 6 days which he describes as bifrontal.  He states that this was unrelieved by any over-the-counter medications he also noted that while he was walking he would drift to the right. CT of the head obtained at the outside hospital demonstrated mass in the left cerebellar hemisphere possibly hemorrhagic with focal edema.  Patient was then transferred to [place] for neurosurgical and oncological consult  On exam patient is alert and oriented with mild ataxia noted in right upper and lower extremities      PAST MEDICAL HISTORY :       Past Medical History:        Diagnosis Date    Anxiety     Atrial fibrillation (HCC)     CAD (coronary artery disease) 2012    stent to LAD after MI    Erectile dysfunction     Headache(784.0)     High cholesterol     Hx of adenomatous colonic polyps     4 tubular adenomas, 2 hyperplastic polyps 11/11/15    Hypertension     ICD (implantable cardioverter-defibrillator) discharge 08/2020    Ischemic cardiomyopathy     AICD 1/13 for EF 20-25%    Low back pain     history of

## 2025-01-17 VITALS
TEMPERATURE: 97.5 F | DIASTOLIC BLOOD PRESSURE: 55 MMHG | BODY MASS INDEX: 32.85 KG/M2 | SYSTOLIC BLOOD PRESSURE: 133 MMHG | HEIGHT: 72 IN | RESPIRATION RATE: 18 BRPM | OXYGEN SATURATION: 97 % | WEIGHT: 242.51 LBS | HEART RATE: 49 BPM

## 2025-01-17 PROCEDURE — 99232 SBSQ HOSP IP/OBS MODERATE 35: CPT | Performed by: INTERNAL MEDICINE

## 2025-01-17 PROCEDURE — 6360000002 HC RX W HCPCS: Performed by: NURSE PRACTITIONER

## 2025-01-17 PROCEDURE — 99239 HOSP IP/OBS DSCHRG MGMT >30: CPT | Performed by: INTERNAL MEDICINE

## 2025-01-17 PROCEDURE — 2500000003 HC RX 250 WO HCPCS: Performed by: NURSE PRACTITIONER

## 2025-01-17 PROCEDURE — 6370000000 HC RX 637 (ALT 250 FOR IP): Performed by: NURSE PRACTITIONER

## 2025-01-17 PROCEDURE — 6370000000 HC RX 637 (ALT 250 FOR IP): Performed by: REGISTERED NURSE

## 2025-01-17 RX ORDER — DEXAMETHASONE 4 MG/1
8 TABLET ORAL DAILY
Qty: 28 TABLET | Refills: 0 | Status: SHIPPED | OUTPATIENT
Start: 2025-01-17 | End: 2025-01-31

## 2025-01-17 RX ADMIN — METOPROLOL TARTRATE 25 MG: 50 TABLET, FILM COATED ORAL at 07:52

## 2025-01-17 RX ADMIN — Medication 250 MG: at 07:52

## 2025-01-17 RX ADMIN — Medication 1 TABLET: at 07:52

## 2025-01-17 RX ADMIN — FAMOTIDINE 20 MG: 20 TABLET, FILM COATED ORAL at 07:52

## 2025-01-17 RX ADMIN — DOCUSATE SODIUM 100 MG: 100 CAPSULE, LIQUID FILLED ORAL at 07:52

## 2025-01-17 RX ADMIN — AMIODARONE HYDROCHLORIDE 200 MG: 200 TABLET ORAL at 07:52

## 2025-01-17 RX ADMIN — DEXAMETHASONE 4 MG: 4 TABLET ORAL at 07:52

## 2025-01-17 RX ADMIN — FLUTICASONE PROPIONATE 2 SPRAY: 50 SPRAY, METERED NASAL at 09:56

## 2025-01-17 RX ADMIN — SODIUM CHLORIDE, PRESERVATIVE FREE 10 ML: 5 INJECTION INTRAVENOUS at 07:54

## 2025-01-17 RX ADMIN — ATORVASTATIN CALCIUM 80 MG: 80 TABLET, FILM COATED ORAL at 07:52

## 2025-01-17 NOTE — PROGRESS NOTES
Pt being discharged home. Pt educated on discharge instructions and all questions answered. Patient provided call back number for any further questions.

## 2025-01-17 NOTE — DISCHARGE SUMMARY
[facility]  Office: [phone]  Santo Soriano DO, Devaughn Kerns DO, Kishor Sutton DO, Pablo Ramos DO, Candice Ugalde MD, Adriana Bashir MD, Kurt Kauffman MD, Carlie Mercedes MD,  Bryan Abdalla MD, Juancho Mariee MD, Codi Bautista MD,  Anabel Zaldivar DO, Keeley Michel MD, Fadi Stark MD, Dank Soriano DO, Aminta Montes MD,  Tonny Mirza DO, Danielle Piña MD, Karin Welch MD, Bettina Cohen MD, Sangeeta Boland MD,  Dung Altamirano MD, Simona Malcolm MD, Deisy Park MD, Judd Conley MD, Jonn Garcia MD, Naldo Townsend MD, Rubens Marsh DO, Phong España MD, Anabel Nevarez MD, Mohsin Reza, MD, Shirley Waterhouse, CNP,  Mariana Nguyễn CNP, Rubens Beebe, CNP,  Lavonne Jiménez, DNP, Miroslava Alexis, CNP, Sanaz Potter, CNP, Paula Sears, CNP, Fidelina Gutiérrez, CNP, Martha Garrido, PA-C, Renetta Oliveira PA-C, Eli Hernandez, CNP, Marquita David, CNP,  Ro Dunbar, CNP, Evelia Polk, CNP, Brissa Dangelo, CNP,  Armida Read, CNS, Grisel Silver, CNP, Yuliya Masters, CNP,   Olesya Lafleur, CNP         [facility]   IN-PATIENT SERVICE   [facility]    Discharge Summary     Patient ID: Andrea Desir  :  1955   MRN: [id number]     ACCOUNT:  [id number]   Patient's PCP: Deon Art MD  Admit Date: 1/15/2025   Discharge Date: 2025     Length of Stay: 2  Code Status:  Full Code  Admitting Physician: No admitting provider for patient encounter.  Discharge Physician: Anabel Nevarez MD     Active Discharge Diagnoses:     Hospital Problem Lists:  Principal Problem:    Cerebellar mass  Active Problems:    CAD (coronary artery disease)    HTN (hypertension)    Hyperlipemia    Hypothyroidism    Depression    Atrial fibrillation (HCC)    Ischemic cardiomyopathy    Hx of adenomatous colonic polyps    Chronic systolic congestive heart failure (HCC)    Small cell lung cancer, right (HCC)    Vasogenic brain edema (HCC)  Resolved Problems:    * No    Radiation oncology  Oncology  Neurosurgery  No follow-up provider specified.     Requiring Further Evaluation/Follow Up POST HOSPITALIZATION/Incidental Findings:     Diet: regular diet    Activity: As tolerated    Instructions to Patient:     Discharge Medications:      Medication List        START taking these medications      dexAMETHasone 4 MG tablet  Commonly known as: DECADRON  Take 2 tablets by mouth daily for 14 days Then take 1 tablet for 7 days, Then half tablet for 7 days            CONTINUE taking these medications      amiodarone 200 MG tablet  Commonly known as: CORDARONE  Take 1 tablet by mouth once daily     atorvastatin 80 MG tablet  Commonly known as: LIPITOR  Take 1 tablet by mouth daily     benzonatate 100 MG capsule  Commonly known as: TESSALON  Take 1 capsule by mouth 3 times daily as needed for Cough     docusate sodium 100 MG capsule  Commonly known as: COLACE     Entresto 24-26 MG per tablet  Generic drug: sacubitril-valsartan  Take 1 tablet by mouth 2 times daily     fish oil 1000 MG capsule     fluticasone 50 MCG/ACT nasal spray  Commonly known as: FLONASE  2 sprays by Nasal route daily     furosemide 40 MG tablet  Commonly known as: LASIX  Take 1 tablet by mouth once daily     metoprolol tartrate 25 MG tablet  Commonly known as: LOPRESSOR  TAKE 1 TABLET BY MOUTH IN THE MORNING AND 1/2 (ONE-HALF) IN THE EVENING     * nystatin 460463 UNIT/GM cream  Commonly known as: MYCOSTATIN  Apply topically 3 times daily.     * nystatin 181060 UNIT/GM ointment  Commonly known as: MYCOSTATIN  Apply topically 2 times daily.     therapeutic multivitamin-minerals tablet     traZODone 50 MG tablet  Commonly known as: DESYREL  TAKE 1 TABLET BY MOUTH NIGHTLY AS NEEDED FOR SLEEP     vitamin C 250 MG tablet           * This list has 2 medication(s) that are the same as other medications prescribed for you. Read the directions carefully, and ask your doctor or other care provider to review them with you.

## 2025-01-17 NOTE — DISCHARGE INSTR - COC
Continuity of Care Form    Patient Name: Andrea Desir   :  1955  MRN:  [id number]    Admit date:  1/15/2025  Discharge date:  ***    Code Status Order: Full Code   Advance Directives:   Advance Care Flowsheet Documentation             Admitting Physician:  No admitting provider for patient encounter.  PCP: Deon Art MD    Discharging Nurse: ***  Discharging Hospital Unit/Room#: 0101/0101-01  Discharging Unit Phone Number: ***    Emergency Contact:   Extended Emergency Contact Information  Primary Emergency Contact: Thelma Desir  Address: [address]  Mobile Phone: [phone]  Relation: Spouse  Secondary Emergency Contact: Enrrique Desir  Mobile Phone: [phone]  Relation: Child    Past Surgical History:  Past Surgical History:   Procedure Laterality Date    CARDIAC CATHETERIZATION  2020    CAD with hx of NOAH to LAD in     CARDIAC DEFIBRILLATOR PLACEMENT  2013    CARDIAC DEFIBRILLATOR PLACEMENT  2021    REPLACEMENT  /  DR ALBERTO    COLONOSCOPY  2015    polyps X 5 Pascale CARBAJAL    COLONOSCOPY  2016    INT. Hemmorrhoids, Transverse Colon Polyp adenoma, Rectal Polyp hyperplastic.  5 yr follow up    CORONARY ANGIOPLASTY WITH STENT PLACEMENT  09/2012    x3  CAD with hx of NOAH to LAD in     CYST REMOVAL      HAND TENDON SURGERY Left     thumb    PORT SURGERY Right 2024    Right Port Placement performed by Franck Goldman MD at [facility] OR    ROTATOR CUFF REPAIR Bilateral     TURP N/A 2024    Cystoscopy Photovaporization of Prostate with Greenlight Laser with alfaro placement performed by Steve Mariee MD at [facility] OR    US BIOPSY LYMPH NODE  4/10/2024    US LYMPH NODE BIOPSY 4/10/2024 [facility] ULTRASOUND    VASECTOMY         Immunization History:   Immunization History   Administered Date(s) Administered    COVID-19, MODERNA BLUE border, Primary or Immunocompromised, (age 12y+), IM, 100 mcg/0.5mL 2021,  Condition:[id number]}    Rehab Potential (if transferring to Rehab): {Prognosis:2391111225}    Recommended Labs or Other Treatments After Discharge: ***    Physician Certification: I certify the above information and transfer of Andrea Desir  is necessary for the continuing treatment of the diagnosis listed and that he requires {Admit to Appropriate Level of Care:44148} for {GREATER/LESS:754343836} 30 days.     Update Admission H&P: {CHP DME Changes in HandP:182559881}    PHYSICIAN SIGNATURE:  {Esignature:362106311}

## 2025-01-17 NOTE — CARE COORDINATION
Transitional Planning    1220 PC to [facility] at [phone], scheduled consultation appt for 1/23/25 at 1pm with Dr. Terrell.

## 2025-01-17 NOTE — PROGRESS NOTES
Today's Date: 1/17/2025  Patient Name: Andrea Desir  Date of admission: 1/15/2025  7:25 PM  Patient's age: 70 y.o., 1955  Admission Dx: Brain neoplasm (HCC) [D49.6]  Brain mass [G93.89]    Reason for Consult: management recommendations  Requesting Physician: No admitting provider for patient encounter.    CHIEF COMPLAINT: Cerebellar mass    INTERIM HISTORY  Patient is seen and evaluated.  No headache.  Neurological symptoms are the same.  Patient agreed to radiation.  The patient is referred to radiation oncology.  I called radiation oncology center and they will coordinate outpatient radiation    HISTORY OF PRESENT ILLNESS:      The patient is a 70 y.o.   male who is admitted to the hospital for difficulty with balance and ataxia as well as word showed left cerebellar MRI of the brain finding problem.  Mass which is normal.  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  He received palliative chemoimmunotherapy for 4 cycles with excellent response and has been on immunotherapy maintenance since then with stable disease.  Patient received brain radiation.  The patient is seen and evaluated in the ER.  He was seen on Monday by my partner Dr. Mendenhall and staging CT scan was done yesterday but I do not have results, but review of the imaging does not show any clear evidence of progression systemically.  Patient is on steroids.  The plan is to admit to the hospital    Past Medical History:   has a past medical history of Anxiety, Atrial fibrillation (HCC), CAD (coronary artery disease), Erectile dysfunction, Headache(784.0), High cholesterol, Hx of adenomatous colonic polyps, Hypertension, ICD (implantable cardioverter-defibrillator) discharge, Ischemic cardiomyopathy, Low back pain, MI, old, Osteoarthritis, Plantar fasciitis, Seborrheic keratosis, Smoker, and Syncopal episodes.    Past Surgical History:   has a past surgical history that  a second line agent as an outpatient      Discussed with patient and Nurse.      Thank you for asking us to see this patient.    Anabel Molina MD  Hematologist/Medical Oncologist  Cell: [phone]

## 2025-01-17 NOTE — PLAN OF CARE
Problem: Safety - Adult  Goal: Free from fall injury  1/17/2025 1310 by Fabiola Goff RN  Outcome: Progressing  1/17/2025 1309 by Fabiola Goff RN  Outcome: Progressing  1/17/2025 0345 by Anel Lam RN  Outcome: Progressing     Problem: Chronic Conditions and Co-morbidities  Goal: Patient's chronic conditions and co-morbidity symptoms are monitored and maintained or improved  1/17/2025 1310 by Fabiola Goff RN  Outcome: Progressing  1/17/2025 1309 by Fabiola Goff RN  Outcome: Progressing  1/17/2025 0345 by Anel Lam RN  Outcome: Progressing     Problem: Discharge Planning  Goal: Discharge to home or other facility with appropriate resources  1/17/2025 1310 by Fabiola Goff RN  Outcome: Progressing  1/17/2025 1309 by Fabiola Goff RN  Outcome: Progressing  1/17/2025 0345 by Anel Lam RN  Outcome: Progressing     Problem: Pain  Goal: Verbalizes/displays adequate comfort level or baseline comfort level  1/17/2025 1310 by Fabiola Goff RN  Outcome: Progressing  1/17/2025 1309 by Fabiola Goff RN  Outcome: Progressing  1/17/2025 0345 by Anel Lam RN  Outcome: Progressing     Problem: ABCDS Injury Assessment  Goal: Absence of physical injury  1/17/2025 1310 by Fabiola Goff RN  Outcome: Progressing  1/17/2025 1309 by Fabiola Goff RN  Outcome: Progressing     
  Problem: Safety - Adult  Goal: Free from fall injury  Outcome: Progressing     Problem: Chronic Conditions and Co-morbidities  Goal: Patient's chronic conditions and co-morbidity symptoms are monitored and maintained or improved  Outcome: Progressing     Problem: Discharge Planning  Goal: Discharge to home or other facility with appropriate resources  Outcome: Progressing     Problem: Pain  Goal: Verbalizes/displays adequate comfort level or baseline comfort level  Outcome: Progressing     
5

## 2025-01-17 NOTE — CONSULTS
Today's Date: 1/16/2025  Patient Name: Andrea Desir  Date of admission: 1/15/2025  7:25 PM  Patient's age: 70 y.o., 1955  Admission Dx: Brain neoplasm (HCC) [D49.6]  Brain mass [G93.89]    Reason for Consult: management recommendations  Requesting Physician: No admitting provider for patient encounter.    CHIEF COMPLAINT: Cerebellar mass    History Obtained From:  patient, electronic medical record    HISTORY OF PRESENT ILLNESS:      The patient is a 70 y.o.   male who is admitted to the hospital for difficulty with balance and ataxia as well as word showed left cerebellar MRI of the brain finding problem.  Mass which is normal.  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  He received palliative chemoimmunotherapy for 4 cycles with excellent response and has been on immunotherapy maintenance since then with stable disease.  Patient received brain radiation.  The patient is seen and evaluated in the ER.  He was seen on Monday by my partner Dr. Mendenhall and staging CT scan was done yesterday but I do not have results, but review of the imaging does not show any clear evidence of progression systemically.  Patient is on steroids.  The plan is to admit to the hospital    Past Medical History:   has a past medical history of Anxiety, Atrial fibrillation (HCC), CAD (coronary artery disease), Erectile dysfunction, Headache(784.0), High cholesterol, Hx of adenomatous colonic polyps, Hypertension, ICD (implantable cardioverter-defibrillator) discharge, Ischemic cardiomyopathy, Low back pain, MI, old, Osteoarthritis, Plantar fasciitis, Seborrheic keratosis, Smoker, and Syncopal episodes.    Past Surgical History:   has a past surgical history that includes Rotator cuff repair (Bilateral, 1997); Vasectomy (1980); Cardiac defibrillator placement (01/07/2013); cyst removal; Hand tendon surgery (Left); Colonoscopy (11/11/2015); Colonoscopy  Ataxia, imbalance, word finding problem    PHYSICAL EXAM:      /64   Pulse 63   Temp 97.6 °F (36.4 °C) (Oral)   Resp 16   Ht 1.829 m (6')   Wt 110 kg (242 lb 8.1 oz)   SpO2 90%   BMI 32.89 kg/m²    Temp (24hrs), Av °F (36.7 °C), Min:97.4 °F (36.3 °C), Max:98.8 °F (37.1 °C)    General appearance - well appearing, no in pain or distress   Mental status - alert and cooperative   Eyes - pupils equal and reactive, extraocular eye movements intact   Ears - bilateral TM's and external ear canals normal   Mouth - mucous membranes moist, pharynx normal without lesions   Neck - supple, no significant adenopathy   Lymphatics - no palpable lymphadenopathy, no hepatosplenomegaly   Chest - clear to auscultation, no wheezes, rales or rhonchi, symmetric air entry   Heart - normal rate, regular rhythm, normal S1, S2, no murmurs  Abdomen - soft, nontender, nondistended, no masses or organomegaly   Neurological -ataxia, no focal neurological deficit.  Musculoskeletal - no joint tenderness, deformity or swelling   Extremities - peripheral pulses normal, no pedal edema, no clubbing or cyanosis   Skin - normal coloration and turgor, no rashes, no suspicious skin lesions noted ,    DATA:    Labs:   CBC:   Recent Labs     01/15/25  1958   WBC 7.0   HGB 13.5   HCT 38.2*        BMP:   Recent Labs     01/15/25  1958      K 4.4   CO2 23   BUN 17   CREATININE 1.0   LABGLOM 80   GLUCOSE 122*     PT/INR:   Recent Labs     01/15/25  1958 01/16/25  0958   PROTIME 13.3 14.2   INR 1.0 1.1       IMAGING DATA:  MRI brain  IMPRESSION:  1. Development of a heterogeneously enhancing mass within the left cerebellum  measuring up to 3.5 cm.  Tiny foci of susceptibility are seen within this  lesion, which either represents intratumoral calcification versus  intratumoral hemorrhage.  2. There is moderate surrounding edema with partial effacement of the 4th  ventricle.  There is also mass effect on the left dorsal rosa maria in

## 2025-01-20 ENCOUNTER — TELEPHONE (OUTPATIENT)
Dept: ONCOLOGY | Age: 70
End: 2025-01-20

## 2025-01-20 ENCOUNTER — OFFICE VISIT (OUTPATIENT)
Dept: ONCOLOGY | Age: 70
End: 2025-01-20
Payer: MEDICARE

## 2025-01-20 ENCOUNTER — CARE COORDINATION (OUTPATIENT)
Dept: CARE COORDINATION | Age: 70
End: 2025-01-20

## 2025-01-20 VITALS
OXYGEN SATURATION: 96 % | HEIGHT: 72 IN | DIASTOLIC BLOOD PRESSURE: 86 MMHG | TEMPERATURE: 97.2 F | RESPIRATION RATE: 20 BRPM | BODY MASS INDEX: 32.8 KG/M2 | HEART RATE: 61 BPM | WEIGHT: 242.2 LBS | SYSTOLIC BLOOD PRESSURE: 146 MMHG

## 2025-01-20 DIAGNOSIS — C34.91 SMALL CELL LUNG CANCER, RIGHT (HCC): ICD-10-CM

## 2025-01-20 PROCEDURE — 99215 OFFICE O/P EST HI 40 MIN: CPT | Performed by: INTERNAL MEDICINE

## 2025-01-20 PROCEDURE — 1123F ACP DISCUSS/DSCN MKR DOCD: CPT | Performed by: INTERNAL MEDICINE

## 2025-01-20 PROCEDURE — 3077F SYST BP >= 140 MM HG: CPT | Performed by: INTERNAL MEDICINE

## 2025-01-20 PROCEDURE — 99213 OFFICE O/P EST LOW 20 MIN: CPT | Performed by: INTERNAL MEDICINE

## 2025-01-20 PROCEDURE — 3078F DIAST BP <80 MM HG: CPT | Performed by: INTERNAL MEDICINE

## 2025-01-20 RX ORDER — LORAZEPAM 0.5 MG/1
0.5 TABLET ORAL EVERY 8 HOURS PRN
Qty: 90 TABLET | Refills: 0 | Status: SHIPPED | OUTPATIENT
Start: 2025-01-20 | End: 2025-02-19

## 2025-01-20 RX ORDER — DEXAMETHASONE 4 MG/1
4 TABLET ORAL 2 TIMES DAILY WITH MEALS
Qty: 20 TABLET | Refills: 0 | Status: SHIPPED | OUTPATIENT
Start: 2025-01-20 | End: 2025-01-30

## 2025-01-20 RX ORDER — TRAZODONE HYDROCHLORIDE 50 MG/1
50 TABLET, FILM COATED ORAL NIGHTLY PRN
Qty: 30 TABLET | Refills: 2 | Status: SHIPPED | OUTPATIENT
Start: 2025-01-20

## 2025-01-20 NOTE — TELEPHONE ENCOUNTER
Name: Andrea Desir  : 1955  MRN: [id number]    Oncology Navigation Follow-Up Note    Contact Type:  Medical Oncology    Notes:   Writer met with patient and family while in office for appt and was present during med onc visit. Recent hospitalization for brain mass. He states he is doing well today. He has rad onc consult 25 at [facility]. Infusions to continue as planned per Dr. Mendenhall. They deny any navigational needs at present. Encouraged them to call if they do have questions/concerns. Understanding verbalized.  Navigator following for continuity of care.          Electronically signed by Natalia Broussard RN on 2025 at 4:05 PM

## 2025-01-20 NOTE — CARE COORDINATION
Care Transitions Note    Initial Call - Call within 2 business days of discharge: Yes    Attempted to reach patient for transitions of care follow up. Unable to reach patient.    Outreach Attempts:   HIPAA compliant voicemail left for patient.     Patient: Andrea Desir    Patient : 1955   MRN: [id number]    Reason for Admission: headache  Discharge Date: 25  RURS: Readmission Risk Score: 10.9    Last Discharge Facility       Date Complaint Diagnosis Description Type Department Provider    1/15/25 Headache Brain neoplasm (HCC) ED to Hosp-Admission (Discharged) (ADMITTED) Anabel Giraldo MD; KRISTA Barrera    1/15/25 Headache Intracranial mass ... ED (TRANSFER) [facility] ED Richard Hernandez DO            # 1 attempt-Attempted initial 24 hour hospital follow up call. Left a Hipaa compliant message with name and call back information. Requested return call to [phone].   Was this an external facility discharge? No    Follow Up Appointment:   Patient does not have a follow up appointment scheduled at time of call.     Future Appointments         Provider Specialty Dept Phone    2025 2:15 PM Jonathon Mendenhall MD Oncology [phone]    2025 1:00 PM Colette Terrell MD; SCHEDULE, [facility] NURSE Radiation Oncology [phone]    2/3/2025 1:30 PM [facility] MED ONC FAST TRACK CHAIR 1 Infusion Therapy [phone]    2025 10:45 AM SCHEDULE, [facility] CARDIOLOGY Cardiology [phone]    2025 11:00 AM Romana Hui MD Cardiology [phone]    6/3/2025 3:20 PM Deon Art MD Family Medicine [phone]    2025 10:00 AM Gigi Whyte PA-C Urology [phone]            Plan for follow-up on next business day. 2nd attempt 24 hr HFU call     Radha Burroughs RN

## 2025-01-20 NOTE — PROGRESS NOTES
Patient ID: Andrea Desir, 1955, [id number], 70 y.o.  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, adrenal metastasis and bone metastasis  Started on palliative chemotherapy with carboplatin etoposide and Tecentriq on 4/23/2024  Restaging CT scan after 2 cycles showed great response to treatment  Restaging CT chest abdomen pelvis after 6 cycles showed stable right upper lobe lung nodule and slightly decreased right hilar lymphadenopathy  Plan to continue maintenance immunotherapy  Unfortunately recent brain MRI showing heterogenous mass within the left cerebellum measuring 3.5 cm and patient complains of gait imbalance  HISTORY OF PRESENT ILLNESS:    Oncologic History:  Andrea Desir is a 70 y.o. male with history of atrial fibrillation, status post AICD, history of CAD status post stent, with a tobacco abuse was seen during initial counseling visit for newly discovered lung mass and lymphadenopathy.    Patient recently was seen by his primary care physician for difficulty swallowing and also swelling in his neck area.  Patient also having cough for past 6 months.  Patient had a CT scan of the neck as well as CT scan of the chest on 3/27/2024 which showed right upper lobe 4.7 cm mass suspicious for primary lung malignancy along with right hilar and bilateral mediastinal bulky lymphadenopathy with extrinsic mass effect on the trachea and esophagus without occlusion.  Patient also noted to have bilateral supraclavicular lymphadenopathy suspicious for metastasis.  Patient was referred to oncology for further recommendations.  He smokes 1 pack cigarettes per day.  He has a history of CAD status post AICD and stent placement.  Currently he is on aspirin and Plavix and following with cardiologist.    He may have lost few pounds over past few weeks.  He denies any alcohol abuse history.    He is complaining of headache for past 3 to 4 days.  Denies

## 2025-01-21 ENCOUNTER — CARE COORDINATION (OUTPATIENT)
Dept: CARE COORDINATION | Age: 70
End: 2025-01-21

## 2025-01-21 NOTE — CARE COORDINATION
Care Transitions Note    Initial Call - Call within 2 business days of discharge: Yes    Attempted to reach patient for transitions of care follow up. Unable to reach patient.    Outreach Attempts:   Multiple attempts to contact patient at phone numbers on file.     Patient: Andrea Desir    Patient : 1955   MRN: [id number]    Reason for Admission: HA  Discharge Date: 25  RURS: Readmission Risk Score: 10.9    Last Discharge Facility       Date Complaint Diagnosis Description Type Department Provider    1/15/25 Headache Brain neoplasm (HCC) ED to Hosp-Admission (Discharged) (ADMITTED) [facility] Anabel Nevarez MD; KRISTA Barrera    1/15/25 Headache Intracranial mass ... ED (TRANSFER) [facility] ED Richard Hernandez DO          # 2 attempt-Attempted initial 24 hour hospital follow up call. Left a Hipaa compliant message with name and call back information. Requested return call to [phone].     2 unsuccessful attempts to reach patient, CTN episode resolved  Was this an external facility discharge? No    Follow Up Appointment:   Patient does not have a follow up appointment scheduled at time of call.     Future Appointments         Provider Specialty Dept Phone    2025 1:00 PM Colette Terrell MD; SCHEDULE, [facility] NURSE Radiation Oncology [phone]    2/3/2025 1:30 PM [facility] MED ONC FAST TRACK CHAIR 1 Infusion Therapy [phone]    2025 3:30 PM Jonathon Mendenhall MD Oncology [phone]    2025 10:45 AM SCHEDULE, [facility] Cardiology [phone]    2025 11:00 AM Romana Hui MD Cardiology [phone]    6/3/2025 3:20 PM Deon Art MD Family Medicine [phone]    2025 10:00 AM Gigi Whyte PA-C Urology [phone]            No further follow-up call indicated     Radha Burroughs RN

## 2025-01-23 ENCOUNTER — HOSPITAL ENCOUNTER (OUTPATIENT)
Dept: RADIATION ONCOLOGY | Age: 70
Discharge: HOME | End: 2025-01-23
Payer: MEDICARE

## 2025-01-23 VITALS
OXYGEN SATURATION: 98 % | BODY MASS INDEX: 33.44 KG/M2 | WEIGHT: 246.6 LBS | RESPIRATION RATE: 16 BRPM | SYSTOLIC BLOOD PRESSURE: 130 MMHG | HEART RATE: 57 BPM | DIASTOLIC BLOOD PRESSURE: 79 MMHG | TEMPERATURE: 98 F

## 2025-01-23 PROCEDURE — 99213 OFFICE O/P EST LOW 20 MIN: CPT | Performed by: RADIOLOGY

## 2025-01-23 ASSESSMENT — PATIENT HEALTH QUESTIONNAIRE - PHQ9
SUM OF ALL RESPONSES TO PHQ QUESTIONS 1-9: 1
SUM OF ALL RESPONSES TO PHQ9 QUESTIONS 1 & 2: 1
SUM OF ALL RESPONSES TO PHQ QUESTIONS 1-9: 1
SUM OF ALL RESPONSES TO PHQ QUESTIONS 1-9: 1
1. LITTLE INTEREST OR PLEASURE IN DOING THINGS: NOT AT ALL
SUM OF ALL RESPONSES TO PHQ QUESTIONS 1-9: 1

## 2025-01-23 NOTE — ACP (ADVANCE CARE PLANNING)
Advance Care Planning     Hospice Services: Patient is not currently receiving hospice services/has not received hospice care within the performance year.    Advance Care Planning was discussed with patient, and the patient refused to provide an ACP or surrogate decision maker because:Patient and spouse in process of completing.  Will bring to office when complete.

## 2025-01-23 NOTE — PROGRESS NOTES
Referring Physician: Dr. Mendenhall      Vitals:    01/23/25 1403   BP: 130/79   Pulse: 57   Resp: 16   Temp: 98 °F (36.7 °C)   SpO2: 98%    :     Pain Assessment: None - Denies Pain          Wt Readings from Last 1 Encounters:   01/23/25 111.9 kg (246 lb 9.6 oz)        Body mass index is 33.44 kg/m².              Smoking Status: 0.5 PPD   [x] Smoker - PPD:   [] Nonsmoker - Quit Date:               [] Never a smoker      No chief complaint on file.       Cancer Staging   No matching staging information was found for the patient.      Prior Radiation Therapy? No   If yes, site treated:   Facility:                             Date:    Concurrent Chemo/radiation? No   If yes, start date:    Prior Hormonal Therapy or Contraceptive Medications?  No   If yes,  Medication:                                Duration:    Head and Neck Cancer? No     Pacemaker/Defibrillator/ICD:  Yes- Defibrillator, Dr. Saul Winslow, [facility]    Do you have any musculoskeletal diseases, Lupus or arthritic conditions?  No   If yes, are you currently taking Methotrexate?  []  Yes  [x]  No              Current Outpatient Medications   Medication Sig Dispense Refill    dexAMETHasone (DECADRON) 4 MG tablet Take 1 tablet by mouth 2 times daily (with meals) for 10 days 20 tablet 0    LORazepam (ATIVAN) 0.5 MG tablet Take 1 tablet by mouth every 8 hours as needed for Anxiety for up to 30 days. Max Daily Amount: 1.5 mg 90 tablet 0    traZODone (DESYREL) 50 MG tablet Take 1 tablet by mouth nightly as needed for Sleep 30 tablet 2    dexAMETHasone (DECADRON) 4 MG tablet Take 2 tablets by mouth daily for 14 days Then take 1 tablet for 7 days, Then half tablet for 7 days (Patient not taking: Reported on 1/20/2025) 28 tablet 0    docusate sodium (COLACE) 100 MG capsule Take 1 capsule by mouth daily      benzonatate (TESSALON) 100 MG capsule Take 1 capsule by mouth 3 times daily as needed for Cough 90 capsule 3    metoprolol tartrate

## 2025-01-27 ENCOUNTER — HOSPITAL ENCOUNTER (OUTPATIENT)
Dept: RADIATION ONCOLOGY | Age: 70
Discharge: HOME | End: 2025-01-27
Payer: MEDICARE

## 2025-01-27 ENCOUNTER — HOSPITAL ENCOUNTER (OUTPATIENT)
Age: 70
Discharge: HOME | End: 2025-01-27
Payer: MEDICARE

## 2025-01-27 ENCOUNTER — TELEPHONE (OUTPATIENT)
Dept: CARDIOLOGY | Age: 70
End: 2025-01-27

## 2025-01-27 ENCOUNTER — OFFICE VISIT (OUTPATIENT)
Dept: PRIMARY CARE CLINIC | Age: 70
End: 2025-01-27
Payer: MEDICARE

## 2025-01-27 VITALS
HEART RATE: 74 BPM | DIASTOLIC BLOOD PRESSURE: 72 MMHG | TEMPERATURE: 98 F | WEIGHT: 259 LBS | SYSTOLIC BLOOD PRESSURE: 130 MMHG | OXYGEN SATURATION: 99 % | BODY MASS INDEX: 35.13 KG/M2

## 2025-01-27 DIAGNOSIS — R30.0 DYSURIA: Primary | ICD-10-CM

## 2025-01-27 DIAGNOSIS — R30.0 DYSURIA: ICD-10-CM

## 2025-01-27 DIAGNOSIS — B35.6 TINEA CRURIS: ICD-10-CM

## 2025-01-27 DIAGNOSIS — R82.90 MALODOROUS URINE: ICD-10-CM

## 2025-01-27 LAB
BILIRUB UR QL STRIP: NEGATIVE
CLARITY UR: CLEAR
COLOR UR: YELLOW
COMMENT: NORMAL
GLUCOSE UR STRIP-MCNC: NEGATIVE MG/DL
HGB UR QL STRIP.AUTO: NEGATIVE
KETONES UR STRIP-MCNC: NEGATIVE MG/DL
LEUKOCYTE ESTERASE UR QL STRIP: NEGATIVE
NITRITE UR QL STRIP: NEGATIVE
PH UR STRIP: 6 [PH] (ref 5–6)
PROT UR STRIP-MCNC: NEGATIVE MG/DL
SP GR UR STRIP: 1.01 (ref 1.01–1.02)
UROBILINOGEN UR STRIP-ACNC: NORMAL EU/DL (ref 0–1)

## 2025-01-27 PROCEDURE — 81003 URINALYSIS AUTO W/O SCOPE: CPT

## 2025-01-27 PROCEDURE — 77470 SPECIAL RADIATION TREATMENT: CPT | Performed by: RADIOLOGY

## 2025-01-27 PROCEDURE — 99214 OFFICE O/P EST MOD 30 MIN: CPT | Performed by: FAMILY MEDICINE

## 2025-01-27 PROCEDURE — 77334 RADIATION TREATMENT AID(S): CPT | Performed by: RADIOLOGY

## 2025-01-27 RX ORDER — NYSTATIN 100000 U/G
CREAM TOPICAL
Qty: 30 G | Refills: 3 | Status: SHIPPED | OUTPATIENT
Start: 2025-01-27

## 2025-01-27 ASSESSMENT — ENCOUNTER SYMPTOMS
RESPIRATORY NEGATIVE: 1
EYES NEGATIVE: 1
GASTROINTESTINAL NEGATIVE: 1

## 2025-01-27 ASSESSMENT — PATIENT HEALTH QUESTIONNAIRE - PHQ9
SUM OF ALL RESPONSES TO PHQ QUESTIONS 1-9: 0
SUM OF ALL RESPONSES TO PHQ QUESTIONS 1-9: 0
2. FEELING DOWN, DEPRESSED OR HOPELESS: NOT AT ALL
SUM OF ALL RESPONSES TO PHQ QUESTIONS 1-9: 0
1. LITTLE INTEREST OR PLEASURE IN DOING THINGS: NOT AT ALL
SUM OF ALL RESPONSES TO PHQ QUESTIONS 1-9: 0
SUM OF ALL RESPONSES TO PHQ9 QUESTIONS 1 & 2: 0

## 2025-01-27 NOTE — PROGRESS NOTES
Radiation Treatment Site/Plan/Fractions:  Left Cerebellum/3 SBRT      Concurrent Chemotherapy/Immunotherapy:  None-Dr. Terrell states okay for pt to receive Tecentriq       Cardiac Device:  Yes-Defibrillator (cardiologist Dr. Hui)      Transportation Concerns:  None-family able to transport patient to [facility]      Nursing Referrals and Reasons:  None needed at this time.      Contrast Given:  None           Miscellaneous Information:  Site specific information and side effects reviewed with pt and son, Enrrique.  Patient states he is taking Dexamethasone 4 mg twice per day and instructed to stay at this dose until he completes his radiation treatments.  Son verbalized understanding of this.  Patient states he is claustrophobic and has Ativan as needed at home.  He will take this prior to his treatments if needed.

## 2025-01-27 NOTE — TELEPHONE ENCOUNTER
Patient called into office. Patient entresto rx is too expensive for him. Is there another option he can go with? Please advise and call patient with answer. Patient uses [facility].

## 2025-01-27 NOTE — DISCHARGE INSTRUCTIONS
Brain Side Effects  Fatigue  Hair loss  Nausea and vomiting  Skin changes  Headache  Blurry vision        Ways to Manage Fatigue    Try to sleep at least 8 hours each night. This may be more sleep than you needed before radiation therapy. One way to sleep better at night is to be active during the day. Another way is to relax right before going to bed. Do calming activities before bedtime, such as reading, working on a jigsaw puzzle, or listening to music.  Plan time to rest. Take short naps or rest breaks between activities.  Try not to do too much. With fatigue, you ay not have enough energy to do all the things you want to do. Stay active, but choose the activities that are most important to you. Try to let go of things that don't matter as much now. For example, you might go to work but not do housework. You might watch your children's sprots events but not cook dinner.  Exercise. Research shows that most people feel better when they get some exercise each day. Go for a short walk, ride a bike, or do yoga. Talk with your doctor or nurse about types of exercise you can do while having radiation therapy.  Relax. Mediatation, prayer, gentle yoga, guided imagery, and visualization are ways you can learn to relax and decrease stress.    For relaxation exercises:  Visit Learning to Relax on the National [employer]'s website at : www.cancer.gov/about-cancer/copingfeelings/relaxation  See Facing Forward: Life After Cancer Treatment at: www.cancer.gov/publications/patient-education/facing-forward  Eat and drink well. It can be easier to eat if you have 5 or 6 small meals each day, rather than 3 large ones. Keep foods around that are easy to fix, such as canned soups, frozen meals, yogurt, and cottage cheese. Drink plenty of fluids each day-about 8 cups of water or juice.  Plan a work schedule that is right for you.Fatigue may affect the amount of energy you have for your job. You may feel well enough to work your

## 2025-01-27 NOTE — PROGRESS NOTES
medications for this visit.     No Known Allergies      Review of Systems   Constitutional:  Negative for fever.   HENT: Negative.     Eyes: Negative.    Respiratory: Negative.     Cardiovascular: Negative.    Gastrointestinal: Negative.    Genitourinary:  Positive for dysuria and frequency. Negative for flank pain and hematuria.   Skin: Negative.    Neurological: Negative.    Psychiatric/Behavioral: Negative.         Objective:   Physical Exam  Constitutional:       Appearance: He is well-developed.   HENT:      Head: Normocephalic and atraumatic.   Eyes:      Pupils: Pupils are equal, round, and reactive to light.   Cardiovascular:      Rate and Rhythm: Normal rate and regular rhythm.      Heart sounds: Normal heart sounds. No murmur heard.  Pulmonary:      Effort: Pulmonary effort is normal. No respiratory distress.      Breath sounds: Normal breath sounds. No wheezing or rales.   Abdominal:      General: There is no distension.      Palpations: Abdomen is soft. There is no mass.      Tenderness: There is no abdominal tenderness.   Musculoskeletal:         General: No tenderness. Normal range of motion.      Cervical back: Normal range of motion and neck supple.   Skin:     General: Skin is warm.      Findings: No erythema or rash.   Neurological:      Mental Status: He is alert.   Psychiatric:         Behavior: Behavior normal.         Thought Content: Thought content normal.         Judgment: Judgment normal.       /72 (Site: Right Upper Arm, Position: Sitting, Cuff Size: Large Adult)   Pulse 74   Temp 98 °F (36.7 °C) (Tympanic)   Wt 117.5 kg (259 lb)   SpO2 99%   BMI 35.13 kg/m²   Hospital Outpatient Visit on 01/27/2025   Component Date Value Ref Range Status    Color, UA 01/27/2025 Yellow  Yellow Final    Turbidity UA 01/27/2025 Clear  Clear Final    Glucose, Ur 01/27/2025 NEGATIVE  NEGATIVE mg/dL Final    Bilirubin, Urine 01/27/2025 NEGATIVE  NEGATIVE Final    Ketones, Urine 01/27/2025 NEGATIVE

## 2025-01-28 ENCOUNTER — TELEPHONE (OUTPATIENT)
Dept: RADIATION ONCOLOGY | Age: 70
End: 2025-01-28

## 2025-01-28 NOTE — TELEPHONE ENCOUNTER
Incoming automated call from [employer] [facility] [employer] regarding authorization for pts radiation therapy ordered by Dr. Terrell. Auth #[id number] approved 180 days until 7/27/2025

## 2025-01-28 NOTE — TELEPHONE ENCOUNTER
Patient to receive radiation therapy to his brain and has implanted defibrillator.  Implanted Cardiac Device Precautions form faxed to Dr. Winslow's [place] office at [phone] with confirmation of receipt received.  Writer called office at [phone] prior to sending to verify fax number and correct location to send letter.

## 2025-01-31 RX ORDER — LOSARTAN POTASSIUM 25 MG/1
25 TABLET ORAL DAILY
Qty: 90 TABLET | Refills: 3 | Status: SHIPPED | OUTPATIENT
Start: 2025-01-31

## 2025-01-31 NOTE — TELEPHONE ENCOUNTER
Pt notified of switch from Entresto to losartan. Pt requested refill be sent to [facility] in [place]. Pt was advised to call office with any problems or concerns. Pt verbalized understanding and had no further questions at the time.

## 2025-02-03 ENCOUNTER — HOSPITAL ENCOUNTER (OUTPATIENT)
Dept: RADIATION ONCOLOGY | Age: 70
Discharge: HOME | End: 2025-02-03
Payer: MEDICARE

## 2025-02-03 ENCOUNTER — TELEPHONE (OUTPATIENT)
Dept: ONCOLOGY | Age: 70
End: 2025-02-03

## 2025-02-03 ENCOUNTER — HOSPITAL ENCOUNTER (OUTPATIENT)
Dept: INFUSION THERAPY | Age: 70
Discharge: HOME | End: 2025-02-03
Payer: MEDICARE

## 2025-02-03 VITALS
TEMPERATURE: 97.6 F | BODY MASS INDEX: 33.46 KG/M2 | WEIGHT: 247 LBS | DIASTOLIC BLOOD PRESSURE: 82 MMHG | SYSTOLIC BLOOD PRESSURE: 142 MMHG | OXYGEN SATURATION: 98 % | HEIGHT: 72 IN | RESPIRATION RATE: 16 BRPM | HEART RATE: 65 BPM

## 2025-02-03 DIAGNOSIS — C34.91 SMALL CELL LUNG CANCER, RIGHT (HCC): Primary | ICD-10-CM

## 2025-02-03 LAB
ALBUMIN SERPL-MCNC: 3.9 G/DL (ref 3.5–5.2)
ALBUMIN/GLOB SERPL: 1.8 {RATIO} (ref 1–2.5)
ALP SERPL-CCNC: 47 U/L (ref 40–129)
ALT SERPL-CCNC: 29 U/L (ref 5–41)
ANION GAP SERPL CALCULATED.3IONS-SCNC: 11 MMOL/L (ref 9–17)
AST SERPL-CCNC: 17 U/L
BASOPHILS # BLD: 0.05 K/UL (ref 0–0.2)
BASOPHILS NFR BLD: 0 % (ref 0–2)
BILIRUB SERPL-MCNC: 0.6 MG/DL (ref 0.3–1.2)
BUN SERPL-MCNC: 44 MG/DL (ref 8–23)
BUN/CREAT SERPL: 40 (ref 9–20)
CALCIUM SERPL-MCNC: 8.8 MG/DL (ref 8.6–10.4)
CHLORIDE SERPL-SCNC: 99 MMOL/L (ref 98–107)
CO2 SERPL-SCNC: 29 MMOL/L (ref 20–31)
CREAT SERPL-MCNC: 1.1 MG/DL (ref 0.7–1.2)
EOSINOPHIL # BLD: <0.03 K/UL (ref 0–0.44)
EOSINOPHILS RELATIVE PERCENT: 0 % (ref 1–4)
ERYTHROCYTE [DISTWIDTH] IN BLOOD BY AUTOMATED COUNT: 13.7 % (ref 11.8–14.4)
GFR, ESTIMATED: 72 ML/MIN/1.73M2
GLUCOSE SERPL-MCNC: 128 MG/DL (ref 70–99)
HCT VFR BLD AUTO: 39.4 % (ref 40.7–50.3)
HGB BLD-MCNC: 13.4 G/DL (ref 13–17)
IMM GRANULOCYTES # BLD AUTO: 0.46 K/UL (ref 0–0.3)
IMM GRANULOCYTES NFR BLD: 2 %
LYMPHOCYTES NFR BLD: 1.5 K/UL (ref 1.1–3.7)
LYMPHOCYTES RELATIVE PERCENT: 8 % (ref 24–43)
MCH RBC QN AUTO: 34.2 PG (ref 25.2–33.5)
MCHC RBC AUTO-ENTMCNC: 34 G/DL (ref 25.2–33.5)
MCV RBC AUTO: 100.5 FL (ref 82.6–102.9)
MONOCYTES NFR BLD: 0.88 K/UL (ref 0.1–1.2)
MONOCYTES NFR BLD: 4 % (ref 3–12)
NEUTROPHILS NFR BLD: 86 % (ref 36–65)
NEUTS SEG NFR BLD: 16.99 K/UL (ref 1.5–8.1)
NRBC BLD-RTO: 0.1 PER 100 WBC
PLATELET # BLD AUTO: ABNORMAL K/UL (ref 138–453)
PLATELET, FLUORESCENCE: 120 K/UL (ref 138–453)
PLATELETS.RETICULATED NFR BLD AUTO: 6.5 % (ref 1.1–10.3)
POTASSIUM SERPL-SCNC: 4.2 MMOL/L (ref 3.7–5.3)
PROT SERPL-MCNC: 6.1 G/DL (ref 6.4–8.3)
RBC # BLD AUTO: 3.92 M/UL (ref 4.21–5.77)
SODIUM SERPL-SCNC: 139 MMOL/L (ref 135–144)
TSH SERPL DL<=0.05 MIU/L-ACNC: 2.19 UIU/ML (ref 0.3–5)
WBC OTHER # BLD: 19.9 K/UL (ref 3.5–11.3)

## 2025-02-03 PROCEDURE — 84443 ASSAY THYROID STIM HORMONE: CPT

## 2025-02-03 PROCEDURE — 2580000003 HC RX 258: Performed by: INTERNAL MEDICINE

## 2025-02-03 PROCEDURE — 6360000002 HC RX W HCPCS: Performed by: INTERNAL MEDICINE

## 2025-02-03 PROCEDURE — 77300 RADIATION THERAPY DOSE PLAN: CPT | Performed by: RADIOLOGY

## 2025-02-03 PROCEDURE — 77301 RADIOTHERAPY DOSE PLAN IMRT: CPT | Performed by: RADIOLOGY

## 2025-02-03 PROCEDURE — 85025 COMPLETE CBC W/AUTO DIFF WBC: CPT

## 2025-02-03 PROCEDURE — 2500000003 HC RX 250 WO HCPCS: Performed by: INTERNAL MEDICINE

## 2025-02-03 PROCEDURE — 96413 CHEMO IV INFUSION 1 HR: CPT

## 2025-02-03 PROCEDURE — 77338 DESIGN MLC DEVICE FOR IMRT: CPT | Performed by: RADIOLOGY

## 2025-02-03 PROCEDURE — 80053 COMPREHEN METABOLIC PANEL: CPT

## 2025-02-03 RX ORDER — SODIUM CHLORIDE 9 MG/ML
5-250 INJECTION, SOLUTION INTRAVENOUS PRN
Status: DISCONTINUED | OUTPATIENT
Start: 2025-02-03 | End: 2025-02-04 | Stop reason: HOSPADM

## 2025-02-03 RX ORDER — HEPARIN 100 UNIT/ML
500 SYRINGE INTRAVENOUS PRN
Status: DISCONTINUED | OUTPATIENT
Start: 2025-02-03 | End: 2025-02-04 | Stop reason: HOSPADM

## 2025-02-03 RX ORDER — SODIUM CHLORIDE 0.9 % (FLUSH) 0.9 %
5-40 SYRINGE (ML) INJECTION PRN
Status: DISCONTINUED | OUTPATIENT
Start: 2025-02-03 | End: 2025-02-04 | Stop reason: HOSPADM

## 2025-02-03 RX ADMIN — SODIUM CHLORIDE 20 ML/HR: 9 INJECTION, SOLUTION INTRAVENOUS at 14:52

## 2025-02-03 RX ADMIN — HEPARIN 500 UNITS: 100 SYRINGE at 15:58

## 2025-02-03 RX ADMIN — SODIUM CHLORIDE, PRESERVATIVE FREE 10 ML: 5 INJECTION INTRAVENOUS at 15:58

## 2025-02-03 RX ADMIN — ATEZOLIZUMAB 1200 MG: 1200 INJECTION, SOLUTION INTRAVENOUS at 15:08

## 2025-02-03 NOTE — PROGRESS NOTES
Dr. Mendenhall notified of wbc 19.9 and ANC 16.99.  Will continue with todays treatment. VAD de-accessed after 10ml NS and 5ml of heparin.  Band aid applied to site.  No sign of reaction to infusion at this time.  Pt ambulated out infusion center door without difficulty.  Paperwork given regarding date and time of next infusion.

## 2025-02-03 NOTE — TELEPHONE ENCOUNTER
Name: Andrea Desir  : 1955  MRN: [id number]    Oncology Navigation Follow-Up Note    Contact Type:   chart review    Notes:   Patient was scheduled for immunotherapy infusion today but the appt was cancelled. Patient is in preparation for radiation treatment.   Per 25 Dr. Mendenhall visit note. Immunotherapy to continue as planned.  Writer spoke with Yuriy in infusion. He will check with patient regarding reason for today's cancelled appt.  Navigator following for continuity of care.    Electronically signed by Natalia Broussard RN on 2/3/2025 at 12:56 PM  
Opt out

## 2025-02-10 RX ORDER — DEXAMETHASONE 4 MG/1
4 TABLET ORAL 2 TIMES DAILY WITH MEALS
Qty: 20 TABLET | Refills: 0 | Status: SHIPPED | OUTPATIENT
Start: 2025-02-10 | End: 2025-02-20

## 2025-02-10 NOTE — TELEPHONE ENCOUNTER
Patient is out of medication and is starting to get headaches again.    Last Appt:  1/20/2025  Next Appt:   2/24/2025  Med verified in Epic

## 2025-02-11 ENCOUNTER — HOSPITAL ENCOUNTER (OUTPATIENT)
Dept: RADIATION ONCOLOGY | Age: 70
Discharge: HOME | End: 2025-02-11
Payer: MEDICARE

## 2025-02-11 VITALS
TEMPERATURE: 97.1 F | DIASTOLIC BLOOD PRESSURE: 88 MMHG | OXYGEN SATURATION: 98 % | HEART RATE: 69 BPM | WEIGHT: 247 LBS | SYSTOLIC BLOOD PRESSURE: 157 MMHG | RESPIRATION RATE: 16 BRPM | BODY MASS INDEX: 33.49 KG/M2

## 2025-02-11 VITALS
BODY MASS INDEX: 33.49 KG/M2 | WEIGHT: 247 LBS | RESPIRATION RATE: 16 BRPM | TEMPERATURE: 97.1 F | SYSTOLIC BLOOD PRESSURE: 157 MMHG | OXYGEN SATURATION: 98 % | HEART RATE: 69 BPM | DIASTOLIC BLOOD PRESSURE: 88 MMHG

## 2025-02-11 DIAGNOSIS — C34.91 SMALL CELL LUNG CANCER, RIGHT (HCC): ICD-10-CM

## 2025-02-11 DIAGNOSIS — D49.6 BRAIN NEOPLASM (HCC): Primary | ICD-10-CM

## 2025-02-11 PROCEDURE — 77373 STRTCTC BDY RAD THER TX DLVR: CPT | Performed by: RADIOLOGY

## 2025-02-11 RX ORDER — DIAZEPAM 2 MG/1
2 TABLET ORAL DAILY PRN
Qty: 2 TABLET | Refills: 0 | Status: SHIPPED | OUTPATIENT
Start: 2025-02-11 | End: 2025-02-13

## 2025-02-11 NOTE — PROGRESS NOTES
Andrea Desir  2/11/2025  Wt Readings from Last 3 Encounters:   02/11/25 112 kg (247 lb)   02/11/25 112 kg (247 lb)   02/03/25 112 kg (247 lb)     Body mass index is 33.49 kg/m².      Treatment Area: Left Cerebellum/Brain    Patient was seen today for weekly visit.      Comfort Alteration    Fatigue: Moderate      CNS Alteration  Depressed Level of Consciousness: None  Orientation: time, place, and person  Neuropathy-Motor: wnl  Ataxia: Present  Speech Impairment: No  Seizures: No  Urinary Incontinence: No  Bowel Incontinence: No  Insomnia: No    Sensory Alteration:  wnl    Nutritional Alteration  Anorexia: No   Nausea: No   Vomiting: No    Skin Alteration   Sensation: wnl    Radiation Dermatitis:  Intact [x]     Erythema  []     Discoloration  []     Rash []     Dry desquamation  []     Moist desquamation []       Emotional  Coping: effective      Injury, potential bleeding or infection:     Lab Results   Component Value Date    WBC 19.9 (H) 02/03/2025    HGB 13.4 02/03/2025    HCT 39.4 (L) 02/03/2025    PLT See Reflexed IPF Result 02/03/2025         BP (!) 157/88   Pulse 69   Temp 97.1 °F (36.2 °C) (Temporal)   Resp 16   Wt 112 kg (247 lb)   SpO2 98%   BMI 33.49 kg/m²                     Assessment/Plan: Patient was seen today for weekly visit.  He ran of out steroids about 5 days ago and was taking Dexamethasone 4 mg twice per day at the time.  He resumed this yesterday and continues to have unsteady gait and states \"I just don't feel right and feel like I am in a fog.\"  Dr. Terrell recommending pt take extra dose today and pt otherwise instructed to stay at twice per day until he completes radiation treatments at which time he will need to be weaned off.    Patient also requesting help with Living Will documents.  Writer called  who will meet with him after his last radiation treatment on 2/17/25.    Martita Jacob RN

## 2025-02-12 ENCOUNTER — APPOINTMENT (OUTPATIENT)
Dept: RADIATION ONCOLOGY | Age: 70
End: 2025-02-12
Payer: MEDICARE

## 2025-02-12 NOTE — PROGRESS NOTES
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  Patient comes in today for his first treatment and noted that over the weekend he developed worsening headaches and gait instability.  He noted that he ran out of his dexamethasone on Friday and missed his doses on Saturday and .  He did have a refill yesterday and did resume taking it however he continues to still feel some symptoms.      OBJECTIVE:   CHAPERONE: Family/friend/companieon Present    ECO Symptomatic but completely ambulatory    VITAL SIGNS: BP (!) 157/88   Pulse 69   Temp 97.1 °F (36.2 °C) (Temporal)   Resp 16   Wt 112 kg (247 lb)   SpO2 98%   BMI 33.49 kg/m²   Wt Readings from Last 5 Encounters:   25 112 kg (247 lb)   25 112 kg (247 lb)   25 112 kg (247 lb)   25 117.5 kg (259 lb)   25 111.9 kg (246 lb 9.6 oz)     Physical Exam    GENERAL:  General appearance is that of a well-nourished, well-developed in no apparent distress.  HEART:  Normal rate and regular rhythm, normal heart sounds.   LUNGS:  Pulmonary effort normal. Normal breath sounds.   ABDOMEN:  Soft, nontender, non

## 2025-02-13 ENCOUNTER — HOSPITAL ENCOUNTER (OUTPATIENT)
Dept: RADIATION ONCOLOGY | Age: 70
Discharge: HOME | End: 2025-02-13
Payer: MEDICARE

## 2025-02-13 PROCEDURE — 77373 STRTCTC BDY RAD THER TX DLVR: CPT | Performed by: RADIOLOGY

## 2025-02-17 ENCOUNTER — HOSPITAL ENCOUNTER (OUTPATIENT)
Dept: RADIATION ONCOLOGY | Age: 70
Discharge: HOME | End: 2025-02-17
Payer: MEDICARE

## 2025-02-17 PROCEDURE — 77373 STRTCTC BDY RAD THER TX DLVR: CPT | Performed by: RADIOLOGY

## 2025-02-17 NOTE — ACP (ADVANCE CARE PLANNING)
Advance Care Planning   Advance Care Planning Note  Ambulatory [facility]    Date:  2/17/2025    Received request from patient.    Consultation conversation participants:   Patient who understands ACP conversation     Goals of ACP Conversation:  Discuss advance care planning documents    Health Care Decision Makers:      Primary Decision Maker: Thelma Desir - Spouse - [phone]    Secondary Decision Maker: DesirDada - Child - [phone]    Supplemental (Other) Decision Maker: Enrrique Desir - Child - [phone]   Summary:  Completed New Documents    Advance Care Planning Documents (Patient Wishes)  Currently on file:   None    Assessment:   Patient requested to complete his health care power of  form. Pt affirmed his close bond with his primary, secondary, and supplemental agents. Pt expressed gratitude for them. Pt wrote his wishes in the special instructions box.     Interventions:  Provided education on documents for clarity and greater understanding  Discussed and provided education on state decision maker hierarchy  Assisted in the completion of documents according to patient's wishes at this time  Encouraged ongoing ACP conversation with future decision makers and loved ones    Care Preferences Communicated:   Patient wrote that he does not want life support after 14 days.     Outcomes:  New advance directive completed.    Patient / Healthcare Decision Maker Instructions:  Follow up with their individual provider to further discussion of code status    Electronically signed by SANJUANITA Champagne on 2/17/2025 at 3:53 PM.

## 2025-02-17 NOTE — PROGRESS NOTES
Spiritual Health Outpatient Oncology/Hematology Progress Note: [facility]    Situation: Writer assisted Patient with completing his advance directives.     Assessment: Patient shared how he was doing. Pt identified his family as his source of strength. Pt expressed gratitude for their care and support.     Intervention: Writer introduced herself and her services. Writer inquired about Pt's coping and needs.  Writer offered supportive presence and active listening. Writer affirmed Pt's strengths. Writer offered words of support and encouragement. Writer shared resources with Pt.     Outcome: Pt requested a copy of ACP for his Spouse. Pt completed his advance directives. Pt thanked writer for the visit.    Plan: Spiritual Health Services are available for Patient by phone and/or in person.        02/17/25 1556   Encounter Summary   Encounter Overview/Reason Advance Care Planning   Service Provided For Patient   Referral/Consult From Nurse   Support System Family members;Children   Last Encounter  02/17/25   Complexity of Encounter Moderate   Begin Time 1500   End Time  1520   Total Time Calculated 20 min   Spiritual/Emotional needs   Type Spiritual Support   Advance Care Planning   Type Completed AD/ACP document(s)   Assessment/Intervention/Outcome   Assessment Calm;Coping   Intervention Sustaining Presence/Ministry of presence;Active listening;Explored/Affirmed feelings, thoughts, concerns;Explored Coping Skills/Resources   Outcome Expressed feelings, needs, and concerns;Expressed Gratitude;Coping   Plan and Referrals   Plan/Referrals Continue Support (comment)     SANJUANITA Champagne  [facility]   [phone]

## 2025-02-18 PROCEDURE — 77336 RADIATION PHYSICS CONSULT: CPT | Performed by: RADIOLOGY

## 2025-02-24 ENCOUNTER — OFFICE VISIT (OUTPATIENT)
Dept: ONCOLOGY | Age: 70
End: 2025-02-24
Payer: MEDICARE

## 2025-02-24 ENCOUNTER — HOSPITAL ENCOUNTER (OUTPATIENT)
Dept: INFUSION THERAPY | Age: 70
Discharge: HOME | End: 2025-02-24
Payer: MEDICARE

## 2025-02-24 ENCOUNTER — TELEPHONE (OUTPATIENT)
Dept: ONCOLOGY | Age: 70
End: 2025-02-24

## 2025-02-24 VITALS
SYSTOLIC BLOOD PRESSURE: 135 MMHG | HEART RATE: 79 BPM | OXYGEN SATURATION: 97 % | DIASTOLIC BLOOD PRESSURE: 79 MMHG | WEIGHT: 255 LBS | BODY MASS INDEX: 34.58 KG/M2 | TEMPERATURE: 97.3 F | RESPIRATION RATE: 16 BRPM

## 2025-02-24 VITALS
DIASTOLIC BLOOD PRESSURE: 79 MMHG | OXYGEN SATURATION: 97 % | RESPIRATION RATE: 16 BRPM | HEART RATE: 78 BPM | HEIGHT: 72 IN | BODY MASS INDEX: 34.54 KG/M2 | WEIGHT: 255 LBS | SYSTOLIC BLOOD PRESSURE: 135 MMHG | TEMPERATURE: 97.3 F

## 2025-02-24 DIAGNOSIS — C34.91 SMALL CELL LUNG CANCER, RIGHT (HCC): Primary | ICD-10-CM

## 2025-02-24 LAB
ALBUMIN SERPL-MCNC: 3.7 G/DL (ref 3.5–5.2)
ALBUMIN/GLOB SERPL: 1.7 {RATIO} (ref 1–2.5)
ALP SERPL-CCNC: 45 U/L (ref 40–129)
ALT SERPL-CCNC: 40 U/L (ref 5–41)
ANION GAP SERPL CALCULATED.3IONS-SCNC: 8 MMOL/L (ref 9–17)
AST SERPL-CCNC: 16 U/L
BASOPHILS # BLD: <0.03 K/UL (ref 0–0.2)
BASOPHILS NFR BLD: 0 % (ref 0–2)
BILIRUB SERPL-MCNC: 0.7 MG/DL (ref 0.3–1.2)
BUN SERPL-MCNC: 28 MG/DL (ref 8–23)
BUN/CREAT SERPL: 25 (ref 9–20)
CALCIUM SERPL-MCNC: 8.6 MG/DL (ref 8.6–10.4)
CHLORIDE SERPL-SCNC: 100 MMOL/L (ref 98–107)
CO2 SERPL-SCNC: 30 MMOL/L (ref 20–31)
CREAT SERPL-MCNC: 1.1 MG/DL (ref 0.7–1.2)
EOSINOPHIL # BLD: <0.03 K/UL (ref 0–0.44)
EOSINOPHILS RELATIVE PERCENT: 0 % (ref 1–4)
ERYTHROCYTE [DISTWIDTH] IN BLOOD BY AUTOMATED COUNT: 13.9 % (ref 11.8–14.4)
GFR, ESTIMATED: 72 ML/MIN/1.73M2
GLUCOSE SERPL-MCNC: 95 MG/DL (ref 70–99)
HCT VFR BLD AUTO: 36.3 % (ref 40.7–50.3)
HGB BLD-MCNC: 12.6 G/DL (ref 13–17)
IMM GRANULOCYTES # BLD AUTO: 0.18 K/UL (ref 0–0.3)
IMM GRANULOCYTES NFR BLD: 1 %
LYMPHOCYTES NFR BLD: 2.85 K/UL (ref 1.1–3.7)
LYMPHOCYTES RELATIVE PERCENT: 19 % (ref 24–43)
MCH RBC QN AUTO: 34.8 PG (ref 25.2–33.5)
MCHC RBC AUTO-ENTMCNC: 34.7 G/DL (ref 25.2–33.5)
MCV RBC AUTO: 100.3 FL (ref 82.6–102.9)
MONOCYTES NFR BLD: 0.75 K/UL (ref 0.1–1.2)
MONOCYTES NFR BLD: 5 % (ref 3–12)
NEUTROPHILS NFR BLD: 75 % (ref 36–65)
NEUTS SEG NFR BLD: 11.4 K/UL (ref 1.5–8.1)
NRBC BLD-RTO: 0.2 PER 100 WBC
PLATELET # BLD AUTO: 105 K/UL (ref 138–453)
PMV BLD AUTO: 10.1 FL (ref 8.1–13.5)
POTASSIUM SERPL-SCNC: 4 MMOL/L (ref 3.7–5.3)
PROT SERPL-MCNC: 5.9 G/DL (ref 6.4–8.3)
RBC # BLD AUTO: 3.62 M/UL (ref 4.21–5.77)
SODIUM SERPL-SCNC: 138 MMOL/L (ref 135–144)
TSH SERPL DL<=0.05 MIU/L-ACNC: 2.82 UIU/ML (ref 0.3–5)
WBC OTHER # BLD: 15.2 K/UL (ref 3.5–11.3)

## 2025-02-24 PROCEDURE — 85025 COMPLETE CBC W/AUTO DIFF WBC: CPT

## 2025-02-24 PROCEDURE — 3078F DIAST BP <80 MM HG: CPT | Performed by: INTERNAL MEDICINE

## 2025-02-24 PROCEDURE — 1124F ACP DISCUSS-NO DSCNMKR DOCD: CPT | Performed by: INTERNAL MEDICINE

## 2025-02-24 PROCEDURE — 3075F SYST BP GE 130 - 139MM HG: CPT | Performed by: INTERNAL MEDICINE

## 2025-02-24 PROCEDURE — 80053 COMPREHEN METABOLIC PANEL: CPT

## 2025-02-24 PROCEDURE — 2580000003 HC RX 258: Performed by: INTERNAL MEDICINE

## 2025-02-24 PROCEDURE — 99214 OFFICE O/P EST MOD 30 MIN: CPT | Performed by: INTERNAL MEDICINE

## 2025-02-24 PROCEDURE — 96413 CHEMO IV INFUSION 1 HR: CPT

## 2025-02-24 PROCEDURE — 6360000002 HC RX W HCPCS: Performed by: INTERNAL MEDICINE

## 2025-02-24 PROCEDURE — 84443 ASSAY THYROID STIM HORMONE: CPT

## 2025-02-24 PROCEDURE — 99204 OFFICE O/P NEW MOD 45 MIN: CPT | Performed by: INTERNAL MEDICINE

## 2025-02-24 RX ORDER — DIPHENHYDRAMINE HYDROCHLORIDE 50 MG/ML
50 INJECTION INTRAMUSCULAR; INTRAVENOUS
OUTPATIENT
Start: 2025-03-17

## 2025-02-24 RX ORDER — SODIUM CHLORIDE 9 MG/ML
5-250 INJECTION, SOLUTION INTRAVENOUS PRN
Status: DISCONTINUED | OUTPATIENT
Start: 2025-02-24 | End: 2025-02-25 | Stop reason: HOSPADM

## 2025-02-24 RX ORDER — ALBUTEROL SULFATE 90 UG/1
4 INHALANT RESPIRATORY (INHALATION) PRN
OUTPATIENT
Start: 2025-03-17

## 2025-02-24 RX ORDER — HEPARIN SODIUM (PORCINE) LOCK FLUSH IV SOLN 100 UNIT/ML 100 UNIT/ML
500 SOLUTION INTRAVENOUS PRN
OUTPATIENT
Start: 2025-03-17

## 2025-02-24 RX ORDER — MEPERIDINE HYDROCHLORIDE 50 MG/ML
12.5 INJECTION INTRAMUSCULAR; INTRAVENOUS; SUBCUTANEOUS PRN
OUTPATIENT
Start: 2025-03-17

## 2025-02-24 RX ORDER — ACETAMINOPHEN 325 MG/1
650 TABLET ORAL
OUTPATIENT
Start: 2025-03-17

## 2025-02-24 RX ORDER — EPINEPHRINE 1 MG/ML
0.3 INJECTION, SOLUTION, CONCENTRATE INTRAVENOUS PRN
OUTPATIENT
Start: 2025-03-17

## 2025-02-24 RX ORDER — SODIUM CHLORIDE 0.9 % (FLUSH) 0.9 %
5-40 SYRINGE (ML) INJECTION PRN
Status: DISCONTINUED | OUTPATIENT
Start: 2025-02-24 | End: 2025-02-25 | Stop reason: HOSPADM

## 2025-02-24 RX ORDER — SODIUM CHLORIDE 9 MG/ML
5-250 INJECTION, SOLUTION INTRAVENOUS PRN
OUTPATIENT
Start: 2025-03-17

## 2025-02-24 RX ORDER — ACETAMINOPHEN 325 MG/1
650 TABLET ORAL
Start: 2025-03-17

## 2025-02-24 RX ORDER — FAMOTIDINE 10 MG/ML
20 INJECTION, SOLUTION INTRAVENOUS
OUTPATIENT
Start: 2025-03-17

## 2025-02-24 RX ORDER — SODIUM CHLORIDE 0.9 % (FLUSH) 0.9 %
5-40 SYRINGE (ML) INJECTION PRN
OUTPATIENT
Start: 2025-03-17

## 2025-02-24 RX ORDER — DEXAMETHASONE 1 MG
2 TABLET ORAL
Qty: 30 TABLET | Refills: 0 | Status: SHIPPED | OUTPATIENT
Start: 2025-02-24 | End: 2025-03-26

## 2025-02-24 RX ORDER — HYDROCORTISONE SODIUM SUCCINATE 100 MG/2ML
100 INJECTION INTRAMUSCULAR; INTRAVENOUS
OUTPATIENT
Start: 2025-03-17

## 2025-02-24 RX ORDER — SODIUM CHLORIDE 9 MG/ML
INJECTION, SOLUTION INTRAVENOUS CONTINUOUS
OUTPATIENT
Start: 2025-03-17

## 2025-02-24 RX ORDER — ONDANSETRON 2 MG/ML
8 INJECTION INTRAMUSCULAR; INTRAVENOUS
OUTPATIENT
Start: 2025-03-17

## 2025-02-24 RX ADMIN — SODIUM CHLORIDE 25 ML/HR: 0.9 INJECTION, SOLUTION INTRAVENOUS at 14:18

## 2025-02-24 RX ADMIN — ATEZOLIZUMAB 1200 MG: 1200 INJECTION, SOLUTION INTRAVENOUS at 14:57

## 2025-02-24 NOTE — TELEPHONE ENCOUNTER
Name: Andrea Desir  : 1955  MRN: [id number]    Oncology Navigation Follow-Up Note    Contact Type:  Medical Oncology    Notes:   Writer met with patient and family while in office for oncology appt. He denies any navigation needs at present. He continues to manage his treatment regimen well. Three doses RT completed 25  Encouraged her/him to call with questions or concerns.     Navigator following for continuity of care.        Electronically signed by Natalia Broussard RN on 2025 at 4:16 PM

## 2025-02-24 NOTE — PROGRESS NOTES
VAD accessed per protocol with 3/4 inch 20 gauge durand needle.  Flushed easily with saline 10 ml.  Good blood return.  Labs drawn.  Saline locked.  No new complaints.

## 2025-02-24 NOTE — PROGRESS NOTES
Patient ID: Andrea Desir, 1955, [id number], 70 y.o.  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, adrenal metastasis and bone metastasis  Started on palliative chemotherapy with carboplatin etoposide and Tecentriq on 4/23/2024  Restaging CT scan after 2 cycles showed great response to treatment  Restaging CT chest abdomen pelvis after 6 cycles showed stable right upper lobe lung nodule and slightly decreased right hilar lymphadenopathy  Plan to continue maintenance immunotherapy  Unfortunately recent brain MRI showing heterogenous mass within the left cerebellum measuring 3.5 cm and patient complains of gait imbalance  Patient has completed radiation therapy his brain last week Thursday 2/20/2025  HISTORY OF PRESENT ILLNESS:    Oncologic History:  Andrea Desir is a 70 y.o. male with history of atrial fibrillation, status post AICD, history of CAD status post stent, with a tobacco abuse was seen during initial counseling visit for newly discovered lung mass and lymphadenopathy.    Patient recently was seen by his primary care physician for difficulty swallowing and also swelling in his neck area.  Patient also having cough for past 6 months.  Patient had a CT scan of the neck as well as CT scan of the chest on 3/27/2024 which showed right upper lobe 4.7 cm mass suspicious for primary lung malignancy along with right hilar and bilateral mediastinal bulky lymphadenopathy with extrinsic mass effect on the trachea and esophagus without occlusion.  Patient also noted to have bilateral supraclavicular lymphadenopathy suspicious for metastasis.  Patient was referred to oncology for further recommendations.  He smokes 1 pack cigarettes per day.  He has a history of CAD status post AICD and stent placement.  Currently he is on aspirin and Plavix and following with cardiologist.    He may have lost few pounds over past few weeks.  He denies any

## 2025-02-25 RX ORDER — DIPHENHYDRAMINE HYDROCHLORIDE 50 MG/ML
50 INJECTION INTRAMUSCULAR; INTRAVENOUS
OUTPATIENT
Start: 2025-04-07

## 2025-02-25 RX ORDER — EPINEPHRINE 1 MG/ML
0.3 INJECTION, SOLUTION, CONCENTRATE INTRAVENOUS PRN
OUTPATIENT
Start: 2025-04-07

## 2025-02-25 RX ORDER — HYDROCORTISONE SODIUM SUCCINATE 100 MG/2ML
100 INJECTION INTRAMUSCULAR; INTRAVENOUS
OUTPATIENT
Start: 2025-04-07

## 2025-02-25 RX ORDER — ACETAMINOPHEN 325 MG/1
650 TABLET ORAL
Start: 2025-04-07

## 2025-02-25 RX ORDER — FAMOTIDINE 10 MG/ML
20 INJECTION, SOLUTION INTRAVENOUS
OUTPATIENT
Start: 2025-04-07

## 2025-02-25 RX ORDER — SODIUM CHLORIDE 9 MG/ML
5-250 INJECTION, SOLUTION INTRAVENOUS PRN
OUTPATIENT
Start: 2025-04-07

## 2025-02-25 RX ORDER — ACETAMINOPHEN 325 MG/1
650 TABLET ORAL
OUTPATIENT
Start: 2025-04-07

## 2025-02-25 RX ORDER — MEPERIDINE HYDROCHLORIDE 50 MG/ML
12.5 INJECTION INTRAMUSCULAR; INTRAVENOUS; SUBCUTANEOUS PRN
OUTPATIENT
Start: 2025-04-07

## 2025-02-25 RX ORDER — HEPARIN SODIUM (PORCINE) LOCK FLUSH IV SOLN 100 UNIT/ML 100 UNIT/ML
500 SOLUTION INTRAVENOUS PRN
OUTPATIENT
Start: 2025-04-07

## 2025-02-25 RX ORDER — SODIUM CHLORIDE 9 MG/ML
INJECTION, SOLUTION INTRAVENOUS CONTINUOUS
OUTPATIENT
Start: 2025-04-07

## 2025-02-25 RX ORDER — SODIUM CHLORIDE 0.9 % (FLUSH) 0.9 %
5-40 SYRINGE (ML) INJECTION PRN
OUTPATIENT
Start: 2025-04-07

## 2025-02-25 RX ORDER — ALBUTEROL SULFATE 90 UG/1
4 INHALANT RESPIRATORY (INHALATION) PRN
OUTPATIENT
Start: 2025-04-07

## 2025-02-25 RX ORDER — ONDANSETRON 2 MG/ML
8 INJECTION INTRAMUSCULAR; INTRAVENOUS
OUTPATIENT
Start: 2025-04-07

## 2025-03-06 ENCOUNTER — OFFICE VISIT (OUTPATIENT)
Dept: UROLOGY | Age: 70
End: 2025-03-06
Payer: MEDICARE

## 2025-03-06 VITALS
BODY MASS INDEX: 34.58 KG/M2 | DIASTOLIC BLOOD PRESSURE: 70 MMHG | HEART RATE: 66 BPM | SYSTOLIC BLOOD PRESSURE: 124 MMHG | HEIGHT: 72 IN

## 2025-03-06 DIAGNOSIS — C34.91 SMALL CELL LUNG CANCER, RIGHT (HCC): ICD-10-CM

## 2025-03-06 DIAGNOSIS — N40.1 BENIGN LOCALIZED PROSTATIC HYPERPLASIA WITH LOWER URINARY TRACT SYMPTOMS (LUTS): Primary | ICD-10-CM

## 2025-03-06 PROCEDURE — 1124F ACP DISCUSS-NO DSCNMKR DOCD: CPT

## 2025-03-06 PROCEDURE — 3078F DIAST BP <80 MM HG: CPT

## 2025-03-06 PROCEDURE — 99213 OFFICE O/P EST LOW 20 MIN: CPT

## 2025-03-06 PROCEDURE — 3074F SYST BP LT 130 MM HG: CPT

## 2025-03-06 PROCEDURE — 99212 OFFICE O/P EST SF 10 MIN: CPT

## 2025-03-06 RX ORDER — LORAZEPAM 0.5 MG/1
TABLET ORAL
Qty: 90 TABLET | Refills: 0 | Status: SHIPPED | OUTPATIENT
Start: 2025-03-06 | End: 2025-04-05

## 2025-03-06 NOTE — PROGRESS NOTES
Greenlight PVP by Dr. Mariee on 07/22/24.   - Symptoms at goal. Very pleased. Urinary stream is much improved, as is emptying.    *Follow-up in 1 year for symptom check.    Gigi Whyte PA-C  Urology

## 2025-03-17 ENCOUNTER — HOSPITAL ENCOUNTER (OUTPATIENT)
Dept: INFUSION THERAPY | Age: 70
Discharge: HOME | End: 2025-03-17
Payer: MEDICARE

## 2025-03-17 VITALS
WEIGHT: 247 LBS | TEMPERATURE: 97.1 F | HEART RATE: 72 BPM | SYSTOLIC BLOOD PRESSURE: 138 MMHG | OXYGEN SATURATION: 95 % | RESPIRATION RATE: 16 BRPM | DIASTOLIC BLOOD PRESSURE: 81 MMHG | HEIGHT: 72 IN | BODY MASS INDEX: 33.46 KG/M2

## 2025-03-17 DIAGNOSIS — R53.83 FATIGUE, UNSPECIFIED TYPE: ICD-10-CM

## 2025-03-17 DIAGNOSIS — C34.91 SMALL CELL LUNG CANCER, RIGHT (HCC): Primary | ICD-10-CM

## 2025-03-17 LAB
ALBUMIN SERPL-MCNC: 3.6 G/DL (ref 3.5–5.2)
ALBUMIN/GLOB SERPL: 1.2 {RATIO} (ref 1–2.5)
ALP SERPL-CCNC: 53 U/L (ref 40–129)
ALT SERPL-CCNC: 32 U/L (ref 5–41)
ANION GAP SERPL CALCULATED.3IONS-SCNC: 10 MMOL/L (ref 9–17)
AST SERPL-CCNC: 21 U/L
BASOPHILS # BLD: 0 K/UL (ref 0–0.2)
BASOPHILS NFR BLD: 0 % (ref 0–2)
BILIRUB SERPL-MCNC: 0.6 MG/DL (ref 0.3–1.2)
BUN SERPL-MCNC: 20 MG/DL (ref 8–23)
BUN/CREAT SERPL: 17 (ref 9–20)
CALCIUM SERPL-MCNC: 8.8 MG/DL (ref 8.6–10.4)
CHLORIDE SERPL-SCNC: 102 MMOL/L (ref 98–107)
CO2 SERPL-SCNC: 28 MMOL/L (ref 20–31)
CREAT SERPL-MCNC: 1.2 MG/DL (ref 0.7–1.2)
EOSINOPHIL # BLD: 0 K/UL (ref 0–0.4)
EOSINOPHILS RELATIVE PERCENT: 0 % (ref 1–8)
ERYTHROCYTE [DISTWIDTH] IN BLOOD BY AUTOMATED COUNT: 14.4 % (ref 11.8–14.4)
GFR, ESTIMATED: 65 ML/MIN/1.73M2
GLUCOSE SERPL-MCNC: 101 MG/DL (ref 70–99)
HCT VFR BLD AUTO: 33.5 % (ref 40.7–50.3)
HGB BLD-MCNC: 11.7 G/DL (ref 13–17)
IMM GRANULOCYTES # BLD AUTO: 0 K/UL (ref 0–0.3)
IMM GRANULOCYTES NFR BLD: 0 %
LYMPHOCYTES NFR BLD: 3.07 K/UL (ref 1–4.8)
LYMPHOCYTES RELATIVE PERCENT: 32 % (ref 15–43)
MCH RBC QN AUTO: 34.5 PG (ref 25.2–33.5)
MCHC RBC AUTO-ENTMCNC: 34.9 G/DL (ref 25.2–33.5)
MCV RBC AUTO: 98.8 FL (ref 82.6–102.9)
MONOCYTES NFR BLD: 0.58 K/UL (ref 0.1–1.2)
MONOCYTES NFR BLD: 6 % (ref 6–14)
MORPHOLOGY: ABNORMAL
MORPHOLOGY: ABNORMAL
NEUTROPHILS NFR BLD: 62 % (ref 44–74)
NEUTS SEG NFR BLD: 5.95 K/UL (ref 1.5–8.1)
NRBC BLD-RTO: 0 PER 100 WBC
PLATELET # BLD AUTO: 143 K/UL (ref 138–453)
PMV BLD AUTO: 10 FL (ref 8.1–13.5)
POTASSIUM SERPL-SCNC: 3.6 MMOL/L (ref 3.7–5.3)
PROT SERPL-MCNC: 6.7 G/DL (ref 6.4–8.3)
RBC # BLD AUTO: 3.39 M/UL (ref 4.21–5.77)
SODIUM SERPL-SCNC: 140 MMOL/L (ref 135–144)
TSH SERPL DL<=0.05 MIU/L-ACNC: 3.08 UIU/ML (ref 0.3–5)
WBC OTHER # BLD: 9.6 K/UL (ref 3.5–11.3)

## 2025-03-17 PROCEDURE — 2580000003 HC RX 258: Performed by: INTERNAL MEDICINE

## 2025-03-17 PROCEDURE — 96413 CHEMO IV INFUSION 1 HR: CPT

## 2025-03-17 PROCEDURE — 85025 COMPLETE CBC W/AUTO DIFF WBC: CPT

## 2025-03-17 PROCEDURE — 2500000003 HC RX 250 WO HCPCS: Performed by: INTERNAL MEDICINE

## 2025-03-17 PROCEDURE — 84443 ASSAY THYROID STIM HORMONE: CPT

## 2025-03-17 PROCEDURE — 80053 COMPREHEN METABOLIC PANEL: CPT

## 2025-03-17 PROCEDURE — 6360000002 HC RX W HCPCS: Performed by: INTERNAL MEDICINE

## 2025-03-17 RX ORDER — SODIUM CHLORIDE 9 MG/ML
5-250 INJECTION, SOLUTION INTRAVENOUS PRN
Status: DISCONTINUED | OUTPATIENT
Start: 2025-03-17 | End: 2025-03-18 | Stop reason: HOSPADM

## 2025-03-17 RX ORDER — HEPARIN 100 UNIT/ML
500 SYRINGE INTRAVENOUS PRN
Status: DISCONTINUED | OUTPATIENT
Start: 2025-03-17 | End: 2025-03-18 | Stop reason: HOSPADM

## 2025-03-17 RX ORDER — SODIUM CHLORIDE 0.9 % (FLUSH) 0.9 %
5-40 SYRINGE (ML) INJECTION PRN
Status: DISCONTINUED | OUTPATIENT
Start: 2025-03-17 | End: 2025-03-18 | Stop reason: HOSPADM

## 2025-03-17 RX ADMIN — SODIUM CHLORIDE, PRESERVATIVE FREE 10 ML: 5 INJECTION INTRAVENOUS at 14:45

## 2025-03-17 RX ADMIN — SODIUM CHLORIDE 30 ML/HR: 0.9 INJECTION, SOLUTION INTRAVENOUS at 13:33

## 2025-03-17 RX ADMIN — HEPARIN 500 UNITS: 100 SYRINGE at 14:45

## 2025-03-17 RX ADMIN — ATEZOLIZUMAB 1200 MG: 1200 INJECTION, SOLUTION INTRAVENOUS at 13:53

## 2025-03-17 NOTE — PLAN OF CARE
Problem: Safety - Adult  Goal: Free from fall injury  3/17/2025 1517 by Yarely Iverson, RN  Outcome: Adequate for Discharge  3/17/2025 1300 by Yarely Iverson, RN  Reactivated

## 2025-04-07 ENCOUNTER — HOSPITAL ENCOUNTER (OUTPATIENT)
Dept: INFUSION THERAPY | Age: 70
Discharge: HOME | End: 2025-04-07
Payer: MEDICARE

## 2025-04-07 VITALS
DIASTOLIC BLOOD PRESSURE: 71 MMHG | TEMPERATURE: 97.9 F | OXYGEN SATURATION: 97 % | RESPIRATION RATE: 16 BRPM | HEART RATE: 71 BPM | SYSTOLIC BLOOD PRESSURE: 127 MMHG

## 2025-04-07 DIAGNOSIS — R53.83 FATIGUE, UNSPECIFIED TYPE: ICD-10-CM

## 2025-04-07 DIAGNOSIS — C34.91 SMALL CELL LUNG CANCER, RIGHT (HCC): Primary | ICD-10-CM

## 2025-04-07 LAB
ALBUMIN SERPL-MCNC: 4 G/DL (ref 3.5–5.2)
ALBUMIN/GLOB SERPL: 1.5 {RATIO} (ref 1–2.5)
ALP SERPL-CCNC: 71 U/L (ref 40–129)
ALT SERPL-CCNC: 20 U/L (ref 10–50)
ANION GAP SERPL CALCULATED.3IONS-SCNC: 12 MMOL/L (ref 9–16)
AST SERPL-CCNC: 19 U/L (ref 10–50)
BASOPHILS # BLD: 0.04 K/UL (ref 0–0.2)
BASOPHILS NFR BLD: 1 % (ref 0–2)
BILIRUB SERPL-MCNC: 0.5 MG/DL (ref 0–1.2)
BUN SERPL-MCNC: 10 MG/DL (ref 8–23)
BUN/CREAT SERPL: 9 (ref 9–20)
CALCIUM SERPL-MCNC: 9.4 MG/DL (ref 8.6–10.4)
CHLORIDE SERPL-SCNC: 102 MMOL/L (ref 98–107)
CO2 SERPL-SCNC: 26 MMOL/L (ref 20–31)
CREAT SERPL-MCNC: 1.1 MG/DL (ref 0.7–1.2)
EOSINOPHIL # BLD: 0.07 K/UL (ref 0–0.44)
EOSINOPHILS RELATIVE PERCENT: 1 % (ref 1–4)
ERYTHROCYTE [DISTWIDTH] IN BLOOD BY AUTOMATED COUNT: 15.3 % (ref 11.8–14.4)
GFR, ESTIMATED: 72 ML/MIN/1.73M2
GLUCOSE SERPL-MCNC: 91 MG/DL (ref 74–99)
HCT VFR BLD AUTO: 33 % (ref 40.7–50.3)
HGB BLD-MCNC: 11.7 G/DL (ref 13–17)
IMM GRANULOCYTES # BLD AUTO: 0.03 K/UL (ref 0–0.3)
IMM GRANULOCYTES NFR BLD: 0 %
LYMPHOCYTES NFR BLD: 2.63 K/UL (ref 1.1–3.7)
LYMPHOCYTES RELATIVE PERCENT: 35 % (ref 24–43)
MCH RBC QN AUTO: 35.3 PG (ref 25.2–33.5)
MCHC RBC AUTO-ENTMCNC: 35.5 G/DL (ref 25.2–33.5)
MCV RBC AUTO: 99.7 FL (ref 82.6–102.9)
MONOCYTES NFR BLD: 0.6 K/UL (ref 0.1–1.2)
MONOCYTES NFR BLD: 8 % (ref 3–12)
NEUTROPHILS NFR BLD: 55 % (ref 36–65)
NEUTS SEG NFR BLD: 4.18 K/UL (ref 1.5–8.1)
NRBC BLD-RTO: 0 PER 100 WBC
PLATELET # BLD AUTO: 158 K/UL (ref 138–453)
PMV BLD AUTO: 10.3 FL (ref 8.1–13.5)
POTASSIUM SERPL-SCNC: 3.8 MMOL/L (ref 3.7–5.3)
PROT SERPL-MCNC: 6.7 G/DL (ref 6.6–8.7)
RBC # BLD AUTO: 3.31 M/UL (ref 4.21–5.77)
RBC # BLD: ABNORMAL 10*6/UL
SODIUM SERPL-SCNC: 140 MMOL/L (ref 136–145)
TSH SERPL DL<=0.05 MIU/L-ACNC: 4.64 UIU/ML (ref 0.27–4.2)
WBC OTHER # BLD: 7.6 K/UL (ref 3.5–11.3)

## 2025-04-07 PROCEDURE — 84443 ASSAY THYROID STIM HORMONE: CPT

## 2025-04-07 PROCEDURE — 6360000002 HC RX W HCPCS: Performed by: INTERNAL MEDICINE

## 2025-04-07 PROCEDURE — 85025 COMPLETE CBC W/AUTO DIFF WBC: CPT

## 2025-04-07 PROCEDURE — 96413 CHEMO IV INFUSION 1 HR: CPT

## 2025-04-07 PROCEDURE — 80053 COMPREHEN METABOLIC PANEL: CPT

## 2025-04-07 PROCEDURE — 2580000003 HC RX 258: Performed by: INTERNAL MEDICINE

## 2025-04-07 PROCEDURE — 2500000003 HC RX 250 WO HCPCS: Performed by: INTERNAL MEDICINE

## 2025-04-07 RX ORDER — SODIUM CHLORIDE 0.9 % (FLUSH) 0.9 %
5-40 SYRINGE (ML) INJECTION PRN
Status: DISCONTINUED | OUTPATIENT
Start: 2025-04-07 | End: 2025-04-08 | Stop reason: HOSPADM

## 2025-04-07 RX ORDER — HEPARIN 100 UNIT/ML
500 SYRINGE INTRAVENOUS PRN
Status: DISCONTINUED | OUTPATIENT
Start: 2025-04-07 | End: 2025-04-08 | Stop reason: HOSPADM

## 2025-04-07 RX ORDER — SODIUM CHLORIDE 9 MG/ML
5-250 INJECTION, SOLUTION INTRAVENOUS PRN
Status: DISCONTINUED | OUTPATIENT
Start: 2025-04-07 | End: 2025-04-08 | Stop reason: HOSPADM

## 2025-04-07 RX ADMIN — HEPARIN 500 UNITS: 100 SYRINGE at 14:49

## 2025-04-07 RX ADMIN — SODIUM CHLORIDE 20 ML/HR: 0.9 INJECTION, SOLUTION INTRAVENOUS at 14:08

## 2025-04-07 RX ADMIN — ATEZOLIZUMAB 1200 MG: 1200 INJECTION, SOLUTION INTRAVENOUS at 14:11

## 2025-04-07 RX ADMIN — SODIUM CHLORIDE, PRESERVATIVE FREE 10 ML: 5 INJECTION INTRAVENOUS at 14:48

## 2025-04-09 ENCOUNTER — HOSPITAL ENCOUNTER (OUTPATIENT)
Dept: CT IMAGING | Age: 70
Discharge: HOME | End: 2025-04-11
Attending: INTERNAL MEDICINE
Payer: MEDICARE

## 2025-04-09 DIAGNOSIS — C34.91 SMALL CELL LUNG CANCER, RIGHT (HCC): ICD-10-CM

## 2025-04-09 PROCEDURE — 71260 CT THORAX DX C+: CPT

## 2025-04-09 PROCEDURE — 6360000004 HC RX CONTRAST MEDICATION: Performed by: INTERNAL MEDICINE

## 2025-04-09 PROCEDURE — 2500000003 HC RX 250 WO HCPCS: Performed by: INTERNAL MEDICINE

## 2025-04-09 RX ORDER — IOPAMIDOL 755 MG/ML
100 INJECTION, SOLUTION INTRAVASCULAR
Status: COMPLETED | OUTPATIENT
Start: 2025-04-09 | End: 2025-04-09

## 2025-04-09 RX ADMIN — BARIUM SULFATE 450 ML: 20 SUSPENSION ORAL at 16:02

## 2025-04-09 RX ADMIN — IOPAMIDOL 100 ML: 755 INJECTION, SOLUTION INTRAVENOUS at 16:05

## 2025-04-14 ENCOUNTER — OFFICE VISIT (OUTPATIENT)
Dept: ONCOLOGY | Age: 70
End: 2025-04-14
Payer: MEDICARE

## 2025-04-14 ENCOUNTER — TELEPHONE (OUTPATIENT)
Dept: ONCOLOGY | Age: 70
End: 2025-04-14

## 2025-04-14 VITALS
WEIGHT: 255 LBS | HEIGHT: 72 IN | TEMPERATURE: 97.5 F | BODY MASS INDEX: 34.54 KG/M2 | SYSTOLIC BLOOD PRESSURE: 128 MMHG | OXYGEN SATURATION: 95 % | RESPIRATION RATE: 16 BRPM | HEART RATE: 80 BPM | DIASTOLIC BLOOD PRESSURE: 72 MMHG

## 2025-04-14 DIAGNOSIS — E78.5 HYPERLIPIDEMIA, UNSPECIFIED HYPERLIPIDEMIA TYPE: ICD-10-CM

## 2025-04-14 DIAGNOSIS — C34.91 SMALL CELL LUNG CANCER, RIGHT (HCC): Primary | ICD-10-CM

## 2025-04-14 DIAGNOSIS — C34.91 SMALL CELL LUNG CANCER, RIGHT (HCC): ICD-10-CM

## 2025-04-14 PROCEDURE — 99215 OFFICE O/P EST HI 40 MIN: CPT | Performed by: INTERNAL MEDICINE

## 2025-04-14 PROCEDURE — 99214 OFFICE O/P EST MOD 30 MIN: CPT

## 2025-04-14 PROCEDURE — 1124F ACP DISCUSS-NO DSCNMKR DOCD: CPT | Performed by: INTERNAL MEDICINE

## 2025-04-14 PROCEDURE — 3074F SYST BP LT 130 MM HG: CPT | Performed by: INTERNAL MEDICINE

## 2025-04-14 PROCEDURE — 1159F MED LIST DOCD IN RCRD: CPT | Performed by: INTERNAL MEDICINE

## 2025-04-14 PROCEDURE — 3078F DIAST BP <80 MM HG: CPT | Performed by: INTERNAL MEDICINE

## 2025-04-14 RX ORDER — ACETAMINOPHEN 325 MG/1
650 TABLET ORAL
Start: 2025-05-19

## 2025-04-14 RX ORDER — HEPARIN SODIUM (PORCINE) LOCK FLUSH IV SOLN 100 UNIT/ML 100 UNIT/ML
500 SOLUTION INTRAVENOUS PRN
OUTPATIENT
Start: 2025-04-28

## 2025-04-14 RX ORDER — EPINEPHRINE 1 MG/ML
0.3 INJECTION, SOLUTION, CONCENTRATE INTRAVENOUS PRN
OUTPATIENT
Start: 2025-05-19

## 2025-04-14 RX ORDER — ONDANSETRON 2 MG/ML
8 INJECTION INTRAMUSCULAR; INTRAVENOUS
OUTPATIENT
Start: 2025-05-19

## 2025-04-14 RX ORDER — SODIUM CHLORIDE 9 MG/ML
INJECTION, SOLUTION INTRAVENOUS CONTINUOUS
OUTPATIENT
Start: 2025-04-28

## 2025-04-14 RX ORDER — ALBUTEROL SULFATE 90 UG/1
4 INHALANT RESPIRATORY (INHALATION) PRN
OUTPATIENT
Start: 2025-05-19

## 2025-04-14 RX ORDER — DIPHENHYDRAMINE HYDROCHLORIDE 50 MG/ML
50 INJECTION, SOLUTION INTRAMUSCULAR; INTRAVENOUS
OUTPATIENT
Start: 2025-05-19

## 2025-04-14 RX ORDER — EPINEPHRINE 1 MG/ML
0.3 INJECTION, SOLUTION, CONCENTRATE INTRAVENOUS PRN
OUTPATIENT
Start: 2025-04-28

## 2025-04-14 RX ORDER — MEPERIDINE HYDROCHLORIDE 50 MG/ML
12.5 INJECTION INTRAMUSCULAR; INTRAVENOUS; SUBCUTANEOUS PRN
OUTPATIENT
Start: 2025-05-19

## 2025-04-14 RX ORDER — HEPARIN SODIUM (PORCINE) LOCK FLUSH IV SOLN 100 UNIT/ML 100 UNIT/ML
500 SOLUTION INTRAVENOUS PRN
OUTPATIENT
Start: 2025-05-19

## 2025-04-14 RX ORDER — SODIUM CHLORIDE 9 MG/ML
5-250 INJECTION, SOLUTION INTRAVENOUS PRN
OUTPATIENT
Start: 2025-05-19

## 2025-04-14 RX ORDER — SODIUM CHLORIDE 9 MG/ML
INJECTION, SOLUTION INTRAVENOUS CONTINUOUS
OUTPATIENT
Start: 2025-05-19

## 2025-04-14 RX ORDER — ALBUTEROL SULFATE 90 UG/1
4 INHALANT RESPIRATORY (INHALATION) PRN
OUTPATIENT
Start: 2025-04-28

## 2025-04-14 RX ORDER — ACETAMINOPHEN 325 MG/1
650 TABLET ORAL
Start: 2025-04-28

## 2025-04-14 RX ORDER — ONDANSETRON 2 MG/ML
8 INJECTION INTRAMUSCULAR; INTRAVENOUS
OUTPATIENT
Start: 2025-04-28

## 2025-04-14 RX ORDER — DIPHENHYDRAMINE HYDROCHLORIDE 50 MG/ML
50 INJECTION, SOLUTION INTRAMUSCULAR; INTRAVENOUS
OUTPATIENT
Start: 2025-04-28

## 2025-04-14 RX ORDER — ACETAMINOPHEN 325 MG/1
650 TABLET ORAL
OUTPATIENT
Start: 2025-05-19

## 2025-04-14 RX ORDER — FAMOTIDINE 10 MG/ML
20 INJECTION, SOLUTION INTRAVENOUS
OUTPATIENT
Start: 2025-04-28

## 2025-04-14 RX ORDER — SODIUM CHLORIDE 9 MG/ML
5-250 INJECTION, SOLUTION INTRAVENOUS PRN
OUTPATIENT
Start: 2025-04-28

## 2025-04-14 RX ORDER — LORAZEPAM 0.5 MG/1
0.5 TABLET ORAL EVERY 8 HOURS PRN
Qty: 90 TABLET | Refills: 0 | Status: SHIPPED | OUTPATIENT
Start: 2025-04-14 | End: 2025-05-14

## 2025-04-14 RX ORDER — DIAZEPAM 2 MG/1
2 TABLET ORAL DAILY PRN
Qty: 2 TABLET | Refills: 0 | Status: SHIPPED | OUTPATIENT
Start: 2025-04-14 | End: 2025-04-16

## 2025-04-14 RX ORDER — HYDROCORTISONE SODIUM SUCCINATE 100 MG/2ML
100 INJECTION INTRAMUSCULAR; INTRAVENOUS
OUTPATIENT
Start: 2025-04-28

## 2025-04-14 RX ORDER — HYDROCORTISONE SODIUM SUCCINATE 100 MG/2ML
100 INJECTION INTRAMUSCULAR; INTRAVENOUS
OUTPATIENT
Start: 2025-05-19

## 2025-04-14 RX ORDER — FAMOTIDINE 10 MG/ML
20 INJECTION, SOLUTION INTRAVENOUS
OUTPATIENT
Start: 2025-05-19

## 2025-04-14 RX ORDER — ATORVASTATIN CALCIUM 80 MG/1
80 TABLET, FILM COATED ORAL DAILY
Qty: 100 TABLET | Refills: 1 | Status: SHIPPED | OUTPATIENT
Start: 2025-04-14

## 2025-04-14 RX ORDER — SODIUM CHLORIDE 0.9 % (FLUSH) 0.9 %
5-40 SYRINGE (ML) INJECTION PRN
OUTPATIENT
Start: 2025-04-28

## 2025-04-14 RX ORDER — ACETAMINOPHEN 325 MG/1
650 TABLET ORAL
OUTPATIENT
Start: 2025-04-28

## 2025-04-14 RX ORDER — LORAZEPAM 0.5 MG/1
0.5 TABLET ORAL EVERY 8 HOURS PRN
Qty: 90 TABLET | Refills: 0 | OUTPATIENT
Start: 2025-04-14

## 2025-04-14 RX ORDER — MEPERIDINE HYDROCHLORIDE 50 MG/ML
12.5 INJECTION INTRAMUSCULAR; INTRAVENOUS; SUBCUTANEOUS PRN
OUTPATIENT
Start: 2025-04-28

## 2025-04-14 RX ORDER — SODIUM CHLORIDE 0.9 % (FLUSH) 0.9 %
5-40 SYRINGE (ML) INJECTION PRN
OUTPATIENT
Start: 2025-05-19

## 2025-04-14 NOTE — PROGRESS NOTES
pericardial  effusion.    Lungs/pleura: Lungs demonstrate opacifications at the right lung apex further  decreased soft tissue density or nodular focus from 2 cm on prior now 1.5 cm  with adjacent scarring most consistent of continued treatment response.  Left  lower lobe subpleural nodularity is slightly less evident than on prior  probable nodule versus scarring 4-5 mm.  No clear new or enlarging nodule..  No pleural effusion or pleural process.    Soft Tissues/Bones: No acute osseous or soft tissue findings. No aggressive  osseous lesion.    Abdomen/Pelvis:    Organs: Liver contains low-attenuation area within the caudate lobe likely  cyst or hemangioma unchanged from prior along with smaller similar  attenuation subcentimeter focus in the right hepatic lobe inferior medial  portion also unchanged.  Gallbladder unremarkable. Pancreas and spleen  unremarkable.  Adrenals demonstrate normal appearance of the right adrenal  gland without nodule.  Left stable adrenal nodule 3 cm.  Kidneys without  suspicious renal lesion and no hydronephrosis.    GI/Bowel: No focal thickening or disproportion dilatation of bowel.  No  inflammatory findings.  Appendix is unremarkable and visualized in the right  lower quadrant.  Mild-to-moderate colonic stool burden.    Pelvis: No suspicious pelvic lesion or bulky pelvic adenopathy/free fluid.    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  No bulky retroperitoneal adenopathy.    Bones/Soft Tissues: No acute osseous or soft tissue findings.  Impression: 1. Continued treatment response at the right lung apex with further decrease  in size of the right apical focus.  Continued attention on follow-up to  establish long-term stability of post treatment changes.  2. Mediastinal and right hilar adenopathy.  3. Stable left adrenal nodule.  4. Stable infrarenal abdominal aortic

## 2025-04-14 NOTE — TELEPHONE ENCOUNTER
Andrea called requesting a refill of the below medication which has been pended for you:     Requested Prescriptions     Pending Prescriptions Disp Refills    atorvastatin (LIPITOR) 80 MG tablet [Pharmacy Med Name: Atorvastatin Calcium 80 MG Oral Tablet] 100 tablet 1     Sig: Take 1 tablet by mouth once daily       Last Appointment Date: 1/27/2025  Next Appointment Date: 6/3/2025    No Known Allergies

## 2025-04-14 NOTE — TELEPHONE ENCOUNTER
Name: Andrea Desir  : 1955  MRN: [id number]    Oncology Navigation Follow-Up Note    Contact Type:  Medical Oncology    Notes:   Writer met with patient while in office for appt and was present during med onc visit.  Patient is doing well. He is scheduled for MRI 25. He struggles with this test and would prefer an open MRI. However, he has pacer/defibrillator. Writer will research area options.       Electronically signed by Natalia Broussard RN on 2025 at 3:59 PM

## 2025-04-25 ENCOUNTER — PROCEDURE VISIT (OUTPATIENT)
Dept: CARDIOLOGY | Age: 70
End: 2025-04-25

## 2025-04-25 ENCOUNTER — OFFICE VISIT (OUTPATIENT)
Dept: CARDIOLOGY | Age: 70
End: 2025-04-25
Payer: MEDICARE

## 2025-04-25 VITALS
SYSTOLIC BLOOD PRESSURE: 116 MMHG | BODY MASS INDEX: 34.58 KG/M2 | DIASTOLIC BLOOD PRESSURE: 62 MMHG | HEART RATE: 57 BPM | WEIGHT: 255 LBS

## 2025-04-25 DIAGNOSIS — Z95.810 ICD (IMPLANTABLE CARDIOVERTER-DEFIBRILLATOR) IN PLACE: ICD-10-CM

## 2025-04-25 DIAGNOSIS — E78.2 MIXED HYPERLIPIDEMIA: ICD-10-CM

## 2025-04-25 DIAGNOSIS — I25.10 CORONARY ARTERY DISEASE INVOLVING NATIVE CORONARY ARTERY OF NATIVE HEART WITHOUT ANGINA PECTORIS: Primary | ICD-10-CM

## 2025-04-25 DIAGNOSIS — I10 PRIMARY HYPERTENSION: ICD-10-CM

## 2025-04-25 DIAGNOSIS — I25.5 ISCHEMIC CARDIOMYOPATHY: Primary | ICD-10-CM

## 2025-04-25 PROCEDURE — 1124F ACP DISCUSS-NO DSCNMKR DOCD: CPT | Performed by: INTERNAL MEDICINE

## 2025-04-25 PROCEDURE — 3074F SYST BP LT 130 MM HG: CPT | Performed by: INTERNAL MEDICINE

## 2025-04-25 PROCEDURE — 3078F DIAST BP <80 MM HG: CPT | Performed by: INTERNAL MEDICINE

## 2025-04-25 PROCEDURE — 99213 OFFICE O/P EST LOW 20 MIN: CPT | Performed by: INTERNAL MEDICINE

## 2025-04-25 PROCEDURE — 99214 OFFICE O/P EST MOD 30 MIN: CPT | Performed by: INTERNAL MEDICINE

## 2025-04-25 PROCEDURE — 1159F MED LIST DOCD IN RCRD: CPT | Performed by: INTERNAL MEDICINE

## 2025-04-25 PROCEDURE — 93010 ELECTROCARDIOGRAM REPORT: CPT | Performed by: INTERNAL MEDICINE

## 2025-04-25 PROCEDURE — 99213 OFFICE O/P EST LOW 20 MIN: CPT

## 2025-04-25 PROCEDURE — 93005 ELECTROCARDIOGRAM TRACING: CPT | Performed by: INTERNAL MEDICINE

## 2025-04-25 NOTE — PROGRESS NOTES
[facility]  [facility] DEPARTMENT OF [place] [address] [address] [facility] [address]  Dept: [phone]    CC: CAD , NOAH/LAD ,  /RCA, VT , CHF, AICD , HFrF,HLP , non-small cell CA lung getting chemo    Subjective:  The patient is a 70 y.o. year old, , male is in the office for a follow up visit.    According patient he is diagnosed with non-small cell CA lung already got 4 chemo's did well no complication with it.     He denies any chest pain or discomfort. No orthopnea or PND. Denies any palpitation, dizziness or syncope. He is completely asymptomatic from cardiac stand point.    AICD is interrogated no episode of VT.  Patient is on amiodarone     Patient has a brain mets from renal cancer, status post radiotherapy, he is getting brain MRI on 5/12/25    Past Medical History:   has a past medical history of Anxiety, Atrial fibrillation (HCC), CAD (coronary artery disease), Erectile dysfunction, Headache(784.0), High cholesterol, Hx of adenomatous colonic polyps, Hypertension, ICD (implantable cardioverter-defibrillator) discharge, Ischemic cardiomyopathy, Low back pain, MI, old, Osteoarthritis, Plantar fasciitis, Seborrheic keratosis, Smoker, and Syncopal episodes.    Past Surgical History:   has a past surgical history that includes Rotator cuff repair (Bilateral, 1997); Vasectomy (1980); Cardiac defibrillator placement (01/07/2013); cyst removal; Hand tendon surgery (Left); Colonoscopy (11/11/2015); Colonoscopy (12/21/2016); Coronary angioplasty with stent (09/2012); Cardiac catheterization (09/04/2020); Cardiac defibrillator placement (06/22/2021); US BIOPSY LYMPH NODE (4/10/2024); Port Surgery (Right, 4/29/2024); and TURP (N/A, 7/22/2024).    Home Medications:  Prior to Admission medications    Medication Sig Start Date End Date Taking? Authorizing Provider   atorvastatin (LIPITOR) 80 MG tablet Take 1 tablet

## 2025-04-28 ENCOUNTER — HOSPITAL ENCOUNTER (OUTPATIENT)
Dept: INFUSION THERAPY | Age: 70
Discharge: HOME | End: 2025-04-28
Payer: MEDICARE

## 2025-04-28 ENCOUNTER — TELEPHONE (OUTPATIENT)
Dept: ONCOLOGY | Age: 70
End: 2025-04-28

## 2025-04-28 VITALS
WEIGHT: 256 LBS | TEMPERATURE: 97.6 F | DIASTOLIC BLOOD PRESSURE: 78 MMHG | SYSTOLIC BLOOD PRESSURE: 127 MMHG | HEART RATE: 67 BPM | OXYGEN SATURATION: 97 % | RESPIRATION RATE: 16 BRPM | HEIGHT: 72 IN | BODY MASS INDEX: 34.67 KG/M2

## 2025-04-28 DIAGNOSIS — C34.91 SMALL CELL LUNG CANCER, RIGHT (HCC): Primary | ICD-10-CM

## 2025-04-28 LAB
ALBUMIN SERPL-MCNC: 4.3 G/DL (ref 3.5–5.2)
ALBUMIN/GLOB SERPL: 1.6 {RATIO} (ref 1–2.5)
ALP SERPL-CCNC: 66 U/L (ref 40–129)
ALT SERPL-CCNC: 17 U/L (ref 10–50)
ANION GAP SERPL CALCULATED.3IONS-SCNC: 13 MMOL/L (ref 9–16)
AST SERPL-CCNC: 21 U/L (ref 10–50)
BASOPHILS # BLD: 0.05 K/UL (ref 0–0.2)
BASOPHILS NFR BLD: 1 % (ref 0–2)
BILIRUB SERPL-MCNC: 0.6 MG/DL (ref 0–1.2)
BUN SERPL-MCNC: 16 MG/DL (ref 8–23)
BUN/CREAT SERPL: 15 (ref 9–20)
CALCIUM SERPL-MCNC: 9.4 MG/DL (ref 8.6–10.4)
CHLORIDE SERPL-SCNC: 104 MMOL/L (ref 98–107)
CO2 SERPL-SCNC: 25 MMOL/L (ref 20–31)
CREAT SERPL-MCNC: 1.1 MG/DL (ref 0.7–1.2)
EOSINOPHIL # BLD: 0.08 K/UL (ref 0–0.44)
EOSINOPHILS RELATIVE PERCENT: 1 % (ref 1–4)
ERYTHROCYTE [DISTWIDTH] IN BLOOD BY AUTOMATED COUNT: 14.6 % (ref 11.8–14.4)
GFR, ESTIMATED: 72 ML/MIN/1.73M2
GLUCOSE SERPL-MCNC: 89 MG/DL (ref 74–99)
HCT VFR BLD AUTO: 34.9 % (ref 40.7–50.3)
HGB BLD-MCNC: 12.3 G/DL (ref 13–17)
IMM GRANULOCYTES # BLD AUTO: 0.03 K/UL (ref 0–0.3)
IMM GRANULOCYTES NFR BLD: 0 %
LYMPHOCYTES NFR BLD: 2.83 K/UL (ref 1.1–3.7)
LYMPHOCYTES RELATIVE PERCENT: 36 % (ref 24–43)
MCH RBC QN AUTO: 35.3 PG (ref 25.2–33.5)
MCHC RBC AUTO-ENTMCNC: 35.2 G/DL (ref 25.2–33.5)
MCV RBC AUTO: 100.3 FL (ref 82.6–102.9)
MONOCYTES NFR BLD: 0.66 K/UL (ref 0.1–1.2)
MONOCYTES NFR BLD: 8 % (ref 3–12)
NEUTROPHILS NFR BLD: 54 % (ref 36–65)
NEUTS SEG NFR BLD: 4.31 K/UL (ref 1.5–8.1)
NRBC BLD-RTO: 0 PER 100 WBC
PLATELET # BLD AUTO: 155 K/UL (ref 138–453)
PMV BLD AUTO: 10.5 FL (ref 8.1–13.5)
POTASSIUM SERPL-SCNC: 3.8 MMOL/L (ref 3.7–5.3)
PROT SERPL-MCNC: 7 G/DL (ref 6.6–8.7)
RBC # BLD AUTO: 3.48 M/UL (ref 4.21–5.77)
RBC # BLD: ABNORMAL 10*6/UL
SODIUM SERPL-SCNC: 142 MMOL/L (ref 136–145)
TSH SERPL DL<=0.05 MIU/L-ACNC: 3.34 UIU/ML (ref 0.27–4.2)
WBC OTHER # BLD: 8 K/UL (ref 3.5–11.3)

## 2025-04-28 PROCEDURE — 96413 CHEMO IV INFUSION 1 HR: CPT

## 2025-04-28 PROCEDURE — 80053 COMPREHEN METABOLIC PANEL: CPT

## 2025-04-28 PROCEDURE — 84443 ASSAY THYROID STIM HORMONE: CPT

## 2025-04-28 PROCEDURE — 6360000002 HC RX W HCPCS: Performed by: INTERNAL MEDICINE

## 2025-04-28 PROCEDURE — 2580000003 HC RX 258: Performed by: INTERNAL MEDICINE

## 2025-04-28 PROCEDURE — 2500000003 HC RX 250 WO HCPCS: Performed by: INTERNAL MEDICINE

## 2025-04-28 PROCEDURE — 85025 COMPLETE CBC W/AUTO DIFF WBC: CPT

## 2025-04-28 RX ORDER — SODIUM CHLORIDE 9 MG/ML
5-250 INJECTION, SOLUTION INTRAVENOUS PRN
Status: DISCONTINUED | OUTPATIENT
Start: 2025-04-28 | End: 2025-04-29 | Stop reason: HOSPADM

## 2025-04-28 RX ORDER — HEPARIN 100 UNIT/ML
500 SYRINGE INTRAVENOUS PRN
Status: DISCONTINUED | OUTPATIENT
Start: 2025-04-28 | End: 2025-04-29 | Stop reason: HOSPADM

## 2025-04-28 RX ORDER — SODIUM CHLORIDE 0.9 % (FLUSH) 0.9 %
5-40 SYRINGE (ML) INJECTION PRN
Status: DISCONTINUED | OUTPATIENT
Start: 2025-04-28 | End: 2025-04-29 | Stop reason: HOSPADM

## 2025-04-28 RX ADMIN — ATEZOLIZUMAB 1200 MG: 1200 INJECTION, SOLUTION INTRAVENOUS at 13:53

## 2025-04-28 RX ADMIN — SODIUM CHLORIDE, PRESERVATIVE FREE 10 ML: 5 INJECTION INTRAVENOUS at 14:31

## 2025-04-28 RX ADMIN — SODIUM CHLORIDE 20 ML/HR: 0.9 INJECTION, SOLUTION INTRAVENOUS at 13:26

## 2025-04-28 RX ADMIN — HEPARIN 500 UNITS: 100 SYRINGE at 14:32

## 2025-04-28 NOTE — PROGRESS NOTES
VAD de-accessed after 10ml NS and 5ml of heparin.  Band aid applied to site.  No sign of reaction to infusion at this time.  Pt ambulated out infusion center door without difficulty in stable condition.  Paperwork given regarding date and time of next treatment.

## 2025-04-28 NOTE — TELEPHONE ENCOUNTER
Name: Andrea Dseir  : 1955  MRN: [id number]    Oncology Navigation Follow-Up Note    Contact Type:   infusion room    Notes:   Writer met with patient and sister while in infusion room for treatment today.   He is still interested in finding an open MRI that is compatible with his pacer/defib. Instructed him to contact his insurance to see which open MRIs in the area are covered by his insurance. He can then call that facility with his pacer information and see if it is compatible. He verbalized understanding. Encouraged him to call if he has any questions/concerns. Understanding verbalized.   Navigator following for continuity of care.        Electronically signed by Natalia Broussard RN on 2025 at 1:23 PM

## 2025-05-12 ENCOUNTER — HOSPITAL ENCOUNTER (OUTPATIENT)
Dept: MRI IMAGING | Age: 70
Discharge: HOME | End: 2025-05-14
Attending: RADIOLOGY
Payer: MEDICARE

## 2025-05-12 ENCOUNTER — HOSPITAL ENCOUNTER (OUTPATIENT)
Dept: INTERVENTIONAL RADIOLOGY/VASCULAR | Age: 70
Discharge: HOME | End: 2025-05-14
Payer: MEDICARE

## 2025-05-12 VITALS — HEART RATE: 58 BPM | OXYGEN SATURATION: 92 %

## 2025-05-12 DIAGNOSIS — D49.6 BRAIN NEOPLASM (HCC): ICD-10-CM

## 2025-05-12 PROCEDURE — 70553 MRI BRAIN STEM W/O & W/DYE: CPT

## 2025-05-12 PROCEDURE — A9579 GAD-BASE MR CONTRAST NOS,1ML: HCPCS | Performed by: RADIOLOGY

## 2025-05-12 PROCEDURE — 6360000004 HC RX CONTRAST MEDICATION: Performed by: RADIOLOGY

## 2025-05-12 RX ADMIN — GADOTERIDOL 20 ML: 279.3 INJECTION, SOLUTION INTRAVENOUS at 14:46

## 2025-05-12 NOTE — PROGRESS NOTES
Pt presents to MRI for scan  MRI tech electronically placed defibrillator into safe scanning mode at 1405  Pt monitored per this RN throughout entire case  MRI tech converted defibrillator back to original settings at 1442

## 2025-05-19 ENCOUNTER — HOSPITAL ENCOUNTER (OUTPATIENT)
Dept: INFUSION THERAPY | Age: 70
Discharge: HOME | End: 2025-05-19
Payer: MEDICARE

## 2025-05-19 ENCOUNTER — TELEPHONE (OUTPATIENT)
Dept: ONCOLOGY | Age: 70
End: 2025-05-19

## 2025-05-19 ENCOUNTER — OFFICE VISIT (OUTPATIENT)
Dept: ONCOLOGY | Age: 70
End: 2025-05-19
Payer: MEDICARE

## 2025-05-19 VITALS
HEIGHT: 72 IN | TEMPERATURE: 97.6 F | RESPIRATION RATE: 16 BRPM | WEIGHT: 256 LBS | BODY MASS INDEX: 34.67 KG/M2 | DIASTOLIC BLOOD PRESSURE: 67 MMHG | OXYGEN SATURATION: 97 % | HEART RATE: 52 BPM | SYSTOLIC BLOOD PRESSURE: 126 MMHG

## 2025-05-19 VITALS
HEIGHT: 72 IN | DIASTOLIC BLOOD PRESSURE: 67 MMHG | OXYGEN SATURATION: 97 % | SYSTOLIC BLOOD PRESSURE: 126 MMHG | RESPIRATION RATE: 16 BRPM | WEIGHT: 256 LBS | BODY MASS INDEX: 34.67 KG/M2 | HEART RATE: 52 BPM | TEMPERATURE: 97.6 F

## 2025-05-19 DIAGNOSIS — C34.91 SMALL CELL LUNG CANCER, RIGHT (HCC): Primary | ICD-10-CM

## 2025-05-19 LAB
ALBUMIN SERPL-MCNC: 4.1 G/DL (ref 3.5–5.2)
ALBUMIN/GLOB SERPL: 1.5 {RATIO} (ref 1–2.5)
ALP SERPL-CCNC: 70 U/L (ref 40–129)
ALT SERPL-CCNC: 17 U/L (ref 10–50)
ANION GAP SERPL CALCULATED.3IONS-SCNC: 11 MMOL/L (ref 9–16)
AST SERPL-CCNC: 23 U/L (ref 10–50)
BASOPHILS # BLD: 0.04 K/UL (ref 0–0.2)
BASOPHILS NFR BLD: 1 % (ref 0–2)
BILIRUB SERPL-MCNC: 0.7 MG/DL (ref 0–1.2)
BUN SERPL-MCNC: 13 MG/DL (ref 8–23)
BUN/CREAT SERPL: 12 (ref 9–20)
CALCIUM SERPL-MCNC: 9.3 MG/DL (ref 8.6–10.4)
CHLORIDE SERPL-SCNC: 105 MMOL/L (ref 98–107)
CO2 SERPL-SCNC: 26 MMOL/L (ref 20–31)
CREAT SERPL-MCNC: 1.1 MG/DL (ref 0.7–1.2)
EOSINOPHIL # BLD: 0.06 K/UL (ref 0–0.44)
EOSINOPHILS RELATIVE PERCENT: 1 % (ref 1–4)
ERYTHROCYTE [DISTWIDTH] IN BLOOD BY AUTOMATED COUNT: 13.3 % (ref 11.8–14.4)
GFR, ESTIMATED: 72 ML/MIN/1.73M2
GLUCOSE SERPL-MCNC: 91 MG/DL (ref 74–99)
HCT VFR BLD AUTO: 35.4 % (ref 40.7–50.3)
HGB BLD-MCNC: 12.4 G/DL (ref 13–17)
IMM GRANULOCYTES # BLD AUTO: <0.03 K/UL (ref 0–0.3)
IMM GRANULOCYTES NFR BLD: 0 %
LYMPHOCYTES NFR BLD: 2.47 K/UL (ref 1.1–3.7)
LYMPHOCYTES RELATIVE PERCENT: 38 % (ref 24–43)
MCH RBC QN AUTO: 35.6 PG (ref 25.2–33.5)
MCHC RBC AUTO-ENTMCNC: 35 G/DL (ref 25.2–33.5)
MCV RBC AUTO: 101.7 FL (ref 82.6–102.9)
MONOCYTES NFR BLD: 0.56 K/UL (ref 0.1–1.2)
MONOCYTES NFR BLD: 9 % (ref 3–12)
NEUTROPHILS NFR BLD: 51 % (ref 36–65)
NEUTS SEG NFR BLD: 3.37 K/UL (ref 1.5–8.1)
NRBC BLD-RTO: 0 PER 100 WBC
PLATELET # BLD AUTO: 150 K/UL (ref 138–453)
PMV BLD AUTO: 11.2 FL (ref 8.1–13.5)
POTASSIUM SERPL-SCNC: 4.1 MMOL/L (ref 3.7–5.3)
PROT SERPL-MCNC: 6.9 G/DL (ref 6.6–8.7)
RBC # BLD AUTO: 3.48 M/UL (ref 4.21–5.77)
SODIUM SERPL-SCNC: 142 MMOL/L (ref 136–145)
TSH SERPL DL<=0.05 MIU/L-ACNC: 3.22 UIU/ML (ref 0.27–4.2)
WBC OTHER # BLD: 6.5 K/UL (ref 3.5–11.3)

## 2025-05-19 PROCEDURE — 1160F RVW MEDS BY RX/DR IN RCRD: CPT | Performed by: INTERNAL MEDICINE

## 2025-05-19 PROCEDURE — 80053 COMPREHEN METABOLIC PANEL: CPT

## 2025-05-19 PROCEDURE — 99215 OFFICE O/P EST HI 40 MIN: CPT | Performed by: INTERNAL MEDICINE

## 2025-05-19 PROCEDURE — 85025 COMPLETE CBC W/AUTO DIFF WBC: CPT

## 2025-05-19 PROCEDURE — 99214 OFFICE O/P EST MOD 30 MIN: CPT | Performed by: INTERNAL MEDICINE

## 2025-05-19 PROCEDURE — 6360000002 HC RX W HCPCS: Performed by: INTERNAL MEDICINE

## 2025-05-19 PROCEDURE — 1159F MED LIST DOCD IN RCRD: CPT | Performed by: INTERNAL MEDICINE

## 2025-05-19 PROCEDURE — 3078F DIAST BP <80 MM HG: CPT | Performed by: INTERNAL MEDICINE

## 2025-05-19 PROCEDURE — 2500000003 HC RX 250 WO HCPCS: Performed by: INTERNAL MEDICINE

## 2025-05-19 PROCEDURE — 96413 CHEMO IV INFUSION 1 HR: CPT

## 2025-05-19 PROCEDURE — 2580000003 HC RX 258: Performed by: INTERNAL MEDICINE

## 2025-05-19 PROCEDURE — 1124F ACP DISCUSS-NO DSCNMKR DOCD: CPT | Performed by: INTERNAL MEDICINE

## 2025-05-19 PROCEDURE — 84443 ASSAY THYROID STIM HORMONE: CPT

## 2025-05-19 PROCEDURE — 3074F SYST BP LT 130 MM HG: CPT | Performed by: INTERNAL MEDICINE

## 2025-05-19 RX ORDER — HEPARIN SODIUM (PORCINE) LOCK FLUSH IV SOLN 100 UNIT/ML 100 UNIT/ML
500 SOLUTION INTRAVENOUS PRN
OUTPATIENT
Start: 2025-06-30

## 2025-05-19 RX ORDER — SODIUM CHLORIDE 0.9 % (FLUSH) 0.9 %
5-40 SYRINGE (ML) INJECTION PRN
OUTPATIENT
Start: 2025-06-09

## 2025-05-19 RX ORDER — EPINEPHRINE 1 MG/ML
0.3 INJECTION, SOLUTION, CONCENTRATE INTRAVENOUS PRN
OUTPATIENT
Start: 2025-06-30

## 2025-05-19 RX ORDER — ONDANSETRON 2 MG/ML
8 INJECTION INTRAMUSCULAR; INTRAVENOUS
OUTPATIENT
Start: 2025-06-09

## 2025-05-19 RX ORDER — SODIUM CHLORIDE 9 MG/ML
INJECTION, SOLUTION INTRAVENOUS CONTINUOUS
OUTPATIENT
Start: 2025-06-30

## 2025-05-19 RX ORDER — ONDANSETRON 2 MG/ML
8 INJECTION INTRAMUSCULAR; INTRAVENOUS
OUTPATIENT
Start: 2025-06-30

## 2025-05-19 RX ORDER — HYDROCORTISONE SODIUM SUCCINATE 100 MG/2ML
100 INJECTION INTRAMUSCULAR; INTRAVENOUS
OUTPATIENT
Start: 2025-06-30

## 2025-05-19 RX ORDER — ALBUTEROL SULFATE 90 UG/1
4 INHALANT RESPIRATORY (INHALATION) PRN
OUTPATIENT
Start: 2025-06-30

## 2025-05-19 RX ORDER — FAMOTIDINE 10 MG/ML
20 INJECTION, SOLUTION INTRAVENOUS
OUTPATIENT
Start: 2025-06-09

## 2025-05-19 RX ORDER — DIAZEPAM 2 MG/1
2 TABLET ORAL DAILY PRN
Qty: 2 TABLET | Refills: 0 | Status: SHIPPED | OUTPATIENT
Start: 2025-05-19 | End: 2025-05-21

## 2025-05-19 RX ORDER — ACETAMINOPHEN 325 MG/1
650 TABLET ORAL
Start: 2025-06-30

## 2025-05-19 RX ORDER — HEPARIN SODIUM (PORCINE) LOCK FLUSH IV SOLN 100 UNIT/ML 100 UNIT/ML
500 SOLUTION INTRAVENOUS PRN
OUTPATIENT
Start: 2025-06-09

## 2025-05-19 RX ORDER — HYDROCORTISONE SODIUM SUCCINATE 100 MG/2ML
100 INJECTION INTRAMUSCULAR; INTRAVENOUS
OUTPATIENT
Start: 2025-06-09

## 2025-05-19 RX ORDER — FAMOTIDINE 10 MG/ML
20 INJECTION, SOLUTION INTRAVENOUS
OUTPATIENT
Start: 2025-06-30

## 2025-05-19 RX ORDER — MEPERIDINE HYDROCHLORIDE 50 MG/ML
12.5 INJECTION INTRAMUSCULAR; INTRAVENOUS; SUBCUTANEOUS PRN
OUTPATIENT
Start: 2025-06-09

## 2025-05-19 RX ORDER — HEPARIN 100 UNIT/ML
500 SYRINGE INTRAVENOUS PRN
Status: DISCONTINUED | OUTPATIENT
Start: 2025-05-19 | End: 2025-05-20 | Stop reason: HOSPADM

## 2025-05-19 RX ORDER — DIPHENHYDRAMINE HYDROCHLORIDE 50 MG/ML
50 INJECTION, SOLUTION INTRAMUSCULAR; INTRAVENOUS
OUTPATIENT
Start: 2025-06-30

## 2025-05-19 RX ORDER — EPINEPHRINE 1 MG/ML
0.3 INJECTION, SOLUTION, CONCENTRATE INTRAVENOUS PRN
OUTPATIENT
Start: 2025-06-09

## 2025-05-19 RX ORDER — SODIUM CHLORIDE 9 MG/ML
5-250 INJECTION, SOLUTION INTRAVENOUS PRN
OUTPATIENT
Start: 2025-06-09

## 2025-05-19 RX ORDER — MEPERIDINE HYDROCHLORIDE 50 MG/ML
12.5 INJECTION INTRAMUSCULAR; INTRAVENOUS; SUBCUTANEOUS PRN
OUTPATIENT
Start: 2025-06-30

## 2025-05-19 RX ORDER — SODIUM CHLORIDE 0.9 % (FLUSH) 0.9 %
5-40 SYRINGE (ML) INJECTION PRN
OUTPATIENT
Start: 2025-06-30

## 2025-05-19 RX ORDER — ACETAMINOPHEN 325 MG/1
650 TABLET ORAL
Start: 2025-06-09

## 2025-05-19 RX ORDER — DIPHENHYDRAMINE HYDROCHLORIDE 50 MG/ML
50 INJECTION, SOLUTION INTRAMUSCULAR; INTRAVENOUS
OUTPATIENT
Start: 2025-06-09

## 2025-05-19 RX ORDER — SODIUM CHLORIDE 9 MG/ML
5-250 INJECTION, SOLUTION INTRAVENOUS PRN
Status: DISCONTINUED | OUTPATIENT
Start: 2025-05-19 | End: 2025-05-20 | Stop reason: HOSPADM

## 2025-05-19 RX ORDER — ALBUTEROL SULFATE 90 UG/1
4 INHALANT RESPIRATORY (INHALATION) PRN
OUTPATIENT
Start: 2025-06-09

## 2025-05-19 RX ORDER — SODIUM CHLORIDE 9 MG/ML
INJECTION, SOLUTION INTRAVENOUS CONTINUOUS
OUTPATIENT
Start: 2025-06-09

## 2025-05-19 RX ORDER — SODIUM CHLORIDE 9 MG/ML
5-250 INJECTION, SOLUTION INTRAVENOUS PRN
OUTPATIENT
Start: 2025-06-30

## 2025-05-19 RX ORDER — ACETAMINOPHEN 325 MG/1
650 TABLET ORAL
OUTPATIENT
Start: 2025-06-30

## 2025-05-19 RX ORDER — ACETAMINOPHEN 325 MG/1
650 TABLET ORAL
OUTPATIENT
Start: 2025-06-09

## 2025-05-19 RX ORDER — SODIUM CHLORIDE 0.9 % (FLUSH) 0.9 %
5-40 SYRINGE (ML) INJECTION PRN
Status: DISCONTINUED | OUTPATIENT
Start: 2025-05-19 | End: 2025-05-20 | Stop reason: HOSPADM

## 2025-05-19 RX ADMIN — HEPARIN 500 UNITS: 100 SYRINGE at 14:44

## 2025-05-19 RX ADMIN — SODIUM CHLORIDE 25 ML/HR: 0.9 INJECTION, SOLUTION INTRAVENOUS at 13:38

## 2025-05-19 RX ADMIN — ATEZOLIZUMAB 1200 MG: 1200 INJECTION, SOLUTION INTRAVENOUS at 14:06

## 2025-05-19 RX ADMIN — SODIUM CHLORIDE, PRESERVATIVE FREE 10 ML: 5 INJECTION INTRAVENOUS at 14:44

## 2025-05-19 NOTE — PROGRESS NOTES
VAD accessed per protocol with 3/4 inch 20 gauge durand needle.  Flushed easily with saline 10 ml.  Good blood return.  Labs drawn.  Secured accessed port with transparent dressing.  IV infusing normal saline.  Labs within limits to treat.

## 2025-05-19 NOTE — PROGRESS NOTES
Patient ID: Andrea Desir, 1955, [id number], 70 y.o.  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  Significant decrease in surrounding vasogenic edema  HISTORY OF PRESENT ILLNESS:    Oncologic History:  Andrea Desir is a 70 y.o. male with history of atrial fibrillation, status post AICD, history of CAD status post stent, with a tobacco abuse was seen during initial counseling visit for newly discovered lung mass and lymphadenopathy.    Patient recently was seen by his primary care physician for difficulty swallowing and also swelling in his neck area.  Patient also having cough for past 6 months.  Patient had a CT scan of the neck as well as CT scan of the chest on 3/27/2024 which showed right upper lobe 4.7 cm mass suspicious for primary lung malignancy along with right hilar and bilateral mediastinal bulky lymphadenopathy with extrinsic mass effect on the trachea and esophagus without occlusion.  Patient also noted to have

## 2025-05-19 NOTE — TELEPHONE ENCOUNTER
Name: Andrea Desir  : 1955  MRN: [id number]    Oncology Navigation Follow-Up Note    Contact Type:  Medical Oncology    Notes:   Writer met with patient while in office for oncology appt. He denies any navigation needs at present. He continues to manage his treatment regimen well. He did well with the MRI brain with use of two valium taken prior to scan.  Encouraged him to call with questions or concerns.     Navigator following for continuity of care.        Electronically signed by Natalia Broussard RN on 2025 at 3:32 PM

## 2025-05-20 ENCOUNTER — HOSPITAL ENCOUNTER (OUTPATIENT)
Dept: RADIATION ONCOLOGY | Age: 70
Discharge: HOME | End: 2025-05-20
Payer: MEDICARE

## 2025-05-20 VITALS
TEMPERATURE: 98.2 F | WEIGHT: 255.95 LBS | RESPIRATION RATE: 16 BRPM | BODY MASS INDEX: 34.71 KG/M2 | OXYGEN SATURATION: 98 % | HEART RATE: 56 BPM | SYSTOLIC BLOOD PRESSURE: 147 MMHG | DIASTOLIC BLOOD PRESSURE: 77 MMHG

## 2025-05-20 DIAGNOSIS — C34.91 SMALL CELL LUNG CANCER, RIGHT (HCC): ICD-10-CM

## 2025-05-20 DIAGNOSIS — D49.6 BRAIN NEOPLASM (HCC): Primary | ICD-10-CM

## 2025-05-20 DIAGNOSIS — I10 PRIMARY HYPERTENSION: ICD-10-CM

## 2025-05-20 PROCEDURE — 99212 OFFICE O/P EST SF 10 MIN: CPT | Performed by: RADIOLOGY

## 2025-05-20 RX ORDER — AMIODARONE HYDROCHLORIDE 200 MG/1
200 TABLET ORAL DAILY
Qty: 90 TABLET | Refills: 1 | Status: SHIPPED | OUTPATIENT
Start: 2025-05-20

## 2025-05-20 NOTE — TELEPHONE ENCOUNTER
Andrea called requesting a refill of the below medication which has been pended for you:     Requested Prescriptions     Pending Prescriptions Disp Refills    amiodarone (CORDARONE) 200 MG tablet [Pharmacy Med Name: AMIODARONE 200MG    TAB] 90 tablet 0     Sig: Take 1 tablet by mouth once daily       Last Appointment Date: 1/27/2025  Next Appointment Date: 6/3/2025    No Known Allergies

## 2025-05-20 NOTE — PROGRESS NOTES
Andrea Desir  5/20/2025  3:26 PM      Vitals:    05/20/25 1457   BP: (!) 147/77   Pulse: 56   Resp: 16   Temp: 98.2 °F (36.8 °C)   SpO2: 98%    :     Pain Assessment: None - Denies Pain          Wt Readings from Last 1 Encounters:   05/20/25 116.1 kg (255 lb 15.3 oz)                Current Outpatient Medications:     amiodarone (CORDARONE) 200 MG tablet, Take 1 tablet by mouth once daily, Disp: 90 tablet, Rfl: 1    diazePAM (VALIUM) 2 MG tablet, Take 1 tablet by mouth daily as needed for Anxiety (Radiation) for up to 2 days. Max Daily Amount: 2 mg, Disp: 2 tablet, Rfl: 0    atorvastatin (LIPITOR) 80 MG tablet, Take 1 tablet by mouth once daily, Disp: 100 tablet, Rfl: 1    dexAMETHasone (DECADRON) 1 MG tablet, Take 2 tablets by mouth daily (with breakfast), Disp: 30 tablet, Rfl: 0    dexAMETHasone (DECADRON) 4 MG tablet, Take 1 tablet by mouth 2 times daily (with meals) for 10 days (Patient not taking: Reported on 2/24/2025), Disp: 20 tablet, Rfl: 0    losartan (COZAAR) 25 MG tablet, Take 1 tablet by mouth daily, Disp: 90 tablet, Rfl: 3    nystatin (MYCOSTATIN) 062940 UNIT/GM cream, Apply topically 3 times daily., Disp: 30 g, Rfl: 3    traZODone (DESYREL) 50 MG tablet, Take 1 tablet by mouth nightly as needed for Sleep, Disp: 30 tablet, Rfl: 2    dexAMETHasone (DECADRON) 4 MG tablet, Take 2 tablets by mouth daily for 14 days Then take 1 tablet for 7 days, Then half tablet for 7 days (Patient not taking: Reported on 2/24/2025), Disp: 28 tablet, Rfl: 0    docusate sodium (COLACE) 100 MG capsule, Take 1 capsule by mouth daily, Disp: , Rfl:     benzonatate (TESSALON) 100 MG capsule, Take 1 capsule by mouth 3 times daily as needed for Cough, Disp: 90 capsule, Rfl: 3    metoprolol tartrate (LOPRESSOR) 25 MG tablet, TAKE 1 TABLET BY MOUTH IN THE MORNING AND 1/2 (ONE-HALF) IN THE EVENING, Disp: 135 tablet, Rfl: 1    furosemide (LASIX) 40 MG tablet, Take 1 tablet by mouth once daily, Disp: 90 tablet, Rfl: 3

## 2025-06-03 ENCOUNTER — OFFICE VISIT (OUTPATIENT)
Dept: FAMILY MEDICINE CLINIC | Age: 70
End: 2025-06-03
Payer: MEDICARE

## 2025-06-03 VITALS
HEIGHT: 72 IN | BODY MASS INDEX: 34.95 KG/M2 | HEART RATE: 62 BPM | WEIGHT: 258 LBS | SYSTOLIC BLOOD PRESSURE: 128 MMHG | OXYGEN SATURATION: 96 % | DIASTOLIC BLOOD PRESSURE: 72 MMHG

## 2025-06-03 DIAGNOSIS — G47.33 OSA (OBSTRUCTIVE SLEEP APNEA): ICD-10-CM

## 2025-06-03 DIAGNOSIS — I10 PRIMARY HYPERTENSION: ICD-10-CM

## 2025-06-03 DIAGNOSIS — E78.5 HYPERLIPIDEMIA WITH TARGET LDL LESS THAN 70: ICD-10-CM

## 2025-06-03 DIAGNOSIS — F32.5 MAJOR DEPRESSIVE DISORDER, SINGLE EPISODE, IN FULL REMISSION: ICD-10-CM

## 2025-06-03 DIAGNOSIS — Z12.5 SCREENING PSA (PROSTATE SPECIFIC ANTIGEN): ICD-10-CM

## 2025-06-03 DIAGNOSIS — I25.10 CORONARY ARTERY DISEASE INVOLVING NATIVE CORONARY ARTERY OF NATIVE HEART WITHOUT ANGINA PECTORIS: Primary | ICD-10-CM

## 2025-06-03 DIAGNOSIS — E03.9 HYPOTHYROIDISM, UNSPECIFIED TYPE: ICD-10-CM

## 2025-06-03 DIAGNOSIS — I47.20 VENTRICULAR TACHYCARDIA (HCC): ICD-10-CM

## 2025-06-03 DIAGNOSIS — N52.9 ERECTILE DYSFUNCTION, UNSPECIFIED ERECTILE DYSFUNCTION TYPE: ICD-10-CM

## 2025-06-03 DIAGNOSIS — Z72.0 TOBACCO ABUSE: ICD-10-CM

## 2025-06-03 DIAGNOSIS — C34.91 SMALL CELL LUNG CANCER, RIGHT (HCC): ICD-10-CM

## 2025-06-03 PROCEDURE — 99214 OFFICE O/P EST MOD 30 MIN: CPT | Performed by: FAMILY MEDICINE

## 2025-06-03 PROCEDURE — 99213 OFFICE O/P EST LOW 20 MIN: CPT

## 2025-06-03 PROCEDURE — 1159F MED LIST DOCD IN RCRD: CPT | Performed by: FAMILY MEDICINE

## 2025-06-03 PROCEDURE — 3078F DIAST BP <80 MM HG: CPT | Performed by: FAMILY MEDICINE

## 2025-06-03 PROCEDURE — 3074F SYST BP LT 130 MM HG: CPT | Performed by: FAMILY MEDICINE

## 2025-06-03 PROCEDURE — 1160F RVW MEDS BY RX/DR IN RCRD: CPT | Performed by: FAMILY MEDICINE

## 2025-06-03 PROCEDURE — 1124F ACP DISCUSS-NO DSCNMKR DOCD: CPT | Performed by: FAMILY MEDICINE

## 2025-06-03 SDOH — ECONOMIC STABILITY: FOOD INSECURITY: WITHIN THE PAST 12 MONTHS, YOU WORRIED THAT YOUR FOOD WOULD RUN OUT BEFORE YOU GOT MONEY TO BUY MORE.: NEVER TRUE

## 2025-06-03 SDOH — ECONOMIC STABILITY: FOOD INSECURITY: WITHIN THE PAST 12 MONTHS, THE FOOD YOU BOUGHT JUST DIDN'T LAST AND YOU DIDN'T HAVE MONEY TO GET MORE.: NEVER TRUE

## 2025-06-03 ASSESSMENT — ENCOUNTER SYMPTOMS
SHORTNESS OF BREATH: 0
COUGH: 0
GASTROINTESTINAL NEGATIVE: 1
RESPIRATORY NEGATIVE: 1
EYES NEGATIVE: 1
ALLERGIC/IMMUNOLOGIC NEGATIVE: 1

## 2025-06-03 NOTE — PROGRESS NOTES
Subjective:      Patient ID: Andrea Desir is a 70 y.o. male.    M      Coronary Artery Disease  Pertinent negatives include no shortness of breath.   Loss of Consciousness  His past medical history is significant for CAD.   Hyperlipidemia  Associated symptoms include leg pain. Pertinent negatives include no myalgias or shortness of breath.   Hypertension  Pertinent negatives include no neck pain or shortness of breath.   Erectile Dysfunction  Irritative symptoms include frequency. Pertinent negatives include no dysuria.   Knee Pain     Leg Pain        Routine follow up on chronic medical conditions, refills, and review of updated labs.  I have reviewed the patient's medical history in detail and updated the computerized patient record.   Interval work up and treatment for Right lung small cell carcinoma with mediastinal lymphadenopathy, adrenal metastasis and bone metastasis  Started on palliative chemotherapy with carboplatin etoposide and Tecentriq on 4/23/2024.  Appetite reduced.  Nothing tastes good.  About the same with last studies.  Overall improved and stable.  Denies symptoms.  Feeling good.  Getting \"mainenance\" iv drug every 3 weeks by patient reporting.      No chest pain or palpitations.  bp under good control.  He has quit smoking [birth year] his [birth year].  Restarted again.  chantix had helped.  Still smoking again.   No concerns for changes in exercise tolerance.     Started cpap late January.  Feeling a little sob, trouble getting used to the mask, mostly due to recurrent nasal congestion.  Had the flu at the time.  Symptoms gone.  He still feels fear of not breathing.  No back pain at present.  Feeling well.  Activity related exacerbations with lifting things.     No chest pain      Knees starting to bother him more.  Takes a lot of effort to get off the knees.  Activity related exacerbations.  Not slowing him down at present.     Compliant with medications.    Syncope in the past.  Sudden loss of

## 2025-06-05 DIAGNOSIS — C34.91 SMALL CELL LUNG CANCER, RIGHT (HCC): ICD-10-CM

## 2025-06-05 RX ORDER — LORAZEPAM 0.5 MG/1
0.5 TABLET ORAL EVERY 8 HOURS PRN
Qty: 90 TABLET | Refills: 0 | Status: SHIPPED | OUTPATIENT
Start: 2025-06-05 | End: 2025-07-05

## 2025-06-09 ENCOUNTER — HOSPITAL ENCOUNTER (OUTPATIENT)
Dept: INFUSION THERAPY | Age: 70
Discharge: HOME | End: 2025-06-09
Payer: MEDICARE

## 2025-06-09 VITALS
DIASTOLIC BLOOD PRESSURE: 73 MMHG | RESPIRATION RATE: 16 BRPM | WEIGHT: 252.6 LBS | HEIGHT: 72 IN | OXYGEN SATURATION: 95 % | BODY MASS INDEX: 34.21 KG/M2 | SYSTOLIC BLOOD PRESSURE: 123 MMHG | TEMPERATURE: 97.7 F | HEART RATE: 55 BPM

## 2025-06-09 DIAGNOSIS — C34.91 SMALL CELL LUNG CANCER, RIGHT (HCC): Primary | ICD-10-CM

## 2025-06-09 LAB
ALBUMIN SERPL-MCNC: 4.3 G/DL (ref 3.5–5.2)
ALBUMIN/GLOB SERPL: 1.5 {RATIO} (ref 1–2.5)
ALP SERPL-CCNC: 75 U/L (ref 40–129)
ALT SERPL-CCNC: 17 U/L (ref 10–50)
ANION GAP SERPL CALCULATED.3IONS-SCNC: 11 MMOL/L (ref 9–16)
AST SERPL-CCNC: 21 U/L (ref 10–50)
BASOPHILS # BLD: 0.04 K/UL (ref 0–0.2)
BASOPHILS NFR BLD: 1 % (ref 0–2)
BILIRUB SERPL-MCNC: 0.6 MG/DL (ref 0–1.2)
BUN SERPL-MCNC: 17 MG/DL (ref 8–23)
BUN/CREAT SERPL: 15 (ref 9–20)
CALCIUM SERPL-MCNC: 9.4 MG/DL (ref 8.6–10.4)
CHLORIDE SERPL-SCNC: 105 MMOL/L (ref 98–107)
CO2 SERPL-SCNC: 26 MMOL/L (ref 20–31)
CREAT SERPL-MCNC: 1.1 MG/DL (ref 0.7–1.2)
EOSINOPHIL # BLD: 0.07 K/UL (ref 0–0.44)
EOSINOPHILS RELATIVE PERCENT: 1 % (ref 1–4)
ERYTHROCYTE [DISTWIDTH] IN BLOOD BY AUTOMATED COUNT: 12.4 % (ref 11.8–14.4)
GFR, ESTIMATED: 72 ML/MIN/1.73M2
GLUCOSE SERPL-MCNC: 90 MG/DL (ref 74–99)
HCT VFR BLD AUTO: 36.9 % (ref 40.7–50.3)
HGB BLD-MCNC: 13.2 G/DL (ref 13–17)
IMM GRANULOCYTES # BLD AUTO: <0.03 K/UL (ref 0–0.3)
IMM GRANULOCYTES NFR BLD: 0 %
LYMPHOCYTES NFR BLD: 2.56 K/UL (ref 1.1–3.7)
LYMPHOCYTES RELATIVE PERCENT: 41 % (ref 24–43)
MCH RBC QN AUTO: 35.4 PG (ref 25.2–33.5)
MCHC RBC AUTO-ENTMCNC: 35.8 G/DL (ref 25.2–33.5)
MCV RBC AUTO: 98.9 FL (ref 82.6–102.9)
MONOCYTES NFR BLD: 0.54 K/UL (ref 0.1–1.2)
MONOCYTES NFR BLD: 9 % (ref 3–12)
NEUTROPHILS NFR BLD: 48 % (ref 36–65)
NEUTS SEG NFR BLD: 2.95 K/UL (ref 1.5–8.1)
NRBC BLD-RTO: 0 PER 100 WBC
PLATELET # BLD AUTO: ABNORMAL K/UL (ref 138–453)
PLATELET, FLUORESCENCE: 145 K/UL (ref 138–453)
PLATELETS.RETICULATED NFR BLD AUTO: 6.4 % (ref 1.1–10.3)
POTASSIUM SERPL-SCNC: 3.9 MMOL/L (ref 3.7–5.3)
PROT SERPL-MCNC: 7.1 G/DL (ref 6.6–8.7)
RBC # BLD AUTO: 3.73 M/UL (ref 4.21–5.77)
SODIUM SERPL-SCNC: 142 MMOL/L (ref 136–145)
TSH SERPL DL<=0.05 MIU/L-ACNC: 2.81 UIU/ML (ref 0.27–4.2)
WBC OTHER # BLD: 6.2 K/UL (ref 3.5–11.3)

## 2025-06-09 PROCEDURE — 96413 CHEMO IV INFUSION 1 HR: CPT

## 2025-06-09 PROCEDURE — 2580000003 HC RX 258: Performed by: INTERNAL MEDICINE

## 2025-06-09 PROCEDURE — 85025 COMPLETE CBC W/AUTO DIFF WBC: CPT

## 2025-06-09 PROCEDURE — 6360000002 HC RX W HCPCS: Performed by: INTERNAL MEDICINE

## 2025-06-09 PROCEDURE — 80053 COMPREHEN METABOLIC PANEL: CPT

## 2025-06-09 PROCEDURE — 84443 ASSAY THYROID STIM HORMONE: CPT

## 2025-06-09 RX ORDER — SODIUM CHLORIDE 9 MG/ML
5-250 INJECTION, SOLUTION INTRAVENOUS PRN
Status: DISCONTINUED | OUTPATIENT
Start: 2025-06-09 | End: 2025-06-10 | Stop reason: HOSPADM

## 2025-06-09 RX ORDER — HEPARIN 100 UNIT/ML
500 SYRINGE INTRAVENOUS PRN
Status: DISCONTINUED | OUTPATIENT
Start: 2025-06-09 | End: 2025-06-10 | Stop reason: HOSPADM

## 2025-06-09 RX ADMIN — HEPARIN 500 UNITS: 100 SYRINGE at 15:30

## 2025-06-09 RX ADMIN — SODIUM CHLORIDE 30 ML/HR: 0.9 INJECTION, SOLUTION INTRAVENOUS at 14:01

## 2025-06-09 RX ADMIN — ATEZOLIZUMAB 1200 MG: 1200 INJECTION, SOLUTION INTRAVENOUS at 14:37

## 2025-06-09 ASSESSMENT — PAIN SCALES - GENERAL: PAINLEVEL_OUTOF10: 0

## 2025-06-09 NOTE — PROGRESS NOTES
Patient arrived to infusion department for his treatment. VAD accessed per protocol with 3/4\" Hopper needle. Labs are within parameters. Infusion given without complications. Flushed with 10 ml of NSS and 5 ml of Heparin flush. VAD D/C'd and Bandaid applied to site. Patient stable, ambulated out of department and discharged home.

## 2025-06-11 DIAGNOSIS — I10 PRIMARY HYPERTENSION: ICD-10-CM

## 2025-06-11 RX ORDER — METOPROLOL TARTRATE 25 MG/1
TABLET, FILM COATED ORAL
Qty: 135 TABLET | Refills: 0 | Status: SHIPPED | OUTPATIENT
Start: 2025-06-11

## 2025-06-11 NOTE — TELEPHONE ENCOUNTER
Andrea called requesting a refill of the below medication which has been pended for you:     Requested Prescriptions     Pending Prescriptions Disp Refills    metoprolol tartrate (LOPRESSOR) 25 MG tablet [Pharmacy Med Name: Metoprolol Tartrate 25 MG Oral Tablet] 135 tablet 0     Sig: TAKE 1 TABLET BY MOUTH IN THE MORNING AND 1/2 IN EVENING.       Last Appointment Date: 6/3/2025  Next Appointment Date: 12/3/2025

## 2025-06-18 ENCOUNTER — TELEPHONE (OUTPATIENT)
Dept: FAMILY MEDICINE CLINIC | Age: 70
End: 2025-06-18

## 2025-06-18 NOTE — TELEPHONE ENCOUNTER
Our records indicate that Andrea Desir may benefit from medication optimization to meet the three pillars of CHF care (ACE/ARB/ARNI, Beta Blocker, and MRA therapy). The ask is to change the patient's medications below, as appropriate. If the patient is being followed by a cardiologist, please ensure they have a follow up appointment with that provider to discuss further.     The measure definition, denominator, inclusions, and exclusions are below:

## 2025-06-19 ENCOUNTER — TELEPHONE (OUTPATIENT)
Dept: ONCOLOGY | Age: 70
End: 2025-06-19

## 2025-06-19 NOTE — TELEPHONE ENCOUNTER
Name: Andrea Desir  : 1955  MRN: [id number]    Oncology Navigation Follow-Up Note    Contact Type:  Telephone    Notes:   Phone call to assess navigation needs. Patient states he is doing well and denies navigational needs at present. Encouraged him to call if he has questions/concerns. Understanding verbalized.        Electronically signed by Natalia Broussard RN on 2025 at 12:01 PM

## 2025-06-30 ENCOUNTER — HOSPITAL ENCOUNTER (OUTPATIENT)
Dept: INFUSION THERAPY | Age: 70
Discharge: HOME | End: 2025-06-30
Payer: MEDICARE

## 2025-06-30 VITALS
BODY MASS INDEX: 33.86 KG/M2 | HEIGHT: 72 IN | TEMPERATURE: 97.9 F | RESPIRATION RATE: 16 BRPM | DIASTOLIC BLOOD PRESSURE: 63 MMHG | HEART RATE: 58 BPM | SYSTOLIC BLOOD PRESSURE: 116 MMHG | WEIGHT: 250 LBS | OXYGEN SATURATION: 97 %

## 2025-06-30 DIAGNOSIS — C34.91 SMALL CELL LUNG CANCER, RIGHT (HCC): Primary | ICD-10-CM

## 2025-06-30 LAB
ALBUMIN SERPL-MCNC: 4.3 G/DL (ref 3.5–5.2)
ALBUMIN/GLOB SERPL: 1.6 {RATIO} (ref 1–2.5)
ALP SERPL-CCNC: 78 U/L (ref 40–129)
ALT SERPL-CCNC: 16 U/L (ref 10–50)
ANION GAP SERPL CALCULATED.3IONS-SCNC: 12 MMOL/L (ref 9–16)
AST SERPL-CCNC: 19 U/L (ref 10–50)
BASOPHILS # BLD: 0.06 K/UL (ref 0–0.2)
BASOPHILS NFR BLD: 1 % (ref 0–2)
BILIRUB SERPL-MCNC: 0.6 MG/DL (ref 0–1.2)
BUN SERPL-MCNC: 17 MG/DL (ref 8–23)
BUN/CREAT SERPL: 15 (ref 9–20)
CALCIUM SERPL-MCNC: 9.4 MG/DL (ref 8.6–10.4)
CHLORIDE SERPL-SCNC: 102 MMOL/L (ref 98–107)
CO2 SERPL-SCNC: 26 MMOL/L (ref 20–31)
CORTIS SERPL-MCNC: 6.1 UG/DL (ref 2.5–19.5)
CREAT SERPL-MCNC: 1.1 MG/DL (ref 0.7–1.2)
EOSINOPHIL # BLD: 0.09 K/UL (ref 0–0.44)
EOSINOPHILS RELATIVE PERCENT: 1 % (ref 1–4)
ERYTHROCYTE [DISTWIDTH] IN BLOOD BY AUTOMATED COUNT: 12.1 % (ref 11.8–14.4)
GFR, ESTIMATED: 72 ML/MIN/1.73M2
GLUCOSE SERPL-MCNC: 95 MG/DL (ref 74–99)
HCT VFR BLD AUTO: 38.5 % (ref 40.7–50.3)
HGB BLD-MCNC: 13.8 G/DL (ref 13–17)
IMM GRANULOCYTES # BLD AUTO: <0.03 K/UL (ref 0–0.3)
IMM GRANULOCYTES NFR BLD: 0 %
LYMPHOCYTES NFR BLD: 2.52 K/UL (ref 1.1–3.7)
LYMPHOCYTES RELATIVE PERCENT: 38 % (ref 24–43)
MCH RBC QN AUTO: 35.2 PG (ref 25.2–33.5)
MCHC RBC AUTO-ENTMCNC: 35.8 G/DL (ref 25.2–33.5)
MCV RBC AUTO: 98.2 FL (ref 82.6–102.9)
MONOCYTES NFR BLD: 0.59 K/UL (ref 0.1–1.2)
MONOCYTES NFR BLD: 9 % (ref 3–12)
NEUTROPHILS NFR BLD: 51 % (ref 36–65)
NEUTS SEG NFR BLD: 3.38 K/UL (ref 1.5–8.1)
NRBC BLD-RTO: 0 PER 100 WBC
PLATELET # BLD AUTO: ABNORMAL K/UL (ref 138–453)
PLATELET, FLUORESCENCE: 141 K/UL (ref 138–453)
PLATELETS.RETICULATED NFR BLD AUTO: 7.1 % (ref 1.1–10.3)
POTASSIUM SERPL-SCNC: 3.9 MMOL/L (ref 3.7–5.3)
PROT SERPL-MCNC: 7 G/DL (ref 6.6–8.7)
RBC # BLD AUTO: 3.92 M/UL (ref 4.21–5.77)
SODIUM SERPL-SCNC: 140 MMOL/L (ref 136–145)
TSH SERPL DL<=0.05 MIU/L-ACNC: 3.25 UIU/ML (ref 0.27–4.2)
WBC OTHER # BLD: 6.7 K/UL (ref 3.5–11.3)

## 2025-06-30 PROCEDURE — 6360000002 HC RX W HCPCS: Performed by: INTERNAL MEDICINE

## 2025-06-30 PROCEDURE — 96413 CHEMO IV INFUSION 1 HR: CPT

## 2025-06-30 PROCEDURE — 80053 COMPREHEN METABOLIC PANEL: CPT

## 2025-06-30 PROCEDURE — 85025 COMPLETE CBC W/AUTO DIFF WBC: CPT

## 2025-06-30 PROCEDURE — 2500000003 HC RX 250 WO HCPCS: Performed by: INTERNAL MEDICINE

## 2025-06-30 PROCEDURE — 2580000003 HC RX 258: Performed by: INTERNAL MEDICINE

## 2025-06-30 PROCEDURE — 82533 TOTAL CORTISOL: CPT

## 2025-06-30 PROCEDURE — 84443 ASSAY THYROID STIM HORMONE: CPT

## 2025-06-30 RX ORDER — SODIUM CHLORIDE 9 MG/ML
5-250 INJECTION, SOLUTION INTRAVENOUS PRN
Status: DISCONTINUED | OUTPATIENT
Start: 2025-06-30 | End: 2025-07-01 | Stop reason: HOSPADM

## 2025-06-30 RX ORDER — SODIUM CHLORIDE 0.9 % (FLUSH) 0.9 %
5-40 SYRINGE (ML) INJECTION PRN
Status: DISCONTINUED | OUTPATIENT
Start: 2025-06-30 | End: 2025-07-01 | Stop reason: HOSPADM

## 2025-06-30 RX ORDER — HEPARIN 100 UNIT/ML
500 SYRINGE INTRAVENOUS PRN
Status: DISCONTINUED | OUTPATIENT
Start: 2025-06-30 | End: 2025-07-01 | Stop reason: HOSPADM

## 2025-06-30 RX ADMIN — HEPARIN 500 UNITS: 100 SYRINGE at 15:04

## 2025-06-30 RX ADMIN — SODIUM CHLORIDE, PRESERVATIVE FREE 10 ML: 5 INJECTION INTRAVENOUS at 15:03

## 2025-06-30 RX ADMIN — ATEZOLIZUMAB 1200 MG: 1200 INJECTION, SOLUTION INTRAVENOUS at 14:21

## 2025-06-30 RX ADMIN — SODIUM CHLORIDE 30 ML/HR: 0.9 INJECTION, SOLUTION INTRAVENOUS at 13:52

## 2025-06-30 NOTE — PROGRESS NOTES
Patient arrived for Tecentriq infusion.  VSS as charted.  Port accessed without complication.  Lab draw from port access.  Patient tolerated infusion well.  Patient left infusion center with all belongings and steady gate.  New appt set for 3 weeks and printed out for patient.

## 2025-07-02 ENCOUNTER — TELEPHONE (OUTPATIENT)
Dept: ONCOLOGY | Age: 70
End: 2025-07-02

## 2025-07-08 ENCOUNTER — HOSPITAL ENCOUNTER (OUTPATIENT)
Dept: CT IMAGING | Age: 70
Discharge: HOME | End: 2025-07-10
Attending: INTERNAL MEDICINE
Payer: MEDICARE

## 2025-07-08 DIAGNOSIS — C34.91 SMALL CELL LUNG CANCER, RIGHT (HCC): ICD-10-CM

## 2025-07-08 PROCEDURE — 6360000004 HC RX CONTRAST MEDICATION: Performed by: INTERNAL MEDICINE

## 2025-07-08 PROCEDURE — 2709999900 CT CHEST ABDOMEN PELVIS W CONTRAST

## 2025-07-08 RX ORDER — IOPAMIDOL 755 MG/ML
100 INJECTION, SOLUTION INTRAVASCULAR
Status: COMPLETED | OUTPATIENT
Start: 2025-07-08 | End: 2025-07-08

## 2025-07-08 RX ADMIN — IOPAMIDOL 100 ML: 755 INJECTION, SOLUTION INTRAVENOUS at 14:50

## 2025-07-15 DIAGNOSIS — C34.91 SMALL CELL LUNG CANCER, RIGHT (HCC): ICD-10-CM

## 2025-07-15 RX ORDER — TRAZODONE HYDROCHLORIDE 50 MG/1
50 TABLET ORAL NIGHTLY PRN
Qty: 30 TABLET | Refills: 0 | Status: SHIPPED | OUTPATIENT
Start: 2025-07-15

## 2025-07-16 RX ORDER — LORAZEPAM 0.5 MG/1
0.5 TABLET ORAL EVERY 8 HOURS PRN
Qty: 90 TABLET | Refills: 0 | Status: SHIPPED | OUTPATIENT
Start: 2025-07-16 | End: 2025-08-15

## 2025-07-21 ENCOUNTER — HOSPITAL ENCOUNTER (OUTPATIENT)
Dept: INFUSION THERAPY | Age: 70
Discharge: HOME | End: 2025-07-21
Payer: MEDICARE

## 2025-07-21 VITALS
WEIGHT: 252 LBS | BODY MASS INDEX: 34.17 KG/M2 | OXYGEN SATURATION: 98 % | RESPIRATION RATE: 16 BRPM | HEART RATE: 54 BPM | TEMPERATURE: 97.2 F | SYSTOLIC BLOOD PRESSURE: 113 MMHG | DIASTOLIC BLOOD PRESSURE: 60 MMHG

## 2025-07-21 DIAGNOSIS — C34.91 SMALL CELL LUNG CANCER, RIGHT (HCC): Primary | ICD-10-CM

## 2025-07-21 LAB
ALBUMIN SERPL-MCNC: 4.1 G/DL (ref 3.5–5.2)
ALBUMIN/GLOB SERPL: 1.6 {RATIO} (ref 1–2.5)
ALP SERPL-CCNC: 72 U/L (ref 40–129)
ALT SERPL-CCNC: 16 U/L (ref 10–50)
ANION GAP SERPL CALCULATED.3IONS-SCNC: 10 MMOL/L (ref 9–16)
AST SERPL-CCNC: 18 U/L (ref 10–50)
BASOPHILS # BLD: 0.04 K/UL (ref 0–0.2)
BASOPHILS NFR BLD: 1 % (ref 0–2)
BILIRUB SERPL-MCNC: 0.5 MG/DL (ref 0–1.2)
BUN SERPL-MCNC: 17 MG/DL (ref 8–23)
BUN/CREAT SERPL: 15 (ref 9–20)
CALCIUM SERPL-MCNC: 8.2 MG/DL (ref 8.6–10.4)
CHLORIDE SERPL-SCNC: 106 MMOL/L (ref 98–107)
CO2 SERPL-SCNC: 27 MMOL/L (ref 20–31)
CREAT SERPL-MCNC: 1.1 MG/DL (ref 0.7–1.2)
EOSINOPHIL # BLD: 0.14 K/UL (ref 0–0.44)
EOSINOPHILS RELATIVE PERCENT: 2 % (ref 1–4)
ERYTHROCYTE [DISTWIDTH] IN BLOOD BY AUTOMATED COUNT: 12.1 % (ref 11.8–14.4)
GFR, ESTIMATED: 72 ML/MIN/1.73M2
GLUCOSE SERPL-MCNC: 110 MG/DL (ref 74–99)
HCT VFR BLD AUTO: 38 % (ref 40.7–50.3)
HGB BLD-MCNC: 13.6 G/DL (ref 13–17)
IMM GRANULOCYTES # BLD AUTO: <0.03 K/UL (ref 0–0.3)
IMM GRANULOCYTES NFR BLD: 0 %
LYMPHOCYTES NFR BLD: 2.26 K/UL (ref 1.1–3.7)
LYMPHOCYTES RELATIVE PERCENT: 31 % (ref 24–43)
MCH RBC QN AUTO: 35.2 PG (ref 25.2–33.5)
MCHC RBC AUTO-ENTMCNC: 35.8 G/DL (ref 25.2–33.5)
MCV RBC AUTO: 98.4 FL (ref 82.6–102.9)
MONOCYTES NFR BLD: 0.65 K/UL (ref 0.1–1.2)
MONOCYTES NFR BLD: 9 % (ref 3–12)
NEUTROPHILS NFR BLD: 57 % (ref 36–65)
NEUTS SEG NFR BLD: 4.17 K/UL (ref 1.5–8.1)
NRBC BLD-RTO: 0 PER 100 WBC
PLATELET # BLD AUTO: ABNORMAL K/UL (ref 138–453)
PLATELET, FLUORESCENCE: 142 K/UL (ref 138–453)
PLATELETS.RETICULATED NFR BLD AUTO: 6.7 % (ref 1.1–10.3)
POTASSIUM SERPL-SCNC: 3.9 MMOL/L (ref 3.7–5.3)
PROT SERPL-MCNC: 6.6 G/DL (ref 6.6–8.7)
RBC # BLD AUTO: 3.86 M/UL (ref 4.21–5.77)
SODIUM SERPL-SCNC: 143 MMOL/L (ref 136–145)
TSH SERPL DL<=0.05 MIU/L-ACNC: 3.15 UIU/ML (ref 0.27–4.2)
WBC OTHER # BLD: 7.3 K/UL (ref 3.5–11.3)

## 2025-07-21 PROCEDURE — 84443 ASSAY THYROID STIM HORMONE: CPT

## 2025-07-21 PROCEDURE — 80053 COMPREHEN METABOLIC PANEL: CPT

## 2025-07-21 PROCEDURE — 2500000003 HC RX 250 WO HCPCS: Performed by: INTERNAL MEDICINE

## 2025-07-21 PROCEDURE — 85025 COMPLETE CBC W/AUTO DIFF WBC: CPT

## 2025-07-21 PROCEDURE — 2580000003 HC RX 258: Performed by: INTERNAL MEDICINE

## 2025-07-21 PROCEDURE — 96409 CHEMO IV PUSH SNGL DRUG: CPT

## 2025-07-21 PROCEDURE — 6360000002 HC RX W HCPCS: Performed by: INTERNAL MEDICINE

## 2025-07-21 PROCEDURE — 96413 CHEMO IV INFUSION 1 HR: CPT

## 2025-07-21 RX ORDER — ACETAMINOPHEN 325 MG/1
650 TABLET ORAL
OUTPATIENT
Start: 2025-07-21

## 2025-07-21 RX ORDER — MEPERIDINE HYDROCHLORIDE 50 MG/ML
12.5 INJECTION INTRAMUSCULAR; INTRAVENOUS; SUBCUTANEOUS PRN
OUTPATIENT
Start: 2025-07-21

## 2025-07-21 RX ORDER — ACETAMINOPHEN 325 MG/1
650 TABLET ORAL
Start: 2025-07-21

## 2025-07-21 RX ORDER — DIPHENHYDRAMINE HYDROCHLORIDE 50 MG/ML
50 INJECTION, SOLUTION INTRAMUSCULAR; INTRAVENOUS
OUTPATIENT
Start: 2025-07-21

## 2025-07-21 RX ORDER — ONDANSETRON 2 MG/ML
8 INJECTION INTRAMUSCULAR; INTRAVENOUS
OUTPATIENT
Start: 2025-07-21

## 2025-07-21 RX ORDER — EPINEPHRINE 1 MG/ML
0.3 INJECTION, SOLUTION, CONCENTRATE INTRAVENOUS PRN
OUTPATIENT
Start: 2025-07-21

## 2025-07-21 RX ORDER — HYDROCORTISONE SODIUM SUCCINATE 100 MG/2ML
100 INJECTION INTRAMUSCULAR; INTRAVENOUS
OUTPATIENT
Start: 2025-07-21

## 2025-07-21 RX ORDER — HEPARIN 100 UNIT/ML
500 SYRINGE INTRAVENOUS PRN
Status: DISCONTINUED | OUTPATIENT
Start: 2025-07-21 | End: 2025-07-22 | Stop reason: HOSPADM

## 2025-07-21 RX ORDER — SODIUM CHLORIDE 0.9 % (FLUSH) 0.9 %
5-40 SYRINGE (ML) INJECTION PRN
Status: DISCONTINUED | OUTPATIENT
Start: 2025-07-21 | End: 2025-07-22 | Stop reason: HOSPADM

## 2025-07-21 RX ORDER — SODIUM CHLORIDE 9 MG/ML
5-250 INJECTION, SOLUTION INTRAVENOUS PRN
OUTPATIENT
Start: 2025-07-21

## 2025-07-21 RX ORDER — ALBUTEROL SULFATE 90 UG/1
4 INHALANT RESPIRATORY (INHALATION) PRN
OUTPATIENT
Start: 2025-07-21

## 2025-07-21 RX ORDER — SODIUM CHLORIDE 9 MG/ML
5-250 INJECTION, SOLUTION INTRAVENOUS PRN
Status: DISCONTINUED | OUTPATIENT
Start: 2025-07-21 | End: 2025-07-22 | Stop reason: HOSPADM

## 2025-07-21 RX ORDER — SODIUM CHLORIDE 9 MG/ML
INJECTION, SOLUTION INTRAVENOUS CONTINUOUS
OUTPATIENT
Start: 2025-07-21

## 2025-07-21 RX ADMIN — HEPARIN 500 UNITS: 100 SYRINGE at 14:54

## 2025-07-21 RX ADMIN — ATEZOLIZUMAB 1200 MG: 1200 INJECTION, SOLUTION INTRAVENOUS at 14:33

## 2025-07-21 RX ADMIN — SODIUM CHLORIDE, PRESERVATIVE FREE 10 ML: 5 INJECTION INTRAVENOUS at 14:53

## 2025-07-21 NOTE — PROGRESS NOTES
VAD accessed per protocol with 3/4\" Hopper needle. Flushes easily and labs drawn. Message sent to Dr Mendenhall with updated scan results and to sign the patients plan. Infusion complete, tolerated well.  Flushed with 10 ml of NSS and 5 ml of Heparin flush. VAD D/C'd and Bandaid applied to site. Patient stable and discharged home.

## 2025-07-28 ENCOUNTER — OFFICE VISIT (OUTPATIENT)
Dept: ONCOLOGY | Age: 70
End: 2025-07-28
Payer: MEDICARE

## 2025-07-28 VITALS
WEIGHT: 250 LBS | BODY MASS INDEX: 33.86 KG/M2 | HEIGHT: 72 IN | DIASTOLIC BLOOD PRESSURE: 62 MMHG | HEART RATE: 61 BPM | SYSTOLIC BLOOD PRESSURE: 112 MMHG

## 2025-07-28 DIAGNOSIS — C34.91 SMALL CELL LUNG CANCER, RIGHT (HCC): Primary | ICD-10-CM

## 2025-07-28 PROCEDURE — 99213 OFFICE O/P EST LOW 20 MIN: CPT | Performed by: INTERNAL MEDICINE

## 2025-07-28 PROCEDURE — 99215 OFFICE O/P EST HI 40 MIN: CPT | Performed by: INTERNAL MEDICINE

## 2025-07-28 PROCEDURE — 1159F MED LIST DOCD IN RCRD: CPT | Performed by: INTERNAL MEDICINE

## 2025-07-28 PROCEDURE — 1124F ACP DISCUSS-NO DSCNMKR DOCD: CPT | Performed by: INTERNAL MEDICINE

## 2025-07-28 PROCEDURE — 1160F RVW MEDS BY RX/DR IN RCRD: CPT | Performed by: INTERNAL MEDICINE

## 2025-07-28 PROCEDURE — 3078F DIAST BP <80 MM HG: CPT | Performed by: INTERNAL MEDICINE

## 2025-07-28 PROCEDURE — 3074F SYST BP LT 130 MM HG: CPT | Performed by: INTERNAL MEDICINE

## 2025-07-28 RX ORDER — DIAZEPAM 5 MG/1
TABLET ORAL
Qty: 2 TABLET | Refills: 0 | Status: SHIPPED | OUTPATIENT
Start: 2025-07-28 | End: 2025-08-28

## 2025-07-28 RX ORDER — BACLOFEN 10 MG/1
5 TABLET ORAL NIGHTLY PRN
Qty: 15 TABLET | Refills: 0 | Status: SHIPPED | OUTPATIENT
Start: 2025-07-28

## 2025-07-28 NOTE — PROGRESS NOTES
Patient ID: Andrea Desir, 1955, [id number], 70 y.o.  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  Significant decrease in surrounding vasogenic edema  HISTORY OF PRESENT ILLNESS:    Oncologic History:  Andrea Desir is a 70 y.o. male with history of atrial fibrillation, status post AICD, history of CAD status post stent, with a tobacco abuse was seen during initial counseling visit for newly discovered lung mass and lymphadenopathy.    Patient recently was seen by his primary care physician for difficulty swallowing and also swelling in his neck area.  Patient also having cough for past 6 months.  Patient had a CT scan of the neck as well as CT scan of the chest on 3/27/2024 which showed right upper lobe 4.7 cm mass suspicious for primary lung malignancy along with right hilar and bilateral mediastinal bulky lymphadenopathy with extrinsic mass effect on the trachea and esophagus without occlusion.  Patient also noted to have

## 2025-08-11 ENCOUNTER — HOSPITAL ENCOUNTER (OUTPATIENT)
Dept: INFUSION THERAPY | Age: 70
Discharge: HOME | End: 2025-08-11
Payer: MEDICARE

## 2025-08-11 ENCOUNTER — TELEPHONE (OUTPATIENT)
Dept: ONCOLOGY | Age: 70
End: 2025-08-11

## 2025-08-11 VITALS
TEMPERATURE: 97.8 F | HEIGHT: 72 IN | DIASTOLIC BLOOD PRESSURE: 83 MMHG | HEART RATE: 59 BPM | WEIGHT: 247.6 LBS | RESPIRATION RATE: 16 BRPM | SYSTOLIC BLOOD PRESSURE: 132 MMHG | OXYGEN SATURATION: 96 % | BODY MASS INDEX: 33.54 KG/M2

## 2025-08-11 DIAGNOSIS — C34.91 SMALL CELL LUNG CANCER, RIGHT (HCC): Primary | ICD-10-CM

## 2025-08-11 LAB
ALBUMIN SERPL-MCNC: 4.3 G/DL (ref 3.5–5.2)
ALBUMIN/GLOB SERPL: 1.7 {RATIO} (ref 1–2.5)
ALP SERPL-CCNC: 81 U/L (ref 40–129)
ALT SERPL-CCNC: 17 U/L (ref 10–50)
ANION GAP SERPL CALCULATED.3IONS-SCNC: 10 MMOL/L (ref 9–16)
AST SERPL-CCNC: 23 U/L (ref 10–50)
BASOPHILS # BLD: 0.04 K/UL (ref 0–0.2)
BASOPHILS NFR BLD: 0 % (ref 0–2)
BILIRUB SERPL-MCNC: 0.7 MG/DL (ref 0–1.2)
BUN SERPL-MCNC: 21 MG/DL (ref 8–23)
BUN/CREAT SERPL: 18 (ref 9–20)
CALCIUM SERPL-MCNC: 9.3 MG/DL (ref 8.6–10.4)
CHLORIDE SERPL-SCNC: 104 MMOL/L (ref 98–107)
CO2 SERPL-SCNC: 26 MMOL/L (ref 20–31)
CORTIS SERPL-MCNC: 8 UG/DL (ref 2.5–19.5)
CREAT SERPL-MCNC: 1.2 MG/DL (ref 0.7–1.2)
EOSINOPHIL # BLD: 0.11 K/UL (ref 0–0.44)
EOSINOPHILS RELATIVE PERCENT: 1 % (ref 1–4)
ERYTHROCYTE [DISTWIDTH] IN BLOOD BY AUTOMATED COUNT: 12.7 % (ref 11.8–14.4)
GFR, ESTIMATED: 65 ML/MIN/1.73M2
GLUCOSE SERPL-MCNC: 91 MG/DL (ref 74–99)
HCT VFR BLD AUTO: 38 % (ref 40.7–50.3)
HGB BLD-MCNC: 13.7 G/DL (ref 13–17)
IMM GRANULOCYTES # BLD AUTO: <0.03 K/UL (ref 0–0.3)
IMM GRANULOCYTES NFR BLD: 0 %
LYMPHOCYTES NFR BLD: 2.63 K/UL (ref 1.1–3.7)
LYMPHOCYTES RELATIVE PERCENT: 29 % (ref 24–43)
MCH RBC QN AUTO: 35.2 PG (ref 25.2–33.5)
MCHC RBC AUTO-ENTMCNC: 36.1 G/DL (ref 25.2–33.5)
MCV RBC AUTO: 97.7 FL (ref 82.6–102.9)
MONOCYTES NFR BLD: 0.78 K/UL (ref 0.1–1.2)
MONOCYTES NFR BLD: 9 % (ref 3–12)
NEUTROPHILS NFR BLD: 61 % (ref 36–65)
NEUTS SEG NFR BLD: 5.54 K/UL (ref 1.5–8.1)
NRBC BLD-RTO: 0 PER 100 WBC
PLATELET # BLD AUTO: ABNORMAL K/UL (ref 138–453)
PLATELET, FLUORESCENCE: 141 K/UL (ref 138–453)
PLATELETS.RETICULATED NFR BLD AUTO: 6.7 % (ref 1.1–10.3)
POTASSIUM SERPL-SCNC: 3.9 MMOL/L (ref 3.7–5.3)
PROT SERPL-MCNC: 6.8 G/DL (ref 6.6–8.7)
RBC # BLD AUTO: 3.89 M/UL (ref 4.21–5.77)
SODIUM SERPL-SCNC: 140 MMOL/L (ref 136–145)
TSH SERPL DL<=0.05 MIU/L-ACNC: 3.48 UIU/ML (ref 0.27–4.2)
WBC OTHER # BLD: 9.1 K/UL (ref 3.5–11.3)

## 2025-08-11 PROCEDURE — 84443 ASSAY THYROID STIM HORMONE: CPT

## 2025-08-11 PROCEDURE — 6360000002 HC RX W HCPCS: Performed by: INTERNAL MEDICINE

## 2025-08-11 PROCEDURE — 2580000003 HC RX 258: Performed by: INTERNAL MEDICINE

## 2025-08-11 PROCEDURE — 85025 COMPLETE CBC W/AUTO DIFF WBC: CPT

## 2025-08-11 PROCEDURE — 80053 COMPREHEN METABOLIC PANEL: CPT

## 2025-08-11 PROCEDURE — 96413 CHEMO IV INFUSION 1 HR: CPT

## 2025-08-11 PROCEDURE — 82533 TOTAL CORTISOL: CPT

## 2025-08-11 PROCEDURE — 2500000003 HC RX 250 WO HCPCS: Performed by: INTERNAL MEDICINE

## 2025-08-11 RX ORDER — FAMOTIDINE 10 MG/ML
20 INJECTION, SOLUTION INTRAVENOUS
Status: CANCELLED | OUTPATIENT
Start: 2025-08-11

## 2025-08-11 RX ORDER — SODIUM CHLORIDE 9 MG/ML
5-250 INJECTION, SOLUTION INTRAVENOUS PRN
Status: CANCELLED | OUTPATIENT
Start: 2025-08-11

## 2025-08-11 RX ORDER — DIPHENHYDRAMINE HYDROCHLORIDE 50 MG/ML
50 INJECTION, SOLUTION INTRAMUSCULAR; INTRAVENOUS
Status: CANCELLED | OUTPATIENT
Start: 2025-08-11

## 2025-08-11 RX ORDER — SODIUM CHLORIDE 0.9 % (FLUSH) 0.9 %
5-40 SYRINGE (ML) INJECTION PRN
Status: DISCONTINUED | OUTPATIENT
Start: 2025-08-11 | End: 2025-08-12 | Stop reason: HOSPADM

## 2025-08-11 RX ORDER — HYDROCORTISONE SODIUM SUCCINATE 100 MG/2ML
100 INJECTION INTRAMUSCULAR; INTRAVENOUS
Status: CANCELLED | OUTPATIENT
Start: 2025-08-11

## 2025-08-11 RX ORDER — MEPERIDINE HYDROCHLORIDE 50 MG/ML
12.5 INJECTION INTRAMUSCULAR; INTRAVENOUS; SUBCUTANEOUS PRN
Status: CANCELLED | OUTPATIENT
Start: 2025-08-11

## 2025-08-11 RX ORDER — SODIUM CHLORIDE 0.9 % (FLUSH) 0.9 %
5-40 SYRINGE (ML) INJECTION PRN
Status: CANCELLED | OUTPATIENT
Start: 2025-08-11

## 2025-08-11 RX ORDER — ACETAMINOPHEN 325 MG/1
650 TABLET ORAL
Status: CANCELLED | OUTPATIENT
Start: 2025-08-11

## 2025-08-11 RX ORDER — SODIUM CHLORIDE 9 MG/ML
5-250 INJECTION, SOLUTION INTRAVENOUS PRN
Status: DISCONTINUED | OUTPATIENT
Start: 2025-08-11 | End: 2025-08-12 | Stop reason: HOSPADM

## 2025-08-11 RX ORDER — EPINEPHRINE 1 MG/ML
0.3 INJECTION, SOLUTION, CONCENTRATE INTRAVENOUS PRN
Status: CANCELLED | OUTPATIENT
Start: 2025-08-11

## 2025-08-11 RX ORDER — ALBUTEROL SULFATE 90 UG/1
4 INHALANT RESPIRATORY (INHALATION) PRN
Status: CANCELLED | OUTPATIENT
Start: 2025-08-11

## 2025-08-11 RX ORDER — HEPARIN SODIUM (PORCINE) LOCK FLUSH IV SOLN 100 UNIT/ML 100 UNIT/ML
500 SOLUTION INTRAVENOUS PRN
Status: CANCELLED | OUTPATIENT
Start: 2025-08-11

## 2025-08-11 RX ORDER — ONDANSETRON 2 MG/ML
8 INJECTION INTRAMUSCULAR; INTRAVENOUS
Status: CANCELLED | OUTPATIENT
Start: 2025-08-11

## 2025-08-11 RX ORDER — HEPARIN 100 UNIT/ML
500 SYRINGE INTRAVENOUS PRN
Status: DISCONTINUED | OUTPATIENT
Start: 2025-08-11 | End: 2025-08-12 | Stop reason: HOSPADM

## 2025-08-11 RX ORDER — SODIUM CHLORIDE 9 MG/ML
INJECTION, SOLUTION INTRAVENOUS CONTINUOUS
Status: CANCELLED | OUTPATIENT
Start: 2025-08-11

## 2025-08-11 RX ORDER — ACETAMINOPHEN 325 MG/1
650 TABLET ORAL
Status: CANCELLED
Start: 2025-08-11

## 2025-08-11 RX ADMIN — ATEZOLIZUMAB 1200 MG: 1200 INJECTION, SOLUTION INTRAVENOUS at 14:48

## 2025-08-11 RX ADMIN — SODIUM CHLORIDE, PRESERVATIVE FREE 10 ML: 5 INJECTION INTRAVENOUS at 15:23

## 2025-08-11 RX ADMIN — SODIUM CHLORIDE 20 ML/HR: 0.9 INJECTION, SOLUTION INTRAVENOUS at 13:59

## 2025-08-11 RX ADMIN — HEPARIN 500 UNITS: 100 SYRINGE at 15:23

## 2025-08-12 RX ORDER — TRAZODONE HYDROCHLORIDE 50 MG/1
50 TABLET ORAL NIGHTLY PRN
Qty: 30 TABLET | Refills: 0 | Status: SHIPPED | OUTPATIENT
Start: 2025-08-12

## 2025-08-21 ENCOUNTER — HOSPITAL ENCOUNTER (OUTPATIENT)
Dept: INTERVENTIONAL RADIOLOGY/VASCULAR | Age: 70
Discharge: HOME | End: 2025-08-23
Payer: MEDICARE

## 2025-08-21 ENCOUNTER — HOSPITAL ENCOUNTER (OUTPATIENT)
Dept: MRI IMAGING | Age: 70
Discharge: HOME | End: 2025-08-23
Attending: RADIOLOGY
Payer: MEDICARE

## 2025-08-21 VITALS — OXYGEN SATURATION: 93 % | HEART RATE: 55 BPM

## 2025-08-21 DIAGNOSIS — D49.6 BRAIN NEOPLASM (HCC): ICD-10-CM

## 2025-08-21 DIAGNOSIS — C34.91 SMALL CELL LUNG CANCER, RIGHT (HCC): ICD-10-CM

## 2025-08-21 PROCEDURE — 6360000004 HC RX CONTRAST MEDICATION: Performed by: RADIOLOGY

## 2025-08-21 PROCEDURE — 2500000003 HC RX 250 WO HCPCS: Performed by: RADIOLOGY

## 2025-08-21 PROCEDURE — A9579 GAD-BASE MR CONTRAST NOS,1ML: HCPCS | Performed by: RADIOLOGY

## 2025-08-21 PROCEDURE — 70553 MRI BRAIN STEM W/O & W/DYE: CPT

## 2025-08-21 RX ORDER — GADOTERIDOL 279.3 MG/ML
20 INJECTION INTRAVENOUS
Status: COMPLETED | OUTPATIENT
Start: 2025-08-21 | End: 2025-08-21

## 2025-08-21 RX ORDER — SODIUM CHLORIDE 0.9 % (FLUSH) 0.9 %
10 SYRINGE (ML) INJECTION PRN
Status: DISCONTINUED | OUTPATIENT
Start: 2025-08-21 | End: 2025-08-24 | Stop reason: HOSPADM

## 2025-08-21 RX ADMIN — SODIUM CHLORIDE, PRESERVATIVE FREE 10 ML: 5 INJECTION INTRAVENOUS at 14:23

## 2025-08-21 RX ADMIN — GADOTERIDOL 20 ML: 279.3 INJECTION, SOLUTION INTRAVENOUS at 14:22

## 2025-08-25 RX ORDER — FUROSEMIDE 40 MG/1
40 TABLET ORAL DAILY
Qty: 30 TABLET | Refills: 3 | Status: SHIPPED | OUTPATIENT
Start: 2025-08-25

## 2025-08-26 RX ORDER — BACLOFEN 10 MG/1
TABLET ORAL
Qty: 15 TABLET | Refills: 0 | Status: SHIPPED | OUTPATIENT
Start: 2025-08-26

## 2025-09-01 DIAGNOSIS — C34.91 SMALL CELL LUNG CANCER, RIGHT (HCC): ICD-10-CM

## 2025-09-02 ENCOUNTER — HOSPITAL ENCOUNTER (OUTPATIENT)
Dept: RADIATION ONCOLOGY | Age: 70
Discharge: HOME | End: 2025-09-02
Payer: MEDICARE

## 2025-09-02 VITALS
BODY MASS INDEX: 34.25 KG/M2 | HEART RATE: 63 BPM | RESPIRATION RATE: 16 BRPM | SYSTOLIC BLOOD PRESSURE: 120 MMHG | DIASTOLIC BLOOD PRESSURE: 76 MMHG | OXYGEN SATURATION: 94 % | TEMPERATURE: 98 F | WEIGHT: 252.6 LBS

## 2025-09-02 DIAGNOSIS — D49.6 BRAIN NEOPLASM (HCC): ICD-10-CM

## 2025-09-02 DIAGNOSIS — C34.91 SMALL CELL LUNG CANCER, RIGHT (HCC): Primary | ICD-10-CM

## 2025-09-02 PROCEDURE — 99212 OFFICE O/P EST SF 10 MIN: CPT | Performed by: RADIOLOGY

## 2025-09-02 RX ORDER — LORAZEPAM 0.5 MG/1
TABLET ORAL
Qty: 90 TABLET | Refills: 0 | Status: SHIPPED | OUTPATIENT
Start: 2025-09-02 | End: 2025-10-02

## 2025-09-02 ASSESSMENT — PAIN SCALES - GENERAL: PAINLEVEL_OUTOF10: 0

## 2025-09-03 ENCOUNTER — HOSPITAL ENCOUNTER (OUTPATIENT)
Dept: INFUSION THERAPY | Age: 70
Discharge: HOME | End: 2025-09-03
Payer: MEDICARE

## 2025-09-03 VITALS
SYSTOLIC BLOOD PRESSURE: 153 MMHG | RESPIRATION RATE: 16 BRPM | OXYGEN SATURATION: 99 % | WEIGHT: 245 LBS | DIASTOLIC BLOOD PRESSURE: 75 MMHG | BODY MASS INDEX: 33.18 KG/M2 | HEART RATE: 54 BPM | TEMPERATURE: 98.7 F | HEIGHT: 72 IN

## 2025-09-03 DIAGNOSIS — C34.91 SMALL CELL LUNG CANCER, RIGHT (HCC): Primary | ICD-10-CM

## 2025-09-03 LAB
ALBUMIN SERPL-MCNC: 4.2 G/DL (ref 3.5–5.2)
ALBUMIN/GLOB SERPL: 1.4 {RATIO} (ref 1–2.5)
ALP SERPL-CCNC: 81 U/L (ref 40–129)
ALT SERPL-CCNC: 19 U/L (ref 10–50)
ANION GAP SERPL CALCULATED.3IONS-SCNC: 12 MMOL/L (ref 9–16)
AST SERPL-CCNC: 21 U/L (ref 10–50)
BASOPHILS # BLD: 0.05 K/UL (ref 0–0.2)
BASOPHILS NFR BLD: 1 % (ref 0–2)
BILIRUB SERPL-MCNC: 0.4 MG/DL (ref 0–1.2)
BUN SERPL-MCNC: 18 MG/DL (ref 8–23)
BUN/CREAT SERPL: 14 (ref 9–20)
CALCIUM SERPL-MCNC: 9.4 MG/DL (ref 8.6–10.4)
CHLORIDE SERPL-SCNC: 105 MMOL/L (ref 98–107)
CO2 SERPL-SCNC: 26 MMOL/L (ref 20–31)
CORTIS SERPL-MCNC: 6.7 UG/DL (ref 2.5–19.5)
CREAT SERPL-MCNC: 1.3 MG/DL (ref 0.7–1.2)
EOSINOPHIL # BLD: 0.17 K/UL (ref 0–0.44)
EOSINOPHILS RELATIVE PERCENT: 2 % (ref 1–4)
ERYTHROCYTE [DISTWIDTH] IN BLOOD BY AUTOMATED COUNT: 13.2 % (ref 11.8–14.4)
GFR, ESTIMATED: 59 ML/MIN/1.73M2
GLUCOSE SERPL-MCNC: 97 MG/DL (ref 74–99)
HCT VFR BLD AUTO: 38.8 % (ref 40.7–50.3)
HGB BLD-MCNC: 13.8 G/DL (ref 13–17)
IMM GRANULOCYTES # BLD AUTO: <0.03 K/UL (ref 0–0.3)
IMM GRANULOCYTES NFR BLD: 0 %
LYMPHOCYTES NFR BLD: 2.61 K/UL (ref 1.1–3.7)
LYMPHOCYTES RELATIVE PERCENT: 34 % (ref 24–43)
MCH RBC QN AUTO: 35.3 PG (ref 25.2–33.5)
MCHC RBC AUTO-ENTMCNC: 35.6 G/DL (ref 25.2–33.5)
MCV RBC AUTO: 99.2 FL (ref 82.6–102.9)
MONOCYTES NFR BLD: 0.8 K/UL (ref 0.1–1.2)
MONOCYTES NFR BLD: 10 % (ref 3–12)
NEUTROPHILS NFR BLD: 53 % (ref 36–65)
NEUTS SEG NFR BLD: 4.09 K/UL (ref 1.5–8.1)
NRBC BLD-RTO: 0 PER 100 WBC
PLATELET # BLD AUTO: 143 K/UL (ref 138–453)
PMV BLD AUTO: 11.3 FL (ref 8.1–13.5)
POTASSIUM SERPL-SCNC: 3.8 MMOL/L (ref 3.7–5.3)
PROT SERPL-MCNC: 7.1 G/DL (ref 6.6–8.7)
RBC # BLD AUTO: 3.91 M/UL (ref 4.21–5.77)
SODIUM SERPL-SCNC: 143 MMOL/L (ref 136–145)
TSH SERPL DL<=0.05 MIU/L-ACNC: 5.04 UIU/ML (ref 0.27–4.2)
WBC OTHER # BLD: 7.7 K/UL (ref 3.5–11.3)

## 2025-09-03 PROCEDURE — 84443 ASSAY THYROID STIM HORMONE: CPT

## 2025-09-03 PROCEDURE — 96413 CHEMO IV INFUSION 1 HR: CPT

## 2025-09-03 PROCEDURE — 80053 COMPREHEN METABOLIC PANEL: CPT

## 2025-09-03 PROCEDURE — 2500000003 HC RX 250 WO HCPCS: Performed by: INTERNAL MEDICINE

## 2025-09-03 PROCEDURE — 82533 TOTAL CORTISOL: CPT

## 2025-09-03 PROCEDURE — 85025 COMPLETE CBC W/AUTO DIFF WBC: CPT

## 2025-09-03 PROCEDURE — 6360000002 HC RX W HCPCS: Performed by: INTERNAL MEDICINE

## 2025-09-03 PROCEDURE — 2580000003 HC RX 258: Performed by: INTERNAL MEDICINE

## 2025-09-03 RX ORDER — ACETAMINOPHEN 325 MG/1
650 TABLET ORAL
Status: CANCELLED
Start: 2025-09-03

## 2025-09-03 RX ORDER — SODIUM CHLORIDE 9 MG/ML
5-250 INJECTION, SOLUTION INTRAVENOUS PRN
OUTPATIENT
Start: 2025-09-22

## 2025-09-03 RX ORDER — SODIUM CHLORIDE 9 MG/ML
5-250 INJECTION, SOLUTION INTRAVENOUS PRN
Status: DISCONTINUED | OUTPATIENT
Start: 2025-09-03 | End: 2025-09-04 | Stop reason: HOSPADM

## 2025-09-03 RX ORDER — MEPERIDINE HYDROCHLORIDE 50 MG/ML
12.5 INJECTION INTRAMUSCULAR; INTRAVENOUS; SUBCUTANEOUS PRN
Status: CANCELLED | OUTPATIENT
Start: 2025-09-03

## 2025-09-03 RX ORDER — DIPHENHYDRAMINE HYDROCHLORIDE 50 MG/ML
50 INJECTION, SOLUTION INTRAMUSCULAR; INTRAVENOUS
Status: CANCELLED | OUTPATIENT
Start: 2025-09-03

## 2025-09-03 RX ORDER — ACETAMINOPHEN 325 MG/1
650 TABLET ORAL
Start: 2025-09-22

## 2025-09-03 RX ORDER — SODIUM CHLORIDE 0.9 % (FLUSH) 0.9 %
5-40 SYRINGE (ML) INJECTION PRN
Status: DISCONTINUED | OUTPATIENT
Start: 2025-09-03 | End: 2025-09-04 | Stop reason: HOSPADM

## 2025-09-03 RX ORDER — ALBUTEROL SULFATE 90 UG/1
4 INHALANT RESPIRATORY (INHALATION) PRN
OUTPATIENT
Start: 2025-09-22

## 2025-09-03 RX ORDER — ACETAMINOPHEN 325 MG/1
650 TABLET ORAL
OUTPATIENT
Start: 2025-09-22

## 2025-09-03 RX ORDER — MEPERIDINE HYDROCHLORIDE 50 MG/ML
12.5 INJECTION INTRAMUSCULAR; INTRAVENOUS; SUBCUTANEOUS PRN
OUTPATIENT
Start: 2025-09-22

## 2025-09-03 RX ORDER — SODIUM CHLORIDE 9 MG/ML
INJECTION, SOLUTION INTRAVENOUS CONTINUOUS
Status: CANCELLED | OUTPATIENT
Start: 2025-09-03

## 2025-09-03 RX ORDER — SODIUM CHLORIDE 9 MG/ML
INJECTION, SOLUTION INTRAVENOUS CONTINUOUS
OUTPATIENT
Start: 2025-09-22

## 2025-09-03 RX ORDER — SODIUM CHLORIDE 9 MG/ML
5-250 INJECTION, SOLUTION INTRAVENOUS PRN
Status: CANCELLED | OUTPATIENT
Start: 2025-09-03

## 2025-09-03 RX ORDER — DIPHENHYDRAMINE HYDROCHLORIDE 50 MG/ML
50 INJECTION, SOLUTION INTRAMUSCULAR; INTRAVENOUS
OUTPATIENT
Start: 2025-09-22

## 2025-09-03 RX ORDER — EPINEPHRINE 1 MG/ML
0.3 INJECTION, SOLUTION, CONCENTRATE INTRAVENOUS PRN
Status: CANCELLED | OUTPATIENT
Start: 2025-09-03

## 2025-09-03 RX ORDER — HEPARIN 100 UNIT/ML
500 SYRINGE INTRAVENOUS PRN
OUTPATIENT
Start: 2025-09-22

## 2025-09-03 RX ORDER — HYDROCORTISONE SODIUM SUCCINATE 100 MG/2ML
100 INJECTION INTRAMUSCULAR; INTRAVENOUS
Status: CANCELLED | OUTPATIENT
Start: 2025-09-03

## 2025-09-03 RX ORDER — ALBUTEROL SULFATE 90 UG/1
4 INHALANT RESPIRATORY (INHALATION) PRN
Status: CANCELLED | OUTPATIENT
Start: 2025-09-03

## 2025-09-03 RX ORDER — ONDANSETRON 2 MG/ML
8 INJECTION INTRAMUSCULAR; INTRAVENOUS
OUTPATIENT
Start: 2025-09-22

## 2025-09-03 RX ORDER — EPINEPHRINE 1 MG/ML
0.3 INJECTION, SOLUTION, CONCENTRATE INTRAVENOUS PRN
OUTPATIENT
Start: 2025-09-22

## 2025-09-03 RX ORDER — HYDROCORTISONE SODIUM SUCCINATE 100 MG/2ML
100 INJECTION INTRAMUSCULAR; INTRAVENOUS
OUTPATIENT
Start: 2025-09-22

## 2025-09-03 RX ORDER — SODIUM CHLORIDE 0.9 % (FLUSH) 0.9 %
5-40 SYRINGE (ML) INJECTION PRN
OUTPATIENT
Start: 2025-09-22

## 2025-09-03 RX ORDER — HEPARIN 100 UNIT/ML
500 SYRINGE INTRAVENOUS PRN
Status: DISCONTINUED | OUTPATIENT
Start: 2025-09-03 | End: 2025-09-04 | Stop reason: HOSPADM

## 2025-09-03 RX ORDER — ACETAMINOPHEN 325 MG/1
650 TABLET ORAL
Status: CANCELLED | OUTPATIENT
Start: 2025-09-03

## 2025-09-03 RX ORDER — ONDANSETRON 2 MG/ML
8 INJECTION INTRAMUSCULAR; INTRAVENOUS
Status: CANCELLED | OUTPATIENT
Start: 2025-09-03

## 2025-09-03 RX ADMIN — ATEZOLIZUMAB 1200 MG: 1200 INJECTION, SOLUTION INTRAVENOUS at 10:18

## 2025-09-03 RX ADMIN — SODIUM CHLORIDE, PRESERVATIVE FREE 10 ML: 5 INJECTION INTRAVENOUS at 11:33

## 2025-09-03 RX ADMIN — SODIUM CHLORIDE 30 ML/HR: 0.9 INJECTION, SOLUTION INTRAVENOUS at 09:56

## 2025-09-03 RX ADMIN — HEPARIN 500 UNITS: 100 SYRINGE at 11:33

## 2025-09-03 ASSESSMENT — PAIN SCALES - GENERAL: PAINLEVEL_OUTOF10: 0

## 2025-09-04 DIAGNOSIS — I10 PRIMARY HYPERTENSION: ICD-10-CM

## 2025-09-04 RX ORDER — METOPROLOL TARTRATE 25 MG/1
TABLET, FILM COATED ORAL
Qty: 135 TABLET | Refills: 0 | Status: SHIPPED | OUTPATIENT
Start: 2025-09-04

## (undated) DEVICE — SUTURE MONOCRYL SZ 4-0 L18IN ABSRB UD L19MM PS-2 3/8 CIR PRIM Y496G

## (undated) DEVICE — MDHZ CYSTOSCOPY: Brand: MEDLINE INDUSTRIES, INC.

## (undated) DEVICE — APPLICATOR MEDICATED 26 CC SOLUTION HI LT ORNG CHLORAPREP

## (undated) DEVICE — BASIN SET: Brand: MEDLINE INDUSTRIES, INC.

## (undated) DEVICE — VENTED/UNVENTED 60 GTT 2 SMRTSITE NDLLSS VLV PRT SET

## (undated) DEVICE — ELECTRODE PT RET AD L9FT HI MOIST COND ADH HYDRGEL CORDED

## (undated) DEVICE — DRAPE,UTILITY,TAPE,15X26,STERILE: Brand: MEDLINE

## (undated) DEVICE — POUCH DRNGE FLX BND INTEGR RAIL CLMP DISP EZ CTCH

## (undated) DEVICE — SUTURE VICRYL SZ 3-0 L27IN ABSRB UD L26MM CT-2 1/2 CIR J232H

## (undated) DEVICE — GLOVE ORANGE PI 7   MSG9070

## (undated) DEVICE — DECANTER FLD 9IN ST BG FOR ASEP TRNSF OF FLD

## (undated) DEVICE — GLOVE ORANGE PI 7 1/2   MSG9075

## (undated) DEVICE — GAUZE,SPONGE,2"X2",8PLY,STERILE,LF,2'S: Brand: MEDLINE

## (undated) DEVICE — SYRINGE MED 30ML STD CLR PLAS LUERLOCK TIP N CTRL DISP

## (undated) DEVICE — TUBING, SUCTION, 3/16" X 20', STRAIGHT: Brand: MEDLINE

## (undated) DEVICE — CATHETER PLUG WITH CAP: Brand: DOVER

## (undated) DEVICE — GARMENT,MEDLINE,DVT,INT,CALF,LG, GEN2: Brand: MEDLINE

## (undated) DEVICE — SOLUTION IV 1000ML 0.9% SOD CHL PH 5 INJ USP VIAFLX PLAS

## (undated) DEVICE — 4-PORT MANIFOLD: Brand: NEPTUNE 2

## (undated) DEVICE — GOWN,AURORA,NONRNF,XL,30/CS: Brand: MEDLINE

## (undated) DEVICE — GLOVE ORANGE PI 8   MSG9080

## (undated) DEVICE — STRIP,CLOSURE,WOUND,MEDI-STRIP,1/2X4: Brand: MEDLINE

## (undated) DEVICE — DRESSING TRNSPAR W5XL4.5IN FLM SHT SEMIPERMEABLE WIND

## (undated) DEVICE — 3M™ TEGADERM™ TRANSPARENT FILM DRESSING FRAME STYLE, 1626W, 4 IN X 4-3/4 IN (10 CM X 12 CM), 50/CT 4CT/CASE: Brand: 3M™ TEGADERM™

## (undated) DEVICE — MARKER SURG SKIN UTIL REGULAR/FINE 2 TIP W/ RUL AND 9 LBL

## (undated) DEVICE — PACK,LAPAROTOMY,PK IV,AURORA: Brand: MEDLINE

## (undated) DEVICE — MASTISOL ADHESIVE LIQ 2/3ML

## (undated) DEVICE — SOLUTION IV 100ML 0.9% SOD CHL PLAS CONT USP VIAFLX 1 PER

## (undated) DEVICE — DRAINBAG,ANTI-REFLUX TOWER,L/F,2000ML,LL: Brand: MEDLINE

## (undated) DEVICE — GLOVE SURG 7.5 11.7IN BEAD CUF LIGHT BRN SENSICARE LTX FREE

## (undated) DEVICE — STRAP,CATHETER,ELASTIC,HOOK&LOOP: Brand: MEDLINE

## (undated) DEVICE — CATHETER,FOLEY,3-WAY,22FR,30ML,100%SILI: Brand: MEDLINE

## (undated) DEVICE — TOWEL,OR,DSP,ST,BLUE,STD,4/PK,20PK/CS: Brand: MEDLINE

## (undated) DEVICE — DRAPE C ARM W104XL188CM FLD MOB XR

## (undated) DEVICE — Device

## (undated) DEVICE — YANKAUER,BULB TIP,W/O VENT,RIGID,STERILE: Brand: MEDLINE